# Patient Record
Sex: FEMALE | Race: WHITE | URBAN - METROPOLITAN AREA
[De-identification: names, ages, dates, MRNs, and addresses within clinical notes are randomized per-mention and may not be internally consistent; named-entity substitution may affect disease eponyms.]

---

## 2017-02-25 ENCOUNTER — EMERGENCY (EMERGENCY)
Facility: HOSPITAL | Age: 82
LOS: 0 days | Discharge: HOME | End: 2017-02-25
Attending: EMERGENCY MEDICINE | Admitting: EMERGENCY MEDICINE
Payer: MEDICARE

## 2017-02-25 VITALS
OXYGEN SATURATION: 100 % | DIASTOLIC BLOOD PRESSURE: 87 MMHG | RESPIRATION RATE: 16 BRPM | SYSTOLIC BLOOD PRESSURE: 128 MMHG

## 2017-02-25 VITALS — WEIGHT: 121.92 LBS | HEIGHT: 62 IN

## 2017-02-25 DIAGNOSIS — Z91.09 OTHER ALLERGY STATUS, OTHER THAN TO DRUGS AND BIOLOGICAL SUBSTANCES: ICD-10-CM

## 2017-02-25 DIAGNOSIS — Z88.8 ALLERGY STATUS TO OTHER DRUGS, MEDICAMENTS AND BIOLOGICAL SUBSTANCES STATUS: ICD-10-CM

## 2017-02-25 DIAGNOSIS — Z88.1 ALLERGY STATUS TO OTHER ANTIBIOTIC AGENTS STATUS: ICD-10-CM

## 2017-02-25 DIAGNOSIS — R53.1 WEAKNESS: ICD-10-CM

## 2017-02-25 DIAGNOSIS — R06.02 SHORTNESS OF BREATH: ICD-10-CM

## 2017-02-25 DIAGNOSIS — Z88.0 ALLERGY STATUS TO PENICILLIN: ICD-10-CM

## 2017-02-25 DIAGNOSIS — R19.7 DIARRHEA, UNSPECIFIED: ICD-10-CM

## 2017-02-25 DIAGNOSIS — I10 ESSENTIAL (PRIMARY) HYPERTENSION: ICD-10-CM

## 2017-02-25 DIAGNOSIS — N39.0 URINARY TRACT INFECTION, SITE NOT SPECIFIED: ICD-10-CM

## 2017-02-25 DIAGNOSIS — R53.83 OTHER FATIGUE: ICD-10-CM

## 2017-02-25 LAB
ALBUMIN SERPL ELPH-MCNC: 4 G/DL — SIGNIFICANT CHANGE UP (ref 3.3–5)
ALP SERPL-CCNC: 62 U/L — SIGNIFICANT CHANGE UP (ref 40–120)
ALT FLD-CCNC: 22 U/L — SIGNIFICANT CHANGE UP (ref 12–78)
ANION GAP SERPL CALC-SCNC: 9 MMOL/L — SIGNIFICANT CHANGE UP (ref 5–17)
APPEARANCE UR: CLEAR — SIGNIFICANT CHANGE UP
APTT BLD: 28.3 SEC — SIGNIFICANT CHANGE UP (ref 27.5–37.4)
AST SERPL-CCNC: 21 U/L — SIGNIFICANT CHANGE UP (ref 15–37)
BACTERIA # UR AUTO: (no result)
BASOPHILS # BLD AUTO: 0.1 K/UL — SIGNIFICANT CHANGE UP (ref 0–0.2)
BASOPHILS NFR BLD AUTO: 1.4 % — SIGNIFICANT CHANGE UP (ref 0–2)
BILIRUB SERPL-MCNC: 1.5 MG/DL — HIGH (ref 0.2–1.2)
BILIRUB UR-MCNC: NEGATIVE — SIGNIFICANT CHANGE UP
BUN SERPL-MCNC: 10 MG/DL — SIGNIFICANT CHANGE UP (ref 7–23)
CALCIUM SERPL-MCNC: 9.8 MG/DL — SIGNIFICANT CHANGE UP (ref 8.5–10.1)
CHLORIDE SERPL-SCNC: 102 MMOL/L — SIGNIFICANT CHANGE UP (ref 96–108)
CO2 SERPL-SCNC: 27 MMOL/L — SIGNIFICANT CHANGE UP (ref 22–31)
COLOR SPEC: YELLOW — SIGNIFICANT CHANGE UP
CREAT SERPL-MCNC: 0.66 MG/DL — SIGNIFICANT CHANGE UP (ref 0.5–1.3)
DIFF PNL FLD: (no result)
EOSINOPHIL # BLD AUTO: 0 K/UL — SIGNIFICANT CHANGE UP (ref 0–0.5)
EOSINOPHIL NFR BLD AUTO: 0.2 % — SIGNIFICANT CHANGE UP (ref 0–6)
EPI CELLS # UR: SIGNIFICANT CHANGE UP
GLUCOSE SERPL-MCNC: 83 MG/DL — SIGNIFICANT CHANGE UP (ref 70–99)
GLUCOSE UR QL: NEGATIVE MG/DL — SIGNIFICANT CHANGE UP
HCT VFR BLD CALC: 39.5 % — SIGNIFICANT CHANGE UP (ref 34.5–45)
HGB BLD-MCNC: 14.1 G/DL — SIGNIFICANT CHANGE UP (ref 11.5–15.5)
INR BLD: 1.06 RATIO — SIGNIFICANT CHANGE UP (ref 0.88–1.16)
KETONES UR-MCNC: NEGATIVE — SIGNIFICANT CHANGE UP
LEUKOCYTE ESTERASE UR-ACNC: (no result)
LIDOCAIN IGE QN: 267 U/L — SIGNIFICANT CHANGE UP (ref 73–393)
LYMPHOCYTES # BLD AUTO: 2.3 K/UL — SIGNIFICANT CHANGE UP (ref 1–3.3)
LYMPHOCYTES # BLD AUTO: 36.6 % — SIGNIFICANT CHANGE UP (ref 13–44)
MAGNESIUM SERPL-MCNC: 2.1 MG/DL — SIGNIFICANT CHANGE UP (ref 1.8–2.4)
MCHC RBC-ENTMCNC: 30.9 PG — SIGNIFICANT CHANGE UP (ref 27–34)
MCHC RBC-ENTMCNC: 35.8 GM/DL — SIGNIFICANT CHANGE UP (ref 32–36)
MCV RBC AUTO: 86.4 FL — SIGNIFICANT CHANGE UP (ref 80–100)
MONOCYTES # BLD AUTO: 0.5 K/UL — SIGNIFICANT CHANGE UP (ref 0–0.9)
MONOCYTES NFR BLD AUTO: 7.8 % — SIGNIFICANT CHANGE UP (ref 2–14)
NEUTROPHILS # BLD AUTO: 3.4 K/UL — SIGNIFICANT CHANGE UP (ref 1.8–7.4)
NEUTROPHILS NFR BLD AUTO: 54 % — SIGNIFICANT CHANGE UP (ref 43–77)
NITRITE UR-MCNC: NEGATIVE — SIGNIFICANT CHANGE UP
NT-PROBNP SERPL-SCNC: 189 PG/ML — SIGNIFICANT CHANGE UP (ref 0–450)
PH UR: 8 — SIGNIFICANT CHANGE UP (ref 4.8–8)
PHOSPHATE SERPL-MCNC: 3 MG/DL — SIGNIFICANT CHANGE UP (ref 2.5–4.5)
PLATELET # BLD AUTO: 248 K/UL — SIGNIFICANT CHANGE UP (ref 150–400)
POTASSIUM SERPL-MCNC: 4.2 MMOL/L — SIGNIFICANT CHANGE UP (ref 3.5–5.3)
POTASSIUM SERPL-SCNC: 4.2 MMOL/L — SIGNIFICANT CHANGE UP (ref 3.5–5.3)
PROT SERPL-MCNC: 7.2 GM/DL — SIGNIFICANT CHANGE UP (ref 6–8.3)
PROT UR-MCNC: NEGATIVE MG/DL — SIGNIFICANT CHANGE UP
PROTHROM AB SERPL-ACNC: 11.7 SEC — SIGNIFICANT CHANGE UP (ref 10–13.1)
RBC # BLD: 4.57 M/UL — SIGNIFICANT CHANGE UP (ref 3.8–5.2)
RBC # FLD: 11.2 % — SIGNIFICANT CHANGE UP (ref 10.3–14.5)
RBC CASTS # UR COMP ASSIST: NEGATIVE /HPF — SIGNIFICANT CHANGE UP (ref 0–4)
SODIUM SERPL-SCNC: 138 MMOL/L — SIGNIFICANT CHANGE UP (ref 135–145)
SP GR SPEC: 1.01 — SIGNIFICANT CHANGE UP (ref 1.01–1.02)
TROPONIN I SERPL-MCNC: <0.015 NG/ML — SIGNIFICANT CHANGE UP (ref 0.01–0.04)
UROBILINOGEN FLD QL: NEGATIVE MG/DL — SIGNIFICANT CHANGE UP
WBC # BLD: 6.2 K/UL — SIGNIFICANT CHANGE UP (ref 3.8–10.5)
WBC # FLD AUTO: 6.2 K/UL — SIGNIFICANT CHANGE UP (ref 3.8–10.5)
WBC UR QL: SIGNIFICANT CHANGE UP

## 2017-02-25 PROCEDURE — 99285 EMERGENCY DEPT VISIT HI MDM: CPT

## 2017-02-25 PROCEDURE — 70450 CT HEAD/BRAIN W/O DYE: CPT | Mod: 26

## 2017-02-25 PROCEDURE — 71010: CPT | Mod: 26

## 2017-02-25 PROCEDURE — 74177 CT ABD & PELVIS W/CONTRAST: CPT | Mod: 26

## 2017-02-25 RX ORDER — NITROFURANTOIN MACROCRYSTAL 50 MG
1 CAPSULE ORAL
Qty: 20 | Refills: 0
Start: 2017-02-25 | End: 2017-03-07

## 2017-02-25 RX ORDER — NITROFURANTOIN MACROCRYSTAL 50 MG
100 CAPSULE ORAL ONCE
Qty: 0 | Refills: 0 | Status: DISCONTINUED | OUTPATIENT
Start: 2017-02-25 | End: 2017-02-25

## 2017-02-25 NOTE — ED PROVIDER NOTE - ENMT, MLM
Airway patent, Nasal mucosa clear. DMM. Throat has no vesicles, no oropharyngeal exudates and uvula is midline.

## 2017-02-25 NOTE — ED PROVIDER NOTE - PROGRESS NOTE DETAILS
patient reevaluated; awake and alert; ambulatory in ED with steady gait  results discussed with patient and family  patient at baseline  will tx for UTI, and patient to follow up with pmd

## 2017-02-25 NOTE — ED PROVIDER NOTE - NS ED MD SCRIBE ATTENDING SCRIBE SECTIONS
PAST MEDICAL/SURGICAL/SOCIAL HISTORY/REVIEW OF SYSTEMS/DISPOSITION/HISTORY OF PRESENT ILLNESS/PROGRESS NOTE/RESULTS/PHYSICAL EXAM

## 2017-02-25 NOTE — ED PROVIDER NOTE - OBJECTIVE STATEMENT
82 y/o female with a h/o HTN, HLD, hiatal hernia, BIB daughter, son, and daughter-in-law, p/w SOB, fatigue, and increased confusion x2 days. Pt has had increasing confusion and diarrhea x2 months. No nausea or vomiting. Pt has been under a lot of stress because she has been taking care of her ill  at home. No sick contacts or recent travel. Non-smoker, non-drinker, no illicit drug use. On crestor, metoprolol, baby aspirin and xanax. Dr. Pelaez (PMD).

## 2017-02-26 LAB
CULTURE RESULTS: SIGNIFICANT CHANGE UP
SPECIMEN SOURCE: SIGNIFICANT CHANGE UP

## 2019-05-22 ENCOUNTER — EMERGENCY (EMERGENCY)
Facility: HOSPITAL | Age: 84
LOS: 0 days | Discharge: HOME | End: 2019-05-22
Attending: EMERGENCY MEDICINE | Admitting: EMERGENCY MEDICINE
Payer: MEDICARE

## 2019-05-22 VITALS
HEART RATE: 69 BPM | OXYGEN SATURATION: 99 % | HEIGHT: 61 IN | WEIGHT: 139.99 LBS | DIASTOLIC BLOOD PRESSURE: 78 MMHG | TEMPERATURE: 98 F | SYSTOLIC BLOOD PRESSURE: 149 MMHG | RESPIRATION RATE: 16 BRPM

## 2019-05-22 VITALS
TEMPERATURE: 98 F | OXYGEN SATURATION: 98 % | DIASTOLIC BLOOD PRESSURE: 76 MMHG | RESPIRATION RATE: 17 BRPM | HEART RATE: 67 BPM | SYSTOLIC BLOOD PRESSURE: 149 MMHG

## 2019-05-22 DIAGNOSIS — Z90.49 ACQUIRED ABSENCE OF OTHER SPECIFIED PARTS OF DIGESTIVE TRACT: ICD-10-CM

## 2019-05-22 DIAGNOSIS — R07.89 OTHER CHEST PAIN: ICD-10-CM

## 2019-05-22 DIAGNOSIS — Z88.1 ALLERGY STATUS TO OTHER ANTIBIOTIC AGENTS STATUS: ICD-10-CM

## 2019-05-22 DIAGNOSIS — I49.1 ATRIAL PREMATURE DEPOLARIZATION: ICD-10-CM

## 2019-05-22 DIAGNOSIS — E78.5 HYPERLIPIDEMIA, UNSPECIFIED: ICD-10-CM

## 2019-05-22 DIAGNOSIS — F41.9 ANXIETY DISORDER, UNSPECIFIED: ICD-10-CM

## 2019-05-22 DIAGNOSIS — Z88.8 ALLERGY STATUS TO OTHER DRUGS, MEDICAMENTS AND BIOLOGICAL SUBSTANCES: ICD-10-CM

## 2019-05-22 DIAGNOSIS — I10 ESSENTIAL (PRIMARY) HYPERTENSION: ICD-10-CM

## 2019-05-22 DIAGNOSIS — Z88.0 ALLERGY STATUS TO PENICILLIN: ICD-10-CM

## 2019-05-22 DIAGNOSIS — K44.9 DIAPHRAGMATIC HERNIA WITHOUT OBSTRUCTION OR GANGRENE: ICD-10-CM

## 2019-05-22 DIAGNOSIS — K21.9 GASTRO-ESOPHAGEAL REFLUX DISEASE WITHOUT ESOPHAGITIS: ICD-10-CM

## 2019-05-22 LAB
ALBUMIN SERPL ELPH-MCNC: 3.8 G/DL — SIGNIFICANT CHANGE UP (ref 3.3–5)
ALP SERPL-CCNC: 89 U/L — SIGNIFICANT CHANGE UP (ref 40–120)
ALT FLD-CCNC: 20 U/L — SIGNIFICANT CHANGE UP (ref 12–78)
ANION GAP SERPL CALC-SCNC: 4 MMOL/L — LOW (ref 5–17)
AST SERPL-CCNC: 24 U/L — SIGNIFICANT CHANGE UP (ref 15–37)
BILIRUB SERPL-MCNC: 0.8 MG/DL — SIGNIFICANT CHANGE UP (ref 0.2–1.2)
BUN SERPL-MCNC: 18 MG/DL — SIGNIFICANT CHANGE UP (ref 7–23)
CALCIUM SERPL-MCNC: 9.7 MG/DL — SIGNIFICANT CHANGE UP (ref 8.5–10.1)
CHLORIDE SERPL-SCNC: 101 MMOL/L — SIGNIFICANT CHANGE UP (ref 96–108)
CO2 SERPL-SCNC: 28 MMOL/L — SIGNIFICANT CHANGE UP (ref 22–31)
CREAT SERPL-MCNC: 0.75 MG/DL — SIGNIFICANT CHANGE UP (ref 0.5–1.3)
GLUCOSE SERPL-MCNC: 101 MG/DL — HIGH (ref 70–99)
HCT VFR BLD CALC: 37.2 % — SIGNIFICANT CHANGE UP (ref 34.5–45)
HGB BLD-MCNC: 12.4 G/DL — SIGNIFICANT CHANGE UP (ref 11.5–15.5)
LIDOCAIN IGE QN: 259 U/L — SIGNIFICANT CHANGE UP (ref 73–393)
MCHC RBC-ENTMCNC: 30 PG — SIGNIFICANT CHANGE UP (ref 27–34)
MCHC RBC-ENTMCNC: 33.3 GM/DL — SIGNIFICANT CHANGE UP (ref 32–36)
MCV RBC AUTO: 89.9 FL — SIGNIFICANT CHANGE UP (ref 80–100)
NT-PROBNP SERPL-SCNC: 119 PG/ML — SIGNIFICANT CHANGE UP (ref 0–450)
PLATELET # BLD AUTO: 209 K/UL — SIGNIFICANT CHANGE UP (ref 150–400)
POTASSIUM SERPL-MCNC: 4.1 MMOL/L — SIGNIFICANT CHANGE UP (ref 3.5–5.3)
POTASSIUM SERPL-SCNC: 4.1 MMOL/L — SIGNIFICANT CHANGE UP (ref 3.5–5.3)
PROT SERPL-MCNC: 7.4 GM/DL — SIGNIFICANT CHANGE UP (ref 6–8.3)
RBC # BLD: 4.14 M/UL — SIGNIFICANT CHANGE UP (ref 3.8–5.2)
RBC # FLD: 13 % — SIGNIFICANT CHANGE UP (ref 10.3–14.5)
SODIUM SERPL-SCNC: 133 MMOL/L — LOW (ref 135–145)
TROPONIN I SERPL-MCNC: <0.015 NG/ML — SIGNIFICANT CHANGE UP (ref 0.01–0.04)
TROPONIN I SERPL-MCNC: <0.015 NG/ML — SIGNIFICANT CHANGE UP (ref 0.01–0.04)
WBC # BLD: 7.02 K/UL — SIGNIFICANT CHANGE UP (ref 3.8–10.5)
WBC # FLD AUTO: 7.02 K/UL — SIGNIFICANT CHANGE UP (ref 3.8–10.5)

## 2019-05-22 PROCEDURE — 71045 X-RAY EXAM CHEST 1 VIEW: CPT | Mod: 26

## 2019-05-22 PROCEDURE — 99284 EMERGENCY DEPT VISIT MOD MDM: CPT

## 2019-05-22 PROCEDURE — 93010 ELECTROCARDIOGRAM REPORT: CPT

## 2019-05-22 NOTE — ED PROVIDER NOTE - CARE PLAN
Principal Discharge DX:	Atypical chest pain  Secondary Diagnosis:	Hiatal hernia with GERD  Secondary Diagnosis:	Anxiety

## 2019-05-22 NOTE — ED ADULT TRIAGE NOTE - CHIEF COMPLAINT QUOTE
pt was at home when she began feeling overwhelmed ( her  just returned home from the hospital) got nauseous and grabbed her chest

## 2019-05-22 NOTE — ED PROVIDER NOTE - NSFOLLOWUPINSTRUCTIONS_ED_ALL_ED_FT
Continue current medications as per routine.  Avoid spicy, fried, greasy foods.  Avoid eating 2 hours before bedtime.  Follow up Dr. Pelaez: call office tomorrow for appointment.  Follow up with own GI doctor next week.      ED evaluation and management discussed with the patient and family (if available) in detail.  Close PMD follow up encouraged.  Strict ED return instructions discussed in detail and patient given the opportunity to ask any questions about their discharge diagnosis and instructions. Patient verbalized understanding.        Chest Pain    Chest pain can be caused by many different conditions which may or may not be dangerous. Causes include heartburn, lung infections, heart attack, blood clot in lungs, skin infections, strain or damage to muscle, cartilage, or bones, etc. In addition to a history and physical examination, an electrocardiogram (ECG) or other lab tests may have been performed to determine the cause of your chest pain. Follow up with your primary care provider or with a cardiologist as instructed.     SEEK IMMEDIATE MEDICAL CARE IF YOU HAVE ANY OF THE FOLLOWING SYMPTOMS: worsening chest pain, coughing up blood, unexplained back/neck/jaw pain, severe abdominal pain, dizziness or lightheadedness, fainting, shortness of breath, sweaty or clammy skin, vomiting, or racing heart beat. These symptoms may represent a serious problem that is an emergency. Do not wait to see if the symptoms will go away. Get medical help right away. Call 911 and do not drive yourself to the hospital. Continue current medications as per routine.  Avoid spicy, fried, greasy foods.  Avoid eating 2 hours before bedtime.  Follow up Dr. Pelaez: call office tomorrow for appointment.  Follow up with own GI doctor next week.      ED evaluation and management discussed with the patient and family (if available) in detail.  Close PMD follow up encouraged.  Strict ED return instructions discussed in detail and patient given the opportunity to ask any questions about their discharge diagnosis and instructions. Patient verbalized understanding.        Chest Pain    Chest pain can be caused by many different conditions which may or may not be dangerous. Causes include heartburn, lung infections, heart attack, blood clot in lungs, skin infections, strain or damage to muscle, cartilage, or bones, etc. In addition to a history and physical examination, an electrocardiogram (ECG) or other lab tests may have been performed to determine the cause of your chest pain. Follow up with your primary care provider or with a cardiologist as instructed.     SEEK IMMEDIATE MEDICAL CARE IF YOU HAVE ANY OF THE FOLLOWING SYMPTOMS: worsening chest pain, coughing up blood, unexplained back/neck/jaw pain, severe abdominal pain, dizziness or lightheadedness, fainting, shortness of breath, sweaty or clammy skin, vomiting, or racing heart beat. These symptoms may represent a serious problem that is an emergency. Do not wait to see if the symptoms will go away. Get medical help right away. Call 911 and do not drive yourself to the hospital.        Hiatal Hernia  Image   A hiatal hernia occurs when part of the stomach slides above the muscle that separates the abdomen from the chest (diaphragm). A person can be born with a hiatal hernia (congenital), or it may develop over time. In almost all cases of hiatal hernia, only the top part of the stomach pushes through the diaphragm.    Many people have a hiatal hernia with no symptoms. The larger the hernia, the more likely it is that you will have symptoms. In some cases, a hiatal hernia allows stomach acid to flow back into the tube that carries food from your mouth to your stomach (esophagus). This may cause heartburn symptoms. Severe heartburn symptoms may mean that you have developed a condition called gastroesophageal reflux disease (GERD).    What are the causes?  This condition is caused by a weakness in the opening (hiatus) where the esophagus passes through the diaphragm to attach to the upper part of the stomach. A person may be born with a weakness in the hiatus, or a weakness can develop over time.    What increases the risk?  This condition is more likely to develop in:  Older people. Age is a major risk factor for a hiatal hernia, especially if you are over the age of 50.  Pregnant women.  People who are overweight.  People who have frequent constipation.  What are the signs or symptoms?  Symptoms of this condition usually develop in the form of GERD symptoms. Symptoms include:  Heartburn.  Belching.  Indigestion.  Trouble swallowing.  Coughing or wheezing.  Sore throat.  Hoarseness.  Chest pain.  Nausea and vomiting.  How is this diagnosed?  This condition may be diagnosed during testing for GERD. Tests that may be done include:  X-rays of your stomach or chest.  An upper gastrointestinal (GI) series. This is an X-ray exam of your GI tract that is taken after you swallow a chalky liquid that shows up clearly on the X-ray.  Endoscopy. This is a procedure to look into your stomach using a thin, flexible tube that has a tiny camera and light on the end of it.  How is this treated?  This condition may be treated by:  Dietary and lifestyle changes to help reduce GERD symptoms.  Medicines. These may include:  Over-the-counter antacids.  Medicines that make your stomach empty more quickly.  Medicines that block the production of stomach acid (H2 blockers).  Stronger medicines to reduce stomach acid (proton pump inhibitors).  Surgery to repair the hernia, if other treatments are not helping.  If you have no symptoms, you may not need treatment.    Follow these instructions at home:  Lifestyle and activity     Do not use any products that contain nicotine or tobacco, such as cigarettes and e-cigarettes. If you need help quitting, ask your health care provider.  Try to achieve and maintain a healthy body weight.  Avoid putting pressure on your abdomen. Anything that puts pressure on your abdomen increases the amount of acid that may be pushed up into your esophagus.  Avoid bending over, especially after eating.  Raise the head of your bed by putting blocks under the legs. This keeps your head and esophagus higher than your stomach.  Do not wear tight clothing around your chest or stomach.  Try not to strain when having a bowel movement, when urinating, or when lifting heavy objects.  Eating and drinking     Avoid foods that can worsen GERD symptoms. These may include:  Fatty foods, like fried foods.  Citrus fruits, like oranges or lemon.  Other foods and drinks that contain acid, like orange juice or tomatoes.  Spicy food.  Chocolate.  Eat frequent small meals instead of three large meals a day. This helps prevent your stomach from getting too full.  Eat slowly.  Do not lie down right after eating.  Do not eat 1–2 hours before bed.  Do not drink beverages with caffeine. These include cola, coffee, cocoa, and tea.  Do not drink alcohol.  General instructions     Take over-the-counter and prescription medicines only as told by your health care provider.  Keep all follow-up visits as told by your health care provider. This is important.  Contact a health care provider if:  Your symptoms are not controlled with medicines or lifestyle changes.  You are having trouble swallowing.  You have coughing or wheezing that will not go away.  Get help right away if:  Your pain is getting worse.  Your pain spreads to your arms, neck, jaw, teeth, or back.  You have shortness of breath.  You sweat for no reason.  You feel sick to your stomach (nauseous) or you vomit.  You vomit blood.  You have bright red blood in your stools.  You have black, tarry stools.  This information is not intended to replace advice given to you by your health care provider. Make sure you discuss any questions you have with your health care provider.        Anxiety    Generalized anxiety disorder (MARLEY) is a mental disorder. It is defined as anxiety that is not necessarily related to specific events or activities or is out of proportion to said events. Symptoms include restlessness, fatigue, difficulty concentrations, irritability and difficulty concentrating. It may interfere with life functions, including relationships, work, and school. If you were started on a medication, make sure to take exactly as prescribed and follow up with a psychiatrist.    SEEK IMMEDIATE MEDICAL CARE IF YOU HAVE ANY OF THE FOLLOWING SYMPTOMS: thoughts about hurting killing yourself, thoughts about hurting or killing somebody else, hallucinations, or worsening depression.

## 2019-05-22 NOTE — ED PROVIDER NOTE - OBJECTIVE STATEMENT
82 y/o F with PMHx of HTN, HLD, Hernia, PAC's, s/p cholecystectomy presenting to the ED c/o burning of chest/epigastric region that started 2.5 hours sudden onset. Patient said she made dinner, and all of a sudden she could not catch her breath and had burning in chest/epigastric area that lasted 5 minutes, without recurrence. After the burning stopped patient was limp +Nauseous, +unable to vomit, +diaphoretic.  Patient has recently been under a lot of stress due to her  being in and out of the hospital. Patient is currently asymptomatic. Denies lightheadedness, SOB, CP . Allergic to Biaxin, Demerol, Doxycycline, Indocin, Penicillins, Tetracyclines, Verapamil  GI Dr. Goldberg   PCD: Dr. Pelaez

## 2019-05-22 NOTE — ED PROVIDER NOTE - CLINICAL SUMMARY MEDICAL DECISION MAKING FREE TEXT BOX
82 y/o F with PMHx of HTN, HLD, Hernia, PAC's, s/p cholecystectomy presenting to the ED BIBA c/o burning of chest/epigastric region that started 2.5 hours sudden onset, that was incurred when eating dinner with mustard. Episode lasted 5-10 minutes non recurrent. Patient is asymptomatic in the ED, but she is slightly anxious.  Plan: EKG, CXR, labs including BNP troponin, monitor, observe reassess and repeat troponin after 2130. 84 y/o F with PMHx of HTN, HLD, Hernia, PAC's, s/p cholecystectomy presenting to the ED BIBA c/o burning of chest/epigastric region that started 2.5 hours sudden onset, that was incurred when eating dinner with mustard. Episode lasted 5-10 minutes non recurrent. Patient is asymptomatic in the ED, but she is slightly anxious. Plan: EKG, CXR, labs including BNP troponin, monitor, observe reassess and repeat troponin after 2130.

## 2019-05-22 NOTE — ED ADULT NURSE REASSESSMENT NOTE - NS ED NURSE REASSESS COMMENT FT1
change of shift report received from NEDRA Santamaria for continuation of care. pt is awake and alert. sitting upright in stretcher, awaiting evaluation by MD Mahoney. pt stating that she made dinner, "salmon and I put a little mustard on it, then I felt burning." as per family pt began to sweat at that time. pt has been placed on the cardiac monitor. pt states that her  has recently been discharged from this hospital and she feels overwhelmed caring for him. no changes in diet habits. vital signs as charted. will continue to monitor.

## 2019-05-22 NOTE — ED PROVIDER NOTE - NEUROLOGICAL, MLM
Cranial Nerves II-XII intact. Alert and oriented, no focal deficits, no motor or sensory deficits. Cranial Nerves II-XII intact.

## 2019-05-23 PROBLEM — I10 ESSENTIAL (PRIMARY) HYPERTENSION: Chronic | Status: ACTIVE | Noted: 2017-02-27

## 2020-01-07 ENCOUNTER — INPATIENT (INPATIENT)
Facility: HOSPITAL | Age: 85
LOS: 1 days | Discharge: HOME | DRG: 69 | End: 2020-01-09
Attending: INTERNAL MEDICINE | Admitting: INTERNAL MEDICINE
Payer: MEDICARE

## 2020-01-07 VITALS
TEMPERATURE: 98 F | RESPIRATION RATE: 18 BRPM | HEART RATE: 72 BPM | SYSTOLIC BLOOD PRESSURE: 150 MMHG | OXYGEN SATURATION: 100 % | DIASTOLIC BLOOD PRESSURE: 77 MMHG | HEIGHT: 61 IN | WEIGHT: 145.95 LBS

## 2020-01-07 DIAGNOSIS — M79.602 PAIN IN LEFT ARM: ICD-10-CM

## 2020-01-07 DIAGNOSIS — I10 ESSENTIAL (PRIMARY) HYPERTENSION: ICD-10-CM

## 2020-01-07 DIAGNOSIS — G45.9 TRANSIENT CEREBRAL ISCHEMIC ATTACK, UNSPECIFIED: ICD-10-CM

## 2020-01-07 DIAGNOSIS — F32.9 MAJOR DEPRESSIVE DISORDER, SINGLE EPISODE, UNSPECIFIED: ICD-10-CM

## 2020-01-07 LAB
ALBUMIN SERPL ELPH-MCNC: 3.5 G/DL — SIGNIFICANT CHANGE UP (ref 3.3–5)
ALP SERPL-CCNC: 100 U/L — SIGNIFICANT CHANGE UP (ref 40–120)
ALT FLD-CCNC: 25 U/L — SIGNIFICANT CHANGE UP (ref 12–78)
ANION GAP SERPL CALC-SCNC: 6 MMOL/L — SIGNIFICANT CHANGE UP (ref 5–17)
APPEARANCE UR: CLEAR — SIGNIFICANT CHANGE UP
APTT BLD: 28 SEC — SIGNIFICANT CHANGE UP (ref 27.5–36.3)
AST SERPL-CCNC: 24 U/L — SIGNIFICANT CHANGE UP (ref 15–37)
BASOPHILS # BLD AUTO: 0.04 K/UL — SIGNIFICANT CHANGE UP (ref 0–0.2)
BASOPHILS NFR BLD AUTO: 0.9 % — SIGNIFICANT CHANGE UP (ref 0–2)
BILIRUB SERPL-MCNC: 0.6 MG/DL — SIGNIFICANT CHANGE UP (ref 0.2–1.2)
BILIRUB UR-MCNC: NEGATIVE — SIGNIFICANT CHANGE UP
BUN SERPL-MCNC: 15 MG/DL — SIGNIFICANT CHANGE UP (ref 7–23)
CALCIUM SERPL-MCNC: 9 MG/DL — SIGNIFICANT CHANGE UP (ref 8.5–10.1)
CHLORIDE SERPL-SCNC: 106 MMOL/L — SIGNIFICANT CHANGE UP (ref 96–108)
CO2 SERPL-SCNC: 26 MMOL/L — SIGNIFICANT CHANGE UP (ref 22–31)
COLOR SPEC: YELLOW — SIGNIFICANT CHANGE UP
CREAT SERPL-MCNC: 0.84 MG/DL — SIGNIFICANT CHANGE UP (ref 0.5–1.3)
DIFF PNL FLD: ABNORMAL
EOSINOPHIL # BLD AUTO: 0.11 K/UL — SIGNIFICANT CHANGE UP (ref 0–0.5)
EOSINOPHIL NFR BLD AUTO: 2.4 % — SIGNIFICANT CHANGE UP (ref 0–6)
GLUCOSE SERPL-MCNC: 106 MG/DL — HIGH (ref 70–99)
GLUCOSE UR QL: NEGATIVE MG/DL — SIGNIFICANT CHANGE UP
HCT VFR BLD CALC: 37 % — SIGNIFICANT CHANGE UP (ref 34.5–45)
HGB BLD-MCNC: 12.5 G/DL — SIGNIFICANT CHANGE UP (ref 11.5–15.5)
IMM GRANULOCYTES NFR BLD AUTO: 0.2 % — SIGNIFICANT CHANGE UP (ref 0–1.5)
INR BLD: 0.96 RATIO — SIGNIFICANT CHANGE UP (ref 0.88–1.16)
KETONES UR-MCNC: ABNORMAL
LEUKOCYTE ESTERASE UR-ACNC: ABNORMAL
LYMPHOCYTES # BLD AUTO: 1.77 K/UL — SIGNIFICANT CHANGE UP (ref 1–3.3)
LYMPHOCYTES # BLD AUTO: 38.5 % — SIGNIFICANT CHANGE UP (ref 13–44)
MAGNESIUM SERPL-MCNC: 1.8 MG/DL — SIGNIFICANT CHANGE UP (ref 1.6–2.6)
MCHC RBC-ENTMCNC: 29.8 PG — SIGNIFICANT CHANGE UP (ref 27–34)
MCHC RBC-ENTMCNC: 33.8 GM/DL — SIGNIFICANT CHANGE UP (ref 32–36)
MCV RBC AUTO: 88.3 FL — SIGNIFICANT CHANGE UP (ref 80–100)
MONOCYTES # BLD AUTO: 0.43 K/UL — SIGNIFICANT CHANGE UP (ref 0–0.9)
MONOCYTES NFR BLD AUTO: 9.3 % — SIGNIFICANT CHANGE UP (ref 2–14)
NEUTROPHILS # BLD AUTO: 2.24 K/UL — SIGNIFICANT CHANGE UP (ref 1.8–7.4)
NEUTROPHILS NFR BLD AUTO: 48.7 % — SIGNIFICANT CHANGE UP (ref 43–77)
NITRITE UR-MCNC: NEGATIVE — SIGNIFICANT CHANGE UP
PH UR: 6 — SIGNIFICANT CHANGE UP (ref 5–8)
PLATELET # BLD AUTO: 206 K/UL — SIGNIFICANT CHANGE UP (ref 150–400)
POTASSIUM SERPL-MCNC: 3.6 MMOL/L — SIGNIFICANT CHANGE UP (ref 3.5–5.3)
POTASSIUM SERPL-SCNC: 3.6 MMOL/L — SIGNIFICANT CHANGE UP (ref 3.5–5.3)
PROT SERPL-MCNC: 7 GM/DL — SIGNIFICANT CHANGE UP (ref 6–8.3)
PROT UR-MCNC: 15 MG/DL
PROTHROM AB SERPL-ACNC: 10.6 SEC — SIGNIFICANT CHANGE UP (ref 10–12.9)
RBC # BLD: 4.19 M/UL — SIGNIFICANT CHANGE UP (ref 3.8–5.2)
RBC # FLD: 13.1 % — SIGNIFICANT CHANGE UP (ref 10.3–14.5)
SODIUM SERPL-SCNC: 138 MMOL/L — SIGNIFICANT CHANGE UP (ref 135–145)
SP GR SPEC: 1.02 — SIGNIFICANT CHANGE UP (ref 1.01–1.02)
TROPONIN I SERPL-MCNC: <0.015 NG/ML — SIGNIFICANT CHANGE UP (ref 0.01–0.04)
UROBILINOGEN FLD QL: 1 MG/DL
VIT B12 SERPL-MCNC: 752 PG/ML — SIGNIFICANT CHANGE UP (ref 232–1245)
WBC # BLD: 4.6 K/UL — SIGNIFICANT CHANGE UP (ref 3.8–10.5)
WBC # FLD AUTO: 4.6 K/UL — SIGNIFICANT CHANGE UP (ref 3.8–10.5)

## 2020-01-07 PROCEDURE — 80053 COMPREHEN METABOLIC PANEL: CPT

## 2020-01-07 PROCEDURE — 70551 MRI BRAIN STEM W/O DYE: CPT | Mod: 26

## 2020-01-07 PROCEDURE — 72198 MR ANGIO PELVIS W/O & W/DYE: CPT | Mod: 26

## 2020-01-07 PROCEDURE — 72141 MRI NECK SPINE W/O DYE: CPT | Mod: 26

## 2020-01-07 PROCEDURE — 70544 MR ANGIOGRAPHY HEAD W/O DYE: CPT | Mod: 26,59

## 2020-01-07 PROCEDURE — 71045 X-RAY EXAM CHEST 1 VIEW: CPT | Mod: 26

## 2020-01-07 PROCEDURE — 85025 COMPLETE CBC W/AUTO DIFF WBC: CPT

## 2020-01-07 PROCEDURE — 70450 CT HEAD/BRAIN W/O DYE: CPT | Mod: 26

## 2020-01-07 PROCEDURE — 70492 CT SFT TSUE NCK W/O & W/DYE: CPT

## 2020-01-07 PROCEDURE — 80061 LIPID PANEL: CPT

## 2020-01-07 PROCEDURE — 70547 MR ANGIOGRAPHY NECK W/O DYE: CPT | Mod: 26

## 2020-01-07 PROCEDURE — 36415 COLL VENOUS BLD VENIPUNCTURE: CPT

## 2020-01-07 PROCEDURE — 82607 VITAMIN B-12: CPT

## 2020-01-07 PROCEDURE — 84443 ASSAY THYROID STIM HORMONE: CPT

## 2020-01-07 RX ORDER — ACETAMINOPHEN 500 MG
650 TABLET ORAL EVERY 6 HOURS
Refills: 0 | Status: DISCONTINUED | OUTPATIENT
Start: 2020-01-07 | End: 2020-01-09

## 2020-01-07 RX ORDER — PSYLLIUM SEED (WITH DEXTROSE)
1 POWDER (GRAM) ORAL DAILY
Refills: 0 | Status: DISCONTINUED | OUTPATIENT
Start: 2020-01-07 | End: 2020-01-09

## 2020-01-07 RX ORDER — ASPIRIN/CALCIUM CARB/MAGNESIUM 324 MG
81 TABLET ORAL DAILY
Refills: 0 | Status: DISCONTINUED | OUTPATIENT
Start: 2020-01-07 | End: 2020-01-09

## 2020-01-07 RX ORDER — DEXTROAMPHETAMINE SACCHARATE, AMPHETAMINE ASPARTATE, DEXTROAMPHETAMINE SULFATE AND AMPHETAMINE SULFATE 1.875; 1.875; 1.875; 1.875 MG/1; MG/1; MG/1; MG/1
10 TABLET ORAL DAILY
Refills: 0 | Status: DISCONTINUED | OUTPATIENT
Start: 2020-01-07 | End: 2020-01-09

## 2020-01-07 RX ORDER — ASPIRIN/CALCIUM CARB/MAGNESIUM 324 MG
325 TABLET ORAL DAILY
Refills: 0 | Status: DISCONTINUED | OUTPATIENT
Start: 2020-01-07 | End: 2020-01-07

## 2020-01-07 RX ORDER — LANOLIN ALCOHOL/MO/W.PET/CERES
3 CREAM (GRAM) TOPICAL AT BEDTIME
Refills: 0 | Status: DISCONTINUED | OUTPATIENT
Start: 2020-01-07 | End: 2020-01-09

## 2020-01-07 RX ORDER — PANTOPRAZOLE SODIUM 20 MG/1
40 TABLET, DELAYED RELEASE ORAL
Refills: 0 | Status: DISCONTINUED | OUTPATIENT
Start: 2020-01-07 | End: 2020-01-09

## 2020-01-07 RX ORDER — METOPROLOL TARTRATE 50 MG
25 TABLET ORAL DAILY
Refills: 0 | Status: DISCONTINUED | OUTPATIENT
Start: 2020-01-07 | End: 2020-01-09

## 2020-01-07 RX ORDER — ATORVASTATIN CALCIUM 80 MG/1
40 TABLET, FILM COATED ORAL AT BEDTIME
Refills: 0 | Status: DISCONTINUED | OUTPATIENT
Start: 2020-01-07 | End: 2020-01-09

## 2020-01-07 RX ADMIN — Medication 3 MILLIGRAM(S): at 22:40

## 2020-01-07 RX ADMIN — ATORVASTATIN CALCIUM 40 MILLIGRAM(S): 80 TABLET, FILM COATED ORAL at 22:22

## 2020-01-07 RX ADMIN — Medication 325 MILLIGRAM(S): at 04:42

## 2020-01-07 RX ADMIN — Medication 20 MILLIGRAM(S): at 13:30

## 2020-01-07 RX ADMIN — Medication 25 MILLIGRAM(S): at 13:31

## 2020-01-07 RX ADMIN — DEXTROAMPHETAMINE SACCHARATE, AMPHETAMINE ASPARTATE, DEXTROAMPHETAMINE SULFATE AND AMPHETAMINE SULFATE 10 MILLIGRAM(S): 1.875; 1.875; 1.875; 1.875 TABLET ORAL at 13:32

## 2020-01-07 RX ADMIN — PANTOPRAZOLE SODIUM 40 MILLIGRAM(S): 20 TABLET, DELAYED RELEASE ORAL at 16:59

## 2020-01-07 RX ADMIN — Medication 1 TABLET(S): at 13:30

## 2020-01-07 NOTE — H&P ADULT - HISTORY OF PRESENT ILLNESS
Pt is a 85 y/o/f  presents to the ED with children.  Pt with episode of waking up screaming, not making sense and difficulty standing.  Pt is home wiht  and aide.  Son said he called and pt was not making sense when she was talking.  Pt states that her left arm was bother her (has had numbness left arm/hand for 1 month).  pt states she was awake and wanted to talk, but words not coming out.  EMS called and by the time they arrived, she was back to normal.  Pt went to sleep at 9pm and woke at 2am with symptoms. Initially patient was screaming. then when son was called patient was talking gibberish.      Care discussed with the son on the phone. Patient denies any HA, CP, SOB. Feels more forgetful than ussual. Labs and vitals reviewed.

## 2020-01-07 NOTE — H&P ADULT - NSHPLABSRESULTS_GEN_ALL_CORE
CBC Full  -  ( 2020 03:52 )  WBC Count : 4.60 K/uL  RBC Count : 4.19 M/uL  Hemoglobin : 12.5 g/dL  Hematocrit : 37.0 %  Platelet Count - Automated : 206 K/uL  Mean Cell Volume : 88.3 fl  Mean Cell Hemoglobin : 29.8 pg  Mean Cell Hemoglobin Concentration : 33.8 gm/dL  Auto Neutrophil # : 2.24 K/uL  Auto Lymphocyte # : 1.77 K/uL  Auto Monocyte # : 0.43 K/uL  Auto Eosinophil # : 0.11 K/uL  Auto Basophil # : 0.04 K/uL  Auto Neutrophil % : 48.7 %  Auto Lymphocyte % : 38.5 %  Auto Monocyte % : 9.3 %  Auto Eosinophil % : 2.4 %  Auto Basophil % : 0.9 %    PT/INR - ( 2020 03:52 )   PT: 10.6 sec;   INR: 0.96 ratio         PTT - ( 2020 03:52 )  PTT:28.0 sec  Urinalysis Basic - ( 2020 03:52 )    Color: Yellow / Appearance: Clear / S.020 / pH: x  Gluc: x / Ketone: Small  / Bili: Negative / Urobili: 1 mg/dL   Blood: x / Protein: 15 mg/dL / Nitrite: Negative   Leuk Esterase: Moderate / RBC: 6-10 /HPF / WBC 11-25   Sq Epi: x / Non Sq Epi: Few / Bacteria: Few          138  |  106  |  15  ----------------------------<  106<H>  3.6   |  26  |  0.84    Ca    9.0      2020 03:52  Mg     1.8         TPro  7.0  /  Alb  3.5  /  TBili  0.6  /  DBili  x   /  AST  24  /  ALT  25  /  AlkPhos  100

## 2020-01-07 NOTE — ED PROVIDER NOTE - PROGRESS NOTE DETAILS
Attending Natali, neurology paged Attending HOMA Hurst/ar Rodriguez and agrees with plan for TIA workup Attending HOMA Hurst/w Dr. Rodriguez and agrees with plan for TIA workup  family updated on results and plan Attending HOMA Hurst/w Dr. Kim for admission

## 2020-01-07 NOTE — H&P ADULT - NSHPREVIEWOFSYSTEMS_GEN_ALL_CORE
REVIEW OF SYSTEMS:  General: NAD, hemodynamically stable   HEENT:  Eyes:  No visual loss, blurred vision, double vision or yellow sclerae. Ears, Nose, Throat:  No hearing loss, sneezing, congestion, runny nose or sore throat.  SKIN:  No rash or itching.  CARDIOVASCULAR:  No chest pain, chest pressure or chest discomfort. No palpitations or edema.  RESPIRATORY:  No shortness of breath, cough or sputum.  GASTROINTESTINAL:  No anorexia, nausea, vomiting or diarrhea. No abdominal pain or blood.  NEUROLOGICAL:  no confusion, more forgetful than ussual  MUSCULOSKELETAL:  No muscle, back pain, joint pain or stiffness.  HEMATOLOGIC:  No anemia, bleeding or bruising.  LYMPHATICS:  No enlarged nodes. No history of splenectomy.  ENDOCRINOLOGIC:  No reports of sweating, cold or heat intolerance. No polyuria or polydipsia.  ALLERGIES:  No history of asthma, hives, eczema or rhinitis.

## 2020-01-07 NOTE — ED ADULT NURSE NOTE - OBJECTIVE STATEMENT
Pt on stretcher, alert and oriented x 3.  Patient's family stated that she woke up screaming and unable to stand, and not making sense while speaking.  Patient is currently at baseline, able to ambulate and speaking clearly

## 2020-01-07 NOTE — ED ADULT TRIAGE NOTE - CHIEF COMPLAINT QUOTE
As per EMS patient's family stated that she woke up screaming and unable to stand, patient is currently at baseline, able to ambulate as per family. Patient A&Ox4.

## 2020-01-07 NOTE — ED PROVIDER NOTE - OBJECTIVE STATEMENT
83 yo pt presents to the ED with children.  Pt with episode of waking up screaming, not making sense and difficulty standing.  Pt is home wiht  and aide.  Son said he called and pt was not making sense when she was talking.  Pt states that her left arm was bother her (has had numbness left arm/hand for 1 month).  pt states she was awake and wanted to talk, but words not coming out.  EMS called and by the time they arrived, she was back to normal.  Pt went to sleep at 9pm and woke at 2am with symptoms.  PMD = Dr. Rao 83 yo pt presents to the ED with children.  Pt with episode of waking up screaming, not making sense and difficulty standing.  Pt is home wiht  and aide.  Son said he called and pt was not making sense when she was talking.  Pt states that her left arm was bother her (has had numbness left arm/hand for 1 month).  pt states she was awake and wanted to talk, but words not coming out.  EMS called and by the time they arrived, she was back to normal.  Pt went to sleep at 9pm and woke at 2am with symptoms.  PMD = Dr. Pelaez

## 2020-01-07 NOTE — CONSULT NOTE ADULT - SUBJECTIVE AND OBJECTIVE BOX
Patient is a 84y old  Female who presents with a chief complaint of change in speech (07 Jan 2020 11:20)      HPI:  Pt is a 83 y/o/f  presents to the ED after a possible neurological event. She reports that she woke up screaming from a dream (she dreamt that she was trying to get help and noone would help her). Her 's aide called her son and he could not make sense of anything that she was saying. He believes that she was speaking gibberish. By the time he arrived he felt that her speech was slurred.   She has been having intermittent numbness/tingling in the left arm for about one month.     She reports feeling more tired recently. Family is not aware of snoring.  she has been more forgetful as well but denies getting lost while driving, etc.      PAST MEDICAL & SURGICAL HISTORY:  Hernia, hiatal  Hypertension  No significant past surgical history      FAMILY HISTORY:      Social Hx:  Nonsmoker, no drug or alcohol use    MEDICATIONS  (STANDING):  amphetamine/dextroamphetamine 10 milliGRAM(s) Oral daily  aspirin enteric coated 81 milliGRAM(s) Oral daily  atorvastatin 40 milliGRAM(s) Oral at bedtime  metoprolol succinate ER 25 milliGRAM(s) Oral daily  multivitamin 1 Tablet(s) Oral daily  pantoprazole    Tablet 40 milliGRAM(s) Oral before breakfast  PARoxetine 20 milliGRAM(s) Oral daily  psyllium Powder 1 Packet(s) Oral daily       Allergies    Biaxin (Other)  Demerol HCl (Other)  doxycycline (Other)  Indocin (Other)  Originally Entered as [Unknown] reaction to [sulfur] (Unknown)  penicillins (Other)  tetracyclines (Unknown)  verapamil (Other)    Intolerances        ROS: Pertinent positives in HPI, all other ROS were reviewed and are negative.      Vital Signs Last 24 Hrs  T(C): 36.8 (07 Jan 2020 07:18), Max: 36.8 (07 Jan 2020 07:18)  T(F): 98.2 (07 Jan 2020 07:18), Max: 98.2 (07 Jan 2020 07:18)  HR: 71 (07 Jan 2020 09:00) (61 - 72)  BP: 144/88 (07 Jan 2020 09:00) (137/73 - 173/91)  BP(mean): 108 (07 Jan 2020 07:18) (108 - 108)  RR: 15 (07 Jan 2020 09:00) (15 - 18)  SpO2: 100% (07 Jan 2020 09:00) (100% - 100%)        Constitutional: awake and alert.  HEENT: PERRLA, EOMI,   Neck: Supple.  Respiratory: Breath sounds are clear bilaterally  Cardiovascular: S1 and S2, regular / irregular rhythm  Gastrointestinal: soft, nontender  Extremities:  no edema  Vascular: Caritid Bruit - no  Musculoskeletal: no joint swelling/tenderness, no abnormal movements  Skin: No rashes    Neurological exam:  HF: A x O x 3. Appropriately interactive, normal affect. Speech fluent, No Aphasia or paraphasic errors. Naming /repetition intact   CN: ISABELLE, EOMI, VFF, facial sensation normal, no NLFD, tongue midline, Palate moves equally, SCM equal bilaterally  Motor: No pronator drift, Strength 5/5 in all 4 ext, normal bulk and tone, no tremor, rigidity or bradykinesia.    Sens: Intact to light touch / PP/ VS/ JS    Reflexes: Symmetric and normal . BJ 2+, BR 2+, KJ 2+, AJ 2+, downgoing toes b/l  Coord:  No FNFA, dysmetria, SHADY intact   Gait/Balance: Normal/Cannot test    NIHSS:          Labs:   01-07    138  |  106  |  15  ----------------------------<  106<H>  3.6   |  26  |  0.84    Ca    9.0      07 Jan 2020 03:52  Mg     1.8     01-07    TPro  7.0  /  Alb  3.5  /  TBili  0.6  /  DBili  x   /  AST  24  /  ALT  25  /  AlkPhos  100  01-07                              12.5   4.60  )-----------( 206      ( 07 Jan 2020 03:52 )             37.0       Radiology:  - CT Head:  - MRI brain  -MRA brain/Carotids  - EEG    A/P:    No IV tpa given because…    -ASA/PLAVIX  -Atorvastatin  -DVT prophylaxis  -Dysphagia screen  -Speech and swallow eval  -PT eval/ rehab eval    Mangement d/w Pt / family /     Total Critical Care Time spent: Patient is a 84y old  Female who presents with a chief complaint of change in speech (07 Jan 2020 11:20)      HPI:  Pt is a 83 y/o/f  presents to the ED after a possible neurological event. She reports that she woke up screaming from a dream (she dreamt that she was trying to get help and noone would help her). Her 's aide called her son and he could not make sense of anything that she was saying. He believes that she was speaking gibberish. By the time he arrived he felt that her speech was slurred.   She has been having intermittent numbness/tingling in the left arm for about one month.     She reports feeling more tired recently. Family is not aware of snoring.  she has been more forgetful as well but denies getting lost while driving, etc.      PAST MEDICAL & SURGICAL HISTORY:  Hernia, hiatal  Hypertension  High triglycerides  Hysterectomy, oophorectomy, cholecystectomy      FAMILY HISTORY:  Father - MI  Mother - CHf    Social Hx:  Nonsmoker, no drug or alcohol use. Lives with .    MEDICATIONS  (STANDING):  amphetamine/dextroamphetamine 10 milliGRAM(s) Oral daily  aspirin enteric coated 81 milliGRAM(s) Oral daily  atorvastatin 40 milliGRAM(s) Oral at bedtime  metoprolol succinate ER 25 milliGRAM(s) Oral daily  multivitamin 1 Tablet(s) Oral daily  pantoprazole    Tablet 40 milliGRAM(s) Oral before breakfast  PARoxetine 20 milliGRAM(s) Oral daily  psyllium Powder 1 Packet(s) Oral daily       Allergies    Biaxin (Other)  Demerol HCl (Other)  doxycycline (Other)  Indocin (Other)  Originally Entered as [Unknown] reaction to [sulfur] (Unknown)  penicillins (Other)  tetracyclines (Unknown)  verapamil (Other)    Intolerances        ROS: Pertinent positives in HPI, all other ROS were reviewed and are negative.      Vital Signs Last 24 Hrs  T(C): 36.8 (07 Jan 2020 07:18), Max: 36.8 (07 Jan 2020 07:18)  T(F): 98.2 (07 Jan 2020 07:18), Max: 98.2 (07 Jan 2020 07:18)  HR: 71 (07 Jan 2020 09:00) (61 - 72)  BP: 144/88 (07 Jan 2020 09:00) (137/73 - 173/91)  BP(mean): 108 (07 Jan 2020 07:18) (108 - 108)  RR: 15 (07 Jan 2020 09:00) (15 - 18)  SpO2: 100% (07 Jan 2020 09:00) (100% - 100%)        Constitutional: awake and alert.  HEENT: PERRLA, EOMI,   Neck: Supple.  Respiratory: Breath sounds are clear bilaterally  Cardiovascular: S1 and S2, regular / irregular rhythm  Gastrointestinal: soft, nontender  Extremities:  no edema  Vascular: Carotid Bruit - no  Musculoskeletal: no joint swelling/tenderness, no abnormal movements  Skin: No rashes    Neurological exam:  HF: A x O x 3. Appropriately interactive, normal affect. Speech fluent, No Aphasia or paraphasic errors.  CN: ISABELLE, EOMI, VFF, facial sensation normal, no NLFD, tongue midline, Palate moves equally, SCM equal bilaterally  Motor: No pronator drift, Strength 5/5 in all 4 ext, normal bulk and tone, no tremor, rigidity or bradykinesia.    Sens: Intact to light touch   Reflexes: Symmetric and normal . BJ 2, BR 2, KJ 1+,  downgoing toes b/l  Coord:  No FNFA, dysmetria, SHADY intact                 Labs:   01-07    138  |  106  |  15  ----------------------------<  106<H>  3.6   |  26  |  0.84    Ca    9.0      07 Jan 2020 03:52  Mg     1.8     01-07    TPro  7.0  /  Alb  3.5  /  TBili  0.6  /  DBili  x   /  AST  24  /  ALT  25  /  AlkPhos  100  01-07                              12.5   4.60  )-----------( 206      ( 07 Jan 2020 03:52 )             37.0       Radiology:  CT brain 1/7/20:  No acute intracranial abnormality.

## 2020-01-07 NOTE — H&P ADULT - NSHPPHYSICALEXAM_GEN_ALL_CORE
Physical Exam:   GENERAL APPEARANCE:  NAD, hemodynamically stable  T(C): 36.8 (01-07-20 @ 07:18), Max: 36.8 (01-07-20 @ 07:18)  HR: 71 (01-07-20 @ 09:00) (61 - 72)  BP: 144/88 (01-07-20 @ 09:00) (137/73 - 173/91)  RR: 15 (01-07-20 @ 09:00) (15 - 18)  SpO2: 100% (01-07-20 @ 09:00) (100% - 100%)  Wt(kg): --  HEENT:  Head is normocephalic    Skin:  Warm and dry without any rash   NECK:  Supple without lymphadenopathy.   HEART:  Regular rate and rhythm. normal S1 and S2, No M/R/G  LUNGS:  Good ins/exp effort, no W/R/R/C  ABDOMEN:  Soft, nontender, nondistended with good bowel sounds heard  EXTREMITIES:  Without cyanosis, clubbing or edema.   NEUROLOGICAL:  Gross nonfocal

## 2020-01-08 ENCOUNTER — TRANSCRIPTION ENCOUNTER (OUTPATIENT)
Age: 85
End: 2020-01-08

## 2020-01-08 DIAGNOSIS — G45.9 TRANSIENT CEREBRAL ISCHEMIC ATTACK, UNSPECIFIED: ICD-10-CM

## 2020-01-08 LAB
ALBUMIN SERPL ELPH-MCNC: 3.3 G/DL — SIGNIFICANT CHANGE UP (ref 3.3–5)
ALP SERPL-CCNC: 83 U/L — SIGNIFICANT CHANGE UP (ref 40–120)
ALT FLD-CCNC: 25 U/L — SIGNIFICANT CHANGE UP (ref 12–78)
ANION GAP SERPL CALC-SCNC: 5 MMOL/L — SIGNIFICANT CHANGE UP (ref 5–17)
AST SERPL-CCNC: 21 U/L — SIGNIFICANT CHANGE UP (ref 15–37)
BASOPHILS # BLD AUTO: 0.03 K/UL — SIGNIFICANT CHANGE UP (ref 0–0.2)
BASOPHILS NFR BLD AUTO: 0.6 % — SIGNIFICANT CHANGE UP (ref 0–2)
BILIRUB SERPL-MCNC: 1 MG/DL — SIGNIFICANT CHANGE UP (ref 0.2–1.2)
BUN SERPL-MCNC: 11 MG/DL — SIGNIFICANT CHANGE UP (ref 7–23)
CALCIUM SERPL-MCNC: 9.5 MG/DL — SIGNIFICANT CHANGE UP (ref 8.5–10.1)
CHLORIDE SERPL-SCNC: 106 MMOL/L — SIGNIFICANT CHANGE UP (ref 96–108)
CHOLEST SERPL-MCNC: 182 MG/DL — SIGNIFICANT CHANGE UP (ref 10–199)
CO2 SERPL-SCNC: 26 MMOL/L — SIGNIFICANT CHANGE UP (ref 22–31)
CREAT SERPL-MCNC: 0.78 MG/DL — SIGNIFICANT CHANGE UP (ref 0.5–1.3)
CULTURE RESULTS: SIGNIFICANT CHANGE UP
EOSINOPHIL # BLD AUTO: 0.12 K/UL — SIGNIFICANT CHANGE UP (ref 0–0.5)
EOSINOPHIL NFR BLD AUTO: 2.5 % — SIGNIFICANT CHANGE UP (ref 0–6)
GLUCOSE SERPL-MCNC: 105 MG/DL — HIGH (ref 70–99)
HCT VFR BLD CALC: 38.5 % — SIGNIFICANT CHANGE UP (ref 34.5–45)
HDLC SERPL-MCNC: 41 MG/DL — LOW
HGB BLD-MCNC: 12.7 G/DL — SIGNIFICANT CHANGE UP (ref 11.5–15.5)
IMM GRANULOCYTES NFR BLD AUTO: 0.2 % — SIGNIFICANT CHANGE UP (ref 0–1.5)
LIPID PNL WITH DIRECT LDL SERPL: 76 MG/DL — SIGNIFICANT CHANGE UP
LYMPHOCYTES # BLD AUTO: 1.48 K/UL — SIGNIFICANT CHANGE UP (ref 1–3.3)
LYMPHOCYTES # BLD AUTO: 31.2 % — SIGNIFICANT CHANGE UP (ref 13–44)
MCHC RBC-ENTMCNC: 29.1 PG — SIGNIFICANT CHANGE UP (ref 27–34)
MCHC RBC-ENTMCNC: 33 GM/DL — SIGNIFICANT CHANGE UP (ref 32–36)
MCV RBC AUTO: 88.1 FL — SIGNIFICANT CHANGE UP (ref 80–100)
MONOCYTES # BLD AUTO: 0.41 K/UL — SIGNIFICANT CHANGE UP (ref 0–0.9)
MONOCYTES NFR BLD AUTO: 8.6 % — SIGNIFICANT CHANGE UP (ref 2–14)
NEUTROPHILS # BLD AUTO: 2.69 K/UL — SIGNIFICANT CHANGE UP (ref 1.8–7.4)
NEUTROPHILS NFR BLD AUTO: 56.9 % — SIGNIFICANT CHANGE UP (ref 43–77)
PLATELET # BLD AUTO: 206 K/UL — SIGNIFICANT CHANGE UP (ref 150–400)
POTASSIUM SERPL-MCNC: 3.9 MMOL/L — SIGNIFICANT CHANGE UP (ref 3.5–5.3)
POTASSIUM SERPL-SCNC: 3.9 MMOL/L — SIGNIFICANT CHANGE UP (ref 3.5–5.3)
PROT SERPL-MCNC: 6.7 GM/DL — SIGNIFICANT CHANGE UP (ref 6–8.3)
RBC # BLD: 4.37 M/UL — SIGNIFICANT CHANGE UP (ref 3.8–5.2)
RBC # FLD: 13 % — SIGNIFICANT CHANGE UP (ref 10.3–14.5)
SODIUM SERPL-SCNC: 137 MMOL/L — SIGNIFICANT CHANGE UP (ref 135–145)
SPECIMEN SOURCE: SIGNIFICANT CHANGE UP
TOTAL CHOLESTEROL/HDL RATIO MEASUREMENT: 4.4 RATIO — SIGNIFICANT CHANGE UP (ref 3.3–7.1)
TRIGL SERPL-MCNC: 325 MG/DL — HIGH (ref 10–149)
TSH SERPL-MCNC: 1.06 UU/ML — SIGNIFICANT CHANGE UP (ref 0.34–4.82)
WBC # BLD: 4.74 K/UL — SIGNIFICANT CHANGE UP (ref 3.8–10.5)
WBC # FLD AUTO: 4.74 K/UL — SIGNIFICANT CHANGE UP (ref 3.8–10.5)

## 2020-01-08 PROCEDURE — 99232 SBSQ HOSP IP/OBS MODERATE 35: CPT

## 2020-01-08 PROCEDURE — 70492 CT SFT TSUE NCK W/O & W/DYE: CPT | Mod: 26

## 2020-01-08 RX ORDER — ASPIRIN/CALCIUM CARB/MAGNESIUM 324 MG
1 TABLET ORAL
Qty: 0 | Refills: 0 | DISCHARGE

## 2020-01-08 RX ADMIN — Medication 81 MILLIGRAM(S): at 11:31

## 2020-01-08 RX ADMIN — Medication 650 MILLIGRAM(S): at 22:44

## 2020-01-08 RX ADMIN — PANTOPRAZOLE SODIUM 40 MILLIGRAM(S): 20 TABLET, DELAYED RELEASE ORAL at 05:53

## 2020-01-08 RX ADMIN — Medication 25 MILLIGRAM(S): at 05:53

## 2020-01-08 RX ADMIN — ATORVASTATIN CALCIUM 40 MILLIGRAM(S): 80 TABLET, FILM COATED ORAL at 21:14

## 2020-01-08 RX ADMIN — DEXTROAMPHETAMINE SACCHARATE, AMPHETAMINE ASPARTATE, DEXTROAMPHETAMINE SULFATE AND AMPHETAMINE SULFATE 10 MILLIGRAM(S): 1.875; 1.875; 1.875; 1.875 TABLET ORAL at 11:31

## 2020-01-08 RX ADMIN — Medication 20 MILLIGRAM(S): at 13:59

## 2020-01-08 RX ADMIN — Medication 1 PACKET(S): at 13:59

## 2020-01-08 RX ADMIN — Medication 1 TABLET(S): at 11:31

## 2020-01-08 RX ADMIN — Medication 3 MILLIGRAM(S): at 21:14

## 2020-01-08 RX ADMIN — Medication 650 MILLIGRAM(S): at 21:14

## 2020-01-08 NOTE — DISCHARGE NOTE PROVIDER - HOSPITAL COURSE
Pt is a 83 y/o/f  presents to the ED with children.  Pt with episode of waking up screaming, not making sense and difficulty standing.  Pt is home wiht  and aide.  Son said he called and pt was not making sense when she was talking.  Pt states that her left arm was bother her (has had numbness left arm/hand for 1 month).  pt states she was awake and wanted to talk, but words not coming out.  EMS called and by the time they arrived, she was back to normal.  Pt went to sleep at 9pm and woke at 2am with symptoms. Initially patient was screaming. then when son was called patient was talking gibberish.          MRI of the head -  not suggestive of acute strokes.         Physical Exam:     GENERAL APPEARANCE:  NAD, hemodynamically stable    T(C): 36.6 (01-08-20 @ 11:06), Max: 37.1 (01-07-20 @ 14:43)    HR: 74 (01-08-20 @ 11:06) (65 - 77)    BP: 115/61 (01-08-20 @ 11:06) (115/61 - 172/91)    RR: 16 (01-08-20 @ 11:06) (15 - 16)    SpO2: 98% (01-08-20 @ 11:06) (95% - 100%)    Wt(kg): --    HEENT:  Head is normocephalic      Skin:  Warm and dry without any rash     NECK:  Supple without lymphadenopathy.     HEART:  Regular rate and rhythm. normal S1 and S2, No M/R/G    LUNGS:  Good ins/exp effort, no W/R/R/C    ABDOMEN:  Soft, nontender, nondistended with good bowel sounds heard    EXTREMITIES:  Without cyanosis, clubbing or edema.     NEUROLOGICAL:  Gross nonfocal

## 2020-01-08 NOTE — DISCHARGE NOTE PROVIDER - CARE PROVIDER_API CALL
Jayde Rodriguez (MD)  Clinical Neurophysiology; EEGEpilepsy; Neurology; Sleep Medicine  [address] [facility], [place] [address]  Phone: [phone]  Fax: [phone]  Follow Up Time:

## 2020-01-08 NOTE — PROGRESS NOTE ADULT - ASSESSMENT
Ms. López is an 84 year old woman who had an episode of waking from sleep startled (? nightmare) who then had an episode of possible aphasia.  She has also had recent numbness/tingling in the left upper extremity and has recent memory issues.    -No acute changes seen on MRI brain. No suggestion of stroke. Cannot rule out TIA.  -Can increase aspirin from 81 mg/day to 325 mg/day  -Continue statin  -MRI cervical spine shows some foraminal stenosis which may be contributing  to radiculopathy. Can get PT as outpatient.  -Continues to have fatigue - TSH and vitamin B12 levels normal. Can consider outpatient sleep study. ? need for echo.    Will follow intermittently as needed.

## 2020-01-08 NOTE — DISCHARGE NOTE PROVIDER - NSDCCPTREATMENT_GEN_ALL_CORE_FT
PRINCIPAL PROCEDURE  Procedure: MRI brain  Findings and Treatment: Attenuation/narrowing at the mid and distal bilateral posterior cerebral arteries.  Artifact versus narrowing at the distal A1 and proximal A2 segment of the left anterior cerebral artery. Artifact versus narrowing at the left MCA bifurcation.

## 2020-01-08 NOTE — DISCHARGE NOTE PROVIDER - NSDCMRMEDTOKEN_GEN_ALL_CORE_FT
aspirin 325 mg oral delayed release tablet: 1 tab(s) orally once a day  B Complex 100: 1 tab(s) orally once a day  Benicar HCT 40 mg-12.5 mg oral tablet: 1 tab(s) orally once a day (in the morning)  dextroamphetamine-amphetamine 10 mg oral tablet: 1 tab(s) orally once a day (in the morning)  Metamucil 400 mg oral capsule: 2 cap(s) orally once a day  metoprolol succinate 25 mg oral tablet, extended release: 1 tab(s) orally once a day  Multiple Vitamins oral tablet: 1 tab(s) orally once a day  pantoprazole 40 mg oral delayed release tablet: 1 tab(s) orally once a day  PARoxetine 20 mg oral tablet: 1 tab(s) orally once a day  rosuvastatin 5 mg oral tablet: 1 tab(s) orally once a day  Vitamin D3 5000 intl units (125 mcg) oral tablet: 1 tab(s) orally every other day

## 2020-01-08 NOTE — PROGRESS NOTE ADULT - SUBJECTIVE AND OBJECTIVE BOX
Interval History:  20: No new complaints this morning. Continues to complain of fatigue.    MEDICATIONS  (STANDING):  amphetamine/dextroamphetamine 10 milliGRAM(s) Oral daily  aspirin enteric coated 81 milliGRAM(s) Oral daily  atorvastatin 40 milliGRAM(s) Oral at bedtime  melatonin 3 milliGRAM(s) Oral at bedtime  metoprolol succinate ER 25 milliGRAM(s) Oral daily  multivitamin 1 Tablet(s) Oral daily  pantoprazole    Tablet 40 milliGRAM(s) Oral before breakfast  PARoxetine 20 milliGRAM(s) Oral daily  psyllium Powder 1 Packet(s) Oral daily    MEDICATIONS  (PRN):  acetaminophen   Tablet .. 650 milliGRAM(s) Oral every 6 hours PRN Mild Pain (1 - 3)      Allergies    Biaxin (Other)  Demerol HCl (Other)  doxycycline (Other)  Indocin (Other)  Originally Entered as [Unknown] reaction to [sulfur] (Unknown)  penicillins (Other)  tetracyclines (Unknown)  verapamil (Other)    Intolerances        PHYSICAL EXAM:  Vital Signs Last 24 Hrs  T(F): 97.9 (20 @ 05:30)  HR: 70 (20 @ 06:57)  BP: 149/84 (20 @ 06:57)  RR: 16 (20 @ 05:30)    GENERAL: NAD, well-groomed, well-developed  HEAD:  Atraumatic, Normocephalic  Neuro:  Awake, alert, no aphasia  CN: PERRL, EOMI, no nystagmus, no facial weakness, tongue protrudes in the midline  motor: normal tone, no pronator drift, full strength in all four extremities  sensory: intact to light touch  coordination: finger to nose intact bilaterally  DTRs: symmetric, plantar responses flexor bilaterally    LABS:                        12.7   4.74  )-----------( 206      ( 2020 06:58 )             38.5     -    137  |  106  |  11  ----------------------------<  105<H>  3.9   |  26  |  0.78    Ca    9.5      2020 06:58  Mg     1.8     -    TPro  6.7  /  Alb  3.3  /  TBili  1.0  /  DBili  x   /  AST  21  /  ALT  25  /  AlkPhos  83  01-08    PT/INR - ( 2020 03:52 )   PT: 10.6 sec;   INR: 0.96 ratio         PTT - ( 2020 03:52 )  PTT:28.0 sec  Urinalysis Basic - ( 2020 03:52 )    Color: Yellow / Appearance: Clear / S.020 / pH: x  Gluc: x / Ketone: Small  / Bili: Negative / Urobili: 1 mg/dL   Blood: x / Protein: 15 mg/dL / Nitrite: Negative   Leuk Esterase: Moderate / RBC: 6-10 /HPF / WBC 11-25   Sq Epi: x / Non Sq Epi: Few / Bacteria: Few        RADIOLOGY & ADDITIONAL STUDIES:  CT head no contrast 20:  No acute intracranial abnormality.    MRI brain 20:  There is mild diffuse parenchymal volume loss. There are T2 prolongation signal abnormalities in the periventricular subcortical white matter likely related to mild chronic microvascular ischemic changes.    Small gradient susceptibility effect left frontal region which may be related to chronic microhemorrhage    There is no acute parenchymal hemorrhage, parenchymal mass, mass effect or midline shift. There is no extra-axial fluid collection.  There is no hydrocephalus.  There is no acute infarct.    Retention cyst/polyp right maxillary sinus.    IMPRESSION:  No acute intracranial hemorrhage or acute infarct.      MRA brain20:  No acute abnormality    MRA neck 20:  No acute abnormality    MR cervical spine 20:  1. C4-C5 level, a disc osteophyte, moderate spinal canal narrowing, chronic   cord impingement, no definite abnormal cord signal, moderate to severe   foraminal narrowing.   2. C5-C6 level, disc osteophyte, left greater than right, mild-to-moderate   spinal canal narrowing, left greater than right chronic cord impingement,   moderate to severe foraminal narrowing.

## 2020-01-08 NOTE — DISCHARGE NOTE PROVIDER - NSDCCPCAREPLAN_GEN_ALL_CORE_FT
PRINCIPAL DISCHARGE DIAGNOSIS  Diagnosis: TIA (transient ischemic attack)  Assessment and Plan of Treatment: - Aspirin 325 mg po qdaily  - Lipitor 40 mg po qdaily  - good blood pressure control with current medications  - TSH and B12 normal  - Close follow up with Neurology for further cognitive evaluation      SECONDARY DISCHARGE DIAGNOSES  Diagnosis: Abnormal MRI  Assessment and Plan of Treatment: - C4-C5 level, a disc osteophyte, moderate spinal canal narrowing, chronic cord impingement, no definite abnormal cord signal, moderate to severe foraminal narrowing.   - C5-C6 level, disc osteophyte,, left greater than right, mild-to-moderate   spinal canal narrowing, left greater than right chronic cord impingement, moderate to severe foraminal narrowing.  - ortho spine evaluation

## 2020-01-09 ENCOUNTER — TRANSCRIPTION ENCOUNTER (OUTPATIENT)
Age: 85
End: 2020-01-09

## 2020-01-09 VITALS
DIASTOLIC BLOOD PRESSURE: 56 MMHG | TEMPERATURE: 98 F | OXYGEN SATURATION: 96 % | RESPIRATION RATE: 17 BRPM | HEART RATE: 62 BPM | SYSTOLIC BLOOD PRESSURE: 156 MMHG

## 2020-01-09 PROCEDURE — 99223 1ST HOSP IP/OBS HIGH 75: CPT

## 2020-01-09 RX ADMIN — Medication 1 TABLET(S): at 13:14

## 2020-01-09 RX ADMIN — DEXTROAMPHETAMINE SACCHARATE, AMPHETAMINE ASPARTATE, DEXTROAMPHETAMINE SULFATE AND AMPHETAMINE SULFATE 10 MILLIGRAM(S): 1.875; 1.875; 1.875; 1.875 TABLET ORAL at 13:14

## 2020-01-09 RX ADMIN — Medication 20 MILLIGRAM(S): at 13:14

## 2020-01-09 RX ADMIN — Medication 25 MILLIGRAM(S): at 06:22

## 2020-01-09 RX ADMIN — PANTOPRAZOLE SODIUM 40 MILLIGRAM(S): 20 TABLET, DELAYED RELEASE ORAL at 06:22

## 2020-01-09 RX ADMIN — Medication 81 MILLIGRAM(S): at 13:14

## 2020-01-09 NOTE — PROGRESS NOTE ADULT - SUBJECTIVE AND OBJECTIVE BOX
Pt is a 85 y/o/f  presents to the ED with children.  Pt with episode of waking up screaming, not making sense and difficulty standing.  Pt is home wiht  and aide.  Son said he called and pt was not making sense when she was talking.  Pt states that her left arm was bother her (has had numbness left arm/hand for 1 month).  pt states she was awake and wanted to talk, but words not coming out.  EMS called and by the time they arrived, she was back to normal.  Pt went to sleep at 9pm and woke at 2am with symptoms. Initially patient was screaming. then when son was called patient was talking gibberish.      MRI of the head -  not suggestive of acute strokes.     1/9 - feels well. case d/w son at bedside    Physical Exam:     Vital Signs Last 24 Hrs  T(C): 36.7 (09 Jan 2020 10:54), Max: 36.7 (09 Jan 2020 10:54)  T(F): 98 (09 Jan 2020 10:54), Max: 98 (09 Jan 2020 10:54)  HR: 62 (09 Jan 2020 10:54) (62 - 68)  BP: 156/56 (09 Jan 2020 10:54) (145/55 - 156/56)  BP(mean): --  RR: 17 (09 Jan 2020 10:54) (17 - 17)  SpO2: 96% (09 Jan 2020 10:54) (95% - 96%)    HEENT:  Head is normocephalic    Skin:  Warm and dry without any rash   NECK:  Supple without lymphadenopathy.   HEART:  Regular rate and rhythm. normal S1 and S2, No M/R/G  LUNGS:  Good ins/exp effort, no W/R/R/C  ABDOMEN:  Soft, nontender, nondistended with good bowel sounds heard  EXTREMITIES:  Without cyanosis, clubbing or edema.   NEUROLOGICAL:  Gross nonfocal                               12.7   4.74  )-----------( 206      ( 08 Jan 2020 06:58 )             38.5     01-08    137  |  106  |  11  ----------------------------<  105<H>  3.9   |  26  |  0.78    Ca    9.5      08 Jan 2020 06:58    TPro  6.7  /  Alb  3.3  /  TBili  1.0  /  DBili  x   /  AST  21  /  ALT  25  /  AlkPhos  83  01-08        LIVER FUNCTIONS - ( 08 Jan 2020 06:58 )  Alb: 3.3 g/dL / Pro: 6.7 gm/dL / ALK PHOS: 83 U/L / ALT: 25 U/L / AST: 21 U/L / GGT: x               - plan for dc home today. see dc summary for details  - ct neck noted. no mass  - ortho spine eval noted no intervention at this time. plans to f/u as outpt for PT  - plan for outpt EMG with neuro     total time spent 35 mins

## 2020-01-09 NOTE — PROGRESS NOTE ADULT - SUBJECTIVE AND OBJECTIVE BOX
Interval History:  1/9/20: No new complaints at this time.  Denies neck pain. Has intermittent paresthesias in his left arm.     MEDICATIONS  (STANDING):  amphetamine/dextroamphetamine 10 milliGRAM(s) Oral daily  aspirin enteric coated 81 milliGRAM(s) Oral daily  atorvastatin 40 milliGRAM(s) Oral at bedtime  melatonin 3 milliGRAM(s) Oral at bedtime  metoprolol succinate ER 25 milliGRAM(s) Oral daily  multivitamin 1 Tablet(s) Oral daily  pantoprazole    Tablet 40 milliGRAM(s) Oral before breakfast  PARoxetine 20 milliGRAM(s) Oral daily  psyllium Powder 1 Packet(s) Oral daily    MEDICATIONS  (PRN):  acetaminophen   Tablet .. 650 milliGRAM(s) Oral every 6 hours PRN Mild Pain (1 - 3)      Allergies    Biaxin (Other)  Demerol HCl (Other)  doxycycline (Other)  Indocin (Other)  Originally Entered as [Unknown] reaction to [sulfur] (Unknown)  penicillins (Other)  tetracyclines (Unknown)  verapamil (Other)    Intolerances        PHYSICAL EXAM:  Vital Signs Last 24 Hrs  T(F): 97.6 (01-09-20 @ 05:48)  HR: 68 (01-09-20 @ 05:48)  BP: 153/80 (01-09-20 @ 05:48)  RR: 17 (01-09-20 @ 05:48)    GENERAL: NAD, well-groomed, well-developed  HEAD:  Atraumatic, Normocephalic  Neuro:  Awake, alert, no aphasia  CN: PERRL, EOMI, no nystagmus, no facial weakness, tongue protrudes in the midline  motor: normal tone, no pronator drift, full strength in all four extremities  sensory: intact to light touch  coordination: finger to nose intact bilaterally  DTRs: symmetric, plantar responses flexor bilaterally  + tinel's on left. + phalen's on left    LABS:                        12.7   4.74  )-----------( 206      ( 08 Jan 2020 06:58 )             38.5     01-08    137  |  106  |  11  ----------------------------<  105<H>  3.9   |  26  |  0.78    Ca    9.5      08 Jan 2020 06:58    TPro  6.7  /  Alb  3.3  /  TBili  1.0  /  DBili  x   /  AST  21  /  ALT  25  /  AlkPhos  83  01-08          RADIOLOGY & ADDITIONAL STUDIES:  CT head no contrast 1/7/20:  No acute intracranial abnormality.    MRI brain 1/7/20:  There is mild diffuse parenchymal volume loss. There are T2 prolongation signal abnormalities in the periventricular subcortical white matter likely related to mild chronic microvascular ischemic changes.    Small gradient susceptibility effect left frontal region which may be related to chronic microhemorrhage    There is no acute parenchymal hemorrhage, parenchymal mass, mass effect or midline shift. There is no extra-axial fluid collection.  There is no hydrocephalus.  There is no acute infarct.    Retention cyst/polyp right maxillary sinus.    IMPRESSION:  No acute intracranial hemorrhage or acute infarct.      MRA brain1/7/20:  No acute abnormality    MRA neck 1/7/20:  No acute abnormality    MR cervical spine 1/7/20:  1. C4-C5 level, a disc osteophyte, moderate spinal canal narrowing, chronic   cord impingement, no definite abnormal cord signal, moderate to severe   foraminal narrowing.   2. C5-C6 level, disc osteophyte, left greater than right, mild-to-moderate   spinal canal narrowing, left greater than right chronic cord impingement,   moderate to severe foraminal narrowing.

## 2020-01-09 NOTE — CONSULT NOTE ADULT - SUBJECTIVE AND OBJECTIVE BOX
[facility]                                                           Orthopedic Spine Consultation      HPI: 84 year old female seen resting in bed. Pt consulted for LUE numbness. Pt had an episode of waking from sleep startled questionable nightmare with an episode of possible aphasia. Pt c/o LUE numbness and tingling to all digits of the left hand since 5 months. Symptoms are stable since. Pt denies cervical pain and LUE weakness. Pt denies changes in bowel and bladder dysfunction. There are no known alleviating or aggravating factors.        PAST MEDICAL & SURGICAL HISTORY:  Hernia, hiatal  Hypertension    SOCIAL HISTORY:    REVIEW OF SYSTEMS:    CONSTITUTIONAL: No fever, weight loss, or fatigue  NECK: See HPI  GASTROINTESTINAL: No abdominal or epigastric pain. No nausea, vomiting, or hematemesis; No diarrhea or constipation. No melena or hematochezia.  GENITOURINARY: No dysuria, frequency, hematuria, or incontinence  NEUROLOGICAL: See HPI  MUSCULOSKELETAL: See HPI    Vital Signs Last 24 Hrs  T(C): 36.7 (09 Jan 2020 10:54), Max: 36.7 (09 Jan 2020 10:54)  T(F): 98 (09 Jan 2020 10:54), Max: 98 (09 Jan 2020 10:54)  HR: 62 (09 Jan 2020 10:54) (62 - 74)  BP: 156/56 (09 Jan 2020 10:54) (115/61 - 156/56)  BP(mean): --  RR: 17 (09 Jan 2020 10:54) (16 - 17)  SpO2: 96% (09 Jan 2020 10:54) (95% - 98%)  I&O's Detail      LABS:                        12.7   4.74  )-----------( 206      ( 08 Jan 2020 06:58 )             38.5     01-08    137  |  106  |  11  ----------------------------<  105<H>  3.9   |  26  |  0.78    Ca    9.5      08 Jan 2020 06:58    TPro  6.7  /  Alb  3.3  /  TBili  1.0  /  DBili  x   /  AST  21  /  ALT  25  /  AlkPhos  83  01-08          RADIOLOGY & ADDITIONAL STUDIES:    PHYSICAL EXAM:  Constitutional: NAD, well-groomed, well-developed  Extremities:   Vascular: peripheral pulses intact  Cervical-non-tender to palpation  Neurological: A/O x  3             Sensation: [X] intact to light touch  [ ] decreased:          Motor exam: [X ]          [X] Upper extremity    Delt      Bicp       Tri      Wrist ext  Wrst Flex       Digit Ext Digit Flex                                     R         5/5        5/5        5/5       5/5            5/5            5/5       5/5          5/5                                     L          5/5        5/5        5/5       5/5            5/5            5/5       5/5          5/5       Positive tinel and phalen test of the LUE         MRI cervical---Multilevel degenerative changes worst at C4-5 and C5-C6 with impingement of ventral cord without cord edema as described above. Multilevel neural foraminal narrowing as described above    Partially visualized signal in the right supraclavicular region possibly representing a enlarged lymph node. CT neck recommended if clinically warranted    C4-C5 level, a disc osteophyte, moderate spinal canal narrowing, chronic   cord impingement, no definite abnormal cord signal, moderate to severe   foraminal narrowing.   2. C5-C6 level, disc osteophyte,, left greater than right, mild-to-moderate   spinal canal narrowing, left greater than right chronic cord impingement,   moderate to severe foraminal narrowing.    A/P :  - LUE numbness  - F/u as outpatient for EMG of the upper extremities to eval CTS VS radiculopathy  - CT scan and MRI reviewed with patient  - No surgical indications at this time for the cervical spine. Motor and neuro intact  - Partially visualized signal in the right supraclavicular region eval per MEDICINE  - Sign off from spine stand point   - Possible TIA/ Aphasia eval per Neuro.   - Discussed case with Dr. Hill.

## 2020-01-09 NOTE — PROGRESS NOTE ADULT - ASSESSMENT
Ms. López is an 84 year old woman who had an episode of waking from sleep startled (? nightmare) who then had an episode of possible aphasia.  She has also had recent numbness/tingling in the left upper extremity and has recent memory issues.    -No acute changes seen on MRI brain. No suggestion of stroke. Cannot rule out TIA although it is possible that patient was just confused/disoriented upon awakening from night mare.  -Can increase aspirin from 81 mg/day to 325 mg/day  -Continue statin  -MRI cervical spine shows some foraminal stenosis which may be contributing  to radiculopathy. There is also cord impingement which is chronic. She denies neck pain. It is unclear if symptoms in left arm are related to radiculopathy vs median neuropathy. Can have outpatient EMG for clarification. She does have + Tinel's and Phalen's. Recommend use of wrist splint at bedtime.  -Continues to have fatigue - TSH and vitamin B12 levels normal. Can consider outpatient sleep study. ? need for echo.    Will follow intermittently as needed.

## 2020-01-09 NOTE — CONSULT NOTE ADULT - ASSESSMENT
[facility]                                                           Orthopedic Spine Consultation      HPI: 84 year old female seen resting in bed. Pt c/o LUE numbness and tingling to all digits of the left hand since 5 months. Symptoms are stable since. Pt denies cervical pain and LUE weakness. Pt denies changes in bowel and bladder dysfunction. There are no known alleviating or aggravating factors.       PAST MEDICAL & SURGICAL HISTORY:  Hernia, hiatal  Hypertension    SOCIAL HISTORY:    REVIEW OF SYSTEMS:    CONSTITUTIONAL: No fever, weight loss, or fatigue  NECK: See HPI  GASTROINTESTINAL: No abdominal or epigastric pain. No nausea, vomiting, or hematemesis; No diarrhea or constipation. No melena or hematochezia.  GENITOURINARY: No dysuria, frequency, hematuria, or incontinence  NEUROLOGICAL: See HPI  MUSCULOSKELETAL: See HPI    Vital Signs Last 24 Hrs  T(C): 36.7 (09 Jan 2020 10:54), Max: 36.7 (09 Jan 2020 10:54)  T(F): 98 (09 Jan 2020 10:54), Max: 98 (09 Jan 2020 10:54)  HR: 62 (09 Jan 2020 10:54) (62 - 74)  BP: 156/56 (09 Jan 2020 10:54) (115/61 - 156/56)  BP(mean): --  RR: 17 (09 Jan 2020 10:54) (16 - 17)  SpO2: 96% (09 Jan 2020 10:54) (95% - 98%)  I&O's Detail      LABS:                        12.7   4.74  )-----------( 206      ( 08 Jan 2020 06:58 )             38.5     01-08    137  |  106  |  11  ----------------------------<  105<H>  3.9   |  26  |  0.78    Ca    9.5      08 Jan 2020 06:58    TPro  6.7  /  Alb  3.3  /  TBili  1.0  /  DBili  x   /  AST  21  /  ALT  25  /  AlkPhos  83  01-08          RADIOLOGY & ADDITIONAL STUDIES:    PHYSICAL EXAM:  Constitutional: NAD, well-groomed, well-developed  Extremities:   Vascular: peripheral pulses intact  Cervical-non-tender to palpation  Neurological: A/O x  3             Sensation: [X] intact to light touch  [ ] decreased:          Motor exam: [X ]          [X] Upper extremity    Delt      Bicp       Tri      Wrist ext  Wrst Flex       Digit Ext Digit Flex                                     R         5/5        5/5        5/5       5/5            5/5            5/5       5/5          5/5                                     L          5/5        5/5        5/5       5/5            5/5            5/5       5/5          5/5       Positive tinel and phalen test of the LUE         MRI cervical---Multilevel degenerative changes worst at C4-5 and C5-C6 with impingement of ventral cord without cord edema as described above. Multilevel neural foraminal narrowing as described above    Partially visualized signal in the right supraclavicular region possibly representing a enlarged lymph node. CT neck recommended if clinically warranted    C4-C5 level, a disc osteophyte, moderate spinal canal narrowing, chronic   cord impingement, no definite abnormal cord signal, moderate to severe   foraminal narrowing.   2. C5-C6 level, disc osteophyte,, left greater than right, mild-to-moderate   spinal canal narrowing, left greater than right chronic cord impingement,   moderate to severe foraminal narrowing.    A/P :  - LUE numbness  - F/u as outpatient for EMG of the upper extremities to eval CTS VS radiculopathy  - CT scan and MRI reviewed with patient  - No surgical indications at this time for the cervical spine. Motor and neuro intact  - Partially visualized signal in the right supraclavicular region eval per MEDICINE  - Sign off from spine stand point   - Discussed case with Dr. Hill.
Ms. López is an 84 year old woman who had an episode of waking from sleep startled (? nightmare) who then had an episode of possible aphasia.  She has also had recent numbness/tingling in the left upper extremity and has recent memory issues.    -Admit for monitoring on telemetry.  -Will get MRI brain, MRA brain, MRA neck  -Continue aspirin for now. If it becomes more clear that this was in fact a vascular event may change aspirin to Plavix.  -Continue statin and can increase dose as tolerated.  -Check TSH and vitamin B12 to look for reversible causes of memory problems.  -Outpatient EMG for workup of arm paresthesias - possible carpal tunnel syndrome.

## 2020-01-09 NOTE — DISCHARGE NOTE NURSING/CASE MANAGEMENT/SOCIAL WORK - NURSING SECTION COMPLETE
Patient/Caregiver provided printed discharge information.
Attending Attestation (For Attendings USE Only)...

## 2020-01-09 NOTE — DISCHARGE NOTE NURSING/CASE MANAGEMENT/SOCIAL WORK - PATIENT PORTAL LINK FT
You can access the FollowMyHealth Patient Portal offered by [facility] by registering at the following website: [email or web address]. By joining [employer]’s FollowMyHealth portal, you will also be able to view your health information using other applications (apps) compatible with our system.

## 2020-01-09 NOTE — DISCHARGE NOTE NURSING/CASE MANAGEMENT/SOCIAL WORK - NSDCPEPTSTRK_GEN_ALL_CORE
Call 911 for stroke/Stroke warning signs and symptoms/Signs and symptoms of stroke/Need for follow up after discharge/Prescribed medications/Risk factors for stroke/Stroke education booklet/Stroke support groups for patients, families, and friends

## 2020-01-11 PROBLEM — K44.9 DIAPHRAGMATIC HERNIA WITHOUT OBSTRUCTION OR GANGRENE: Chronic | Status: ACTIVE | Noted: 2020-01-07

## 2020-01-13 DIAGNOSIS — I10 ESSENTIAL (PRIMARY) HYPERTENSION: ICD-10-CM

## 2020-01-13 DIAGNOSIS — R47.01 APHASIA: ICD-10-CM

## 2020-01-13 DIAGNOSIS — R40.2412 GLASGOW COMA SCALE SCORE 13-15, AT ARRIVAL TO EMERGENCY DEPARTMENT: ICD-10-CM

## 2020-01-13 DIAGNOSIS — M79.602 PAIN IN LEFT ARM: ICD-10-CM

## 2020-01-13 DIAGNOSIS — F32.9 MAJOR DEPRESSIVE DISORDER, SINGLE EPISODE, UNSPECIFIED: ICD-10-CM

## 2020-01-13 DIAGNOSIS — G45.9 TRANSIENT CEREBRAL ISCHEMIC ATTACK, UNSPECIFIED: ICD-10-CM

## 2020-01-13 DIAGNOSIS — R20.2 PARESTHESIA OF SKIN: ICD-10-CM

## 2020-01-13 DIAGNOSIS — R20.0 ANESTHESIA OF SKIN: ICD-10-CM

## 2020-01-13 DIAGNOSIS — Z88.0 ALLERGY STATUS TO PENICILLIN: ICD-10-CM

## 2020-01-13 DIAGNOSIS — Z88.8 ALLERGY STATUS TO OTHER DRUGS, MEDICAMENTS AND BIOLOGICAL SUBSTANCES STATUS: ICD-10-CM

## 2020-02-13 ENCOUNTER — APPOINTMENT (OUTPATIENT)
Dept: NEUROLOGY | Facility: CLINIC | Age: 85
End: 2020-02-13

## 2020-05-26 ENCOUNTER — OUTPATIENT (OUTPATIENT)
Dept: OUTPATIENT SERVICES | Facility: HOSPITAL | Age: 85
LOS: 1 days | End: 2020-05-26
Payer: MEDICARE

## 2020-05-26 DIAGNOSIS — I25.10 ATHEROSCLEROTIC HEART DISEASE OF NATIVE CORONARY ARTERY WITHOUT ANGINA PECTORIS: ICD-10-CM

## 2020-05-26 DIAGNOSIS — Z01.818 ENCOUNTER FOR OTHER PREPROCEDURAL EXAMINATION: ICD-10-CM

## 2020-05-26 LAB
ANION GAP SERPL CALC-SCNC: 3 MMOL/L — LOW (ref 5–17)
BASOPHILS # BLD AUTO: 0.03 K/UL — SIGNIFICANT CHANGE UP (ref 0–0.2)
BASOPHILS NFR BLD AUTO: 0.6 % — SIGNIFICANT CHANGE UP (ref 0–2)
BUN SERPL-MCNC: 12 MG/DL — SIGNIFICANT CHANGE UP (ref 7–23)
CALCIUM SERPL-MCNC: 9.9 MG/DL — SIGNIFICANT CHANGE UP (ref 8.5–10.1)
CHLORIDE SERPL-SCNC: 110 MMOL/L — HIGH (ref 96–108)
CO2 SERPL-SCNC: 28 MMOL/L — SIGNIFICANT CHANGE UP (ref 22–31)
CREAT SERPL-MCNC: 0.76 MG/DL — SIGNIFICANT CHANGE UP (ref 0.5–1.3)
EOSINOPHIL # BLD AUTO: 0.09 K/UL — SIGNIFICANT CHANGE UP (ref 0–0.5)
EOSINOPHIL NFR BLD AUTO: 1.9 % — SIGNIFICANT CHANGE UP (ref 0–6)
GLUCOSE SERPL-MCNC: 93 MG/DL — SIGNIFICANT CHANGE UP (ref 70–99)
HCT VFR BLD CALC: 33.5 % — LOW (ref 34.5–45)
HGB BLD-MCNC: 11.1 G/DL — LOW (ref 11.5–15.5)
IMM GRANULOCYTES NFR BLD AUTO: 0.4 % — SIGNIFICANT CHANGE UP (ref 0–1.5)
LYMPHOCYTES # BLD AUTO: 1.92 K/UL — SIGNIFICANT CHANGE UP (ref 1–3.3)
LYMPHOCYTES # BLD AUTO: 40.8 % — SIGNIFICANT CHANGE UP (ref 13–44)
MCHC RBC-ENTMCNC: 29.5 PG — SIGNIFICANT CHANGE UP (ref 27–34)
MCHC RBC-ENTMCNC: 33.1 GM/DL — SIGNIFICANT CHANGE UP (ref 32–36)
MCV RBC AUTO: 89.1 FL — SIGNIFICANT CHANGE UP (ref 80–100)
MONOCYTES # BLD AUTO: 0.44 K/UL — SIGNIFICANT CHANGE UP (ref 0–0.9)
MONOCYTES NFR BLD AUTO: 9.3 % — SIGNIFICANT CHANGE UP (ref 2–14)
NEUTROPHILS # BLD AUTO: 2.21 K/UL — SIGNIFICANT CHANGE UP (ref 1.8–7.4)
NEUTROPHILS NFR BLD AUTO: 47 % — SIGNIFICANT CHANGE UP (ref 43–77)
PLATELET # BLD AUTO: 185 K/UL — SIGNIFICANT CHANGE UP (ref 150–400)
POTASSIUM SERPL-MCNC: 4.1 MMOL/L — SIGNIFICANT CHANGE UP (ref 3.5–5.3)
POTASSIUM SERPL-SCNC: 4.1 MMOL/L — SIGNIFICANT CHANGE UP (ref 3.5–5.3)
RBC # BLD: 3.76 M/UL — LOW (ref 3.8–5.2)
RBC # FLD: 13.2 % — SIGNIFICANT CHANGE UP (ref 10.3–14.5)
SODIUM SERPL-SCNC: 141 MMOL/L — SIGNIFICANT CHANGE UP (ref 135–145)
WBC # BLD: 4.71 K/UL — SIGNIFICANT CHANGE UP (ref 3.8–10.5)
WBC # FLD AUTO: 4.71 K/UL — SIGNIFICANT CHANGE UP (ref 3.8–10.5)

## 2020-05-26 PROCEDURE — 36415 COLL VENOUS BLD VENIPUNCTURE: CPT

## 2020-05-26 PROCEDURE — U0003: CPT

## 2020-05-26 PROCEDURE — 85025 COMPLETE CBC W/AUTO DIFF WBC: CPT

## 2020-05-26 PROCEDURE — 80048 BASIC METABOLIC PNL TOTAL CA: CPT

## 2020-05-26 RX ORDER — ROSUVASTATIN CALCIUM 5 MG/1
1 TABLET ORAL
Qty: 0 | Refills: 0 | DISCHARGE

## 2020-05-26 RX ORDER — CHOLECALCIFEROL (VITAMIN D3) 125 MCG
1 CAPSULE ORAL
Qty: 0 | Refills: 0 | DISCHARGE

## 2020-05-26 RX ORDER — PSYLLIUM SEED (WITH DEXTROSE)
2 POWDER (GRAM) ORAL
Qty: 0 | Refills: 0 | DISCHARGE

## 2020-05-26 RX ORDER — METOPROLOL TARTRATE 50 MG
1 TABLET ORAL
Qty: 0 | Refills: 0 | DISCHARGE

## 2020-05-27 DIAGNOSIS — Z01.818 ENCOUNTER FOR OTHER PREPROCEDURAL EXAMINATION: ICD-10-CM

## 2020-05-27 LAB — SARS-COV-2 RNA SPEC QL NAA+PROBE: SIGNIFICANT CHANGE UP

## 2020-05-27 NOTE — H&P ADULT - NSHPREVIEWOFSYSTEMS_GEN_ALL_CORE
General: Pt denies recent weight loss/fever/chills    Neurological: denies numbness or  sensation loss    Cardiovascular: denies chest pain/palpitations/leg edema    Respiratory and Thorax: denies cough/wheezing (+) SOB    Gastrointestinal: denies abdominal pain/diarrhea/constipation/bloody stool    Genitourinary: denies urinary frequency/urgency/ dysuria    Musculoskeletal: (+) chronic leg pain d/t arthritis    Hematologic: denies abnormal bleeding

## 2020-05-27 NOTE — H&P ADULT - NSICDXPASTSURGICALHX_GEN_ALL_CORE_FT
PAST SURGICAL HISTORY:  No significant past surgical history PAST SURGICAL HISTORY:  S/P appendectomy     S/P hysterectomy     S/P tonsillectomy

## 2020-05-27 NOTE — H&P ADULT - ASSESSMENT
84 y.o female with PMHx of HTN, hyperparathyroidism, arthritis, TIA, GERD with c/o SOB and increased fatigue referred for cardiac cath with possible PCI.     ASA class:  Cr:  GFR:  Bleeding risk: 84 y.o female with PMHx of HTN, hyperparathyroidism, arthritis, TIA, GERD with c/o SOB and increased fatigue referred for cardiac cath with possible PCI.     ASA class: II  Cr: 0.76  GFR:72  Bleeding risk: 3.7%

## 2020-05-27 NOTE — H&P ADULT - NSHPPHYSICALEXAM_GEN_ALL_CORE
Vital Signs : /70       HR  59     RR 16 O2 sat 97% in RA                Constitutional: well developed, well nourished, no deformities and no acute distress    Neurological: Alert & Oriented x 3, MESSER, no focal deficits    HEENT: NC/AT, PERRLA, EOMI,  Neck supple.    Respiratory: CTA B/L, No wheezing/crackles/rhonchi    Cardiovascular: (+) S1 & S2, RRR, No m/r/g    Gastrointestinal: soft, NT, nondistended, (+) BS    Genitourinary: non distended bladder, voiding freely    Extremities: No pedal edema, No clubbing, No cyanosis    Skin:  normal skin color and pigmentation, no skin lesions

## 2020-05-27 NOTE — H&P ADULT - HISTORY OF PRESENT ILLNESS
84 y.o female with PMHx of HTN, hyperparathyroidism, arthritis, TIA, GERD with c/o SOB and increased fatigue referred for cardiac cath with possible PCI. 84 y.o female with PMHx of HTN, hyperparathyroidism, arthritis, TIA, GERD with c/o SOB and increased fatigue referred for cardiac cath with possible PCI.   COVID-19 PCR: Ollie (26 May 2020 16:18)

## 2020-05-28 ENCOUNTER — OUTPATIENT (OUTPATIENT)
Dept: OUTPATIENT SERVICES | Facility: HOSPITAL | Age: 85
LOS: 1 days | Discharge: HOME | End: 2020-05-28
Payer: MEDICARE

## 2020-05-28 VITALS — WEIGHT: 145.06 LBS | HEIGHT: 61 IN

## 2020-05-28 DIAGNOSIS — R06.02 SHORTNESS OF BREATH: ICD-10-CM

## 2020-05-28 DIAGNOSIS — Z90.89 ACQUIRED ABSENCE OF OTHER ORGANS: Chronic | ICD-10-CM

## 2020-05-28 DIAGNOSIS — I25.10 ATHEROSCLEROTIC HEART DISEASE OF NATIVE CORONARY ARTERY WITHOUT ANGINA PECTORIS: ICD-10-CM

## 2020-05-28 DIAGNOSIS — Z90.710 ACQUIRED ABSENCE OF BOTH CERVIX AND UTERUS: Chronic | ICD-10-CM

## 2020-05-28 DIAGNOSIS — Z90.49 ACQUIRED ABSENCE OF OTHER SPECIFIED PARTS OF DIGESTIVE TRACT: Chronic | ICD-10-CM

## 2020-05-28 PROCEDURE — 86900 BLOOD TYPING SEROLOGIC ABO: CPT

## 2020-05-28 PROCEDURE — C1887: CPT

## 2020-05-28 PROCEDURE — C1769: CPT

## 2020-05-28 PROCEDURE — C1725: CPT

## 2020-05-28 PROCEDURE — 80048 BASIC METABOLIC PNL TOTAL CA: CPT

## 2020-05-28 PROCEDURE — C1894: CPT

## 2020-05-28 PROCEDURE — C1753: CPT

## 2020-05-28 PROCEDURE — C1874: CPT

## 2020-05-28 PROCEDURE — 86850 RBC ANTIBODY SCREEN: CPT

## 2020-05-28 PROCEDURE — C9600: CPT | Mod: LD

## 2020-05-28 PROCEDURE — 85027 COMPLETE CBC AUTOMATED: CPT

## 2020-05-28 PROCEDURE — 93010 ELECTROCARDIOGRAM REPORT: CPT

## 2020-05-28 PROCEDURE — 86901 BLOOD TYPING SEROLOGIC RH(D): CPT

## 2020-05-28 PROCEDURE — C1760: CPT

## 2020-05-28 PROCEDURE — 36415 COLL VENOUS BLD VENIPUNCTURE: CPT

## 2020-05-28 PROCEDURE — 93005 ELECTROCARDIOGRAM TRACING: CPT

## 2020-05-28 PROCEDURE — 93458 L HRT ARTERY/VENTRICLE ANGIO: CPT | Mod: XU

## 2020-05-28 RX ORDER — PANTOPRAZOLE SODIUM 20 MG/1
40 TABLET, DELAYED RELEASE ORAL DAILY
Refills: 0 | Status: DISCONTINUED | OUTPATIENT
Start: 2020-05-28 | End: 2020-05-29

## 2020-05-28 RX ORDER — CLOPIDOGREL BISULFATE 75 MG/1
75 TABLET, FILM COATED ORAL DAILY
Refills: 0 | Status: DISCONTINUED | OUTPATIENT
Start: 2020-05-29 | End: 2020-05-29

## 2020-05-28 RX ORDER — HYDRALAZINE HCL 50 MG
10 TABLET ORAL ONCE
Refills: 0 | Status: COMPLETED | OUTPATIENT
Start: 2020-05-28 | End: 2020-05-28

## 2020-05-28 RX ORDER — ASPIRIN/CALCIUM CARB/MAGNESIUM 324 MG
325 TABLET ORAL ONCE
Refills: 0 | Status: COMPLETED | OUTPATIENT
Start: 2020-05-28 | End: 2020-05-28

## 2020-05-28 RX ORDER — METOPROLOL TARTRATE 50 MG
50 TABLET ORAL DAILY
Refills: 0 | Status: DISCONTINUED | OUTPATIENT
Start: 2020-05-28 | End: 2020-05-29

## 2020-05-28 RX ORDER — CLOPIDOGREL BISULFATE 75 MG/1
600 TABLET, FILM COATED ORAL ONCE
Refills: 0 | Status: COMPLETED | OUTPATIENT
Start: 2020-05-28 | End: 2020-05-28

## 2020-05-28 RX ORDER — DEXTROAMPHETAMINE SACCHARATE, AMPHETAMINE ASPARTATE, DEXTROAMPHETAMINE SULFATE AND AMPHETAMINE SULFATE 1.875; 1.875; 1.875; 1.875 MG/1; MG/1; MG/1; MG/1
10 TABLET ORAL DAILY
Refills: 0 | Status: DISCONTINUED | OUTPATIENT
Start: 2020-05-28 | End: 2020-05-29

## 2020-05-28 RX ORDER — ISOSORBIDE MONONITRATE 60 MG/1
30 TABLET, EXTENDED RELEASE ORAL DAILY
Refills: 0 | Status: DISCONTINUED | OUTPATIENT
Start: 2020-05-28 | End: 2020-05-29

## 2020-05-28 RX ORDER — HYDROCHLOROTHIAZIDE 25 MG
12.5 TABLET ORAL DAILY
Refills: 0 | Status: DISCONTINUED | OUTPATIENT
Start: 2020-05-29 | End: 2020-05-29

## 2020-05-28 RX ORDER — LOSARTAN POTASSIUM 100 MG/1
50 TABLET, FILM COATED ORAL DAILY
Refills: 0 | Status: DISCONTINUED | OUTPATIENT
Start: 2020-05-28 | End: 2020-05-29

## 2020-05-28 RX ORDER — ATORVASTATIN CALCIUM 80 MG/1
80 TABLET, FILM COATED ORAL AT BEDTIME
Refills: 0 | Status: DISCONTINUED | OUTPATIENT
Start: 2020-05-28 | End: 2020-05-29

## 2020-05-28 RX ORDER — HYDROCHLOROTHIAZIDE 25 MG
12.5 TABLET ORAL ONCE
Refills: 0 | Status: DISCONTINUED | OUTPATIENT
Start: 2020-05-28 | End: 2020-05-28

## 2020-05-28 RX ORDER — SODIUM CHLORIDE 9 MG/ML
1000 INJECTION INTRAMUSCULAR; INTRAVENOUS; SUBCUTANEOUS
Refills: 0 | Status: DISCONTINUED | OUTPATIENT
Start: 2020-05-28 | End: 2020-05-29

## 2020-05-28 RX ORDER — ASPIRIN/CALCIUM CARB/MAGNESIUM 324 MG
325 TABLET ORAL DAILY
Refills: 0 | Status: DISCONTINUED | OUTPATIENT
Start: 2020-05-28 | End: 2020-05-29

## 2020-05-28 RX ORDER — LOSARTAN POTASSIUM 100 MG/1
50 TABLET, FILM COATED ORAL ONCE
Refills: 0 | Status: DISCONTINUED | OUTPATIENT
Start: 2020-05-28 | End: 2020-05-28

## 2020-05-28 RX ADMIN — ISOSORBIDE MONONITRATE 30 MILLIGRAM(S): 60 TABLET, EXTENDED RELEASE ORAL at 17:17

## 2020-05-28 RX ADMIN — Medication 10 MILLIGRAM(S): at 12:25

## 2020-05-28 RX ADMIN — Medication 1 TABLET(S): at 17:18

## 2020-05-28 RX ADMIN — CLOPIDOGREL BISULFATE 600 MILLIGRAM(S): 75 TABLET, FILM COATED ORAL at 11:45

## 2020-05-28 RX ADMIN — SODIUM CHLORIDE 75 MILLILITER(S): 9 INJECTION INTRAMUSCULAR; INTRAVENOUS; SUBCUTANEOUS at 12:00

## 2020-05-28 RX ADMIN — LOSARTAN POTASSIUM 50 MILLIGRAM(S): 100 TABLET, FILM COATED ORAL at 17:18

## 2020-05-28 RX ADMIN — Medication 50 MILLIGRAM(S): at 17:18

## 2020-05-28 RX ADMIN — Medication 325 MILLIGRAM(S): at 18:17

## 2020-05-28 RX ADMIN — Medication 325 MILLIGRAM(S): at 11:45

## 2020-05-28 RX ADMIN — ATORVASTATIN CALCIUM 80 MILLIGRAM(S): 80 TABLET, FILM COATED ORAL at 21:48

## 2020-05-28 RX ADMIN — Medication 20 MILLIGRAM(S): at 21:48

## 2020-05-28 RX ADMIN — PANTOPRAZOLE SODIUM 40 MILLIGRAM(S): 20 TABLET, DELAYED RELEASE ORAL at 17:18

## 2020-05-28 NOTE — PROGRESS NOTE ADULT - SUBJECTIVE AND OBJECTIVE BOX
Cath Lab Nurse Practitioner Note  HPI:  84 y.o female with PMHx of HTN, hyperparathyroidism, arthritis, TIA, GERD with c/o SOB and increased fatigue referred for cardiac cath with possible PCI.   COVID-19 PCR: NotDetec (26 May 2020 16:18)    s/p LHC : UZMA to LAD  Pt denies chest pain/SOB/palpitations post cath.    Physical exam:  Vital Signs Last 24 Hrs  T(C): 36.6 (28 May 2020 08:03), Max: 36.6 (28 May 2020 08:03)  T(F): 97.8 (28 May 2020 08:03), Max: 97.8 (28 May 2020 08:03)  HR: 56 (28 May 2020 11:45) (56 - 58)  BP: 173/82 (28 May 2020 11:45) (169/76 - 173/82)  BP(mean): 102 (28 May 2020 08:03) (102 - 102)  RR: 16 (28 May 2020 11:45) (16 - 18)  SpO2: 92% (28 May 2020 11:45) (92% - 96%)  Neuro: A&Ox3  Cardiac : (+)S1S2, RRR  Lungs: CTA B/L  Abd: soft, NT, (+)BS  Ext: Rt groin (+) perclose closure device , 2+ Rt DP    Labs:                        11.1   4.71  )-----------( 185      ( 26 May 2020 16:18 )             33.5     05-26    141  |  110<H>  |  12  ----------------------------<  93  4.1   |  28  |  0.76    Ca    9.9      26 May 2020 16:18        MEDICATIONS  (STANDING):  aspirin 325 milliGRAM(s) Oral Once  clopidogrel Tablet 600 milliGRAM(s) Oral Once  sodium chloride 0.9%. 1000 milliLiter(s) (75 mL/Hr) IV Continuous <Continuous>      A/P : CAD, s/p UZMA to LAD  -IV hydration  -continue ASA/plavix/b-blocker/statin  -Discussed therapeutic lifestyle changes to reduce risk factors such as following a cardiac diet, weight loss, maintaining a healthy weight, exercise, smoking cessation, medication compliance, and regular follow-up  with MD to know our numbers (BP, cholesterol, weight, and glucose)  -d/c planning later today if pt remains stable after OOB  -f/u with Dr. Yates in 1week  -Plan d/w pt/Dr. Stubbs. Cath Lab Nurse Practitioner Note  HPI:  84 y.o female with PMHx of HTN, hyperparathyroidism, arthritis, TIA, GERD with c/o SOB and increased fatigue referred for cardiac cath with possible PCI.   COVID-19 PCR: NotDetec (26 May 2020 16:18)    s/p LHC : UZMA to LAD  Pt denies chest pain/SOB/palpitations post cath.    Physical exam:  Vital Signs Last 24 Hrs  T(C): 36.6 (28 May 2020 08:03), Max: 36.6 (28 May 2020 08:03)  T(F): 97.8 (28 May 2020 08:03), Max: 97.8 (28 May 2020 08:03)  HR: 56 (28 May 2020 11:45) (56 - 58)  BP: 173/82 (28 May 2020 11:45) (169/76 - 173/82)  BP(mean): 102 (28 May 2020 08:03) (102 - 102)  RR: 16 (28 May 2020 11:45) (16 - 18)  SpO2: 92% (28 May 2020 11:45) (92% - 96%)  Neuro: A&Ox3  Cardiac : (+)S1S2, RRR  Lungs: CTA B/L  Abd: soft, NT, (+)BS  Ext: Rt groin (+) perclose closure device , 2+ Rt DP    Labs:                        11.1   4.71  )-----------( 185      ( 26 May 2020 16:18 )             33.5     05-26    141  |  110<H>  |  12  ----------------------------<  93  4.1   |  28  |  0.76    Ca    9.9      26 May 2020 16:18        MEDICATIONS  (STANDING):  aspirin 325 milliGRAM(s) Oral Once  clopidogrel Tablet 600 milliGRAM(s) Oral Once  sodium chloride 0.9%. 1000 milliLiter(s) (75 mL/Hr) IV Continuous <Continuous>      A/P : CAD, s/p UZMA to LAD  -IV hydration  -continue ASA/plavix/b-blocker/statin  -Discussed therapeutic lifestyle changes to reduce risk factors such as following a cardiac diet, weight loss, maintaining a healthy weight, exercise, smoking cessation, medication compliance, and regular follow-up  with MD to know our numbers (BP, cholesterol, weight, and glucose)  -check EKG/Labs/site in AM  -d/c home tomorrow if remains stable.  -f/u with Dr. Yates in 1week  -Plan d/w pt/Dr. Stubbs.

## 2020-05-29 ENCOUNTER — TRANSCRIPTION ENCOUNTER (OUTPATIENT)
Age: 85
End: 2020-05-29

## 2020-05-29 VITALS
SYSTOLIC BLOOD PRESSURE: 129 MMHG | OXYGEN SATURATION: 96 % | HEART RATE: 57 BPM | TEMPERATURE: 98 F | RESPIRATION RATE: 18 BRPM | DIASTOLIC BLOOD PRESSURE: 57 MMHG

## 2020-05-29 LAB
ANION GAP SERPL CALC-SCNC: 7 MMOL/L — SIGNIFICANT CHANGE UP (ref 5–17)
BUN SERPL-MCNC: 12 MG/DL — SIGNIFICANT CHANGE UP (ref 7–23)
CALCIUM SERPL-MCNC: 9.6 MG/DL — SIGNIFICANT CHANGE UP (ref 8.5–10.1)
CHLORIDE SERPL-SCNC: 107 MMOL/L — SIGNIFICANT CHANGE UP (ref 96–108)
CO2 SERPL-SCNC: 23 MMOL/L — SIGNIFICANT CHANGE UP (ref 22–31)
CREAT SERPL-MCNC: 0.77 MG/DL — SIGNIFICANT CHANGE UP (ref 0.5–1.3)
GLUCOSE SERPL-MCNC: 105 MG/DL — HIGH (ref 70–99)
HCT VFR BLD CALC: 35.8 % — SIGNIFICANT CHANGE UP (ref 34.5–45)
HGB BLD-MCNC: 12.1 G/DL — SIGNIFICANT CHANGE UP (ref 11.5–15.5)
MCHC RBC-ENTMCNC: 29.6 PG — SIGNIFICANT CHANGE UP (ref 27–34)
MCHC RBC-ENTMCNC: 33.8 GM/DL — SIGNIFICANT CHANGE UP (ref 32–36)
MCV RBC AUTO: 87.5 FL — SIGNIFICANT CHANGE UP (ref 80–100)
PLATELET # BLD AUTO: 181 K/UL — SIGNIFICANT CHANGE UP (ref 150–400)
POTASSIUM SERPL-MCNC: 3.9 MMOL/L — SIGNIFICANT CHANGE UP (ref 3.5–5.3)
POTASSIUM SERPL-SCNC: 3.9 MMOL/L — SIGNIFICANT CHANGE UP (ref 3.5–5.3)
RBC # BLD: 4.09 M/UL — SIGNIFICANT CHANGE UP (ref 3.8–5.2)
RBC # FLD: 13.5 % — SIGNIFICANT CHANGE UP (ref 10.3–14.5)
SODIUM SERPL-SCNC: 137 MMOL/L — SIGNIFICANT CHANGE UP (ref 135–145)
WBC # BLD: 6.96 K/UL — SIGNIFICANT CHANGE UP (ref 3.8–10.5)
WBC # FLD AUTO: 6.96 K/UL — SIGNIFICANT CHANGE UP (ref 3.8–10.5)

## 2020-05-29 PROCEDURE — 93010 ELECTROCARDIOGRAM REPORT: CPT

## 2020-05-29 RX ORDER — CLOPIDOGREL BISULFATE 75 MG/1
1 TABLET, FILM COATED ORAL
Qty: 30 | Refills: 7 | DISCHARGE
Start: 2020-05-29 | End: 2021-01-23

## 2020-05-29 RX ORDER — CLOPIDOGREL BISULFATE 75 MG/1
1 TABLET, FILM COATED ORAL
Qty: 30 | Refills: 7
Start: 2020-05-29 | End: 2021-01-23

## 2020-05-29 RX ORDER — LOSARTAN POTASSIUM 100 MG/1
1 TABLET, FILM COATED ORAL
Qty: 30 | Refills: 5
Start: 2020-05-29 | End: 2020-11-24

## 2020-05-29 RX ORDER — AMLODIPINE BESYLATE 2.5 MG/1
1 TABLET ORAL
Qty: 30 | Refills: 2
Start: 2020-05-29 | End: 2020-08-26

## 2020-05-29 RX ADMIN — LOSARTAN POTASSIUM 50 MILLIGRAM(S): 100 TABLET, FILM COATED ORAL at 05:51

## 2020-05-29 RX ADMIN — PANTOPRAZOLE SODIUM 40 MILLIGRAM(S): 20 TABLET, DELAYED RELEASE ORAL at 11:23

## 2020-05-29 RX ADMIN — DEXTROAMPHETAMINE SACCHARATE, AMPHETAMINE ASPARTATE, DEXTROAMPHETAMINE SULFATE AND AMPHETAMINE SULFATE 10 MILLIGRAM(S): 1.875; 1.875; 1.875; 1.875 TABLET ORAL at 11:22

## 2020-05-29 RX ADMIN — CLOPIDOGREL BISULFATE 75 MILLIGRAM(S): 75 TABLET, FILM COATED ORAL at 11:22

## 2020-05-29 RX ADMIN — Medication 1 TABLET(S): at 11:23

## 2020-05-29 RX ADMIN — Medication 12.5 MILLIGRAM(S): at 05:51

## 2020-05-29 RX ADMIN — Medication 50 MILLIGRAM(S): at 11:23

## 2020-05-29 RX ADMIN — Medication 325 MILLIGRAM(S): at 11:22

## 2020-05-29 RX ADMIN — ISOSORBIDE MONONITRATE 30 MILLIGRAM(S): 60 TABLET, EXTENDED RELEASE ORAL at 11:23

## 2020-05-29 NOTE — PROGRESS NOTE ADULT - ASSESSMENT
84 y.o female with PMHx of HTN, hyperparathyroidism, arthritis, TIA, GERD with c/o SOB and increased fatigue referred for cardiac cath with possible PCI.   COVID-19 PCR: NotDetec (26 May 2020 16:18) (27 May 2020     -Encourage PO fluids  -ASA 81mg   -Plavix 75mg  -Lipitor 80mg   -Toprol XL 50mg   -Lipitor 80mg   -HCTZ 12.5mg  -Imdur 30mg   -Losartan 50mg    -Pt. to be discharged home today  -Post cath discharge instructions reviewed with pt., pt. verbalizes understands instructions   -Cardiac rehab deferred at this time due to Covid-19 pandemic    -Plan of care D/W pt. and MD  -Discussed therapeutic lifestyle changes to reduce risk factors such as following a cardiac diet, weight loss, maintaining a healthy weight, exercise, smoking cessation, medication compliance, and regular follow-up  with MD to know our numbers (BP, cholesterol, weight, and glucose  -Follow-up with attending/cardiologist

## 2020-05-29 NOTE — DISCHARGE NOTE PROVIDER - HOSPITAL COURSE
84 y.o female with PMHx of HTN, hyperparathyroidism, arthritis, TIA, GERD with c/o SOB and increased fatigue referred for cardiac cath with possible PCI.     COVID-19 PCR: NotDetec (26 May 2020 16:18)    S/P [facility] S/P UZMA to LAD    < from: Cardiac Cath Lab - Adult (05.28.20 @ 10:49) >     Dominance: Right     Impression     Diagnostic Conclusions     One Vessel coronary artery disease (LAD) .     Normal LV systolic function. Estimated LV ejection fraction is 65 %.     No aortic valve stenosis.     Elevated left ventricular end-diastolic pressure.     Systemic hypertension.     Interventional Conclusions     Successful Coronary Intervention UZMA of proximal LAD.     Recommendations     Percutaneous coronary intervention of LAD today - symptomatic stenosis.     Aggressive medical management of coronary artery disease and its     underlying risk factors.     If renal function and electrolytes permit add diuretic for hypertension     and elevated LVEDP.

## 2020-05-29 NOTE — DISCHARGE NOTE PROVIDER - NSDCMRMEDTOKEN_GEN_ALL_CORE_FT
aspirin 325 mg oral delayed release tablet: 1 tab(s) orally once a day  B Complex 100: 1 tab(s) orally once a day  Benicar HCT 40 mg-12.5 mg oral tablet: 1 tab(s) orally once a day (in the morning)  calcium:   dextroamphetamine-amphetamine 10 mg oral tablet: 1 tab(s) orally once a day (in the morning)  isosorbide mononitrate 30 mg oral tablet, extended release: 1 tab(s) orally once a day (in the morning)  Metamucil:   metoprolol succinate 25 mg oral tablet, extended release: 2 tab(s) orally once a day  Multiple Vitamins oral tablet: 1 tab(s) orally once a day  Omega-3 oral capsule:   pantoprazole 40 mg oral delayed release tablet: 1 tab(s) orally once a day  PARoxetine 20 mg oral tablet: 1 tab(s) orally once a day (at bedtime)  rosuvastatin 40 mg oral tablet: 1 tab(s) orally once a day  Vitamin C 1000 mg oral tablet: 1 tab(s) orally once a day  Vitamin D2:

## 2020-05-29 NOTE — DISCHARGE NOTE NURSING/CASE MANAGEMENT/SOCIAL WORK - PATIENT PORTAL LINK FT
You can access the FollowMyHealth Patient Portal offered by [facility] by registering at the following website: [email or web address]. By joining [employer]’s FollowMyHealth portal, you will also be able to view your health information using other applications (apps) compatible with our system.

## 2020-05-29 NOTE — DISCHARGE NOTE PROVIDER - NSDCCPCAREPLAN_GEN_ALL_CORE_FT
PRINCIPAL DISCHARGE DIAGNOSIS  Diagnosis: CAD (coronary artery disease)  Assessment and Plan of Treatment: Pt. will remain free from chest pain

## 2020-05-29 NOTE — PROGRESS NOTE ADULT - SUBJECTIVE AND OBJECTIVE BOX
Follow Up: S/P C S/P UZMA     HPI:  84 y.o female with PMHx of HTN, hyperparathyroidism, arthritis, TIA, GERD with c/o SOB and increased fatigue referred for cardiac cath with possible PCI.   COVID-19 PCR: NotDetec (26 May 2020 16:18) (27 May 2020 18:40)        Subjective/Observations: Pt. seen and examined and evaluated. Pt. resting comfortably in bed in NAD, with no respiratory distress, no chest pain, dyspnea, palpitations, PND, or orthopnea.      REVIEW OF SYSTEMS: All other review of systems is negative unless indicated above    PAST MEDICAL & SURGICAL HISTORY:  Hernia, hiatal  Hypertension  S/P hysterectomy  S/P tonsillectomy  S/P appendectomy      MEDICATIONS  (STANDING):  amphetamine/dextroamphetamine 10 milliGRAM(s) Oral daily  aspirin enteric coated 325 milliGRAM(s) Oral daily  atorvastatin 80 milliGRAM(s) Oral at bedtime  clopidogrel Tablet 75 milliGRAM(s) Oral daily  hydrochlorothiazide 12.5 milliGRAM(s) Oral daily  isosorbide   mononitrate ER Tablet (IMDUR) 30 milliGRAM(s) Oral daily  losartan 50 milliGRAM(s) Oral daily  metoprolol succinate ER 50 milliGRAM(s) Oral daily  multivitamin 1 Tablet(s) Oral daily  pantoprazole    Tablet 40 milliGRAM(s) Oral daily  PARoxetine 20 milliGRAM(s) Oral at bedtime  sodium chloride 0.9%. 1000 milliLiter(s) (75 mL/Hr) IV Continuous <Continuous>    MEDICATIONS  (PRN):      Allergies    Biaxin (Other)  Demerol HCl (Other)  doxycycline (Other)  Indocin (Other)  Originally Entered as [Unknown] reaction to [sulfur] (Unknown)  penicillins (Other)  tetracyclines (Unknown)  verapamil (Other)    Intolerances          Vital Signs Last 24 Hrs  T(C): 36.9 (29 May 2020 05:47), Max: 36.9 (28 May 2020 21:44)  T(F): 98.4 (29 May 2020 05:47), Max: 98.4 (28 May 2020 21:44)  HR: 55 (29 May 2020 05:49) (52 - 67)  BP: 139/62 (29 May 2020 05:47) (108/54 - 207/89)  BP(mean): --  RR: 18 (29 May 2020 05:47) (16 - 18)  SpO2: 97% (29 May 2020 05:47) (92% - 99%)    I&O's Summary    28 May 2020 07:01  -  29 May 2020 07:00  --------------------------------------------------------  IN: 0 mL / OUT: 300 mL / NET: -300 mL              LABS: All Labs Reviewed:                        12.1   6.96  )-----------( 181      ( 29 May 2020 08:23 )             35.8                             Echo:    Stress Testing:     Cath:  < from: Cardiac Cath Lab - Adult (05.28.20 @ 10:49) >  [facility]  Ventriculography Findings:  Normal left ventricular systolic function.  Left ventricular cavity size is normal.     Coronary tree     Dominance: Right     Impression     Diagnostic Conclusions   One Vessel coronary artery disease (LAD) .   Normal LV systolic function. Estimated LV ejection fraction is 65 %.   No aortic valve stenosis.   Elevated left ventricular end-diastolic pressure.   Systemic hypertension.     Interventional Conclusions     Successful Coronary Intervention UZMA of proximal LAD.     Recommendations     Percutaneous coronary intervention of LAD today - symptomatic stenosis.     Aggressive medical management of coronary artery disease and its   underlying risk factors.     If renal function and electrolytes permit add diuretic for hypertension   and elevated LVEDP.     Aspirin 325 mg PO daily .   Add clopidogrel (Plavix) 75 mg PO daily.      EKG:    Events Overnight:       Physical Exam:  Appearance: [ ] Normal  [ ] abnormal [ ] NAD   Eyes: [ ] PERRL [ ] EOMI  HEENT: [ ] Normal [ ] Abnormal oral mucosa [ ]NC/AT  Cardiovascular: [ ] S1 [ ] S2 [ ] RRR [ ] m/r/g [ ]edema [ ] JVP  Procedural Access Site: [ ]  hematoma [ ] tender to palpation [ ] 2+ pulse [ ] bruit [ ] Ecchymosis  Respiratory: [ ] Clear to auscultation bilaterally  Gastrointestinal: [ ] Soft [ ] tenderness[ ] distension [ ] BS  Musculoskeletal: [ ] clubbing [ ] joint deformity   Neurologic: [ ] Non-focal  Lymphatic: [ ] lymphadenopathy  Psychiatry: [ ] AAOx3  [ ] confused [ ] disoriented [ ] Mood & affect appropriate  Skin: [ ]  rashes [ ] ecchymoses [ ] cyanosis Follow Up: S/P C S/P UZMA     HPI:  84 y.o female with PMHx of HTN, hyperparathyroidism, arthritis, TIA, GERD with c/o SOB and increased fatigue referred for cardiac cath with possible PCI.   COVID-19 PCR: NotDetec (26 May 2020 16:18) (27 May 2020 18:40)        Subjective/Observations: Pt. seen and examined and evaluated. Pt. resting comfortably in bed in NAD, with no respiratory distress, no chest pain, dyspnea, palpitations, PND, or orthopnea.      REVIEW OF SYSTEMS: All other review of systems is negative unless indicated above    PAST MEDICAL & SURGICAL HISTORY:  Hernia, hiatal  Hypertension  S/P hysterectomy  S/P tonsillectomy  S/P appendectomy      MEDICATIONS  (STANDING):  amphetamine/dextroamphetamine 10 milliGRAM(s) Oral daily  aspirin enteric coated 325 milliGRAM(s) Oral daily  atorvastatin 80 milliGRAM(s) Oral at bedtime  clopidogrel Tablet 75 milliGRAM(s) Oral daily  hydrochlorothiazide 12.5 milliGRAM(s) Oral daily  isosorbide   mononitrate ER Tablet (IMDUR) 30 milliGRAM(s) Oral daily  losartan 50 milliGRAM(s) Oral daily  metoprolol succinate ER 50 milliGRAM(s) Oral daily  multivitamin 1 Tablet(s) Oral daily  pantoprazole    Tablet 40 milliGRAM(s) Oral daily  PARoxetine 20 milliGRAM(s) Oral at bedtime  sodium chloride 0.9%. 1000 milliLiter(s) (75 mL/Hr) IV Continuous <Continuous>    MEDICATIONS  (PRN):      Allergies    Biaxin (Other)  Demerol HCl (Other)  doxycycline (Other)  Indocin (Other)  Originally Entered as [Unknown] reaction to [sulfur] (Unknown)  penicillins (Other)  tetracyclines (Unknown)  verapamil (Other)    Intolerances    Vital Signs Last 24 Hrs  T(C): 36.9 (29 May 2020 05:47), Max: 36.9 (28 May 2020 21:44)  T(F): 98.4 (29 May 2020 05:47), Max: 98.4 (28 May 2020 21:44)  HR: 55 (29 May 2020 05:49) (52 - 67)  BP: 139/62 (29 May 2020 05:47) (108/54 - 207/89)  BP(mean): --  RR: 18 (29 May 2020 05:47) (16 - 18)  SpO2: 97% (29 May 2020 05:47) (92% - 99%)    I&O's Summary    28 May 2020 07:01  -  29 May 2020 07:00  --------------------------------------------------------  IN: 0 mL / OUT: 300 mL / NET: -300 mL              LABS: All Labs Reviewed:                        12.1   6.96  )-----------( 181      ( 29 May 2020 08:23 )             35.8                   Cath:  < from: Cardiac Cath Lab - Adult (05.28.20 @ 10:49) >  [facility]  Ventriculography Findings:  Normal left ventricular systolic function.  Left ventricular cavity size is normal.     Coronary tree     Dominance: Right     Impression     Diagnostic Conclusions   One Vessel coronary artery disease (LAD) .   Normal LV systolic function. Estimated LV ejection fraction is 65 %.   No aortic valve stenosis.   Elevated left ventricular end-diastolic pressure.   Systemic hypertension.     Interventional Conclusions     Successful Coronary Intervention UZMA of proximal LAD.     Recommendations     Percutaneous coronary intervention of LAD today - symptomatic stenosis.     Aggressive medical management of coronary artery disease and its   underlying risk factors.     If renal function and electrolytes permit add diuretic for hypertension   and elevated LVEDP.     Aspirin 325 mg PO daily .   Add clopidogrel (Plavix) 75 mg PO daily.      EKG:     Telemetry SB-SR 50-60 SVT vs PAT     Physical Exam:  Appearance: [ ] Normal  [ ] abnormal [X ] NAD   Eyes: [ ] PERRL [ ] EOMI  HEENT: [ ] Normal [ ] Abnormal oral mucosa [ ]NC/AT  Cardiovascular: [X ] S1 [X ] S2 [ ] RRR [ ] m/r/g [ ]edema [ ] JVP  Procedural Access Site: [X] rt. groin benign benign soft no bleeding no hematoma    Respiratory: [X ] Clear to auscultation bilaterally  Gastrointestinal: [ ] Soft [ ] tenderness[ ] distension [ ] BS  Musculoskeletal: [ ] clubbing [ ] joint deformity   Neurologic: [ ] Non-focal  Lymphatic: [ ] lymphadenopathy  Psychiatry: [X] AAOx3  [ ] confused [ ] disoriented [ ] Mood & affect appropriate  Skin: [ ]  rashes [ ] ecchymoses [ ] cyanosis

## 2020-05-29 NOTE — DISCHARGE NOTE PROVIDER - CARE PROVIDER_API CALL
Annie Yates J  CARDIOVASCULAR DISEASE  [address] [place] [address]  Phone: [phone]  Fax: [phone]  Follow Up Time:

## 2020-05-31 DIAGNOSIS — I25.110 ATHEROSCLEROTIC HEART DISEASE OF NATIVE CORONARY ARTERY WITH UNSTABLE ANGINA PECTORIS: ICD-10-CM

## 2020-05-31 DIAGNOSIS — Z88.5 ALLERGY STATUS TO NARCOTIC AGENT: ICD-10-CM

## 2020-05-31 DIAGNOSIS — Z79.02 LONG TERM (CURRENT) USE OF ANTITHROMBOTICS/ANTIPLATELETS: ICD-10-CM

## 2020-05-31 DIAGNOSIS — R06.02 SHORTNESS OF BREATH: ICD-10-CM

## 2020-05-31 DIAGNOSIS — R53.83 OTHER FATIGUE: ICD-10-CM

## 2020-05-31 DIAGNOSIS — E21.3 HYPERPARATHYROIDISM, UNSPECIFIED: ICD-10-CM

## 2020-05-31 DIAGNOSIS — Z79.82 LONG TERM (CURRENT) USE OF ASPIRIN: ICD-10-CM

## 2020-05-31 DIAGNOSIS — I10 ESSENTIAL (PRIMARY) HYPERTENSION: ICD-10-CM

## 2020-05-31 DIAGNOSIS — Z88.0 ALLERGY STATUS TO PENICILLIN: ICD-10-CM

## 2020-05-31 DIAGNOSIS — I20.0 UNSTABLE ANGINA: ICD-10-CM

## 2020-05-31 DIAGNOSIS — I25.84 CORONARY ATHEROSCLEROSIS DUE TO CALCIFIED CORONARY LESION: ICD-10-CM

## 2020-09-01 ENCOUNTER — APPOINTMENT (OUTPATIENT)
Dept: DISASTER EMERGENCY | Facility: CLINIC | Age: 85
End: 2020-09-01

## 2020-09-01 DIAGNOSIS — Z01.818 ENCOUNTER FOR OTHER PREPROCEDURAL EXAMINATION: ICD-10-CM

## 2020-09-02 LAB — SARS-COV-2 N GENE NPH QL NAA+PROBE: NOT DETECTED

## 2020-09-02 NOTE — H&P ADULT - HISTORY OF PRESENT ILLNESS
84 y/o female with PMHx of HTN, TIA, CAD with LAD stent 5/2020 presented to cardiology for c/o increased fatigue. Sx of fatigue returned over the past few weeks. Patient was feeling better after LAD stent 5/28/2020. Referred for cardiac catheterization for further eval. COVID PST negative.

## 2020-09-02 NOTE — H&P ADULT - ASSESSMENT
84 y/o female with PMHx of HTN, TIA, CAD with LAD stent 5/2020 presented to cardiology for c/o increased fatigue. Sx of fatigue returned over the past few weeks. Patient was feeling better after LAD stent 5/28/2020. Referred for cardiac catheterization for further eval. COVID PST negative.     ASA class:  Creatinine:  GFR:  Bleeding  Risk score: 84 y/o female with PMHx of HTN, TIA, CAD with LAD stent 5/2020 presented to cardiology for c/o increased fatigue. Sx of fatigue returned over the past few weeks. Patient was feeling better after LAD stent 5/28/2020. Referred for cardiac catheterization for further eval. COVID PST negative.     ASA class:II  Creatinine: 0.8  GFR: 68.17  Bleeding  Risk score:2%

## 2020-09-02 NOTE — H&P ADULT - NSHPREVIEWOFSYSTEMS_GEN_ALL_CORE
REVIEW OF SYSTEMS:    CONSTITUTIONAL: No fever, weight loss, chills, shakes, or fatigue  EYES: No eye pain, visual disturbances, or discharge  ENMT:  No difficulty hearing, tinnitus, vertigo; No sinus or throat pain  NECK: No pain or stiffness  RESPIRATORY: No cough, wheezing, hemoptysis, or shortness of breath  CARDIOVASCULAR: No chest pain, dyspnea, palpitations, dizziness, syncope, paroxysmal nocturnal dyspnea, orthopnea, or arm or leg swelling  GASTROINTESTINAL: No abdominal  or epigastric pain, nausea, vomiting, hematemesis, diarrhea, constipation, melena or bright red blood.  GENITOURINARY: No dysuria, nocturia, hematuria, or urinary incontinence  NEUROLOGICAL: No headaches, memory loss, slurred speech, limb weakness, loss of strength, numbness, or tremors  SKIN: No itching, burning, rashes, or lesions   MUSCULOSKELETAL: No joint pain or swelling, muscle, back, or extremity pain  PSYCHIATRIC: No depression, anxiety, or difficulty sleeping REVIEW OF SYSTEMS:    CONSTITUTIONAL: No fever, weight loss, chills, shakes. + fatigue  EYES: No eye pain, visual disturbances, or discharge  ENMT:  No difficulty hearing, tinnitus, vertigo; No sinus or throat pain  NECK: No pain or stiffness  RESPIRATORY: No cough, wheezing, hemoptysis, or shortness of breath  CARDIOVASCULAR: No chest pain, dyspnea, palpitations, dizziness, syncope, paroxysmal nocturnal dyspnea, orthopnea, or arm or leg swelling  GASTROINTESTINAL: No abdominal  or epigastric pain, nausea, vomiting, hematemesis, diarrhea, constipation, melena or bright red blood.  GENITOURINARY: No dysuria, nocturia, hematuria, or urinary incontinence  NEUROLOGICAL: No headaches, memory loss, slurred speech, limb weakness, loss of strength, numbness, or tremors  SKIN: No itching, burning, rashes, or lesions   MUSCULOSKELETAL: No joint pain or swelling, muscle, back, or extremity pain  PSYCHIATRIC: No depression, anxiety, or difficulty sleeping

## 2020-09-02 NOTE — H&P ADULT - NSHPLABSRESULTS_GEN_ALL_CORE
EKG ----    < from: Cardiac Cath Lab - Adult (05.28.20 @ 10:49) >   Diagnostic Conclusions   One Vessel coronary artery disease (LAD) .   Normal LV systolic function. Estimated LV ejection fraction is 65 %.   No aortic valve stenosis.   Elevated left ventricular end-diastolic pressure.   Systemic hypertension.     Interventional Conclusions   Successful Coronary Intervention UZMA of proximal LAD.     Recommendations   Percutaneous coronary intervention of LAD today - symptomatic stenosis.  < end of copied text > 9/3/2020 EKG: NSR     < from: Cardiac Cath Lab - Adult (05.28.20 @ 10:49) >   Diagnostic Conclusions   One Vessel coronary artery disease (LAD) .   Normal LV systolic function. Estimated LV ejection fraction is 65 %.   No aortic valve stenosis.   Elevated left ventricular end-diastolic pressure.   Systemic hypertension.     Interventional Conclusions   Successful Coronary Intervention UZMA of proximal LAD.     Recommendations   Percutaneous coronary intervention of LAD today - symptomatic stenosis.  < end of copied text >

## 2020-09-02 NOTE — H&P ADULT - NSICDXPASTMEDICALHX_GEN_ALL_CORE_FT
PAST MEDICAL HISTORY:  CAD (coronary artery disease) LAD stent 5/2020    Hernia, hiatal     Hypertension

## 2020-09-02 NOTE — H&P ADULT - NSHPPHYSICALEXAM_GEN_ALL_CORE
PHYSICAL EXAM  GENERAL: NAD, AAOx3  HEAD:  Atraumatic, Normocephalic  EYES: EOMI, PERRLA, conjunctiva and sclera clear  NECK: Supple, No JVD, No LAD  CHEST/LUNG: Clear to auscultation bilaterally; No wheeze  HEART: s1 s2 Regular rate and rhythm; No murmurs, rubs, or gallops  ABDOMEN: Soft, Nontender, Nondistended; Bowel sounds present X 4 quadrants   EXTREMITIES:  2+ Peripheral Pulses, No clubbing, cyanosis, or edema  SKIN: No rashes or lesions,  b/l LE not red, cool to touch,  no open skin no drainage  NEURO: nonfocal CN/motor/sensory/reflexes  Psych: normal affect and behavior, calm and cooperative PHYSICAL EXAM    Vital Signs Last 24 Hrs  T(C): 36.7 (03 Sep 2020 10:12), Max: 36.7 (03 Sep 2020 10:12)  T(F): 98 (03 Sep 2020 10:12), Max: 98 (03 Sep 2020 10:12)  HR: 61 (03 Sep 2020 10:12) (61 - 61)  BP: 148/65 (03 Sep 2020 10:12) (148/65 - 148/65)  BP(mean): 87 (03 Sep 2020 10:12) (87 - 87)  RR: 18 (03 Sep 2020 10:12) (18 - 18)  SpO2: 100% (03 Sep 2020 10:12) (100% - 100%)    GENERAL: NAD, AAOx3  HEAD:  Atraumatic, Normocephalic  EYES: EOMI, PERRLA, conjunctiva and sclera clear  NECK: Supple, No JVD, No LAD  CHEST/LUNG: Clear to auscultation bilaterally; No wheeze  HEART: s1 s2 Regular rate and rhythm; No murmurs, rubs, or gallops  ABDOMEN: Soft, Nontender, Nondistended; Bowel sounds present X 4 quadrants   EXTREMITIES:  2+ Peripheral Pulses, No clubbing, cyanosis, or edema  SKIN: No rashes or lesions,  b/l LE not red, cool to touch,  no open skin no drainage  NEURO: nonfocal CN/motor/sensory/reflexes  Psych: normal affect and behavior, calm and cooperative

## 2020-09-03 ENCOUNTER — OUTPATIENT (OUTPATIENT)
Dept: OUTPATIENT SERVICES | Facility: HOSPITAL | Age: 85
LOS: 1 days | Discharge: HOME | End: 2020-09-03
Payer: MEDICARE

## 2020-09-03 VITALS
OXYGEN SATURATION: 100 % | SYSTOLIC BLOOD PRESSURE: 148 MMHG | DIASTOLIC BLOOD PRESSURE: 65 MMHG | RESPIRATION RATE: 18 BRPM | TEMPERATURE: 98 F | HEART RATE: 61 BPM

## 2020-09-03 VITALS
HEART RATE: 68 BPM | SYSTOLIC BLOOD PRESSURE: 156 MMHG | OXYGEN SATURATION: 97 % | DIASTOLIC BLOOD PRESSURE: 69 MMHG | RESPIRATION RATE: 16 BRPM

## 2020-09-03 DIAGNOSIS — Z90.89 ACQUIRED ABSENCE OF OTHER ORGANS: Chronic | ICD-10-CM

## 2020-09-03 DIAGNOSIS — I25.10 ATHEROSCLEROTIC HEART DISEASE OF NATIVE CORONARY ARTERY WITHOUT ANGINA PECTORIS: ICD-10-CM

## 2020-09-03 DIAGNOSIS — Z88.1 ALLERGY STATUS TO OTHER ANTIBIOTIC AGENTS STATUS: ICD-10-CM

## 2020-09-03 DIAGNOSIS — Z90.710 ACQUIRED ABSENCE OF BOTH CERVIX AND UTERUS: Chronic | ICD-10-CM

## 2020-09-03 DIAGNOSIS — Z90.49 ACQUIRED ABSENCE OF OTHER SPECIFIED PARTS OF DIGESTIVE TRACT: Chronic | ICD-10-CM

## 2020-09-03 PROCEDURE — C1760: CPT

## 2020-09-03 PROCEDURE — C1894: CPT

## 2020-09-03 PROCEDURE — C1769: CPT

## 2020-09-03 PROCEDURE — 93010 ELECTROCARDIOGRAM REPORT: CPT

## 2020-09-03 PROCEDURE — C1887: CPT

## 2020-09-03 PROCEDURE — 93005 ELECTROCARDIOGRAM TRACING: CPT | Mod: XU

## 2020-09-03 PROCEDURE — 93458 L HRT ARTERY/VENTRICLE ANGIO: CPT

## 2020-09-03 RX ORDER — ISOSORBIDE MONONITRATE 60 MG/1
1 TABLET, EXTENDED RELEASE ORAL
Qty: 0 | Refills: 0 | DISCHARGE

## 2020-09-03 NOTE — PACU DISCHARGE NOTE - COMMENTS
Patient discharge home as per orders. pt A/Ox4. Vital signs stable. PIVL removed. No evidence of infiltration noted at discharge. Patient skin intact, Patient with no complain of pain, SOB, or chest pain at discharge. pt ambulating around unit without c/o dizziness or lightheadedness. All discharge paperwork reviewed with pt. Pt to follow up as directed. Patient verbalized good understanding to all utilizing teach back and is without concerns at this time

## 2020-09-03 NOTE — ASU PREOP CHECKLIST - DNR CLARIFICATION FORM COMPLETED
Transfer    S- Transfer to [facility] from 636-2.    B- Pt arrived on station 66 9/13 around 2300. Pt troponin worsening from 3.5 to5.9 Heparin drip initiated and MD put orders in to transfer to [facility].    A- Brief systems assessment: PT A&Ox4, c/o chest tightness at beginning of shift. Pt got PRN albuterol nebulizer and chest tightness subsided to a tolerable level. SBA to wheel chair for transfer. No cardiac monitor needed for transfer.    R- Transfer to [facility] per physician orders. Continue to monitor pt and update physician as needed.      Code status: DNR / DNI  Skin: WDL- blanchable redness on coccyx  Fall Risk: Yes- Department fall risk interventions implemented.  Isolation: None  Patient belongings: only clothing- remains with patient  Medication drips upon transfer: none  Bedside Report Letter Given and explained to pt: Yes    
abdominal pain
n/a

## 2020-09-04 DIAGNOSIS — G45.8 OTHER TRANSIENT CEREBRAL ISCHEMIC ATTACKS AND RELATED SYNDROMES: ICD-10-CM

## 2020-09-04 DIAGNOSIS — I20.9 ANGINA PECTORIS, UNSPECIFIED: ICD-10-CM

## 2020-09-04 DIAGNOSIS — I25.119 ATHEROSCLEROTIC HEART DISEASE OF NATIVE CORONARY ARTERY WITH UNSPECIFIED ANGINA PECTORIS: ICD-10-CM

## 2020-09-04 DIAGNOSIS — Z95.5 PRESENCE OF CORONARY ANGIOPLASTY IMPLANT AND GRAFT: ICD-10-CM

## 2020-09-04 DIAGNOSIS — Z88.0 ALLERGY STATUS TO PENICILLIN: ICD-10-CM

## 2020-09-04 DIAGNOSIS — Z88.1 ALLERGY STATUS TO OTHER ANTIBIOTIC AGENTS STATUS: ICD-10-CM

## 2020-09-04 DIAGNOSIS — I10 ESSENTIAL (PRIMARY) HYPERTENSION: ICD-10-CM

## 2020-11-27 ENCOUNTER — EMERGENCY (EMERGENCY)
Facility: HOSPITAL | Age: 85
LOS: 0 days | Discharge: HOME | End: 2020-11-27
Attending: EMERGENCY MEDICINE
Payer: MEDICARE

## 2020-11-27 VITALS
HEART RATE: 63 BPM | RESPIRATION RATE: 17 BRPM | HEIGHT: 61 IN | OXYGEN SATURATION: 99 % | DIASTOLIC BLOOD PRESSURE: 66 MMHG | SYSTOLIC BLOOD PRESSURE: 137 MMHG | TEMPERATURE: 98 F | WEIGHT: 139.99 LBS

## 2020-11-27 VITALS
DIASTOLIC BLOOD PRESSURE: 64 MMHG | OXYGEN SATURATION: 96 % | TEMPERATURE: 98 F | HEART RATE: 64 BPM | RESPIRATION RATE: 18 BRPM | SYSTOLIC BLOOD PRESSURE: 152 MMHG

## 2020-11-27 DIAGNOSIS — S01.01XA LACERATION WITHOUT FOREIGN BODY OF SCALP, INITIAL ENCOUNTER: ICD-10-CM

## 2020-11-27 DIAGNOSIS — Z95.5 PRESENCE OF CORONARY ANGIOPLASTY IMPLANT AND GRAFT: ICD-10-CM
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
## 2020-11-27 DIAGNOSIS — S09.90XA UNSPECIFIED INJURY OF HEAD, INITIAL ENCOUNTER: ICD-10-CM

## 2020-11-27 DIAGNOSIS — Z88.0 ALLERGY STATUS TO PENICILLIN: ICD-10-CM

## 2020-11-27 DIAGNOSIS — Z90.710 ACQUIRED ABSENCE OF BOTH CERVIX AND UTERUS: Chronic | ICD-10-CM

## 2020-11-27 DIAGNOSIS — Z79.02 LONG TERM (CURRENT) USE OF ANTITHROMBOTICS/ANTIPLATELETS: ICD-10-CM
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
## 2020-11-27 DIAGNOSIS — I10 ESSENTIAL (PRIMARY) HYPERTENSION: ICD-10-CM

## 2020-11-27 DIAGNOSIS — Z88.1 ALLERGY STATUS TO OTHER ANTIBIOTIC AGENTS STATUS: ICD-10-CM

## 2020-11-27 DIAGNOSIS — Z90.49 ACQUIRED ABSENCE OF OTHER SPECIFIED PARTS OF DIGESTIVE TRACT: Chronic | ICD-10-CM

## 2020-11-27 DIAGNOSIS — Z79.82 LONG TERM (CURRENT) USE OF ASPIRIN: ICD-10-CM

## 2020-11-27 DIAGNOSIS — I25.10 ATHEROSCLEROTIC HEART DISEASE OF NATIVE CORONARY ARTERY WITHOUT ANGINA PECTORIS: ICD-10-CM

## 2020-11-27 DIAGNOSIS — K44.9 DIAPHRAGMATIC HERNIA WITHOUT OBSTRUCTION OR GANGRENE: ICD-10-CM

## 2020-11-27 DIAGNOSIS — Z90.89 ACQUIRED ABSENCE OF OTHER ORGANS: Chronic | ICD-10-CM

## 2020-11-27 PROCEDURE — 70450 CT HEAD/BRAIN W/O DYE: CPT | Mod: 26

## 2020-11-27 PROCEDURE — 99284 EMERGENCY DEPT VISIT MOD MDM: CPT | Mod: 25

## 2020-11-27 PROCEDURE — 12001 RPR S/N/AX/GEN/TRNK 2.5CM/<: CPT

## 2020-11-27 PROCEDURE — 93005 ELECTROCARDIOGRAM TRACING: CPT | Mod: XU

## 2020-11-27 PROCEDURE — 72125 CT NECK SPINE W/O DYE: CPT

## 2020-11-27 PROCEDURE — 99283 EMERGENCY DEPT VISIT LOW MDM: CPT

## 2020-11-27 PROCEDURE — 93010 ELECTROCARDIOGRAM REPORT: CPT

## 2020-11-27 PROCEDURE — 70450 CT HEAD/BRAIN W/O DYE: CPT

## 2020-11-27 PROCEDURE — 72125 CT NECK SPINE W/O DYE: CPT | Mod: 26

## 2020-11-27 NOTE — ED PROVIDER NOTE - PROGRESS NOTE DETAILS
84 yo female with a PMH of cad, on plavix and asa, htn, presents with head injury s/p mechanical fall. Pt was pushing the garbage down in the trash can and fell backwards and hit her head on the metal hose pond. Denies loc. Lac to the occipital scalp. CTs, lac repair, d/c home. pt feeling better, will dc home, lac repaired. 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

## 2020-11-27 NOTE — ED PROVIDER NOTE - MUSCULOSKELETAL, MLM
Spine appears normal, pelvic rack stable, range of motion is not limited, no muscle or joint tenderness

## 2020-11-27 NOTE — ED PROVIDER NOTE - NSFOLLOWUPINSTRUCTIONS_ED_ALL_ED_FT
Head Injury    WHAT YOU NEED TO KNOW:    A head injury is most often caused by a blow to the head. This may occur from a fall, bicycle injury, sports injury, being struck in the head, or a motor vehicle accident.     DISCHARGE INSTRUCTIONS:    Call 911 or have someone else call for any of the following:     You cannot be woken.      You have a seizure.      You stop responding to others or you faint.      You have blurry or double vision.      Your speech becomes slurred or confused.      You have arm or leg weakness, loss of feeling, or new problems with coordination.      Your pupils are larger than usual or one pupil is a different size than the other.       You have blood or clear fluid coming out of your ears or nose.    Return to the emergency department if:     You have repeated or forceful vomiting.      You feel confused.      Your headache gets worse or becomes severe.      You or someone caring for you notices that you are harder to wake than usual.    Contact your healthcare provider if:     Your symptoms last longer than 6 weeks after the injury.      You have questions or concerns about your condition or care.    Medicines:     Acetaminophen decreases pain. Acetaminophen is available without a doctor's order. Ask how much to take and how often to take it. Follow directions. Acetaminophen can cause liver damage if not taken correctly.      Take your medicine as directed. Contact your healthcare provider if you think your medicine is not helping or if you have side effects. Tell him or her if you are allergic to any medicine. Keep a list of the medicines, vitamins, and herbs you take. Include the amounts, and when and why you take them. Bring the list or the pill bottles to follow-up visits. Carry your medicine list with you in case of an emergency.    Self-care:     Rest or do quiet activities for 24 to 48 hours. Limit your time watching TV, using the computer, or doing tasks that require a lot of thinking. Slowly return to your normal activities as directed. Do not play sports or do activities that may cause you to get hit in the head. Ask your healthcare provider when you can return to sports.       Apply ice on your head for 15 to 20 minutes every hour or as directed. Use an ice pack, or put crushed ice in a plastic bag. Cover it with a towel before you apply it to your skin. Ice helps prevent tissue damage and decreases swelling and pain.       Have someone stay with you for 24 hours or as directed. This person can monitor you for complications and call 911. When you are awake the person should ask you a few questions to see if you are thinking clearly. An example would be to ask your name or your address.     Prevent another head injury:     Wear a helmet that fits properly. Do this when you play sports, or ride a bike, scooter, or skateboard. Helmets help decrease your risk of a serious head injury. Talk to your healthcare provider about other ways you can protect yourself if you play sports.      Wear your seat belt every time you are in a car. This helps to decrease your risk for a head injury if you are in a car accident.     Follow up with your healthcare provider as directed: Write down your questions so you remember to ask them during your visits.          Laceration    WHAT YOU NEED TO KNOW:    A laceration is an injury to the skin and the soft tissue underneath it. Lacerations happen when you are cut or hit by something. They can happen anywhere on the body.     DISCHARGE INSTRUCTIONS:    Return to the emergency department if:     You have heavy bleeding or bleeding that does not stop after 10 minutes of holding firm, direct pressure over the wound.       Your wound opens up.     Contact your healthcare provider if:     You have a fever or chills.       Your laceration is red, warm, or swollen.      You have red streaks on your skin coming from your wound.      You have white or yellow drainage from the wound that smells bad.      You have pain that gets worse, even after treatment.       You have questions or concerns about your condition or care.     Medicines:     Take your medicine as directed. Contact your healthcare provider if you think your medicine is not helping or if you have side effects. Tell him or her if you are allergic to any medicine. Keep a list of the medicines, vitamins, and herbs you take. Include the amounts, and when and why you take them. Bring the list or the pill bottles to follow-up visits. Carry your medicine list with you in case of an emergency.    Care for your wound as directed:     Do not get your wound wet until your healthcare provider says it is okay. Do not soak your wound in water. Do not go swimming until your healthcare provider says it is okay. Carefully wash the wound with soap and water. Gently pat the area dry or allow it to air dry.       Change your bandages when they get wet, dirty, or after washing. Apply new, clean bandages as directed. Do not apply elastic bandages or tape too tight. Do not put powders or lotions over your incision.       Apply antibiotic ointment as directed. Your healthcare provider may give you antibiotic ointment to put over your wound if you have stitches. If you have strips of tape over your incision, let them dry up and fall off on their own. If they do not fall off within 14 days, gently remove them. If you have glue over your wound, do not remove or pick at it. If your glue comes off, do not replace it with glue that you have at home.       Check your wound every day for signs of infection such as swelling, redness, or pus.     Self-care:     Apply ice on your wound for 15 to 20 minutes every hour or as directed. Use an ice pack, or put crushed ice in a plastic bag. Cover it with a towel. Ice helps prevent tissue damage and decreases swelling and pain.      Decrease scarring of your wound by applying ointments as directed. Do not apply ointments until your healthcare provider says it is okay. You may need to wait until your wound is healed. Ask which ointment to buy and how often to use it. After your wound is healed, use sunscreen over the area when you are out in the sun. You should do this for at least 6 months to 1 year after your injury.     Follow up with your healthcare provider as directed: You may need to follow up in 24 to 48 hours to have your wound checked for infection. You will need to return in 3 to 14 days if you have stitches or staples so they can be removed. Care for your wound as directed to prevent infection and help it heal. Write down your questions so you remember to ask them during your visits.

## 2020-11-27 NOTE — ED PROVIDER NOTE - ATTENDING CONTRIBUTION TO CARE
I, Deepthi Figueroa MD, performed the initial face to face bedside interview with this patient regarding history of present illness, review of symptoms and relevant past medical, social and family history.  I completed an independent physical examination.  I was the initial provider who evaluated this patient. I have signed out the follow up of any pending tests (i.e. labs, radiological studies) to the ACP.  I have communicated the patient’s plan of care and disposition with the ACP.  The history, relevant review of systems, past medical and surgical history, medical decision making, and physical examination was documented by the scribe in my presence and I attest to the accuracy of the documentation.

## 2020-11-27 NOTE — ED PROVIDER NOTE - PATIENT PORTAL LINK FT
You can access the FollowMyHealth Patient Portal offered by [facility] by registering at the following website: [email or web address]. By joining [email or web address]’s FollowMyHealth portal, you will also be able to view your health information using other applications (apps) compatible with our system.

## 2020-11-27 NOTE — ED PROVIDER NOTE - NSFOLLOWUPCLINICS_GEN_ALL_ED_FT
[facility]  Family Medicine  [address] [place] [address]  Phone: [phone]  Fax:   Follow Up Time:

## 2020-11-27 NOTE — ED PROVIDER NOTE - CLINICAL SUMMARY MEDICAL DECISION MAKING FREE TEXT BOX
85 year old female, says she was standing on box to crush it and fell backwards and hit her head. On Plavix and 81 mg ASA. Neuro alert called. Will obtain CT head and neck and reassess.

## 2020-11-27 NOTE — ED PROVIDER NOTE - OBJECTIVE STATEMENT
85 year old female with PMHx CAD s/p stent x1 on Plavix, 81 mg ASA presents to ED BIB EMS s/p mechanical fall with head strike PTA. Pt reports falling backward off a box while attempting to crush it with her feet. No LOC. +Struck back of head. Takes Plavix and 81 mg ASA. Presents with hematoma to back of scalp. No other acute complaints at this time.

## 2020-11-27 NOTE — ED PROVIDER NOTE - CARE PLAN
Principal Discharge DX:	Injury of head, initial encounter  Secondary Diagnosis:	Scalp laceration, initial encounter

## 2020-11-27 NOTE — ED ADULT NURSE NOTE - OBJECTIVE STATEMENT
pt to ed from home after mechanical fall pt fell sustaining laceration to head. Bleeding controlled. No loc. PT alert and oriented, [place]. Reports use of anticoagulants. Denies dizziness, palpitations and SOB. VSS. PT sent to CT scan from triage as a neuro alert. Will monitor and reassess.

## 2020-11-28 PROBLEM — I25.10 ATHEROSCLEROTIC HEART DISEASE OF NATIVE CORONARY ARTERY WITHOUT ANGINA PECTORIS: Chronic | Status: ACTIVE | Noted: 2020-09-02

## 2021-01-22 NOTE — ED ADULT NURSE NOTE - CHIEF COMPLAINT QUOTE
pt was at home when she began feeling overwhelmed ( her  just returned home from the hospital) got nauseous and grabbed her chest
none

## 2021-07-06 ENCOUNTER — INPATIENT (INPATIENT)
Facility: HOSPITAL | Age: 86
LOS: 1 days | Discharge: HOME HEALTH | DRG: 884 | End: 2021-07-08
Attending: INTERNAL MEDICINE | Admitting: HOSPITALIST
Payer: MEDICARE

## 2021-07-06 VITALS
WEIGHT: 145.06 LBS | HEIGHT: 61 IN | OXYGEN SATURATION: 96 % | SYSTOLIC BLOOD PRESSURE: 155 MMHG | HEART RATE: 69 BPM | DIASTOLIC BLOOD PRESSURE: 89 MMHG | RESPIRATION RATE: 20 BRPM

## 2021-07-06 DIAGNOSIS — Z90.710 ACQUIRED ABSENCE OF BOTH CERVIX AND UTERUS: Chronic | ICD-10-CM

## 2021-07-06 DIAGNOSIS — R41.0 DISORIENTATION, UNSPECIFIED: ICD-10-CM

## 2021-07-06 DIAGNOSIS — Z90.89 ACQUIRED ABSENCE OF OTHER ORGANS: Chronic | ICD-10-CM

## 2021-07-06 DIAGNOSIS — Z90.49 ACQUIRED ABSENCE OF OTHER SPECIFIED PARTS OF DIGESTIVE TRACT: Chronic | ICD-10-CM

## 2021-07-06 LAB
ALBUMIN SERPL ELPH-MCNC: 4.2 G/DL — SIGNIFICANT CHANGE UP (ref 3.3–5)
ALP SERPL-CCNC: 88 U/L — SIGNIFICANT CHANGE UP (ref 40–120)
ALT FLD-CCNC: 45 U/L — SIGNIFICANT CHANGE UP (ref 12–78)
ANION GAP SERPL CALC-SCNC: 9 MMOL/L — SIGNIFICANT CHANGE UP (ref 5–17)
APPEARANCE UR: ABNORMAL
AST SERPL-CCNC: 37 U/L — SIGNIFICANT CHANGE UP (ref 15–37)
BASOPHILS # BLD AUTO: 0.03 K/UL — SIGNIFICANT CHANGE UP (ref 0–0.2)
BASOPHILS NFR BLD AUTO: 0.4 % — SIGNIFICANT CHANGE UP (ref 0–2)
BILIRUB SERPL-MCNC: 1.3 MG/DL — HIGH (ref 0.2–1.2)
BILIRUB UR-MCNC: NEGATIVE — SIGNIFICANT CHANGE UP
BUN SERPL-MCNC: 14 MG/DL — SIGNIFICANT CHANGE UP (ref 7–23)
CALCIUM SERPL-MCNC: 10.5 MG/DL — HIGH (ref 8.5–10.1)
CHLORIDE SERPL-SCNC: 100 MMOL/L — SIGNIFICANT CHANGE UP (ref 96–108)
CO2 SERPL-SCNC: 26 MMOL/L — SIGNIFICANT CHANGE UP (ref 22–31)
COLOR SPEC: YELLOW — SIGNIFICANT CHANGE UP
CREAT SERPL-MCNC: 0.79 MG/DL — SIGNIFICANT CHANGE UP (ref 0.5–1.3)
DIFF PNL FLD: ABNORMAL
EOSINOPHIL # BLD AUTO: 0.05 K/UL — SIGNIFICANT CHANGE UP (ref 0–0.5)
EOSINOPHIL NFR BLD AUTO: 0.7 % — SIGNIFICANT CHANGE UP (ref 0–6)
GLUCOSE SERPL-MCNC: 88 MG/DL — SIGNIFICANT CHANGE UP (ref 70–99)
GLUCOSE UR QL: NEGATIVE MG/DL — SIGNIFICANT CHANGE UP
HCT VFR BLD CALC: 42.8 % — SIGNIFICANT CHANGE UP (ref 34.5–45)
HGB BLD-MCNC: 14 G/DL — SIGNIFICANT CHANGE UP (ref 11.5–15.5)
IMM GRANULOCYTES NFR BLD AUTO: 0.3 % — SIGNIFICANT CHANGE UP (ref 0–1.5)
KETONES UR-MCNC: NEGATIVE — SIGNIFICANT CHANGE UP
LEUKOCYTE ESTERASE UR-ACNC: NEGATIVE — SIGNIFICANT CHANGE UP
LYMPHOCYTES # BLD AUTO: 2.34 K/UL — SIGNIFICANT CHANGE UP (ref 1–3.3)
LYMPHOCYTES # BLD AUTO: 34.8 % — SIGNIFICANT CHANGE UP (ref 13–44)
MCHC RBC-ENTMCNC: 29.2 PG — SIGNIFICANT CHANGE UP (ref 27–34)
MCHC RBC-ENTMCNC: 32.7 GM/DL — SIGNIFICANT CHANGE UP (ref 32–36)
MCV RBC AUTO: 89.2 FL — SIGNIFICANT CHANGE UP (ref 80–100)
MONOCYTES # BLD AUTO: 0.65 K/UL — SIGNIFICANT CHANGE UP (ref 0–0.9)
MONOCYTES NFR BLD AUTO: 9.7 % — SIGNIFICANT CHANGE UP (ref 2–14)
NEUTROPHILS # BLD AUTO: 3.63 K/UL — SIGNIFICANT CHANGE UP (ref 1.8–7.4)
NEUTROPHILS NFR BLD AUTO: 54.1 % — SIGNIFICANT CHANGE UP (ref 43–77)
NITRITE UR-MCNC: NEGATIVE — SIGNIFICANT CHANGE UP
PH UR: 7 — SIGNIFICANT CHANGE UP (ref 5–8)
PLATELET # BLD AUTO: 217 K/UL — SIGNIFICANT CHANGE UP (ref 150–400)
POTASSIUM SERPL-MCNC: 3.7 MMOL/L — SIGNIFICANT CHANGE UP (ref 3.5–5.3)
POTASSIUM SERPL-SCNC: 3.7 MMOL/L — SIGNIFICANT CHANGE UP (ref 3.5–5.3)
PROT SERPL-MCNC: 8.2 GM/DL — SIGNIFICANT CHANGE UP (ref 6–8.3)
PROT UR-MCNC: 15 MG/DL
RBC # BLD: 4.8 M/UL — SIGNIFICANT CHANGE UP (ref 3.8–5.2)
RBC # FLD: 13.1 % — SIGNIFICANT CHANGE UP (ref 10.3–14.5)
SODIUM SERPL-SCNC: 135 MMOL/L — SIGNIFICANT CHANGE UP (ref 135–145)
SP GR SPEC: 1 — LOW (ref 1.01–1.02)
UROBILINOGEN FLD QL: NEGATIVE MG/DL — SIGNIFICANT CHANGE UP
WBC # BLD: 6.72 K/UL — SIGNIFICANT CHANGE UP (ref 3.8–10.5)
WBC # FLD AUTO: 6.72 K/UL — SIGNIFICANT CHANGE UP (ref 3.8–10.5)

## 2021-07-06 PROCEDURE — 97116 GAIT TRAINING THERAPY: CPT | Mod: GP

## 2021-07-06 PROCEDURE — 84100 ASSAY OF PHOSPHORUS: CPT

## 2021-07-06 PROCEDURE — 85610 PROTHROMBIN TIME: CPT

## 2021-07-06 PROCEDURE — 85025 COMPLETE CBC W/AUTO DIFF WBC: CPT

## 2021-07-06 PROCEDURE — 71045 X-RAY EXAM CHEST 1 VIEW: CPT | Mod: 26

## 2021-07-06 PROCEDURE — 82607 VITAMIN B-12: CPT

## 2021-07-06 PROCEDURE — 83735 ASSAY OF MAGNESIUM: CPT

## 2021-07-06 PROCEDURE — 70450 CT HEAD/BRAIN W/O DYE: CPT | Mod: 26,MA

## 2021-07-06 PROCEDURE — 70551 MRI BRAIN STEM W/O DYE: CPT

## 2021-07-06 PROCEDURE — 99222 1ST HOSP IP/OBS MODERATE 55: CPT

## 2021-07-06 PROCEDURE — 86769 SARS-COV-2 COVID-19 ANTIBODY: CPT

## 2021-07-06 PROCEDURE — 93010 ELECTROCARDIOGRAM REPORT: CPT

## 2021-07-06 PROCEDURE — 80053 COMPREHEN METABOLIC PANEL: CPT

## 2021-07-06 PROCEDURE — 82140 ASSAY OF AMMONIA: CPT

## 2021-07-06 PROCEDURE — 83970 ASSAY OF PARATHORMONE: CPT

## 2021-07-06 PROCEDURE — 97162 PT EVAL MOD COMPLEX 30 MIN: CPT | Mod: GP

## 2021-07-06 PROCEDURE — 36415 COLL VENOUS BLD VENIPUNCTURE: CPT

## 2021-07-06 PROCEDURE — 82310 ASSAY OF CALCIUM: CPT

## 2021-07-06 PROCEDURE — 84443 ASSAY THYROID STIM HORMONE: CPT

## 2021-07-06 PROCEDURE — 99285 EMERGENCY DEPT VISIT HI MDM: CPT

## 2021-07-06 RX ORDER — METOPROLOL TARTRATE 50 MG
50 TABLET ORAL DAILY
Refills: 0 | Status: DISCONTINUED | OUTPATIENT
Start: 2021-07-06 | End: 2021-07-08

## 2021-07-06 RX ORDER — ASCORBIC ACID 60 MG
1000 TABLET,CHEWABLE ORAL DAILY
Refills: 0 | Status: DISCONTINUED | OUTPATIENT
Start: 2021-07-06 | End: 2021-07-08

## 2021-07-06 RX ORDER — PANTOPRAZOLE SODIUM 20 MG/1
1 TABLET, DELAYED RELEASE ORAL
Qty: 0 | Refills: 0 | DISCHARGE

## 2021-07-06 RX ORDER — DONEPEZIL HYDROCHLORIDE 10 MG/1
5 TABLET, FILM COATED ORAL AT BEDTIME
Refills: 0 | Status: DISCONTINUED | OUTPATIENT
Start: 2021-07-06 | End: 2021-07-08

## 2021-07-06 RX ORDER — DEXTROAMPHETAMINE SACCHARATE, AMPHETAMINE ASPARTATE, DEXTROAMPHETAMINE SULFATE AND AMPHETAMINE SULFATE 1.875; 1.875; 1.875; 1.875 MG/1; MG/1; MG/1; MG/1
20 TABLET ORAL
Refills: 0 | Status: DISCONTINUED | OUTPATIENT
Start: 2021-07-06 | End: 2021-07-08

## 2021-07-06 RX ORDER — PSYLLIUM SEED (WITH DEXTROSE)
0 POWDER (GRAM) ORAL
Qty: 0 | Refills: 0 | DISCHARGE

## 2021-07-06 RX ORDER — ASPIRIN/CALCIUM CARB/MAGNESIUM 324 MG
325 TABLET ORAL DAILY
Refills: 0 | Status: DISCONTINUED | OUTPATIENT
Start: 2021-07-06 | End: 2021-07-08

## 2021-07-06 RX ORDER — CLOPIDOGREL BISULFATE 75 MG/1
75 TABLET, FILM COATED ORAL DAILY
Refills: 0 | Status: DISCONTINUED | OUTPATIENT
Start: 2021-07-06 | End: 2021-07-08

## 2021-07-06 RX ORDER — PSYLLIUM SEED (WITH DEXTROSE)
2 POWDER (GRAM) ORAL DAILY
Refills: 0 | Status: DISCONTINUED | OUTPATIENT
Start: 2021-07-06 | End: 2021-07-08

## 2021-07-06 RX ORDER — ATORVASTATIN CALCIUM 80 MG/1
80 TABLET, FILM COATED ORAL AT BEDTIME
Refills: 0 | Status: DISCONTINUED | OUTPATIENT
Start: 2021-07-06 | End: 2021-07-08

## 2021-07-06 RX ORDER — ACETAMINOPHEN 500 MG
650 TABLET ORAL EVERY 4 HOURS
Refills: 0 | Status: DISCONTINUED | OUTPATIENT
Start: 2021-07-06 | End: 2021-07-08

## 2021-07-06 RX ORDER — OMEGA-3 ACID ETHYL ESTERS 1 G
0 CAPSULE ORAL
Qty: 0 | Refills: 0 | DISCHARGE

## 2021-07-06 RX ORDER — ALPRAZOLAM 0.25 MG
0.25 TABLET ORAL
Refills: 0 | Status: DISCONTINUED | OUTPATIENT
Start: 2021-07-06 | End: 2021-07-08

## 2021-07-06 RX ORDER — LEVETIRACETAM 250 MG/1
500 TABLET, FILM COATED ORAL ONCE
Refills: 0 | Status: DISCONTINUED | OUTPATIENT
Start: 2021-07-06 | End: 2021-07-06

## 2021-07-06 RX ORDER — LOSARTAN POTASSIUM 100 MG/1
50 TABLET, FILM COATED ORAL DAILY
Refills: 0 | Status: DISCONTINUED | OUTPATIENT
Start: 2021-07-06 | End: 2021-07-08

## 2021-07-06 RX ORDER — ERGOCALCIFEROL 1.25 MG/1
0 CAPSULE ORAL
Qty: 0 | Refills: 0 | DISCHARGE

## 2021-07-06 RX ORDER — LACOSAMIDE 50 MG/1
100 TABLET ORAL
Refills: 0 | Status: DISCONTINUED | OUTPATIENT
Start: 2021-07-06 | End: 2021-07-06

## 2021-07-06 RX ORDER — DEXTROAMPHETAMINE SACCHARATE, AMPHETAMINE ASPARTATE, DEXTROAMPHETAMINE SULFATE AND AMPHETAMINE SULFATE 1.875; 1.875; 1.875; 1.875 MG/1; MG/1; MG/1; MG/1
1 TABLET ORAL
Qty: 0 | Refills: 0 | DISCHARGE

## 2021-07-06 RX ORDER — FAMOTIDINE 10 MG/ML
10 INJECTION INTRAVENOUS DAILY
Refills: 0 | Status: DISCONTINUED | OUTPATIENT
Start: 2021-07-06 | End: 2021-07-07

## 2021-07-06 RX ORDER — PANTOPRAZOLE SODIUM 20 MG/1
40 TABLET, DELAYED RELEASE ORAL ONCE
Refills: 0 | Status: DISCONTINUED | OUTPATIENT
Start: 2021-07-06 | End: 2021-07-06

## 2021-07-06 RX ORDER — METOPROLOL TARTRATE 50 MG
50 TABLET ORAL ONCE
Refills: 0 | Status: COMPLETED | OUTPATIENT
Start: 2021-07-06 | End: 2021-07-06

## 2021-07-06 RX ADMIN — Medication 50 MILLIGRAM(S): at 21:51

## 2021-07-06 NOTE — ED PROVIDER NOTE - CLINICAL SUMMARY MEDICAL DECISION MAKING FREE TEXT BOX
Presents with worsening confusion and auditory hallucinations x3-4 weeks. Taking Effexor and Paxil without relief. Will admit Presents with worsening confusion and auditory hallucinations x3-4 weeks. Taking Effexor and Paxil without relief. Will admit for further neuro & psych workup. pt w/o si/hi

## 2021-07-06 NOTE — H&P ADULT - HISTORY OF PRESENT ILLNESS
86 year old female patient with history noted for Tinnitus presented to the ED by her daughter who reports that patient has been increasingly confused for the past month. Of note, patient lost her  in December 2020 and has been depressed- she was subsequently started on Paxil and Effexor. Daughter noticed a drastic change since patient has been on  86 year old female patient with history noted for Tinnitus and mild dementia presented to the ED by her daughter who reports that patient has been increasingly confused for the past month. Of note, patient lost her  in December 2020 and has been depressed- she was subsequently started on Paxil and Effexor. Daughter noticed a drastic change since patient has been on Effexor- Both medications have since been discontinued by her Neurologist. Daughter states patient has been hearing voices, which have been instructive- patient unable to elaborate on exactly what the voices have been saying but states it has been her very uncomfortable and has been a source of anxiety, agitation and confusion. Patient lives alone. Denies any fever, chill, cough or urinary symptoms. Daughter concerned about sudden mood changes and confusion.

## 2021-07-06 NOTE — PATIENT PROFILE ADULT - NSPROGENPREVTRANSF_GEN_A_NUR
Please clarify procedure, patient states that she is not having an oophorectomy but it is listed in the procedure  no

## 2021-07-06 NOTE — ED ADULT NURSE NOTE - HIV OFFER
Anesthesia Evaluation     .             ROS/MED HX    ENT/Pulmonary:       Neurologic:       Cardiovascular:     (+) Dyslipidemia, -Peripheral Vascular Disease-- Carotid Stenosis, --. : . . . :. .       METS/Exercise Tolerance:     Hematologic:         Musculoskeletal:         GI/Hepatic:         Renal/Genitourinary:     (+) Other Renal/ Genitourinary, prostatism      Endo:         Psychiatric:         Infectious Disease:         Malignancy:         Other:                     Physical Exam      Airway   Mallampati: III  TM distance: >3 FB  Neck ROM: full    Dental     Cardiovascular   Rhythm and rate: regular and normal      Pulmonary    breath sounds clear to auscultation                    Anesthesia Plan      History & Physical Review  History and physical reviewed and following examination; no interval change.    ASA Status:  2 .    NPO Status:  > 8 hours    Plan for General and LMA with Intravenous and Propofol induction. Maintenance will be Balanced.    PONV prophylaxis:  Ondansetron (or other 5HT-3) and Dexamethasone or Solumedrol       Postoperative Care  Postoperative pain management:  IV analgesics, Oral pain medications and Multi-modal analgesia.      Consents  Anesthetic plan, risks, benefits and alternatives discussed with:  Patient..                          .   Previously Declined (within the last year)

## 2021-07-06 NOTE — H&P ADULT - NSHPPHYSICALEXAM_GEN_ALL_CORE
Vital Signs Last 24 Hrs  T(C): 36.4 (06 Jul 2021 21:30), Max: 36.6 (06 Jul 2021 18:12)  T(F): 97.5 (06 Jul 2021 21:30), Max: 97.9 (06 Jul 2021 18:12)  HR: 61 (06 Jul 2021 21:30) (61 - 69)  BP: 174/80 (06 Jul 2021 21:30) (155/89 - 174/80)  BP(mean): --  RR: 18 (06 Jul 2021 21:30) (18 - 20)  SpO2: 96% (06 Jul 2021 21:30) (96% - 96%)

## 2021-07-06 NOTE — PATIENT PROFILE ADULT - NSPRONUTRITIONRISK_GEN_A_NUR
CHIEF COMPLAINT:Right ureteral colic    HISTORY OF PRESENT ILLNESS:6mm  upper ureteral stone right side with hydro and pain    PAST MEDICAL & SURGICAL HISTORY:  Kidney stones  HTN (hypertension)  Diverticulitis      REVIEW OF SYSTEMS:    CONSTITUTIONAL: No weakness, fevers or chills  EYES/ENT: No visual changes;  No vertigo or throat pain   NECK: No pain or stiffness  RESPIRATORY: No cough, wheezing, hemoptysis; No shortness of breath  CARDIOVASCULAR: No chest pain or palpitations  GASTROINTESTINAL: No abdominal or epigastric pain. No nausea, vomiting, or hematemesis; No diarrhea or constipation. No melena or hematochezia.  GENITOURINARY: No dysuria, frequency or hematuria/flank pain  NEUROLOGICAL: No numbness or weakness  SKIN: No itching, burning, rashes, or lesions   All other review of systems is negative unless indicated above.    MEDICATIONS  (STANDING):  heparin  Injectable 5000 Unit(s) SubCutaneous every 8 hours  lidocaine   Patch 1 Patch Transdermal daily  nebivolol 5 milliGRAM(s) Oral daily  pantoprazole    Tablet 40 milliGRAM(s) Oral before breakfast  PARoxetine 30 milliGRAM(s) Oral daily  sodium chloride 0.9%. 1000 milliLiter(s) (75 mL/Hr) IV Continuous <Continuous>  tamsulosin 0.4 milliGRAM(s) Oral at bedtime    MEDICATIONS  (PRN):  acetaminophen   Tablet .. 650 milliGRAM(s) Oral every 6 hours PRN Mild Pain (1 - 3)  ALPRAZolam 0.5 milliGRAM(s) Oral daily PRN for anxiety  morphine  - Injectable 4 milliGRAM(s) IV Push every 4 hours PRN Severe Pain (7 - 10)  ondansetron Injectable 4 milliGRAM(s) IV Push every 6 hours PRN Nausea and/or Vomiting  ondansetron Injectable 4 milliGRAM(s) IV Push once PRN Nausea and/or Vomiting      Allergies    No Known Allergies    Intolerances        SOCIAL HISTORY:    FAMILY HISTORY:      Vital Signs Last 24 Hrs  T(C): 36.4 (2019 04:28), Max: 36.7 (2019 17:57)  T(F): 97.6 (2019 04:28), Max: 98.1 (2019 17:57)  HR: 50 (2019 09:03) (50 - 60)  BP: 111/75 (2019 09:03) (107/60 - 149/89)  BP(mean): --  RR: 16 (2019 04:28) (16 - 18)  SpO2: 97% (2019 04:28) (97% - 100%)    PHYSICAL EXAM:    Constitutional: NAD, well-developed  HEENT: EPHRAIM, EOMI, Normal Hearing, MMM  Neck: No LAD, No JVD  Back: Normal spine flexure, No CVA tenderness  Respiratory: CTAB   Cardiovascular: S1 and S2, RRR, no M/G/R  Abd: BS+, soft, NT/ND, No CVAT  : Normal phallus,open meatus,bilateral descended testes, no masses  DENISE: Normal prostate, no masses  Extremities: No peripheral edema  Vascular: 2+ peripheral pulses  Neurological: A/O x 3, no focal deficits  Psychiatric: Normal mood, normal affect  Musculoskeletal: 5/5 strength b/l upper and lower extremities  Skin: No rashes    LABS:                        14.2   7.97  )-----------( 173      ( 2019 18:39 )             42.2     02-    143  |  109<H>  |  23  ----------------------------<  93  4.2   |  28  |  1.25    Ca    8.3<L>      2019 07:03    TPro  8.2  /  Alb  4.2  /  TBili  0.5  /  DBili  x   /  AST  19  /  ALT  39  /  AlkPhos  56  02-22      Urinalysis Basic - ( 2019 18:39 )    Color: Yellow / Appearance: Clear / S.015 / pH: x  Gluc: x / Ketone: Negative  / Bili: Negative / Urobili: Negative mg/dL   Blood: x / Protein: Negative mg/dL / Nitrite: Negative   Leuk Esterase: Negative / RBC: 25-50 /HPF / WBC 0-2   Sq Epi: x / Non Sq Epi: Occasional / Bacteria: Occasional      Urine Culture:     RADIOLOGY & ADDITIONAL STUDIES:
No indicators present

## 2021-07-06 NOTE — ED PROVIDER NOTE - ATTENDING CONTRIBUTION TO CARE
Attending Attestation:  Onur Hicks MD.  I have personally performed a history and physical examination of the patient . I have discussed management with the ACP & the patient.  I reviewed the ACP's note and agree with the documented findings and plan of care.  I have authored and modified critical sections of the Provider Note, including but not limited to HPI, ROS, Physical Exam and MDM.

## 2021-07-06 NOTE — ED PROVIDER NOTE - PROGRESS NOTE DETAILS
85 y/o F presents with increased confusion. Per daughter in law pt lost her  in december and has gradually been decli Discussed with Dr. valadez who will admit pt. -Irineo Rose PA-C 85 y/o F presents with increased confusion. Per daughter in law pt lost her  in december and has gradually been declining. Pt was started on effexor 3 weeks ago and then had medication dose doubled, since then pt has been very confused, and hearing people talk to her. Effexor stopped 3 days ago w/o improvement of sx.  -Irineo Rose PA-C

## 2021-07-06 NOTE — ED PROVIDER NOTE - OBJECTIVE STATEMENT
Pertinent HPI/PMH/PSH/FHx/SHx and Review of Systems contained within  HPI:  87 y/o female presents to the ED c/o increased confusion x3 weeks. Pt has been very depressed since her   7 months ago. Was started on Effexor 4 weeks ago, had dose increased a week after that. Since that, she has been very confused, agitated, and hearing voices. Pt's family called neurologist, Effexor was stopped 3 days ago without improvement of symptoms. No SI/HI. Pt with h/o dementia, baseline A&Ox2. History obtained from family at bedside. History limited secondary to pt confusion.   PMH/PSH relevant for: dementia, CAD, HTN  ROS negative for: unable to obtain secondary to confusion Pertinent HPI/PMH/PSH/FHx/SHx and Review of Systems contained within  HPI:  87 y/o female presents to the ED c/o increased confusion x3 weeks. Pt has been very depressed since her   7 months ago. Was started on Effexor 4 weeks ago, had dose increased a week after that. Since that, she has been very confused, agitated, and hearing voices. Pt's family called neurologist, Effexor was stopped 3 days ago without improvement of symptoms. No SI/HI. Pt with h/o dementia, baseline A&Ox2. History obtained from family at bedside. History limited secondary to pt confusion.   PMH/PSH relevant for: dementia, CAD s/p stents in , TIA, HTN  ROS negative for: unable to obtain secondary to confusion Pertinent HPI/PMH/PSH/FHx/SHx and Review of Systems contained within  HPI:  87 y/o female presents to the ED c/o increased confusion x3 weeks. Pt has been very depressed since her   7 months ago. Was started on Effexor 4 weeks ago, had dose increased a week after that. Since that, she has been very confused, agitated, and hearing voices. Pt's family called neurologist, Effexor was stopped 3 days ago without improvement of symptoms. No SI/HI. Pt with h/o dementia, baseline A&Ox2. History obtained from family at bedside.   PMH/PSH relevant for: dementia, CAD s/p stents in , TIA, HTN  ROS negative for: unable to obtain secondary to confusion

## 2021-07-06 NOTE — ED ADULT NURSE NOTE - CHIEF COMPLAINT QUOTE
patient's daughter in law states patient has had increased confusion, hallucination and delusions, elevated blood pressure 195/112 at home and sweating episode in past week, patient started on effexor 2 weeks ago, having episodes of anxiety, BGM in triage 88

## 2021-07-06 NOTE — PATIENT PROFILE ADULT - ABILITY TO HEAR (WITH HEARING AID OR HEARING APPLIANCE IF NORMALLY USED):
Mildly to Moderately Impaired: difficulty hearing in some environments or speaker may need to increase volume or speak distinctly as per patient, B/L hearing aides send home with daughter prior to arrival to [facility]/Mildly to Moderately Impaired: difficulty hearing in some environments or speaker may need to increase volume or speak distinctly

## 2021-07-06 NOTE — ED ADULT NURSE NOTE - NSIMPLEMENTINTERV_GEN_ALL_ED
Implemented All Fall with Harm Risk Interventions:  [place] to call system. Call bell, personal items and telephone within reach. Instruct patient to call for assistance. Room bathroom lighting operational. Non-slip footwear when patient is off stretcher. Physically safe environment: no spills, clutter or unnecessary equipment. Stretcher in lowest position, wheels locked, appropriate side rails in place. Provide visual cue, wrist band, yellow gown, etc. Monitor gait and stability. Monitor for mental status changes and reorient to person, place, and time. Review medications for side effects contributing to fall risk. Reinforce activity limits and safety measures with patient and family. Provide visual clues: red socks.

## 2021-07-06 NOTE — PATIENT PROFILE ADULT - VISION (WITH CORRECTIVE LENSES IF THE PATIENT USUALLY WEARS THEM):
Normal vision: sees adequately in most situations; can see medication labels, newsprint glasses sent home with daughter, hospital pair provided/Partially impaired: cannot see medication labels or newsprint, but can see obstacles in path, and the surrounding layout; can count fingers at arm's length

## 2021-07-06 NOTE — ED ADULT NURSE REASSESSMENT NOTE - NS ED NURSE REASSESS COMMENT FT1
Assumed care of pt. Pt presents to the ED with confusion and hallucinations. At this time pt is A+Ox4 with family at bedside. Pt with stable vital signs and offers no current complaints at this time. All safety measures in place

## 2021-07-06 NOTE — ED ADULT NURSE REASSESSMENT NOTE - NS ED NURSE REASSESS COMMENT FT1
pt stable at this time with stable vitals. Pt denies any visual/auditory hallucinations. report given to 2SW

## 2021-07-06 NOTE — ED ADULT NURSE NOTE - OBJECTIVE STATEMENT
Pt brought in by daughter-in-law for increasing confusion, auditory hallucinations, and hypertension since change of Effexor dose on June 2. Pts  passed away in December and has had increased anxiety since. Daughter states symptoms started when the dose was changed. Pt lives alone. Currently AOX2, keeps thinking her  is at the bedside with her. Denies any pain, angina, sob, or urinary symptoms.

## 2021-07-06 NOTE — ED PROVIDER NOTE - NS ED ROS FT
Review of Systems:  	•	CONSTITUTIONAL: no fever  	•	SKIN: no rash  	•	RESPIRATORY: no shortness of breath  	•	CARDIAC: no chest pain  	•	GI:  no abd pain, no nausea, no vomiting, no diarrhea  	•	GENITO-URINARY:  no dysuria  	•	MUSCULOSKELETAL:  no back pain  	•	NEUROLOGIC: no weakness  	•	ALLERGY: no rhinorrhea  	•	PSYSCHIATRIC: auditory hallucinations

## 2021-07-06 NOTE — H&P ADULT - ASSESSMENT
86 year old female patient with mental status changes        -Admit to [facility]      #Encephalopathy   -etiology unclear  -UA not consistent with UTI  -CT head negative for acute pathology  -check tsh, B12 and ammonia levels  -will get MRI brain  -neuro checks per routine    # Auditory hallucinations  -hold anti-psychotics  -will get psych evaluation    #HTN  -give losartan    #Dementia  -on aricept  -supportive care    #Other  -will get social work eval: patient may benefit from HHA services and or possibly therapy    #Advanced Directives  -full code    # DVT ppx  -encourage ambulation

## 2021-07-06 NOTE — ED ADULT TRIAGE NOTE - CHIEF COMPLAINT QUOTE
patient's daughter in law states patient has had increased confusion, hallucination and delusions, elevated blood pressure 195/112 at home and sweating episode in past week, patient started on effexor 2 weeks ago, having episodes of anxiety patient's daughter in law states patient has had increased confusion, hallucination and delusions, elevated blood pressure 195/112 at home and sweating episode in past week, patient started on effexor 2 weeks ago, having episodes of anxiety, BGM in triage 88

## 2021-07-07 DIAGNOSIS — F43.20 ADJUSTMENT DISORDER, UNSPECIFIED: ICD-10-CM

## 2021-07-07 DIAGNOSIS — R41.89 OTHER SYMPTOMS AND SIGNS INVOLVING COGNITIVE FUNCTIONS AND AWARENESS: ICD-10-CM

## 2021-07-07 LAB
ALBUMIN SERPL ELPH-MCNC: 3.4 G/DL — SIGNIFICANT CHANGE UP (ref 3.3–5)
ALP SERPL-CCNC: 77 U/L — SIGNIFICANT CHANGE UP (ref 40–120)
ALT FLD-CCNC: 38 U/L — SIGNIFICANT CHANGE UP (ref 12–78)
AMMONIA BLD-MCNC: 30 UMOL/L — SIGNIFICANT CHANGE UP (ref 11–32)
ANION GAP SERPL CALC-SCNC: 5 MMOL/L — SIGNIFICANT CHANGE UP (ref 5–17)
AST SERPL-CCNC: 37 U/L — SIGNIFICANT CHANGE UP (ref 15–37)
BASOPHILS # BLD AUTO: 0.03 K/UL — SIGNIFICANT CHANGE UP (ref 0–0.2)
BASOPHILS NFR BLD AUTO: 0.6 % — SIGNIFICANT CHANGE UP (ref 0–2)
BILIRUB SERPL-MCNC: 1.1 MG/DL — SIGNIFICANT CHANGE UP (ref 0.2–1.2)
BUN SERPL-MCNC: 13 MG/DL — SIGNIFICANT CHANGE UP (ref 7–23)
CALCIUM SERPL-MCNC: 10.3 MG/DL — SIGNIFICANT CHANGE UP (ref 8.4–10.5)
CALCIUM SERPL-MCNC: 9.7 MG/DL — SIGNIFICANT CHANGE UP (ref 8.5–10.1)
CHLORIDE SERPL-SCNC: 106 MMOL/L — SIGNIFICANT CHANGE UP (ref 96–108)
CO2 SERPL-SCNC: 26 MMOL/L — SIGNIFICANT CHANGE UP (ref 22–31)
COVID-19 SPIKE DOMAIN AB INTERP: POSITIVE
COVID-19 SPIKE DOMAIN ANTIBODY RESULT: 107 U/ML — HIGH
CREAT SERPL-MCNC: 0.73 MG/DL — SIGNIFICANT CHANGE UP (ref 0.5–1.3)
CULTURE RESULTS: NO GROWTH — SIGNIFICANT CHANGE UP
EOSINOPHIL # BLD AUTO: 0.07 K/UL — SIGNIFICANT CHANGE UP (ref 0–0.5)
EOSINOPHIL NFR BLD AUTO: 1.5 % — SIGNIFICANT CHANGE UP (ref 0–6)
GLUCOSE SERPL-MCNC: 95 MG/DL — SIGNIFICANT CHANGE UP (ref 70–99)
HCT VFR BLD CALC: 41.3 % — SIGNIFICANT CHANGE UP (ref 34.5–45)
HGB BLD-MCNC: 13.3 G/DL — SIGNIFICANT CHANGE UP (ref 11.5–15.5)
IMM GRANULOCYTES NFR BLD AUTO: 0.4 % — SIGNIFICANT CHANGE UP (ref 0–1.5)
INR BLD: 1.07 RATIO — SIGNIFICANT CHANGE UP (ref 0.88–1.16)
LYMPHOCYTES # BLD AUTO: 1.53 K/UL — SIGNIFICANT CHANGE UP (ref 1–3.3)
LYMPHOCYTES # BLD AUTO: 32.8 % — SIGNIFICANT CHANGE UP (ref 13–44)
MAGNESIUM SERPL-MCNC: 2.3 MG/DL — SIGNIFICANT CHANGE UP (ref 1.6–2.6)
MCHC RBC-ENTMCNC: 29.2 PG — SIGNIFICANT CHANGE UP (ref 27–34)
MCHC RBC-ENTMCNC: 32.2 GM/DL — SIGNIFICANT CHANGE UP (ref 32–36)
MCV RBC AUTO: 90.8 FL — SIGNIFICANT CHANGE UP (ref 80–100)
MONOCYTES # BLD AUTO: 0.38 K/UL — SIGNIFICANT CHANGE UP (ref 0–0.9)
MONOCYTES NFR BLD AUTO: 8.2 % — SIGNIFICANT CHANGE UP (ref 2–14)
NEUTROPHILS # BLD AUTO: 2.63 K/UL — SIGNIFICANT CHANGE UP (ref 1.8–7.4)
NEUTROPHILS NFR BLD AUTO: 56.5 % — SIGNIFICANT CHANGE UP (ref 43–77)
PHOSPHATE SERPL-MCNC: 2.7 MG/DL — SIGNIFICANT CHANGE UP (ref 2.5–4.5)
PLATELET # BLD AUTO: 194 K/UL — SIGNIFICANT CHANGE UP (ref 150–400)
POTASSIUM SERPL-MCNC: 4 MMOL/L — SIGNIFICANT CHANGE UP (ref 3.5–5.3)
POTASSIUM SERPL-SCNC: 4 MMOL/L — SIGNIFICANT CHANGE UP (ref 3.5–5.3)
PROT SERPL-MCNC: 7.1 GM/DL — SIGNIFICANT CHANGE UP (ref 6–8.3)
PROTHROM AB SERPL-ACNC: 12.5 SEC — SIGNIFICANT CHANGE UP (ref 10.6–13.6)
PTH-INTACT FLD-MCNC: 54 PG/ML — SIGNIFICANT CHANGE UP (ref 15–65)
RBC # BLD: 4.55 M/UL — SIGNIFICANT CHANGE UP (ref 3.8–5.2)
RBC # FLD: 13.2 % — SIGNIFICANT CHANGE UP (ref 10.3–14.5)
SARS-COV-2 IGG+IGM SERPL QL IA: 107 U/ML — HIGH
SARS-COV-2 IGG+IGM SERPL QL IA: POSITIVE
SODIUM SERPL-SCNC: 137 MMOL/L — SIGNIFICANT CHANGE UP (ref 135–145)
SPECIMEN SOURCE: SIGNIFICANT CHANGE UP
VIT B12 SERPL-MCNC: 646 PG/ML — SIGNIFICANT CHANGE UP (ref 232–1245)
WBC # BLD: 4.66 K/UL — SIGNIFICANT CHANGE UP (ref 3.8–10.5)
WBC # FLD AUTO: 4.66 K/UL — SIGNIFICANT CHANGE UP (ref 3.8–10.5)

## 2021-07-07 PROCEDURE — 99233 SBSQ HOSP IP/OBS HIGH 50: CPT | Mod: GC

## 2021-07-07 PROCEDURE — 90792 PSYCH DIAG EVAL W/MED SRVCS: CPT

## 2021-07-07 PROCEDURE — 70551 MRI BRAIN STEM W/O DYE: CPT | Mod: 26

## 2021-07-07 RX ORDER — FAMOTIDINE 10 MG/ML
10 INJECTION INTRAVENOUS DAILY
Refills: 0 | Status: DISCONTINUED | OUTPATIENT
Start: 2021-07-07 | End: 2021-07-08

## 2021-07-07 RX ADMIN — DONEPEZIL HYDROCHLORIDE 5 MILLIGRAM(S): 10 TABLET, FILM COATED ORAL at 22:16

## 2021-07-07 RX ADMIN — Medication 50 MILLIGRAM(S): at 09:26

## 2021-07-07 RX ADMIN — DONEPEZIL HYDROCHLORIDE 5 MILLIGRAM(S): 10 TABLET, FILM COATED ORAL at 00:50

## 2021-07-07 RX ADMIN — FAMOTIDINE 10 MILLIGRAM(S): 10 INJECTION INTRAVENOUS at 09:26

## 2021-07-07 RX ADMIN — Medication 1000 MILLIGRAM(S): at 09:26

## 2021-07-07 RX ADMIN — ATORVASTATIN CALCIUM 80 MILLIGRAM(S): 80 TABLET, FILM COATED ORAL at 22:16

## 2021-07-07 RX ADMIN — DEXTROAMPHETAMINE SACCHARATE, AMPHETAMINE ASPARTATE, DEXTROAMPHETAMINE SULFATE AND AMPHETAMINE SULFATE 20 MILLIGRAM(S): 1.875; 1.875; 1.875; 1.875 TABLET ORAL at 10:10

## 2021-07-07 RX ADMIN — FAMOTIDINE 10 MILLIGRAM(S): 10 INJECTION INTRAVENOUS at 00:50

## 2021-07-07 RX ADMIN — LOSARTAN POTASSIUM 50 MILLIGRAM(S): 100 TABLET, FILM COATED ORAL at 09:26

## 2021-07-07 RX ADMIN — Medication 325 MILLIGRAM(S): at 09:26

## 2021-07-07 RX ADMIN — ATORVASTATIN CALCIUM 80 MILLIGRAM(S): 80 TABLET, FILM COATED ORAL at 00:50

## 2021-07-07 RX ADMIN — Medication 2 PACKET(S): at 09:08

## 2021-07-07 RX ADMIN — CLOPIDOGREL BISULFATE 75 MILLIGRAM(S): 75 TABLET, FILM COATED ORAL at 09:26

## 2021-07-07 NOTE — BEHAVIORAL HEALTH ASSESSMENT NOTE - HPI (INCLUDE ILLNESS QUALITY, SEVERITY, DURATION, TIMING, CONTEXT, MODIFYING FACTORS, ASSOCIATED SIGNS AND SYMPTOMS)
Pt is an 86 YOWWW, who lives alone after husbands passing 6 months ago, with difficulties adjusting on that major loss, was on Paxil and Effexor that was later d/ginny because of cognitive worsening,. followed by neurologist ROSA vitla, on Aricept 5mg,  Consult was called re: family concerns about change in MS. Pt states that after her  s passing she hears voices in her head and she said "I know it is in my head, there is nobody there". Denies VH, PI,. Denies ever having SI, HI. NO agitation. Denies depression and anxiety, denies sleep and appetite problems.   PT improved from grieving and adjustment to loss. Has mild memory problems, eg can not recall [particular things, forgets names.   Never saw psychiatrist. No legal hx, no trauma hx, and no substance abuse hxc.  PT is [place] but with a few repeating of words she registered 3/3, and recalled 3/3 in 1 min and in 5 min, Spelling is 2/5,  She is oriented in time, partially in place (could not remember county and alla it is [place]).  Pt has an aide, but she fired them due to high price of service.

## 2021-07-07 NOTE — PROGRESS NOTE ADULT - ATTENDING COMMENTS
86 year old female patient with mental status changes. Pt admitted due to:     #Acute metabolic encephalopathy   -Etiology unclear but could be secondary to depression or progression of dementia   -UA not consistent with UTI  -CT head negative for acute pathology  -MRI Brain wnl   -TSH and B12 wnl   -Neuro checks per routine    # Auditory hallucinations  -Psych consult appreciated; does not recommend psych inpatient admission or psychotropic drugs   -Could be secondary to complicated grief vs early dementia   - No psychotropic medications recommended   - F/U with neurology as outpatient

## 2021-07-07 NOTE — BEHAVIORAL HEALTH ASSESSMENT NOTE - NSBHCHARTREVIEWIMAGING_PSY_A_CORE FT
EXAM:  CT BRAIN                            PROCEDURE DATE:  07/06/2021          INTERPRETATION:  Noncontrast CT of the brain.    CLINICAL INDICATION: Confusion    TECHNIQUE : Axial CT scanning of the brain was obtained from the skull base to the vertex without the administration of intravenous contrast. Sagittal and coronal reformats were provided.    COMPARISON: CT brain 11/27/2020    FINDINGS:    No hydrocephalus, mass effect, midline shift, acute intracranial hemorrhage, or brain edema.    Right maxillary sinus mucosal thickening, otherwise the visualized paranasal sinuses and mastoid air cells are clear.    IMPRESSION:    No hydrocephalus, acute intracranial hemorrhage, mass effect, or brain edema.

## 2021-07-07 NOTE — BEHAVIORAL HEALTH ASSESSMENT NOTE - SUMMARY
Pt is an 86 YOWWW, who lives alone after husbands passing 6 months ago, with difficulties adjusting on that major loss, was on Paxil and Effexor that was later d/ginny because of cognitive worsening,. followed by neurologist [facility], on Aricept 5mg,  Consult was called re: family concerns about change in MS. Pt states that after her  s passing she hears voices in her head and she said "I know it is in my head, there is nobody there". Denies VH, PI,. Denies ever having SI, HI. NO agitation. Denies depression and anxiety, denies sleep and appetite problems.   PT improved from grieving and adjustment to loss. Has mild memory problems, eg can not recall [particular things, forgets names.   Never saw psychiatrist. No legal hx, no trauma hx, and no substance abuse hxc.  PT is [place] but with a few repeating of words she registered 3/3, and recalled 3/3 in 1 min and in 5 min, Spelling is 2/5,  She is oriented in time, partially in place (could not remember county and alla it is [place]).  Pt has an aide, but she fired them due to high price of service.    Pt does not need inpatient psychiatry  treatment.   No need for psychotropics.   f/u with neurologist in community.

## 2021-07-07 NOTE — PROGRESS NOTE ADULT - ASSESSMENT
86 year old female patient with mental status changes      #Encephalopathy   -etiology unclear  -UA not consistent with UTI  -CT head negative for acute pathology  -MRI Brain wnl   -TSH and B12 wnl   -neuro checks per routine    # Auditory hallucinations  -Psych consult appreciated; does not recommend psych inpatient admission or psychotropic drugs     #HTN  -Continue  losartan 50 mg Daily     #Dementia  -on aricept  -supportive care    #Advanced Directives  -full code    # DVT ppx  -encourage ambulation 86 year old female patient with mental status changes      #Encephalopathy   -etiology unclear  -UA not consistent with UTI  -CT head negative for acute pathology  -MRI Brain wnl   -TSH and B12 wnl   -neuro checks per routine    # Auditory hallucinations  -Psych consult appreciated; does not recommend psych inpatient admission or psychotropic drugs     #HTN  -Continue  losartan 50 mg Daily     #Dementia  -on aricept  -supportive care    #Advanced Directives  -full code  -attempted to contact family via phone but no response     # DVT ppx  -encourage ambulation 86 year old female patient with mental status changes      #Encephalopathy   -etiology unclear  -UA not consistent with UTI  -CT head negative for acute pathology  -MRI Brain wnl   -TSH and B12 wnl   -neuro checks per routine    # Auditory hallucinations  -Psych consult appreciated; does not recommend psych inpatient admission or psychotropic drugs     #HTN  -Continue  losartan 50 mg Daily     #Dementia  -on aricept  -supportive care    #Advanced Directives  -full code      # DVT ppx  -encourage ambulation 86 year old female patient with mental status changes. Pt admitted due to:     #Acute metabolic encephalopathy   -Etiology unclear but could be secondary to depression or progression of dementia   -UA not consistent with UTI  -CT head negative for acute pathology  -MRI Brain wnl   -TSH and B12 wnl   -Neuro checks per routine    # Auditory hallucinations  -Psych consult appreciated; does not recommend psych inpatient admission or psychotropic drugs   -Could be secondary to complicated grief vs early dementia   - No psychotropic medications recommended   - F/U with neurology as outpatient     #HTN  -Continue losartan 50 mg Daily     #Dementia  -on Aricept  -supportive care    #Advanced Directives  -full code    # DVT ppx  -encourage ambulation    #Dispo   - F/U SW for home assistance upon discharge. Discharge planning     Case discussed with Dr Do

## 2021-07-07 NOTE — PROGRESS NOTE ADULT - SUBJECTIVE AND OBJECTIVE BOX
86y YO Female With a PMH of  PAST MEDICAL & SURGICAL HISTORY:  Hypertension    Hernia, hiatal    CAD (coronary artery disease)  LAD stent 5/2020    Dementia    S/P appendectomy    S/P tonsillectomy    S/P hysterectomy    Patient is a 86y old  Female who presents with a chief complaint of Encephalopathy (06 Jul 2021 21:54)      Subjective: Patient was seen and examined by me this morning at bedside.     REVIEW OF SYSTEMS:  CONSTITUTIONAL: No weakness, fevers or chills  EYES/ENT: Hearing impairment.   No visual changes;  No vertigo or throat pain   NECK: No pain or stiffness  RESPIRATORY: No cough, wheezing, hemoptysis; No shortness of breath  CARDIOVASCULAR: No chest pain or palpitations  GASTROINTESTINAL: No abdominal or epigastric pain. No nausea, vomiting; No diarrhea or constipation.   GENITOURINARY: No dysuria, frequency or hematuria  NEUROLOGICAL: No numbness or weakness  SKIN: No itching, burning, rashes, or lesions     PHYSICAL EXAM:  Vital Signs Last 24 Hrs  T(C): 36.6 (07 Jul 2021 16:00), Max: 36.8 (06 Jul 2021 22:35)  T(F): 97.9 (07 Jul 2021 16:00), Max: 98.3 (06 Jul 2021 22:35)  HR: 65 (07 Jul 2021 16:00) (61 - 69)  BP: 134/63 (07 Jul 2021 16:00) (115/77 - 174/80)  BP(mean): 91 (06 Jul 2021 23:26) (91 - 91)  RR: 17 (07 Jul 2021 16:00) (16 - 20)  SpO2: 96% (07 Jul 2021 16:00) (94% - 98%)    Constitutional: Pt lying in bed, awake and alert, NAD  HEENT: EOMI, normal hearing, moist mucous membranes  Neck: Soft and supple, no JVD  Respiratory: CTABL, No wheezing, rales or rhonchi  Cardiovascular: S1S2+, RRR, no M/G/R  Gastrointestinal: BS+, soft, NT/ND, no guarding, no rebound  Extremities: No peripheral edema  Vascular: 2+ peripheral pulses  Neurological: AAOx3, no focal deficits  Musculoskeletal: 5/5 strength b/l upper and lower extremities  Skin: No rashes    MEDICATIONS:  MEDICATIONS  (STANDING):  amphetamine/dextroamphetamine XR 20 milliGRAM(s) Oral with breakfast  ascorbic acid  Oral Tab/Cap - Peds 1000 milliGRAM(s) Oral daily  aspirin enteric coated 325 milliGRAM(s) Oral daily  atorvastatin 80 milliGRAM(s) Oral at bedtime  clopidogrel Tablet 75 milliGRAM(s) Oral daily  donepezil 5 milliGRAM(s) Oral at bedtime  famotidine  Oral Tab/Cap - Peds 10 milliGRAM(s) Oral daily  losartan 50 milliGRAM(s) Oral daily  metoprolol succinate ER 50 milliGRAM(s) Oral daily  psyllium Powder 2 Packet(s) Oral daily    MEDICATIONS  (PRN):  acetaminophen   Tablet .. 650 milliGRAM(s) Oral every 4 hours PRN Mild Pain (1 - 3)  ALPRAZolam 0.25 milliGRAM(s) Oral two times a day PRN for anxiety      LABS: All Labs Reviewed:                        13.3   4.66  )-----------( 194      ( 07 Jul 2021 09:01 )             41.3     07-07    137  |  106  |  13  ----------------------------<  95  4.0   |  26  |  0.73    Ca    9.7      07 Jul 2021 09:01  Phos  2.7     07-07  Mg     2.3     07-07    TPro  7.1  /  Alb  3.4  /  TBili  1.1  /  DBili  x   /  AST  37  /  ALT  38  /  AlkPhos  77  07-07    PT/INR - ( 07 Jul 2021 09:01 )   PT: 12.5 sec;   INR: 1.07 ratio           I&O's Summary    06 Jul 2021 07:01  -  07 Jul 2021 07:00  --------------------------------------------------------  IN: 0 mL / OUT: 501 mL / NET: -501 mL          POCT Blood Glucose.: 88 mg/dL (06 Jul 2021 17:55)      RADIOLOGY/EKG:  EXAM:  MR BRAIN                            PROCEDURE DATE:  07/07/2021          INTERPRETATION:  Exam Date: 7/7/2021 3:39 PM    MR brain  without gadolinium    CLINICAL INFORMATION:  mental status changes/ auditory hallucinations    TECHNIQUE:   Sagittal and axial T1-weighted images, axial FLAIR images, gradient echo, axial  T2-weighted images and axial diffusion weighted images of the brain were obtained.    FINDINGS:    There is no evidence of acute infarct, hemorrhage, or mass lesion. There are scattered foci of FLAIR hyperintensity in the periventricular and subcortical white matter of the bilateral cerebri, compatible with mild chronic microvascular ischemic changes. There is no midline shift or herniation pattern. No mass effect is found in the brain.    The ventricles, sulci and basal cisterns are prominent, compatible with age related generalized cerebral volume loss.    The vertebral and internal carotid arteries demonstrate expected flow voids indicating their patency.    Small retention cyst versus polyp along the anterior right maxillary wall.

## 2021-07-07 NOTE — BEHAVIORAL HEALTH ASSESSMENT NOTE - NSBHCHARTREVIEWVS_PSY_A_CORE FT
Vital Signs Last 24 Hrs  T(C): 36.1 (07 Jul 2021 08:06), Max: 36.8 (06 Jul 2021 22:35)  T(F): 97 (07 Jul 2021 08:06), Max: 98.3 (06 Jul 2021 22:35)  HR: 61 (07 Jul 2021 08:06) (61 - 69)  BP: 156/73 (07 Jul 2021 08:06) (115/77 - 174/80)  BP(mean): 91 (06 Jul 2021 23:26) (91 - 91)  RR: 17 (07 Jul 2021 08:06) (16 - 20)  SpO2: 95% (07 Jul 2021 08:06) (94% - 98%)

## 2021-07-07 NOTE — BEHAVIORAL HEALTH ASSESSMENT NOTE - NSBHCHARTREVIEWLAB_PSY_A_CORE FT
13.3   4.66  )-----------( 194      ( 07 Jul 2021 09:01 )             41.3   07-07    137  |  106  |  13  ----------------------------<  95  4.0   |  26  |  0.73    Ca    9.7      07 Jul 2021 09:01  Phos  2.7     07-07  Mg     2.3     07-07    TPro  7.1  /  Alb  3.4  /  TBili  1.1  /  DBili  x   /  AST  37  /  ALT  38  /  AlkPhos  77  07-07

## 2021-07-07 NOTE — BEHAVIORAL HEALTH ASSESSMENT NOTE - RISK ASSESSMENT
Low Acute Suicide Risk Low acute risk: no SI, plan or intent, no depression and anxiety, no hx no pain, no insomnia   Low long term risk: no pshc hx. no hx of SA  Protective: plans for future and supportive family.

## 2021-07-08 ENCOUNTER — TRANSCRIPTION ENCOUNTER (OUTPATIENT)
Age: 86
End: 2021-07-08

## 2021-07-08 VITALS
TEMPERATURE: 97 F | HEART RATE: 62 BPM | SYSTOLIC BLOOD PRESSURE: 155 MMHG | OXYGEN SATURATION: 97 % | DIASTOLIC BLOOD PRESSURE: 69 MMHG | RESPIRATION RATE: 17 BRPM

## 2021-07-08 PROCEDURE — 99232 SBSQ HOSP IP/OBS MODERATE 35: CPT

## 2021-07-08 PROCEDURE — 99232 SBSQ HOSP IP/OBS MODERATE 35: CPT | Mod: GC

## 2021-07-08 RX ORDER — JNJ-78436735 50000000000 [PFU]/.5ML
0.5 SUSPENSION INTRAMUSCULAR
Qty: 0 | Refills: 0 | DISCHARGE

## 2021-07-08 RX ORDER — LUTEIN 20 MG
1 CAPSULE ORAL
Qty: 0 | Refills: 0 | DISCHARGE

## 2021-07-08 RX ADMIN — Medication 2 PACKET(S): at 09:37

## 2021-07-08 RX ADMIN — LOSARTAN POTASSIUM 50 MILLIGRAM(S): 100 TABLET, FILM COATED ORAL at 09:37

## 2021-07-08 RX ADMIN — Medication 50 MILLIGRAM(S): at 09:37

## 2021-07-08 RX ADMIN — Medication 1000 MILLIGRAM(S): at 09:37

## 2021-07-08 RX ADMIN — Medication 325 MILLIGRAM(S): at 09:37

## 2021-07-08 RX ADMIN — FAMOTIDINE 10 MILLIGRAM(S): 10 INJECTION INTRAVENOUS at 09:37

## 2021-07-08 RX ADMIN — DEXTROAMPHETAMINE SACCHARATE, AMPHETAMINE ASPARTATE, DEXTROAMPHETAMINE SULFATE AND AMPHETAMINE SULFATE 20 MILLIGRAM(S): 1.875; 1.875; 1.875; 1.875 TABLET ORAL at 11:08

## 2021-07-08 RX ADMIN — CLOPIDOGREL BISULFATE 75 MILLIGRAM(S): 75 TABLET, FILM COATED ORAL at 09:37

## 2021-07-08 NOTE — DISCHARGE NOTE PROVIDER - CARE PROVIDER_API CALL
Linda Ott)  Internal Medicine  [address] [place] [address]  Phone: [phone]  Fax: [phone]  Follow Up Time:

## 2021-07-08 NOTE — PHYSICAL THERAPY INITIAL EVALUATION ADULT - GENERAL OBSERVATIONS, REHAB EVAL
Pre and post session: supine in bed with bed alarm on. Call bell and phone in reach. No c/o pain. C/o confusion. Able to recall name,  and living situation. Forgetful with address and where she was presently. Tolerated well and is independent with mobility. Would recommend sup with activities on unit due to confusion and hallucinations. Nsg aware. No skilled PT needed at this time. ,

## 2021-07-08 NOTE — PROGRESS NOTE BEHAVIORAL HEALTH - SUMMARY
Pt is an 86 YOWWW, who lives alone after husbands passing 6 months ago, with difficulties adjusting on that major loss, was on Paxil and Effexor that was later d/ginny because of cognitive worsening,. followed by neurologist I [facility], on Aricept 5mg,  Consult was called re: family concerns about change in MS. Pt states that after her  s passing she heard voices, but t9oday she denies voices and states that she hears music.  Denies ever having SI, HI. No agitation.   Never saw psychiatrist. No legal hx, no trauma hx, and no substance abuse hxc.  [place]).   Pt does not need inpatient psychiatry  treatment.   No need for psychotropics.   f/u with neurologist in community.

## 2021-07-08 NOTE — DISCHARGE NOTE PROVIDER - HOSPITAL COURSE
86 year old female patient with history noted for Tinnitus and mild dementia presented to the ED by her daughter who reports that patient has been increasingly confused for the past month. Of note, patient lost her  in December 2020 and has been depressed- she was subsequently started on Paxil and Effexor. Daughter noticed a drastic change since patient has been on Effexor- Both medications have since been discontinued by her Neurologist. Patient reports has been hearing voices, described as musical and not giving particular instructions. Head Ct done at ED was wnl. BMP showed mild hypercalcemia and hyperbilirubinemia, which had decreased to normal levels on repeat labs. TSH, ammonia, and vitamin B12 levels were ordered as well to r/o any metabolic cause of dementia and came back negative. Psychiatry was consulted due to HPI, and did not recommended inpatient psychiatric admission or start of any psychotropic drugs. As of today, pt is stable to be discharged home with home care which was being arranged by . During the course of her stay, has had elevated BP readings, currently on antihypertensive regimen with Benicar, although changed to Losartan 50 mg qD on admission. Counseled to follow up with PCP. 86 year old female patient with history noted for Tinnitus and mild dementia presented to the ED 7/6/21 accompanied by her daughter who reports that patient has been increasingly confused for the past month. Of note, patient lost her  in December 2020 and has been depressed- she was subsequently started on Paxil and Effexor by Neurology. Daughter noticed a drastic change since patient has been on Effexor- Both medications have since been discontinued by her Neurologist. Patient reported has been hearing voices, described as musical and not giving particular instructions. At arrival to ED patient found  afebrile, BP stable. Head CT done at ED was wnl. MRI also performed and showed old microvascular changes. BMP showed mild hypercalcemia and hyperbilirubinemia, which had decreased to normal levels on repeat labs. TSH, ammonia, and vitamin B12 levels were ordered as well to r/o any metabolic cause of dementia and came back negative. Pt admitted due acute metabolic encephalopathy  with concerning of  auditory hallucinations. Psychiatry was consulted due to HPI, and did not recommended inpatient psychiatric admission or start of any psychotropic drugs. Per psychiatry current symptoms  can be secondary to complicated grief or mild dementia. Due auditory hallucination are no given instructions or making her disturb there is no concern of psychosis and cleared her to be discharge. As of today, pt is stable to be discharged home with home care which was being arranged by . During the course of her stay, has had elevated BP readings, currently on antihypertensive regimen with Benicar, although receiving Losartan 50 mg qD during hospitalization. Counseled to follow up with PCP.     SUBJECTIVE:   Pt is evaluated at bedside and found NAD and alert. She states continues hearing constant musical sounds. 86 year old female patient with history noted for Tinnitus and mild dementia presented to the ED 7/6/21 accompanied by her daughter who reports that patient has been increasingly confused for the past month. Of note, patient lost her  in December 2020 and has been depressed- she was subsequently started on Paxil and Effexor by Neurology. Daughter noticed a drastic change since patient has been on Effexor- Both medications have since been discontinued by her Neurologist. Patient reported has been hearing voices, described as musical and not giving particular instructions. At arrival to ED patient found  afebrile, BP stable. Head CT done at ED was wnl. MRI also performed and showed old microvascular changes. BMP showed mild hypercalcemia and hyperbilirubinemia, which had decreased to normal levels on repeat labs. TSH, ammonia, and vitamin B12 levels were ordered as well to r/o any metabolic cause of dementia and came back negative. Pt admitted due acute metabolic encephalopathy  with concerning of  auditory hallucinations. Psychiatry was consulted due to HPI, and did not recommended inpatient psychiatric admission or start of any psychotropic drugs. Per psychiatry current symptoms  can be secondary to complicated grief or mild dementia. Due auditory hallucination are no given instructions or making her disturb there is no concern of psychosis and cleared her to be discharge. As of today, pt is stable to be discharged home with home care which was being arranged by . During the course of her stay, has had elevated BP readings, currently on antihypertensive regimen with Benicar, although receiving Losartan 50 mg qD during hospitalization. Counseled to follow up with PCP.     SUBJECTIVE:   Pt is evaluated at bedside and found NAD and alert. She states continues hearing constant musical sounds. She denies suicidal thinking and plan. Pt denies fever, chills, nausea, vomits and any other symptoms.     Vital Signs Last 24 Hrs  T(C): 36.3 (08 Jul 2021 15:27), Max: 36.3 (08 Jul 2021 08:04)  T(F): 97.4 (08 Jul 2021 15:27), Max: 97.4 (08 Jul 2021 08:04)  HR: 62 (08 Jul 2021 15:27) (61 - 63)  BP: 155/69 (08 Jul 2021 15:27) (155/69 - 166/69)  BP(mean): --  RR: 17 (08 Jul 2021 15:27) (17 - 17)  SpO2: 97% (08 Jul 2021 15:27) (95% - 97%)    Constitutional: Pt lying in bed, awake and alert, NAD  HEENT: EOMI, normal hearing, moist mucous membranes  Neck: Soft and supple, no JVD  Respiratory: CTABL, No wheezing, rales or rhonchi  Cardiovascular: S1S2+, RRR, no M/G/R  Gastrointestinal: BS+, soft, NT/ND, no guarding, no rebound  Extremities: No peripheral edema  Vascular: 2+ peripheral pulses  Neurological: AAOx3, no focal deficits  Musculoskeletal: 5/5 strength b/l upper and lower extremities  Skin: No rashes    RADIOLOGY:     < from: CT Head No Cont (11.27.20 @ 16:43) >    IMPRESSION:    Head CT: Redemonstration volume loss, microvascular disease, no hemorrhage or midline shift. If symptoms persist consider follow-up head CT or MRI if no contraindications.    Cervical spine CT: Redemonstration multilevel degenerative changes most pronounced at C4-5 and C5-6, no acute fracture dislocation, MRI is more sensitive for disc or ligamentous injury may be obtained if persistentcervical spine tenderness as clinically warranted.    Redemonstration 1.2 x 7.3 x 1.2 cm, AP, soft tissue mass right transesophageal groove behind right posterior inferior thyroid with enhancing lesion and 1/8/2020 CT, series 2 image 148, separate from the thyroid gland parathyroid adenoma not excluded, correlate with calcium levels and ultrasound of neck as clinically warranted.      < from: MR Head No Cont (07.07.21 @ 15:39) >      FINDINGS:    There is no evidence of acute infarct, hemorrhage, or mass lesion. There are scattered foci of FLAIR hyperintensity in the periventricular and subcortical white matter of the bilateral cerebri, compatible with mild chronic microvascular ischemic changes. There is no midline shift or herniation pattern. No mass effect is found in the brain.    The ventricles, sulci and basal cisterns are prominent, compatible with age related generalized cerebral volume loss.    The vertebral and internal carotid arteries demonstrate expected flow voids indicating their patency.    Small retention cyst versus polyp along the anterior right maxillary wall.    .    IMPRESSION:   No acute infarct. Mild periventricular and subcortical white matter chronic microvascular ischemic changes.

## 2021-07-08 NOTE — PROGRESS NOTE BEHAVIORAL HEALTH - RISK ASSESSMENT
Low acute risk: no SI, plan or intent, no depression and anxiety, no hx no pain, no insomnia   Low long term risk: no pshc hx. no hx of SA  Protective: plans for future and supportive fami

## 2021-07-08 NOTE — PROGRESS NOTE BEHAVIORAL HEALTH - NSBHCHARTREVIEWVS_PSY_A_CORE FT
Vital Signs Last 24 Hrs  T(C): 36.3 (08 Jul 2021 08:04), Max: 36.6 (07 Jul 2021 16:00)  T(F): 97.4 (08 Jul 2021 08:04), Max: 97.9 (07 Jul 2021 16:00)  HR: 61 (08 Jul 2021 08:04) (61 - 65)  BP: 166/69 (08 Jul 2021 08:04) (134/63 - 166/69)  BP(mean): --  RR: 17 (08 Jul 2021 08:04) (17 - 17)  SpO2: 96% (08 Jul 2021 08:04) (95% - 96%)

## 2021-07-08 NOTE — DISCHARGE NOTE PROVIDER - NSDCCPCAREPLAN_GEN_ALL_CORE_FT
PRINCIPAL DISCHARGE DIAGNOSIS  Diagnosis: Confusion  Assessment and Plan of Treatment: You were brought to the ED because of confusion, episodes of anxiety, and hearing voices, and were hospitalized for further neurological workup. Blood work and urine  was normal. Head CT and brain MRI were within normal limits. The psychiatrist saw you and said you don't need medical treatment for your current symptoms, but should follow as an otpatient with a behavioral health professional.      SECONDARY DISCHARGE DIAGNOSES  Diagnosis: AMS (altered mental status)  Assessment and Plan of Treatment: You were brought to the ED becasue of confusion, altered mental status, episodes of anxiety, and hearing voices, and were hospitalized for further neurological workup. Blood work and urine was normal. Head CT and brain MRI were within normal. Tghe psychiatrist saw you and said you don't need medical treatment for your current symptoms or inpatient psychiatric admission, but should follow as an outpatient with a behavioral health specialist.     PRINCIPAL DISCHARGE DIAGNOSIS  Diagnosis: Confusion  Assessment and Plan of Treatment: You were brought to the ED because of confusion, episodes of anxiety, and hearing voices, and were hospitalized for further neurological workup. Blood work and urine  was normal. Head CT and brain MRI were within normal limits. The psychiatrist saw you and said you don't need medical treatment for your current symptoms, but should follow as an otpatient with a behavioral health professional.      SECONDARY DISCHARGE DIAGNOSES  Diagnosis: AMS (altered mental status)  Assessment and Plan of Treatment: You were brought to the ED because of confusion, altered mental status, episodes of anxiety, and hearing voices, and were hospitalized for further neurological workup. Blood work and urine was normal. Head CT and brain MRI were within normal. The psychiatrist saw you and said you don't need medical treatment for your current symptoms or inpatient psychiatric admission, but should follow as an outpatient with a behavioral health specialist and neurology     PRINCIPAL DISCHARGE DIAGNOSIS  Diagnosis: Confusion  Assessment and Plan of Treatment: You were brought to the ED because of confusion, episodes of anxiety, and hearing voices, and were hospitalized for further neurological workup. Blood work and urine  was normal. Head CT and brain MRI were within normal limits. The psychiatrist saw you and said you don't need medical treatment for your current symptoms, but should follow as an otpatient with a behavioral health professional.   - Discontinue Plaxil due to side effect profile   - Continue hold Efflexor      SECONDARY DISCHARGE DIAGNOSES  Diagnosis: AMS (altered mental status)  Assessment and Plan of Treatment: You were brought to the ED because of confusion, altered mental status, episodes of anxiety, and hearing voices, and were hospitalized for further neurological workup. Blood work and urine was normal. Head CT and brain MRI were within normal. The psychiatrist saw you and said you don't need medical treatment for your current symptoms or inpatient psychiatric admission, but should follow as an outpatient with a behavioral health specialist and neurology

## 2021-07-08 NOTE — PROGRESS NOTE BEHAVIORAL HEALTH - NSBHFUPINTERVALHXFT_PSY_A_CORE
Pt is [address] chair. Her grandson is present at the bedside. PT state that she enjoys grandsons presence. Mod is depressed "because I lost my ". Pt denies any SI, HI,   She denies heaving voices today and said "I hear music and I like music "  . NO delusions, no VH.   Pt is comfortable her grandson bright her hearing aide so compunction is improved.

## 2021-07-08 NOTE — DISCHARGE NOTE NURSING/CASE MANAGEMENT/SOCIAL WORK - PATIENT PORTAL LINK FT
You can access the FollowMyHealth Patient Portal offered by [facility] by registering at the following website: [email or web address]. By joining "[employer]’s FollowMyHealth portal, you will also be able to view your health information using other applications (apps) compatible with our system.

## 2021-07-08 NOTE — DISCHARGE NOTE PROVIDER - NSDCCAREPROVSEEN_GEN_ALL_CORE_FT
Isrrael, Jerod Smith, Mihir Villagomez, Jitendra Boo, Nata Batista, Jaime Alamo, Corky Schwartz, Bahman Serrato, Perez

## 2021-07-08 NOTE — DISCHARGE NOTE PROVIDER - NSDCMRMEDTOKEN_GEN_ALL_CORE_FT
Adderall XR 20 mg oral capsule, extended release: 1 cap(s) orally once a day (in the morning)  ALPRAZolam 0.25 mg oral tablet: 1 tab(s) orally 2 times a day, As Needed - for anxiety  aspirin 325 mg oral delayed release tablet: 1 tab(s) orally once a day  Benicar HCT 40 mg-12.5 mg oral tablet: 1 tab(s) orally once a day (in the morning)  clopidogrel 75 mg oral tablet: 1 tab(s) orally once a day  donepezil 5 mg oral tablet: 1 tab(s) orally once a day  famotidine 10 mg oral tablet: 1 tab(s) orally once a day  Tangela COVID-19 Vaccine PF intramuscular suspension: 0.5 milliliter(s) intramuscular once  ***May***  Metamucil 400 mg oral capsule: 2 cap(s) orally once a day  metoprolol succinate 25 mg oral tablet, extended release: 2 tab(s) orally once a day  PARoxetine 20 mg oral tablet: 2 tab(s) orally once a day (at bedtime)  rosuvastatin 40 mg oral tablet: 1 tab(s) orally once a day  Vitamin C 1000 mg oral tablet: 1 tab(s) orally once a day  Vitamin D3 5000 intl units (125 mcg) oral tablet: 1 tab(s) orally every other day   Adderall XR 20 mg oral capsule, extended release: 1 cap(s) orally once a day (in the morning)  ALPRAZolam 0.25 mg oral tablet: 1 tab(s) orally 2 times a day, As Needed - for anxiety  aspirin 325 mg oral delayed release tablet: 1 tab(s) orally once a day  Benicar HCT 40 mg-12.5 mg oral tablet: 1 tab(s) orally once a day (in the morning)  clopidogrel 75 mg oral tablet: 1 tab(s) orally once a day  donepezil 5 mg oral tablet: 1 tab(s) orally once a day  famotidine 10 mg oral tablet: 1 tab(s) orally once a day  Metamucil 400 mg oral capsule: 2 cap(s) orally once a day  metoprolol succinate 25 mg oral tablet, extended release: 2 tab(s) orally once a day  rosuvastatin 40 mg oral tablet: 1 tab(s) orally once a day  Vitamin C 1000 mg oral tablet: 1 tab(s) orally once a day  Vitamin D3 5000 intl units (125 mcg) oral tablet: 1 tab(s) orally every other day

## 2021-07-08 NOTE — PHYSICAL THERAPY INITIAL EVALUATION ADULT - PERTINENT HX OF CURRENT PROBLEM, REHAB EVAL
Daughter states patient has been hearing voices, which have been instructive- patient unable to elaborate on exactly what the voices have been saying but states it has been her very uncomfortable and has been a source of anxiety, agitation and confusion. Patient lives alone.

## 2021-07-08 NOTE — PHYSICAL THERAPY INITIAL EVALUATION ADULT - ADDITIONAL COMMENTS
Lives alone in house. 3-4 [address]IK[address] Bedroom on main level. Has chair lifts to upstairs and downstairs floors. No AD. Drives.

## 2021-07-08 NOTE — CHART NOTE - NSCHARTNOTEFT_GEN_A_CORE
Pt seen and examined with house staff.  Plan formulated and reviewed on rounds     Briefly,  85 y/o male recently loss  admitted with AMS--w/u for sepsis negative  She is awake and alert and Ox 3  No events overnight   She admits to still hearing people "singing in her head"   Denies SI/SA or HI    NAD  Stable vitals  No fevers    Grossly non focal     Labs reviewed    Will ask mental health to re eval today for continuos hearing of voices  Cont with current Rx  DVT prophy    Med surg

## 2021-07-18 DIAGNOSIS — F32.9 MAJOR DEPRESSIVE DISORDER, SINGLE EPISODE, UNSPECIFIED: ICD-10-CM

## 2021-07-18 DIAGNOSIS — Z79.02 LONG TERM (CURRENT) USE OF ANTITHROMBOTICS/ANTIPLATELETS: ICD-10-CM

## 2021-07-18 DIAGNOSIS — R44.0 AUDITORY HALLUCINATIONS: ICD-10-CM

## 2021-07-18 DIAGNOSIS — Z91.09 OTHER ALLERGY STATUS, OTHER THAN TO DRUGS AND BIOLOGICAL SUBSTANCES: ICD-10-CM

## 2021-07-18 DIAGNOSIS — G45.9 TRANSIENT CEREBRAL ISCHEMIC ATTACK, UNSPECIFIED: ICD-10-CM

## 2021-07-18 DIAGNOSIS — F03.90 UNSPECIFIED DEMENTIA, UNSPECIFIED SEVERITY, WITHOUT BEHAVIORAL DISTURBANCE, PSYCHOTIC DISTURBANCE, MOOD DISTURBANCE, AND ANXIETY: ICD-10-CM

## 2021-07-18 DIAGNOSIS — Z79.82 LONG TERM (CURRENT) USE OF ASPIRIN: ICD-10-CM

## 2021-07-18 DIAGNOSIS — F43.20 ADJUSTMENT DISORDER, UNSPECIFIED: ICD-10-CM

## 2021-07-18 DIAGNOSIS — E80.7 DISORDER OF BILIRUBIN METABOLISM, UNSPECIFIED: ICD-10-CM

## 2021-07-18 DIAGNOSIS — G93.41 METABOLIC ENCEPHALOPATHY: ICD-10-CM

## 2021-07-18 DIAGNOSIS — Z88.0 ALLERGY STATUS TO PENICILLIN: ICD-10-CM

## 2021-07-18 DIAGNOSIS — E83.52 HYPERCALCEMIA: ICD-10-CM

## 2021-07-18 DIAGNOSIS — I10 ESSENTIAL (PRIMARY) HYPERTENSION: ICD-10-CM

## 2021-07-18 DIAGNOSIS — I25.10 ATHEROSCLEROTIC HEART DISEASE OF NATIVE CORONARY ARTERY WITHOUT ANGINA PECTORIS: ICD-10-CM

## 2021-08-16 NOTE — ASU PREOP CHECKLIST - NOTHING BY MOUTH SINCE
In the next few weeks you may receive a [employer] survey regarding your most recent clinic visit with us.  Please take a few moments out of your day to accurately evaluate your visit.  We strive to provide you with the best medical care. Thank you for your time and we look forward to your next visit.     Results: If you recently had testing performed and your results are normal, we will notify you of your results in a letter. If we need to contact you regarding any abnormal results, we will make 2 attempts to reach you at the number you have listed during your office visit today. If we are unable to reach you, a letter with your results and any further instructions will be mailed to your home.    Refills: If you need a refill of your medication, please contact your pharmacy FIRST. You may have refills on file with them; if not, they will contact our office to refill the prescription on your behalf.     [facility] Hours:  Monday - Thursday: 7:00 am - 6:00 pm  Friday: 7:00 am - 5:00 pm  Saturday: 7:00 am - noon    Please do not hesitate to call our office with any questions or concerns AT [phone].       28-May-2020 00:00

## 2022-06-28 NOTE — PATIENT PROFILE ADULT - DO YOU FEEL UNSAFE AT HOME, WORK, OR SCHOOL?
[General Appearance - Well Developed] : well developed [General Appearance - Well Nourished] : well nourished [Normal Appearance] : normal appearance [Well Groomed] : well groomed [General Appearance - In No Acute Distress] : no acute distress [Edema] : no peripheral edema [Respiration, Rhythm And Depth] : normal respiratory rhythm and effort [Exaggerated Use Of Accessory Muscles For Inspiration] : no accessory muscle use [Abdomen Soft] : soft [Abdomen Tenderness] : non-tender [] : no hepato-splenomegaly [Costovertebral Angle Tenderness] : no ~M costovertebral angle tenderness [Urethral Meatus] : normal urethra [Urinary Bladder Findings] : the bladder was normal on palpation [External Female Genitalia] : normal external genitalia [Vagina] : normal vaginal exam [Normal Station and Gait] : the gait and station were normal for the patient's age [Oriented To Time, Place, And Person] : oriented to person, place, and time [FreeTextEntry1] : - [facility], - CST, no prolapse noted.  no

## 2022-10-19 NOTE — ED PROVIDER NOTE - CROS ED NEURO ALL NEG
Received call from patients sister Luis Daniel White stating that patient is currently still experiencing an extraordinary amount of pain on current pain medication  She would like to discuss other pain medication options  - - -

## 2023-05-06 NOTE — BEHAVIORAL HEALTH ASSESSMENT NOTE - AFFECT RANGE
Follow-up with PCP in 1 week  Follow-up with orthopedics in 10 to 14 days as outpatient  Continue to wear knee immobilizer while in bed to prevent hyperflexion and risk of recurrent dislocation
Full

## 2023-11-05 ENCOUNTER — EMERGENCY (EMERGENCY)
Facility: HOSPITAL | Age: 88
LOS: 0 days | Discharge: HOME | End: 2023-11-06
Attending: STUDENT IN AN ORGANIZED HEALTH CARE EDUCATION/TRAINING PROGRAM
Payer: MEDICARE

## 2023-11-05 VITALS — HEIGHT: 64 IN | WEIGHT: 179.9 LBS

## 2023-11-05 DIAGNOSIS — I10 ESSENTIAL (PRIMARY) HYPERTENSION: ICD-10-CM

## 2023-11-05 DIAGNOSIS — Z79.02 LONG TERM (CURRENT) USE OF ANTITHROMBOTICS/ANTIPLATELETS: ICD-10-CM

## 2023-11-05 DIAGNOSIS — Z86.73 PERSONAL HISTORY OF TRANSIENT ISCHEMIC ATTACK (TIA), AND CEREBRAL INFARCTION WITHOUT RESIDUAL DEFICITS: ICD-10-CM

## 2023-11-05 DIAGNOSIS — Z88.8 ALLERGY STATUS TO OTHER DRUGS, MEDICAMENTS AND BIOLOGICAL SUBSTANCES: ICD-10-CM

## 2023-11-05 DIAGNOSIS — R53.83 OTHER FATIGUE: ICD-10-CM

## 2023-11-05 DIAGNOSIS — K44.9 DIAPHRAGMATIC HERNIA WITHOUT OBSTRUCTION OR GANGRENE: ICD-10-CM

## 2023-11-05 DIAGNOSIS — Z90.710 ACQUIRED ABSENCE OF BOTH CERVIX AND UTERUS: ICD-10-CM

## 2023-11-05 DIAGNOSIS — Z88.0 ALLERGY STATUS TO PENICILLIN: ICD-10-CM

## 2023-11-05 DIAGNOSIS — R07.9 CHEST PAIN, UNSPECIFIED: ICD-10-CM

## 2023-11-05 DIAGNOSIS — Z90.710 ACQUIRED ABSENCE OF BOTH CERVIX AND UTERUS: Chronic | ICD-10-CM

## 2023-11-05 DIAGNOSIS — N39.0 URINARY TRACT INFECTION, SITE NOT SPECIFIED: ICD-10-CM

## 2023-11-05 DIAGNOSIS — Z79.82 LONG TERM (CURRENT) USE OF ASPIRIN: ICD-10-CM

## 2023-11-05 DIAGNOSIS — Z90.89 ACQUIRED ABSENCE OF OTHER ORGANS: Chronic | ICD-10-CM

## 2023-11-05 DIAGNOSIS — F02.80 DEMENTIA IN OTHER DISEASES CLASSIFIED ELSEWHERE, UNSPECIFIED SEVERITY, WITHOUT BEHAVIORAL DISTURBANCE, PSYCHOTIC DISTURBANCE, MOOD DISTURBANCE, AND ANXIETY: ICD-10-CM

## 2023-11-05 DIAGNOSIS — Z88.6 ALLERGY STATUS TO ANALGESIC AGENT: ICD-10-CM

## 2023-11-05 DIAGNOSIS — Z90.49 ACQUIRED ABSENCE OF OTHER SPECIFIED PARTS OF DIGESTIVE TRACT: ICD-10-CM

## 2023-11-05 DIAGNOSIS — Z88.1 ALLERGY STATUS TO OTHER ANTIBIOTIC AGENTS STATUS: ICD-10-CM

## 2023-11-05 DIAGNOSIS — I25.10 ATHEROSCLEROTIC HEART DISEASE OF NATIVE CORONARY ARTERY WITHOUT ANGINA PECTORIS: ICD-10-CM

## 2023-11-05 DIAGNOSIS — Z88.5 ALLERGY STATUS TO NARCOTIC AGENT: ICD-10-CM

## 2023-11-05 DIAGNOSIS — Z90.49 ACQUIRED ABSENCE OF OTHER SPECIFIED PARTS OF DIGESTIVE TRACT: Chronic | ICD-10-CM

## 2023-11-05 DIAGNOSIS — G20.A1 PARKINSON'S DISEASE WITHOUT DYSKINESIA, WITHOUT MENTION OF FLUCTUATIONS: ICD-10-CM

## 2023-11-05 DIAGNOSIS — Z95.5 PRESENCE OF CORONARY ANGIOPLASTY IMPLANT AND GRAFT: ICD-10-CM

## 2023-11-05 LAB
ALBUMIN SERPL ELPH-MCNC: 3.9 G/DL — SIGNIFICANT CHANGE UP (ref 3.3–5)
ALBUMIN SERPL ELPH-MCNC: 3.9 G/DL — SIGNIFICANT CHANGE UP (ref 3.3–5)
ALP SERPL-CCNC: 164 U/L — HIGH (ref 40–120)
ALP SERPL-CCNC: 164 U/L — HIGH (ref 40–120)
ALT FLD-CCNC: 12 U/L — SIGNIFICANT CHANGE UP (ref 12–78)
ALT FLD-CCNC: 12 U/L — SIGNIFICANT CHANGE UP (ref 12–78)
ANION GAP SERPL CALC-SCNC: 4 MMOL/L — LOW (ref 5–17)
ANION GAP SERPL CALC-SCNC: 4 MMOL/L — LOW (ref 5–17)
APPEARANCE UR: CLEAR — SIGNIFICANT CHANGE UP
APPEARANCE UR: CLEAR — SIGNIFICANT CHANGE UP
APTT BLD: 29 SEC — SIGNIFICANT CHANGE UP (ref 24.5–35.6)
APTT BLD: 29 SEC — SIGNIFICANT CHANGE UP (ref 24.5–35.6)
AST SERPL-CCNC: 31 U/L — SIGNIFICANT CHANGE UP (ref 15–37)
AST SERPL-CCNC: 31 U/L — SIGNIFICANT CHANGE UP (ref 15–37)
BASOPHILS # BLD AUTO: 0.01 K/UL — SIGNIFICANT CHANGE UP (ref 0–0.2)
BASOPHILS # BLD AUTO: 0.01 K/UL — SIGNIFICANT CHANGE UP (ref 0–0.2)
BASOPHILS NFR BLD AUTO: 0.1 % — SIGNIFICANT CHANGE UP (ref 0–2)
BASOPHILS NFR BLD AUTO: 0.1 % — SIGNIFICANT CHANGE UP (ref 0–2)
BILIRUB SERPL-MCNC: 1.9 MG/DL — HIGH (ref 0.2–1.2)
BILIRUB SERPL-MCNC: 1.9 MG/DL — HIGH (ref 0.2–1.2)
BILIRUB UR-MCNC: NEGATIVE — SIGNIFICANT CHANGE UP
BILIRUB UR-MCNC: NEGATIVE — SIGNIFICANT CHANGE UP
BLD GP AB SCN SERPL QL: SIGNIFICANT CHANGE UP
BLD GP AB SCN SERPL QL: SIGNIFICANT CHANGE UP
BUN SERPL-MCNC: 17 MG/DL — SIGNIFICANT CHANGE UP (ref 7–23)
BUN SERPL-MCNC: 17 MG/DL — SIGNIFICANT CHANGE UP (ref 7–23)
CALCIUM SERPL-MCNC: 11.1 MG/DL — HIGH (ref 8.5–10.1)
CALCIUM SERPL-MCNC: 11.1 MG/DL — HIGH (ref 8.5–10.1)
CHLORIDE SERPL-SCNC: 103 MMOL/L — SIGNIFICANT CHANGE UP (ref 96–108)
CHLORIDE SERPL-SCNC: 103 MMOL/L — SIGNIFICANT CHANGE UP (ref 96–108)
CO2 SERPL-SCNC: 32 MMOL/L — HIGH (ref 22–31)
CO2 SERPL-SCNC: 32 MMOL/L — HIGH (ref 22–31)
COLOR SPEC: SIGNIFICANT CHANGE UP
COLOR SPEC: SIGNIFICANT CHANGE UP
CREAT SERPL-MCNC: 1.35 MG/DL — HIGH (ref 0.5–1.3)
CREAT SERPL-MCNC: 1.35 MG/DL — HIGH (ref 0.5–1.3)
DIFF PNL FLD: ABNORMAL
DIFF PNL FLD: ABNORMAL
EGFR: 38 ML/MIN/1.73M2 — LOW
EGFR: 38 ML/MIN/1.73M2 — LOW
EOSINOPHIL # BLD AUTO: 0.29 K/UL — SIGNIFICANT CHANGE UP (ref 0–0.5)
EOSINOPHIL # BLD AUTO: 0.29 K/UL — SIGNIFICANT CHANGE UP (ref 0–0.5)
EOSINOPHIL NFR BLD AUTO: 3 % — SIGNIFICANT CHANGE UP (ref 0–6)
EOSINOPHIL NFR BLD AUTO: 3 % — SIGNIFICANT CHANGE UP (ref 0–6)
GLUCOSE SERPL-MCNC: 116 MG/DL — HIGH (ref 70–99)
GLUCOSE SERPL-MCNC: 116 MG/DL — HIGH (ref 70–99)
GLUCOSE UR QL: NEGATIVE MG/DL — SIGNIFICANT CHANGE UP
GLUCOSE UR QL: NEGATIVE MG/DL — SIGNIFICANT CHANGE UP
HCT VFR BLD CALC: 44.1 % — SIGNIFICANT CHANGE UP (ref 34.5–45)
HCT VFR BLD CALC: 44.1 % — SIGNIFICANT CHANGE UP (ref 34.5–45)
HGB BLD-MCNC: 14.4 G/DL — SIGNIFICANT CHANGE UP (ref 11.5–15.5)
HGB BLD-MCNC: 14.4 G/DL — SIGNIFICANT CHANGE UP (ref 11.5–15.5)
IMM GRANULOCYTES NFR BLD AUTO: 0.4 % — SIGNIFICANT CHANGE UP (ref 0–0.9)
IMM GRANULOCYTES NFR BLD AUTO: 0.4 % — SIGNIFICANT CHANGE UP (ref 0–0.9)
INR BLD: 1.05 RATIO — SIGNIFICANT CHANGE UP (ref 0.85–1.18)
INR BLD: 1.05 RATIO — SIGNIFICANT CHANGE UP (ref 0.85–1.18)
KETONES UR-MCNC: NEGATIVE MG/DL — SIGNIFICANT CHANGE UP
KETONES UR-MCNC: NEGATIVE MG/DL — SIGNIFICANT CHANGE UP
LEUKOCYTE ESTERASE UR-ACNC: ABNORMAL
LEUKOCYTE ESTERASE UR-ACNC: ABNORMAL
LYMPHOCYTES # BLD AUTO: 2.47 K/UL — SIGNIFICANT CHANGE UP (ref 1–3.3)
LYMPHOCYTES # BLD AUTO: 2.47 K/UL — SIGNIFICANT CHANGE UP (ref 1–3.3)
LYMPHOCYTES # BLD AUTO: 25.8 % — SIGNIFICANT CHANGE UP (ref 13–44)
LYMPHOCYTES # BLD AUTO: 25.8 % — SIGNIFICANT CHANGE UP (ref 13–44)
MCHC RBC-ENTMCNC: 28.9 PG — SIGNIFICANT CHANGE UP (ref 27–34)
MCHC RBC-ENTMCNC: 28.9 PG — SIGNIFICANT CHANGE UP (ref 27–34)
MCHC RBC-ENTMCNC: 32.7 GM/DL — SIGNIFICANT CHANGE UP (ref 32–36)
MCHC RBC-ENTMCNC: 32.7 GM/DL — SIGNIFICANT CHANGE UP (ref 32–36)
MCV RBC AUTO: 88.6 FL — SIGNIFICANT CHANGE UP (ref 80–100)
MCV RBC AUTO: 88.6 FL — SIGNIFICANT CHANGE UP (ref 80–100)
MONOCYTES # BLD AUTO: 0.77 K/UL — SIGNIFICANT CHANGE UP (ref 0–0.9)
MONOCYTES # BLD AUTO: 0.77 K/UL — SIGNIFICANT CHANGE UP (ref 0–0.9)
MONOCYTES NFR BLD AUTO: 8 % — SIGNIFICANT CHANGE UP (ref 2–14)
MONOCYTES NFR BLD AUTO: 8 % — SIGNIFICANT CHANGE UP (ref 2–14)
NEUTROPHILS # BLD AUTO: 6.01 K/UL — SIGNIFICANT CHANGE UP (ref 1.8–7.4)
NEUTROPHILS # BLD AUTO: 6.01 K/UL — SIGNIFICANT CHANGE UP (ref 1.8–7.4)
NEUTROPHILS NFR BLD AUTO: 62.7 % — SIGNIFICANT CHANGE UP (ref 43–77)
NEUTROPHILS NFR BLD AUTO: 62.7 % — SIGNIFICANT CHANGE UP (ref 43–77)
NITRITE UR-MCNC: NEGATIVE — SIGNIFICANT CHANGE UP
NITRITE UR-MCNC: NEGATIVE — SIGNIFICANT CHANGE UP
PH UR: 6 — SIGNIFICANT CHANGE UP (ref 5–8)
PH UR: 6 — SIGNIFICANT CHANGE UP (ref 5–8)
PLATELET # BLD AUTO: 272 K/UL — SIGNIFICANT CHANGE UP (ref 150–400)
PLATELET # BLD AUTO: 272 K/UL — SIGNIFICANT CHANGE UP (ref 150–400)
POTASSIUM SERPL-MCNC: 3.7 MMOL/L — SIGNIFICANT CHANGE UP (ref 3.5–5.3)
POTASSIUM SERPL-MCNC: 3.7 MMOL/L — SIGNIFICANT CHANGE UP (ref 3.5–5.3)
POTASSIUM SERPL-SCNC: 3.7 MMOL/L — SIGNIFICANT CHANGE UP (ref 3.5–5.3)
POTASSIUM SERPL-SCNC: 3.7 MMOL/L — SIGNIFICANT CHANGE UP (ref 3.5–5.3)
PROT SERPL-MCNC: 9.1 GM/DL — HIGH (ref 6–8.3)
PROT SERPL-MCNC: 9.1 GM/DL — HIGH (ref 6–8.3)
PROT UR-MCNC: 100 MG/DL
PROT UR-MCNC: 100 MG/DL
PROTHROM AB SERPL-ACNC: 11.9 SEC — SIGNIFICANT CHANGE UP (ref 9.5–13)
PROTHROM AB SERPL-ACNC: 11.9 SEC — SIGNIFICANT CHANGE UP (ref 9.5–13)
RBC # BLD: 4.98 M/UL — SIGNIFICANT CHANGE UP (ref 3.8–5.2)
RBC # BLD: 4.98 M/UL — SIGNIFICANT CHANGE UP (ref 3.8–5.2)
RBC # FLD: 14.3 % — SIGNIFICANT CHANGE UP (ref 10.3–14.5)
RBC # FLD: 14.3 % — SIGNIFICANT CHANGE UP (ref 10.3–14.5)
SODIUM SERPL-SCNC: 139 MMOL/L — SIGNIFICANT CHANGE UP (ref 135–145)
SODIUM SERPL-SCNC: 139 MMOL/L — SIGNIFICANT CHANGE UP (ref 135–145)
SP GR SPEC: 1.01 — SIGNIFICANT CHANGE UP (ref 1–1.03)
SP GR SPEC: 1.01 — SIGNIFICANT CHANGE UP (ref 1–1.03)
TROPONIN I, HIGH SENSITIVITY RESULT: 7.21 NG/L — SIGNIFICANT CHANGE UP
TROPONIN I, HIGH SENSITIVITY RESULT: 7.21 NG/L — SIGNIFICANT CHANGE UP
UROBILINOGEN FLD QL: 1 MG/DL — SIGNIFICANT CHANGE UP (ref 0.2–1)
UROBILINOGEN FLD QL: 1 MG/DL — SIGNIFICANT CHANGE UP (ref 0.2–1)
WBC # BLD: 9.59 K/UL — SIGNIFICANT CHANGE UP (ref 3.8–10.5)
WBC # BLD: 9.59 K/UL — SIGNIFICANT CHANGE UP (ref 3.8–10.5)
WBC # FLD AUTO: 9.59 K/UL — SIGNIFICANT CHANGE UP (ref 3.8–10.5)
WBC # FLD AUTO: 9.59 K/UL — SIGNIFICANT CHANGE UP (ref 3.8–10.5)

## 2023-11-05 PROCEDURE — 85025 COMPLETE CBC W/AUTO DIFF WBC: CPT

## 2023-11-05 PROCEDURE — 86900 BLOOD TYPING SEROLOGIC ABO: CPT

## 2023-11-05 PROCEDURE — 84484 ASSAY OF TROPONIN QUANT: CPT | Mod: 59

## 2023-11-05 PROCEDURE — 85730 THROMBOPLASTIN TIME PARTIAL: CPT

## 2023-11-05 PROCEDURE — 80053 COMPREHEN METABOLIC PANEL: CPT

## 2023-11-05 PROCEDURE — 99285 EMERGENCY DEPT VISIT HI MDM: CPT

## 2023-11-05 PROCEDURE — 85610 PROTHROMBIN TIME: CPT

## 2023-11-05 PROCEDURE — 71045 X-RAY EXAM CHEST 1 VIEW: CPT | Mod: 26

## 2023-11-05 PROCEDURE — 86901 BLOOD TYPING SEROLOGIC RH(D): CPT

## 2023-11-05 PROCEDURE — 93005 ELECTROCARDIOGRAM TRACING: CPT

## 2023-11-05 PROCEDURE — 71045 X-RAY EXAM CHEST 1 VIEW: CPT

## 2023-11-05 PROCEDURE — 81001 URINALYSIS AUTO W/SCOPE: CPT

## 2023-11-05 PROCEDURE — 36415 COLL VENOUS BLD VENIPUNCTURE: CPT

## 2023-11-05 PROCEDURE — 86850 RBC ANTIBODY SCREEN: CPT

## 2023-11-05 PROCEDURE — 99285 EMERGENCY DEPT VISIT HI MDM: CPT | Mod: 25

## 2023-11-05 PROCEDURE — 93010 ELECTROCARDIOGRAM REPORT: CPT

## 2023-11-05 PROCEDURE — 87086 URINE CULTURE/COLONY COUNT: CPT

## 2023-11-05 NOTE — ED PROVIDER NOTE - PATIENT PORTAL LINK FT
You can access the FollowMyHealth Patient Portal offered by [facility] by registering at the following website: [email or web address]. By joining [employer]’s FollowMyHealth portal, you will also be able to view your health information using other applications (apps) compatible with our system.

## 2023-11-05 NOTE — ED PROVIDER NOTE - NS ED ROS FT
General: No fever, no nausea, no vomiting  HEENT: No loc, no ha, no dizziness  Respiratory: no trouble breathing  Cardiovascular: no chest pain  GI: No abdominal pain, no diarrhea  : No urinary complaints  Endocrine: no generalized weakness  Neurological: No weakness, numbness  MSK: no recent trauma

## 2023-11-05 NOTE — ED PROVIDER NOTE - NS_ ATTENDINGSCRIBEDETAILS _ED_A_ED_FT
I, Ruby Walden DO,  performed the initial face to face bedside interview with this patient regarding history of present illness, review of symptoms and relevant past medical, social and family history.  I completed an independent physical examination.  I was the initial provider who evaluated this patient.   I personally saw the patient and performed a substantive portion of the visit including all aspects of the medical decision making.  The history, relevant review of systems, past medical and surgical history, medical decision making, and physical examination was documented by the scribe in my presence and I attest to the accuracy of the documentation.

## 2023-11-05 NOTE — ED ADULT NURSE NOTE - NSFALLRISKINTERV_ED_ALL_ED

## 2023-11-05 NOTE — ED PROVIDER NOTE - OBJECTIVE STATEMENT
89 y/o female with PMHx of CVA, Parkinson's disease, dementia, CAD s/p stent x2, hiatal hernia, HTN, s/p hysterectomy; presents to the ED c/o chest pain onset 18:30 but has resolved currently. Daughter at bedside reports patient looked pale tonight and nauseous yesterday, she also notes patient seems fatigued and has been sleeping in more than usual x1 week. Takes Plavix. Denies nausea, vomiting, dizziness, abdominal pain, fever, recent falls. Cardio [place].

## 2023-11-05 NOTE — ED PROVIDER NOTE - CARE PROVIDER_API CALL
Annie Yates  Cardiovascular Disease  [address] [place] [id number]  Phone: [phone]  Fax: [phone]  Follow Up Time:

## 2023-11-05 NOTE — ED ADULT NURSE NOTE - CAS EDP DISCH TYPE
Home

## 2023-11-05 NOTE — ED STATDOCS - NSICDXPASTMEDICALHX_GEN_ALL_CORE_FT
PAST MEDICAL HISTORY:  CAD (coronary artery disease) LAD stent 5/2020    Dementia     Hernia, hiatal     Hypertension

## 2023-11-05 NOTE — ED ADULT TRIAGE NOTE - CHIEF COMPLAINT QUOTE
pt BIB family to the ED with c/o chest pain starting tonight. Pt has hx of CVA, parkinson's, Dementia and had a stent placed. Pt's daughter states "she said she was nauseous yesterday and also looked pale tonight". Family also endorses she's been sleeping longer in the morning. STAT EKG in triage.

## 2023-11-05 NOTE — ED PROVIDER NOTE - CLINICAL SUMMARY MEDICAL DECISION MAKING FREE TEXT BOX
87 y/o female with PMHx of CVA, Parkinson's disease, dementia, CAD s/p stent x2, hiatal hernia, HTN, s/p hysterectomy; presents to the ED c/o chest pain onset 18:30, but has resolved at present. Rule out ACS vs arrhythmia. Plan labs including troponin, EKG, chest x-ray and reassess. 89 y/o female with PMHx of CVA, Parkinson's disease, dementia, CAD s/p stent x2, hiatal hernia, HTN, s/p hysterectomy; presents to the ED c/o chest pain onset 18:30, but has resolved at present. Rule out ACS vs arrhythmia. vs UTI. Plan labs including troponin, EKG, UA, chest x-ray and reassess.     1st trop X negative, EKG NSR, non specific t wave abnormalities. pending 2nd trop, UA.

## 2023-11-05 NOTE — ED PROVIDER NOTE - NSFOLLOWUPINSTRUCTIONS_ED_ALL_ED_FT
Return to the ER if symptoms return or worsen.  See your doctor within 1-2 days.    Chest Pain    WHAT YOU NEED TO KNOW:    Chest pain can be caused by a range of conditions, from not serious to life-threatening. Chest pain can be a symptom of a digestive problem, such as acid reflux or a stomach ulcer. An anxiety attack or a strong emotion, such as anger, can also cause chest pain. Infection, inflammation, or a fracture in the bones or cartilage in your chest can cause pain or discomfort. Sometimes chest pain or pressure is caused by poor blood flow to your heart (angina). Chest pain may also be caused by life-threatening conditions such as a heart attack or blood clot in your lungs.     DISCHARGE INSTRUCTIONS:    Call 911 if:     You have any of the following signs of a heart attack:   Squeezing, pressure, or pain in your chest       and any of the following:   Discomfort or pain in your back, neck, jaw, stomach, or arm       Shortness of breath      Nausea or vomiting      Lightheadedness or a sudden cold sweat        Return to the emergency department if:     You have chest discomfort that gets worse, even with medicine.      You cough or vomit blood.       Your bowel movements are black or bloody.       You cannot stop vomiting, or it hurts to swallow.     Contact your healthcare provider if:     You have questions or concerns about your condition or care.        Medicines:     Medicines may be given to treat the cause of your chest pain. Examples include pain medicine, anxiety medicine, or medicines to increase blood flow to your heart.       Do not take certain medicines without asking your healthcare provider first. These include NSAIDs, herbal or vitamin supplements, or hormones (estrogen or progestin).       Take your medicine as directed. Contact your healthcare provider if you think your medicine is not helping or if you have side effects. Tell him or her if you are allergic to any medicine. Keep a list of the medicines, vitamins, and herbs you take. Include the amounts, and when and why you take them. Bring the list or the pill bottles to follow-up visits. Carry your medicine list with you in case of an emergency.    Follow up with your healthcare provider within 72 hours, or as directed: You may need to return for more tests to find the cause of your chest pain. You may be referred to a specialist, such as a cardiologist or gastroenterologist. Write down your questions so you remember to ask them during your visits.     Healthy living tips: The following are general healthy guidelines. If your chest pain is caused by a heart problem, your healthcare provider will give you specific guidelines to follow.    Do not smoke. Nicotine and other chemicals in cigarettes and cigars can cause lung and heart damage. Ask your healthcare provider for information if you currently smoke and need help to quit. E-cigarettes or smokeless tobacco still contain nicotine. Talk to your healthcare provider before you use these products.       Eat a variety of healthy, low-fat, low-salt foods. Healthy foods include fruits, vegetables, whole-grain breads, low-fat dairy products, beans, lean meats, and fish. Ask for more information about a heart healthy diet.      Drink plenty of water every day. Your body is made of mostly water. Water helps your body to control your temperature and blood pressure. Ask your healthcare provider how much water you should drink every day.      Ask about activity. Your healthcare provider will tell you which activities to limit or avoid. Ask when you can drive, return to work, and have sex. Ask about the best exercise plan for you.      Maintain a healthy weight. Ask your healthcare provider how much you should weigh. Ask him or her to help you create a weight loss plan if you are overweight.       Get the flu and pneumonia vaccines. All adults should get the influenza (flu) vaccine. Get it every year as soon as it becomes available. The pneumococcal vaccine is given to adults aged 65 years or older. The vaccine is given every 5 years to prevent pneumococcal disease, such as pneumonia.    If you have a stent:     Carry your stent card with you at all times.       Let all healthcare providers know that you have a stent.    Urinary Tract Infection, Adult  ImageA urinary tract infection (UTI) is an infection of any part of the urinary tract, which includes the kidneys, ureters, bladder, and urethra. These organs make, store, and get rid of urine in the body. UTI can be a bladder infection (cystitis) or kidney infection (pyelonephritis).    What are the causes?  This infection may be caused by fungi, viruses, or bacteria. Bacteria are the most common cause of UTIs. This condition can also be caused by repeated incomplete emptying of the bladder during urination.    What increases the risk?  This condition is more likely to develop if:    You ignore your need to urinate or hold urine for long periods of time.  You do not empty your bladder completely during urination.  You wipe back to front after urinating or having a bowel movement, if you are female.  You are uncircumcised, if you are male.  You are constipated.  You have a urinary catheter that stays in place (indwelling).  You have a weak defense (immune) system.  You have a medical condition that affects your bowels, kidneys, or bladder.  You have diabetes.  You take antibiotic medicines frequently or for long periods of time, and the antibiotics no longer work well against certain types of infections (antibiotic resistance).  You take medicines that irritate your urinary tract.  You are exposed to chemicals that irritate your urinary tract.  You are female.    What are the signs or symptoms?  Symptoms of this condition include:    Fever.  Frequent urination or passing small amounts of urine frequently.  Needing to urinate urgently.  Pain or burning with urination.  Urine that smells bad or unusual.  Cloudy urine.  Pain in the lower abdomen or back.  Trouble urinating.  Blood in the urine.  Vomiting or being less hungry than normal.  Diarrhea or abdominal pain.  Vaginal discharge, if you are female.    How is this diagnosed?  This condition is diagnosed with a medical history and physical exam. You will also need to provide a urine sample to test your urine. Other tests may be done, including:    Blood tests.  Sexually transmitted disease (STD) testing.    If you have had more than one UTI, a cystoscopy or imaging studies may be done to determine the cause of the infections.    How is this treated?  Treatment for this condition often includes a combination of two or more of the following:    Antibiotic medicine.  Other medicines to treat less common causes of UTI.  Over-the-counter medicines to treat pain.  Drinking enough water to stay hydrated.    Follow these instructions at home:  Take over-the-counter and prescription medicines only as told by your health care provider.  If you were prescribed an antibiotic, take it as told by your health care provider. Do not stop taking the antibiotic even if you start to feel better.  Avoid alcohol, caffeine, tea, and carbonated beverages. They can irritate your bladder.  Drink enough fluid to keep your urine clear or pale yellow.  Keep all follow-up visits as told by your health care provider. This is important.  ImageMake sure to:    Empty your bladder often and completely. Do not hold urine for long periods of time.  Empty your bladder before and after sex.  Wipe from front to back after a bowel movement if you are female. Use each tissue one time when you wipe.    Contact a health care provider if:  You have back pain.  You have a fever.  You feel nauseous or vomit.  Your symptoms do not get better after 3 days.  Your symptoms go away and then return.  Get help right away if:  You have severe back pain or lower abdominal pain.  You are vomiting and cannot keep down any medicines or water.  This information is not intended to replace advice given to you by your health care provider. Make sure you discuss any questions you have with your health care provider.

## 2023-11-05 NOTE — ED ADULT NURSE NOTE - OBJECTIVE STATEMENT
88y female presented to the ED with complaints of chest pain. Pt has a history of dementia. Pt stated she had an episode of chest pain and took Pepcid with out relief. Upon arrival Pt states the chest pain has resolved. As per family she has been having increasing dificulty ambulating and increased weakness over the last 4 days.

## 2023-11-05 NOTE — ED PROVIDER NOTE - PROGRESS NOTE DETAILS
JG: Received signout from Dr. Walden to follow up 2nd troponin and discharge if negative.  2nd troponin negative.  UA trace positive.  Will treat with macrobid, but patient with dementia, poor historian unable to repor dysuria.  Macrobid dose given in ED and sent to pharmacy until urine culture results.  Will have patient follow up with PMD and Cardiologist.

## 2023-11-05 NOTE — ED PROVIDER NOTE - PHYSICAL EXAMINATION
"Subjective   History of Present Illness  Pt presents to the  with report of R ankle pain - states he fell and twisted ankle when someone tripped him.  Pt denies any other injuries.  No numb/tingling.  No hip/knee pain. Denies head injury.         Review of Systems   Respiratory:  Negative for shortness of breath.    Cardiovascular:  Negative for chest pain.   Musculoskeletal:         + R ankle pain     Neurological:  Negative for numbness and headaches.   Psychiatric/Behavioral:  Negative for confusion.    All other systems reviewed and are negative.      Past Medical History:   Diagnosis Date    Abdominal pain     Blindness     Chest pressure     Diabetes mellitus     Fatigue     Hypertension     Pancreatitis     Seizures        Allergies   Allergen Reactions    Keppra [Levetiracetam] Rash     Patient reports rash with keppra. \"episodes of altered mental status at this time.)       Past Surgical History:   Procedure Laterality Date    EYE SURGERY         Family History   Problem Relation Age of Onset    Diabetes Mother        Social History     Socioeconomic History    Marital status: Single   Tobacco Use    Smoking status: Every Day     Packs/day: 1     Types: Cigarettes    Smokeless tobacco: Never   Vaping Use    Vaping Use: Never used   Substance and Sexual Activity    Alcohol use: Yes     Comment: Occasionally    Drug use: Yes     Types: Marijuana, Methamphetamines    Sexual activity: Defer           Objective   Physical Exam  Vitals and nursing note reviewed.   Constitutional:       General: He is not in acute distress.     Appearance: He is well-developed.   HENT:      Head: Normocephalic and atraumatic.   Eyes:      Extraocular Movements: Extraocular movements intact.      Pupils: Pupils are equal, round, and reactive to light.   Cardiovascular:      Rate and Rhythm: Normal rate and regular rhythm.   Pulmonary:      Effort: Pulmonary effort is normal.      Breath sounds: Normal breath sounds.   Abdominal: "      General: Bowel sounds are normal.      Palpations: Abdomen is soft.   Musculoskeletal:      Comments: + TTP R ankle diffusely.  No swelling/erythema/warmth. + abrasion to anteromedial aspect.  NVT intact.    Skin:     General: Skin is warm and dry.      Capillary Refill: Capillary refill takes less than 2 seconds.   Neurological:      Mental Status: He is alert and oriented to person, place, and time.      Sensory: No sensory deficit.      Motor: No weakness.         Procedures           ED Course      CT Head Without Contrast   Final Result   No acute intracranial abnormality.       This report was signed and finalized on 11/4/2023 10:17 PM CDT by Dr. Amy Stephen MD.          XR Ankle 3+ View Right   Final Result   1. No convincing acute osseous injury identified. Soft tissue swelling.   Old healed right posterior tibial fracture with additional chronic   changes described above.           This report was signed and finalized on 11/4/2023 10:20 PM CDT by Dr. Amy Stephen MD.                                                   Medical Decision Making  Pt stable in EC - NAD att.  No neuro deficits.  Pt answers me and speaks appropriately with me. Given report of AMS - CT head was obtained as he has elevated BP and report of fall.  This was unremarkable.  No evid of ankle fx.  Given splint.  Recommend RICE/apap/nsaids.  Will d/c to home att and recommend outpt f/u    Amount and/or Complexity of Data Reviewed  Radiology: ordered and independent interpretation performed.    Risk  Prescription drug management.        Final diagnoses:   Sprain of right ankle, unspecified ligament, initial encounter   Fall, initial encounter       ED Disposition  ED Disposition       ED Disposition   Discharge    Condition   Stable    Comment   --               Provider, No Known  [facility]  [facility] [address]  [phone]               Medication List      No changes were made to your prescriptions during this  visit.            Facundo Tamez, DO  11/04/23 2146       Facundo Tamez, DO  11/04/23 2148       Facundo Tamez, DO  11/04/23 2234       Facundo Tamez, DO  11/04/23 7756     *GEN: No acute distress, well appearing   *HEAD: Normocephalic, Atraumatic  *EYES/NOSE: b/l Pupils symmetric & Reactive to light, EOMI b/l  *THROAT: airway patent, moist mucous membranes  *NECK: Neck supple  *PULMONARY: No Respiratory distress, symmetric b/l chest rise  *CARDIAC: s1s2, regular rhythm   *ABDOMEN:  Non Tender, Non Distended, soft, no guarding, no rebound, no masses   *BACK: no CVA tenderness, No midline vertebral tenderness to palpation   *EXTREMITIES: symmetric pulses, 2+ DP & radial pulses, no cyanosis, no edema   *SKIN: no rash, no bruising   *NEUROLOGIC: alert,  full active & passive ROM in all 4 extremities,   *PSYCH: appropriate concern about symptoms, pleasant *GEN: No acute distress, well appearing   *HEAD: Normocephalic, Atraumatic  *EYES/NOSE: b/l Pupils symmetric & Reactive to light, EOMI b/l  *THROAT: airway patent, moist mucous membranes  *NECK: Neck supple  *PULMONARY: No Respiratory distress, symmetric b/l chest rise  *CARDIAC: s1s2, regular rhythm   *ABDOMEN:  Non Tender, Non Distended, soft, no guarding, no rebound, no masses   *BACK: no CVA tenderness, No midline vertebral tenderness to palpation   *EXTREMITIES: symmetric pulses, 2+ DP & radial pulses, no cyanosis, no edema   *SKIN: no rash, no bruising   *NEUROLOGIC: alert,  full active & passive ROM in all 4 extremities, Cranial nerves II-XII intact   *PSYCH: appropriate concern about symptoms, pleasant A&Ox3

## 2023-11-05 NOTE — ED STATDOCS - PROGRESS NOTE DETAILS
Nay العراقي   89 yo female wPMHx of CVA, Parkinson's, dementia, stent placed on 2012 presents to the ED c/o chest pain starting tonight. Pt's daughter states she was nauseous yesterday and looked pale tonight. Family endorses she's been sleeping longer in the morning. Family states that pt has been more fatigued. Pt is on Plavix. Pt used to be on ASA but not anymore. Denies fevers, SOB. Will send to main for further evaluation.

## 2023-11-06 VITALS
HEART RATE: 79 BPM | SYSTOLIC BLOOD PRESSURE: 121 MMHG | DIASTOLIC BLOOD PRESSURE: 79 MMHG | TEMPERATURE: 98 F | RESPIRATION RATE: 20 BRPM | OXYGEN SATURATION: 99 %

## 2023-11-06 PROBLEM — F03.90 UNSPECIFIED DEMENTIA WITHOUT BEHAVIORAL DISTURBANCE: Chronic | Status: ACTIVE | Noted: 2021-07-07

## 2023-11-06 PROBLEM — F03.90 UNSPECIFIED DEMENTIA, UNSPECIFIED SEVERITY, WITHOUT BEHAVIORAL DISTURBANCE, PSYCHOTIC DISTURBANCE, MOOD DISTURBANCE, AND ANXIETY: Chronic | Status: ACTIVE | Noted: 2021-07-07

## 2023-11-06 LAB
BACTERIA # UR AUTO: ABNORMAL /HPF
BACTERIA # UR AUTO: ABNORMAL /HPF
CAST: 9 /LPF — HIGH (ref 0–4)
CAST: 9 /LPF — HIGH (ref 0–4)
GRAN CASTS # UR COMP ASSIST: PRESENT
GRAN CASTS # UR COMP ASSIST: PRESENT
RBC CASTS # UR COMP ASSIST: 1 /HPF — SIGNIFICANT CHANGE UP (ref 0–4)
RBC CASTS # UR COMP ASSIST: 1 /HPF — SIGNIFICANT CHANGE UP (ref 0–4)
SQUAMOUS # UR AUTO: 8 /HPF — HIGH (ref 0–5)
SQUAMOUS # UR AUTO: 8 /HPF — HIGH (ref 0–5)
TROPONIN I, HIGH SENSITIVITY RESULT: 8.26 NG/L — SIGNIFICANT CHANGE UP
TROPONIN I, HIGH SENSITIVITY RESULT: 8.26 NG/L — SIGNIFICANT CHANGE UP
WBC UR QL: 10 /HPF — HIGH (ref 0–5)
WBC UR QL: 10 /HPF — HIGH (ref 0–5)

## 2023-11-06 RX ORDER — NITROFURANTOIN MACROCRYSTAL 50 MG
1 CAPSULE ORAL
Qty: 14 | Refills: 0
Start: 2023-11-06 | End: 2023-11-12

## 2023-11-06 RX ORDER — NITROFURANTOIN MACROCRYSTAL 50 MG
100 CAPSULE ORAL ONCE
Refills: 0 | Status: COMPLETED | OUTPATIENT
Start: 2023-11-06 | End: 2023-11-06

## 2023-11-06 RX ADMIN — Medication 100 MILLIGRAM(S): at 01:52

## 2023-11-07 LAB
CULTURE RESULTS: SIGNIFICANT CHANGE UP
CULTURE RESULTS: SIGNIFICANT CHANGE UP
SPECIMEN SOURCE: SIGNIFICANT CHANGE UP
SPECIMEN SOURCE: SIGNIFICANT CHANGE UP

## 2024-02-06 ENCOUNTER — INPATIENT (INPATIENT)
Facility: HOSPITAL | Age: 89
LOS: 2 days | Discharge: HOME HEALTH | DRG: 193 | End: 2024-02-09
Attending: INTERNAL MEDICINE | Admitting: INTERNAL MEDICINE
Payer: MEDICARE

## 2024-02-06 VITALS — WEIGHT: 139.99 LBS

## 2024-02-06 DIAGNOSIS — Z90.710 ACQUIRED ABSENCE OF BOTH CERVIX AND UTERUS: Chronic | ICD-10-CM

## 2024-02-06 DIAGNOSIS — Z90.89 ACQUIRED ABSENCE OF OTHER ORGANS: Chronic | ICD-10-CM

## 2024-02-06 DIAGNOSIS — Z90.49 ACQUIRED ABSENCE OF OTHER SPECIFIED PARTS OF DIGESTIVE TRACT: Chronic | ICD-10-CM

## 2024-02-06 LAB
ALBUMIN SERPL ELPH-MCNC: 3.5 G/DL — SIGNIFICANT CHANGE UP (ref 3.3–5)
ALP SERPL-CCNC: 131 U/L — HIGH (ref 40–120)
ALT FLD-CCNC: 16 U/L — SIGNIFICANT CHANGE UP (ref 12–78)
ANION GAP SERPL CALC-SCNC: 6 MMOL/L — SIGNIFICANT CHANGE UP (ref 5–17)
APTT BLD: 29.7 SEC — SIGNIFICANT CHANGE UP (ref 24.5–35.6)
AST SERPL-CCNC: 54 U/L — HIGH (ref 15–37)
BASOPHILS # BLD AUTO: 0.02 K/UL — SIGNIFICANT CHANGE UP (ref 0–0.2)
BASOPHILS NFR BLD AUTO: 0.3 % — SIGNIFICANT CHANGE UP (ref 0–2)
BILIRUB SERPL-MCNC: 1.5 MG/DL — HIGH (ref 0.2–1.2)
BUN SERPL-MCNC: 21 MG/DL — SIGNIFICANT CHANGE UP (ref 7–23)
CALCIUM SERPL-MCNC: 10.4 MG/DL — HIGH (ref 8.5–10.1)
CHLORIDE SERPL-SCNC: 104 MMOL/L — SIGNIFICANT CHANGE UP (ref 96–108)
CO2 SERPL-SCNC: 26 MMOL/L — SIGNIFICANT CHANGE UP (ref 22–31)
CREAT SERPL-MCNC: 1.4 MG/DL — HIGH (ref 0.5–1.3)
EGFR: 36 ML/MIN/1.73M2 — LOW
EOSINOPHIL # BLD AUTO: 0.01 K/UL — SIGNIFICANT CHANGE UP (ref 0–0.5)
EOSINOPHIL NFR BLD AUTO: 0.1 % — SIGNIFICANT CHANGE UP (ref 0–6)
FLUAV AG NPH QL: DETECTED
FLUBV AG NPH QL: SIGNIFICANT CHANGE UP
GLUCOSE SERPL-MCNC: 101 MG/DL — HIGH (ref 70–99)
HCT VFR BLD CALC: 37.4 % — SIGNIFICANT CHANGE UP (ref 34.5–45)
HGB BLD-MCNC: 12.4 G/DL — SIGNIFICANT CHANGE UP (ref 11.5–15.5)
IMM GRANULOCYTES NFR BLD AUTO: 0.4 % — SIGNIFICANT CHANGE UP (ref 0–0.9)
INR BLD: 1.06 RATIO — SIGNIFICANT CHANGE UP (ref 0.85–1.18)
LACTATE SERPL-SCNC: 1.3 MMOL/L — SIGNIFICANT CHANGE UP (ref 0.7–2)
LYMPHOCYTES # BLD AUTO: 2.37 K/UL — SIGNIFICANT CHANGE UP (ref 1–3.3)
LYMPHOCYTES # BLD AUTO: 34.4 % — SIGNIFICANT CHANGE UP (ref 13–44)
MAGNESIUM SERPL-MCNC: 2.6 MG/DL — SIGNIFICANT CHANGE UP (ref 1.6–2.6)
MCHC RBC-ENTMCNC: 29.2 PG — SIGNIFICANT CHANGE UP (ref 27–34)
MCHC RBC-ENTMCNC: 33.2 GM/DL — SIGNIFICANT CHANGE UP (ref 32–36)
MCV RBC AUTO: 88.2 FL — SIGNIFICANT CHANGE UP (ref 80–100)
MONOCYTES # BLD AUTO: 0.77 K/UL — SIGNIFICANT CHANGE UP (ref 0–0.9)
MONOCYTES NFR BLD AUTO: 11.2 % — SIGNIFICANT CHANGE UP (ref 2–14)
NEUTROPHILS # BLD AUTO: 3.69 K/UL — SIGNIFICANT CHANGE UP (ref 1.8–7.4)
NEUTROPHILS NFR BLD AUTO: 53.6 % — SIGNIFICANT CHANGE UP (ref 43–77)
NT-PROBNP SERPL-SCNC: 1862 PG/ML — HIGH (ref 0–450)
PLATELET # BLD AUTO: 174 K/UL — SIGNIFICANT CHANGE UP (ref 150–400)
POTASSIUM SERPL-MCNC: 4 MMOL/L — SIGNIFICANT CHANGE UP (ref 3.5–5.3)
POTASSIUM SERPL-SCNC: 4 MMOL/L — SIGNIFICANT CHANGE UP (ref 3.5–5.3)
PROT SERPL-MCNC: 8.4 GM/DL — HIGH (ref 6–8.3)
PROTHROM AB SERPL-ACNC: 12 SEC — SIGNIFICANT CHANGE UP (ref 9.5–13)
RBC # BLD: 4.24 M/UL — SIGNIFICANT CHANGE UP (ref 3.8–5.2)
RBC # FLD: 14.1 % — SIGNIFICANT CHANGE UP (ref 10.3–14.5)
RSV RNA NPH QL NAA+NON-PROBE: SIGNIFICANT CHANGE UP
SARS-COV-2 RNA SPEC QL NAA+PROBE: SIGNIFICANT CHANGE UP
SODIUM SERPL-SCNC: 136 MMOL/L — SIGNIFICANT CHANGE UP (ref 135–145)
TROPONIN I, HIGH SENSITIVITY RESULT: 14.03 NG/L — SIGNIFICANT CHANGE UP
WBC # BLD: 6.89 K/UL — SIGNIFICANT CHANGE UP (ref 3.8–10.5)
WBC # FLD AUTO: 6.89 K/UL — SIGNIFICANT CHANGE UP (ref 3.8–10.5)

## 2024-02-06 PROCEDURE — 71045 X-RAY EXAM CHEST 1 VIEW: CPT | Mod: 26

## 2024-02-06 PROCEDURE — 99285 EMERGENCY DEPT VISIT HI MDM: CPT | Mod: FS

## 2024-02-06 PROCEDURE — 70450 CT HEAD/BRAIN W/O DYE: CPT | Mod: 26,MA

## 2024-02-06 PROCEDURE — 93010 ELECTROCARDIOGRAM REPORT: CPT

## 2024-02-06 RX ORDER — PSYLLIUM SEED (WITH DEXTROSE)
2 POWDER (GRAM) ORAL
Qty: 0 | Refills: 0 | DISCHARGE

## 2024-02-06 RX ORDER — DEXTROAMPHETAMINE SACCHARATE, AMPHETAMINE ASPARTATE, DEXTROAMPHETAMINE SULFATE AND AMPHETAMINE SULFATE 1.875; 1.875; 1.875; 1.875 MG/1; MG/1; MG/1; MG/1
1 TABLET ORAL
Qty: 0 | Refills: 0 | DISCHARGE

## 2024-02-06 RX ORDER — CHOLECALCIFEROL (VITAMIN D3) 125 MCG
1 CAPSULE ORAL
Qty: 0 | Refills: 0 | DISCHARGE

## 2024-02-06 RX ORDER — OLMESARTAN MEDOXOMIL-HYDROCHLOROTHIAZIDE 25; 40 MG/1; MG/1
1 TABLET, FILM COATED ORAL
Qty: 0 | Refills: 0 | DISCHARGE

## 2024-02-06 RX ORDER — ASCORBIC ACID 60 MG
1 TABLET,CHEWABLE ORAL
Qty: 0 | Refills: 0 | DISCHARGE

## 2024-02-06 RX ORDER — ASPIRIN/CALCIUM CARB/MAGNESIUM 324 MG
1 TABLET ORAL
Qty: 0 | Refills: 0 | DISCHARGE

## 2024-02-06 RX ORDER — ALPRAZOLAM 0.25 MG
1 TABLET ORAL
Qty: 0 | Refills: 0 | DISCHARGE

## 2024-02-06 RX ORDER — FAMOTIDINE 10 MG/ML
1 INJECTION INTRAVENOUS
Qty: 0 | Refills: 0 | DISCHARGE

## 2024-02-06 RX ADMIN — Medication 30 MILLIGRAM(S): at 23:59

## 2024-02-06 NOTE — ED PROVIDER NOTE - IV ALTEPLASE EXCL ABS HIDDEN
[facility] CHILDRENS [facility]  [facility] at [facility]  [address], 3rd [address]  [facility] [address]  Dept Phone: [phone]    Developmental and Behavioral Pediatric Initial Evaluation    Name:  Ivan Mccauley     YOB: 2019     Date of Service:  11/28/2023       Clinician: Antonieta Disla MD   Referring Physician: Farheen Harmon MD      Ivan and his family were seen for a Developmental and Behavioral Pediatric physician initial evaluation.        The following individuals were present during the session: Mother.       Family Concerns:  Ivan is a 4 year old boy whose family is concerned about his speech and communication. Mother first became concerned about his development at the age 2 years.       Developmental and Behavioral History  Family does not report a history of regression of skills or seizures.    Motor: Family does not have concerns in this area.     Adaptive: Ivan was fully toilet trained at 3 years of age. He can feed himself using utensils.     Communication: He has a history of a speech delay. He had approximately 10-20 meaningful words at the age of 2 years. He currently communicates his needs using 3-4 word phrases. Mother reports some echolalia. He struggles to maintain a conversation and answering open ended questions. His eye contact is described as appropriate. He points to request and direct attention. He uses descriptive gestures. He can follow 2 step commands consistently .       Play: Ivan likes playing with [employer], trucks, cars, blocks, legos, balls and other construction toys. He plays with a little kitchen. He occasionally aligns cars.     Interaction: His mother states that Ivan usually approaches and interacts with other children. Mother states that he often does not maintain the interaction due to his communication difficulties. Teachers have mentioned that he has friends at school.      Behavior: Parents report some tantrums triggered by taking a preferred object away from him.  He now does well with transitions, but this has now improved. Tantrums last less than 1 minute. He is able to calm himself down with a hug.  He used to be hyperactive but this has improved. Mother states that he has had eloping behaviors in the past. There are no longer eloping concerns.      Sensory Sensitivities/Interest: none reported.     Restricted Interests and Repetitive Behaviors: Mother only reports echolalia. He is very attached to a Ed[employer]ard Mouse stuff animal.     Sleep: Ivan goes to bed at 8:30pm and wakes up at 6:30am  He does not snore and sleeps through the night.  Ivan  takes naps every day     Diet: Ivan is described as a good eater. He eats a variety of foods.      Oral Motor: Ivan does not choke or gag when eating liquids or solids.    Past Medical History    Birth History:   Ivan was born at 37 weeks gestation via Vaginal Delivery.   Birth Weight: 6 pounds, 5 ounces.   No prenatal, , or  complications noted.    Medical History:   No significant past medical history, surgical history, injuries, or chronic illnesses reported.  No history of congenital heart disease, cardiac arrhythmia, or syncope.    Educational and Therapeutic History:   Ivan participated in Early Intervention. He was evaluated by [facility] and they recommended ST however mother chose to participate in private therapies.    Ivan has an Individualized Education Plan. He is a in a general education classroom. He attends a half-day pre-K program . He receives ST at school (120 min/month)    Ivan participated in private therapies. He received ST at [facility] for 12 sessions.     Hearing: Passed audiology evaluation at 2.5 years of age.      Vision: No concerns about vision.    Medications:  No outpatient medications have been marked as taking for the 23 encounter (Appointment) with Antonieta Torrez MD.        Allergies:  ALLERGIES:  No Known Allergies    Family History: Ivan is related to individuals with distant family members with ASD. Family history is otherwise negative for developmental delays,, learning problems, ADHD, seizures/neurologic conditions, known genetic syndromes, or mental illness.      Social History: Ivan resides with parents and two siblings  ( 2 and 8 years age) .     Review of Systems   Constitutional: Negative for activity change and appetite change.   HENT: Negative for congestion.    Respiratory: Negative for cough.    Cardiovascular: Negative.  Negative for palpitations.   Gastrointestinal: Negative for abdominal distention and abdominal pain.   Skin: Negative for rash.   Neurological: Positive for speech difficulty. Negative for seizures and headaches.   Psychiatric/Behavioral: Negative for behavioral problems, self-injury and sleep disturbance. The patient is not hyperactive.         Physical Exam:  Visit Vitals  Ht 3' 5.73\" (1.06 m)   Wt 17.8 kg (39 lb 3.9 oz)   BMI 15.84 kg/m²     General: Well-appearing child in no apparent distress.  Face:    symmetrical  Mouth:  symmetrical  Neck:   supple  Chest:  symmetrical  Cardiovascular: no murmurs auscultated  Abdomen:  soft, non-tender, no masses, no organomegaly  Extremities:  good muscle mass, normal range of motion  Skin:  One hypopigmented macule in upper abdomen and three hyperpigmented macules in left hand, left leg and lower back areas.  Neurological Examination:  Strength:          normal               Tone:               normal  Motor:           normal gait, good hand movements  Reflexes:          DTRs   2+ and equal  Behavioral Observations: Ivan often directed his mother's attention, however he demonstrated slightly decreased social reciprocity with the evaluator. He communicated using short phrases but he did not respond to open ended questions. He requested by putting the bubbles in the evaluator's hand with a  vocalization but without a coordinated eye gaze.    Developmental and Behavioral Data:     The Developmental Profile 4 assesses the overall development and functioning of individuals from birth to 21 years, 11 months. For this parent/caregiver questionnaire, the following areas are assessed through parent/caregiver report: Physical, Adaptive Behavior, Social-Emotional, Cognitive, and Communication. It is used in conjunction with developmental and behavioral history, medical examination, and overall developmental assessment and observation.    Score Summary:       Interpretation:   Physical Scale  The Physical Scale includes items measuring gross- and fine-motor skills, coordination, strength, stamina, and flexibility. Based on the information provided, the standard score on this scale fell into the Average range. A score in this range suggests physical skills similar to those of same-age peers. There may be some strengths and weaknesses within the physical area, but overall development is typical.    Adaptive Behavior Scale  The Adaptive Behavior Scale measures age-appropriate independent functioning, which includes the ability to use current technology. On this scale, the standard score that was obtained is considered to be in the Well Above Average range. This score range indicates very successful self-care behaviors, with little to no struggles related to age-appropriate independent functioning.    Social-Emotional Scale  The Social-Emotional Scale measures skills related to interpersonal behaviors and the demonstration of social and emotional competence. The standard score that was obtained on this scale is in the Above Average range and signifies good social-emotional skills, with little difficulty expressing needs, interacting with others, and adhering to societal norms.    Cognitive Scale  The Cognitive Scale measures perception, concept development, number relations, reasoning, memory, classification, time  concepts, and related mental acuity tasks. The standard score that was obtained on this scale is in the Below Average range, which implies cognitive skills are below the expected level for this age.     Communication Scale  The Communication Scale score reflects the ability to understand spoken and written language as well as to use both verbal and nonverbal skills to communicate. On the Communication Scale, the obtained standard score is considered to be in the Average range, compared to peers of a similar age. This score range on this scale denotes typical communication skills, with some relative strengths and weaknesses in this area.        Impression: Ivan is a 4 year old boy with a history of  expressive and receptive language delays, echolalia and decreased social interactions. At this time, Sonyas development and behavior meet criteria for the following diagnoses:  1. Language impairment      Ivan benefits from the following home, community, and school-based interventions. Further evaluation is recommended as described below.    Recommendations:    School: Continue with current school and IEP services.    Referral: Recommend a referral to combo DBP/ psych clinic.    Therapies: Recommend supplementing with outpatient ST     Family Support: Discussed the book SOS:  Help for Parents by Maryse Faustin, PhD for help with behavioral modification strategies with Ivan including the appropriate use of time outs.    Follow up with Dr. Askew during combo clinic evaluation or sooner if needed.    If you have questions, please call [phone].    Sincerely,        Antonieta Askew MD  Developmental and Behavioral Pediatrician  [facility]    Cc: family, primary care physician    Total time spent on patient care, including face-to-face and non face-to-face time on date of encounter:  65 minutes.    No additional time was spent related to developmental testing.                      show

## 2024-02-06 NOTE — ED PROVIDER NOTE - CARE PLAN
1 Principal Discharge DX:	Influenza A  Secondary Diagnosis:	Acute respiratory failure with hypoxia

## 2024-02-06 NOTE — ED ADULT NURSE NOTE - OBJECTIVE STATEMENT
Pt presents with c/o SOB and non-baseline AMS. The pt son stated she developed a cough 2 days ago which worsened yesterday. He also reported the pt has also been hearing hallucinations in the morning and afternoon, as well as being lethargic past 12:00 which is not normal for her.  The son states her SPO2 at home was 89% on RA, and was brought to ED for evaluation.

## 2024-02-06 NOTE — ED ADULT NURSE NOTE - NSFALLHARMRISKINTERV_ED_ALL_ED
Assistance OOB with selected safe patient handling equipment if applicable/Assistance with ambulation/Communicate risk of Fall with Harm to all staff, patient, and family/Monitor gait and stability/Monitor for mental status changes and reorient to person, place, and time, as needed/Move patient closer to nursing station/within visual sight of ED staff/Provide patient with walking aids/Provide visual cue: red socks, yellow wristband, yellow gown, etc/Reinforce activity limits and safety measures with patient and family/Toileting schedule using arm’s reach rule for commode and bathroom/Use of alarms - bed, stretcher, chair and/or video monitoring/Bed in lowest position, wheels locked, appropriate side rails in place/Call bell, personal items and telephone in reach/Instruct patient to call for assistance before getting out of bed/chair/stretcher/Non-slip footwear applied when patient is off stretcher/[place] to call system/Physically safe environment - no spills, clutter or unnecessary equipment/Purposeful Proactive Rounding/Room/bathroom lighting operational, light cord in reach

## 2024-02-06 NOTE — PHARMACOTHERAPY INTERVENTION NOTE - COMMENTS
Medication reconciliation completed.  Patient's family at bedside provided list of current medication; confirmed with Dr. First [facility].

## 2024-02-06 NOTE — ED PROVIDER NOTE - CLINICAL SUMMARY MEDICAL DECISION MAKING FREE TEXT BOX
Elderly female with hypoxia and AMS, likely with infectious process. Patient hypoxic 89% on room air, in ED sat O2 up to 96% on 3L O2. Labs, urine, CXR, swab, likely admission. Elderly female with hypoxia and AMS, likely with infectious process. Patient hypoxic 89% on room air, in ED sat O2 up to 96% on 3L O2. Labs, urine, CXR, swab, likely admission.    23;00, C MD Maru:  CXR wet read personally by me: VANE.  Labs notable for Influenza A +, normal WBC/lactate, elev. BNP 1800.  Pt requires admission for suppl. O2.  I believe pt's hypoxia is from Flu A & not CHF.  Tamiflu ordered.  Case d/w admit hospitalist Dr. Brewer for Med floor admit. Elderly female with hypoxia and AMS, likely with infectious process. Patient hypoxic 89% on room air, in ED sat O2 up to 96% on 3L O2.   Plan:  Labs, urine, CXR, swab, nasal O2 suppl., likely admission.    16:47, Nay Mendez for attending Dr. Mahoney: Patient bradycardic to 55, son notes that for years she has intermittent bradycardia, saw Dr. Lagunas for bradycardia, since this is transient did not need to be treated at this time.    16:51, signed Eugenie Moser PA-C   88F with dementia and PArkinsons here with her son for cough x 2 days and hypoxia at home 87-89% when checked by the home aide. As per son who sees her 3-4x a week she is not herself today and seems very 'lethargic'.    23:00, NALLELY Mahoney MD:  CXR wet read personally by me: VANE.  Labs notable for Influenza A +, normal WBC/lactate, elev. BNP 1800.  Pt requires admission for suppl. O2.  I believe pt's hypoxia is from Flu A & not CHF.  Tamiflu ordered.  Case d/w admit hospitalist Dr. Brewer for Med floor admit.

## 2024-02-06 NOTE — ED PROVIDER NOTE - CONSTITUTIONAL, MLM
elderly female sitting quietly in stretcher, sedate appearing but does respond to voice and follows commands normal...

## 2024-02-06 NOTE — ED ADULT TRIAGE NOTE - CHIEF COMPLAINT QUOTE
Pt presents to ED due to low O2 Sat O2 Sat 89% pt has been feeling SOB x 2 days lives at home with daughter who recently tested FLU + pt now with a cough

## 2024-02-06 NOTE — ED PROVIDER NOTE - MUSCULOSKELETAL, MLM
Spine appears normal, range of motion is not limited, no muscle or joint tenderness, moves all extremities equally Spine appears normal, range of motion is not limited, no muscle or joint tenderness, moves all extremities equally, no focal tender/swelling.

## 2024-02-06 NOTE — ED PROVIDER NOTE - OBJECTIVE STATEMENT
89 y/o female with PMHx of dementia, on plavix for TIA presents to the ED c/o low O2 sat at home. Son states that family members were recently ill, patient developed cough 2 days ago, worsened yesterday. Today patient wasn't coughing at all, seemed to be low energy, not acting herself. Home aide checked her vitals, O2 sat 89%, son brought patient in for evaluation. Patient was incontinent of urine this morning, usually states when she has to go to the bathroom. Denies N/V/D, fever, known falls. PCP Dr. Ott. Neurologist Dr. Laureano. Cardiologist Dr. Lagunas. 89 y/o female with PMHx of dementia, on plavix for TIA, BIB pvt car via son to the ED c/o low O2 sat at home. Son reports + contact w/ family members who were recently ill. Patient developed cough 2 days ago, worsened yesterday. Today patient wasn't coughing at all, but seemed to be low energy, not acting like herself. Home aide checked her vitals, O2 sat 89%, son brought patient in for evaluation. Patient was incontinent of urine this morning, usually states when she has to go to the bathroom. Denies N/V/D, fever, known falls.   PCP Dr. Ott.    Neurologist Dr. Laureano.    Cardiologist Dr. Yates.

## 2024-02-06 NOTE — ED PROVIDER NOTE - PROGRESS NOTE DETAILS
Nay Mendez for attending Dr. Mahoney: Patient bradycardic to 55, son notes that for years she has intermittent bradycardia, saw Dr. Lagunas for bradycardia, since this is transient did not need to be treated at this time. signed Eugenie Moser PA-C   88F with dementia and PArkinsons here with her son for cough x 2 days and hypoxia at home 87-89% when checked by the home aide. As per son who sees her 3-4x a week she is not herself today and seems very 'lethargic'.

## 2024-02-06 NOTE — ED PROVIDER NOTE - ATTENDING APP SHARED VISIT CONTRIBUTION OF CARE
LO Mahoney MD, ED Attending physician:  This was a shared visit with WE[facility].  I reviewed and verified the documentation and independently performed the documented history/exam/mdm.

## 2024-02-07 ENCOUNTER — TRANSCRIPTION ENCOUNTER (OUTPATIENT)
Age: 89
End: 2024-02-07

## 2024-02-07 DIAGNOSIS — J10.1 INFLUENZA DUE TO OTHER IDENTIFIED INFLUENZA VIRUS WITH OTHER RESPIRATORY MANIFESTATIONS: ICD-10-CM

## 2024-02-07 LAB
ANION GAP SERPL CALC-SCNC: 6 MMOL/L — SIGNIFICANT CHANGE UP (ref 5–17)
APPEARANCE UR: CLEAR — SIGNIFICANT CHANGE UP
BACTERIA # UR AUTO: NEGATIVE /HPF — SIGNIFICANT CHANGE UP
BASOPHILS # BLD AUTO: 0.03 K/UL — SIGNIFICANT CHANGE UP (ref 0–0.2)
BASOPHILS NFR BLD AUTO: 0.4 % — SIGNIFICANT CHANGE UP (ref 0–2)
BILIRUB UR-MCNC: NEGATIVE — SIGNIFICANT CHANGE UP
BUN SERPL-MCNC: 22 MG/DL — SIGNIFICANT CHANGE UP (ref 7–23)
CALCIUM SERPL-MCNC: 10.1 MG/DL — SIGNIFICANT CHANGE UP (ref 8.5–10.1)
CAST: 3 /LPF — SIGNIFICANT CHANGE UP (ref 0–4)
CHLORIDE SERPL-SCNC: 107 MMOL/L — SIGNIFICANT CHANGE UP (ref 96–108)
CO2 SERPL-SCNC: 25 MMOL/L — SIGNIFICANT CHANGE UP (ref 22–31)
COLOR SPEC: YELLOW — SIGNIFICANT CHANGE UP
CREAT SERPL-MCNC: 1.38 MG/DL — HIGH (ref 0.5–1.3)
DIFF PNL FLD: ABNORMAL
EGFR: 37 ML/MIN/1.73M2 — LOW
EOSINOPHIL # BLD AUTO: 0.08 K/UL — SIGNIFICANT CHANGE UP (ref 0–0.5)
EOSINOPHIL NFR BLD AUTO: 1.2 % — SIGNIFICANT CHANGE UP (ref 0–6)
GLUCOSE SERPL-MCNC: 104 MG/DL — HIGH (ref 70–99)
GLUCOSE UR QL: NEGATIVE MG/DL — SIGNIFICANT CHANGE UP
HCT VFR BLD CALC: 35.2 % — SIGNIFICANT CHANGE UP (ref 34.5–45)
HGB BLD-MCNC: 11.4 G/DL — LOW (ref 11.5–15.5)
IMM GRANULOCYTES NFR BLD AUTO: 0.3 % — SIGNIFICANT CHANGE UP (ref 0–0.9)
KETONES UR-MCNC: NEGATIVE MG/DL — SIGNIFICANT CHANGE UP
LEUKOCYTE ESTERASE UR-ACNC: NEGATIVE — SIGNIFICANT CHANGE UP
LYMPHOCYTES # BLD AUTO: 2.49 K/UL — SIGNIFICANT CHANGE UP (ref 1–3.3)
LYMPHOCYTES # BLD AUTO: 36.5 % — SIGNIFICANT CHANGE UP (ref 13–44)
MCHC RBC-ENTMCNC: 29.1 PG — SIGNIFICANT CHANGE UP (ref 27–34)
MCHC RBC-ENTMCNC: 32.4 GM/DL — SIGNIFICANT CHANGE UP (ref 32–36)
MCV RBC AUTO: 89.8 FL — SIGNIFICANT CHANGE UP (ref 80–100)
MONOCYTES # BLD AUTO: 0.82 K/UL — SIGNIFICANT CHANGE UP (ref 0–0.9)
MONOCYTES NFR BLD AUTO: 12 % — SIGNIFICANT CHANGE UP (ref 2–14)
NEUTROPHILS # BLD AUTO: 3.39 K/UL — SIGNIFICANT CHANGE UP (ref 1.8–7.4)
NEUTROPHILS NFR BLD AUTO: 49.6 % — SIGNIFICANT CHANGE UP (ref 43–77)
NITRITE UR-MCNC: NEGATIVE — SIGNIFICANT CHANGE UP
PH UR: 7 — SIGNIFICANT CHANGE UP (ref 5–8)
PLATELET # BLD AUTO: 189 K/UL — SIGNIFICANT CHANGE UP (ref 150–400)
POTASSIUM SERPL-MCNC: 3.3 MMOL/L — LOW (ref 3.5–5.3)
POTASSIUM SERPL-SCNC: 3.3 MMOL/L — LOW (ref 3.5–5.3)
PROT UR-MCNC: 100 MG/DL
RBC # BLD: 3.92 M/UL — SIGNIFICANT CHANGE UP (ref 3.8–5.2)
RBC # FLD: 14.1 % — SIGNIFICANT CHANGE UP (ref 10.3–14.5)
RBC CASTS # UR COMP ASSIST: 11 /HPF — HIGH (ref 0–4)
SODIUM SERPL-SCNC: 138 MMOL/L — SIGNIFICANT CHANGE UP (ref 135–145)
SP GR SPEC: 1.01 — SIGNIFICANT CHANGE UP (ref 1–1.03)
SQUAMOUS # UR AUTO: 0 /HPF — SIGNIFICANT CHANGE UP (ref 0–5)
UROBILINOGEN FLD QL: 1 MG/DL — SIGNIFICANT CHANGE UP (ref 0.2–1)
WBC # BLD: 6.83 K/UL — SIGNIFICANT CHANGE UP (ref 3.8–10.5)
WBC # FLD AUTO: 6.83 K/UL — SIGNIFICANT CHANGE UP (ref 3.8–10.5)
WBC UR QL: 1 /HPF — SIGNIFICANT CHANGE UP (ref 0–5)

## 2024-02-07 PROCEDURE — 87086 URINE CULTURE/COLONY COUNT: CPT

## 2024-02-07 PROCEDURE — 81001 URINALYSIS AUTO W/SCOPE: CPT

## 2024-02-07 PROCEDURE — 99223 1ST HOSP IP/OBS HIGH 75: CPT

## 2024-02-07 PROCEDURE — 85027 COMPLETE CBC AUTOMATED: CPT

## 2024-02-07 PROCEDURE — 80048 BASIC METABOLIC PNL TOTAL CA: CPT

## 2024-02-07 PROCEDURE — 85025 COMPLETE CBC W/AUTO DIFF WBC: CPT

## 2024-02-07 PROCEDURE — 36415 COLL VENOUS BLD VENIPUNCTURE: CPT

## 2024-02-07 RX ORDER — CARBIDOPA AND LEVODOPA 25; 100 MG/1; MG/1
1 TABLET ORAL THREE TIMES A DAY
Refills: 0 | Status: DISCONTINUED | OUTPATIENT
Start: 2024-02-07 | End: 2024-02-09

## 2024-02-07 RX ORDER — CARBIDOPA AND LEVODOPA 25; 100 MG/1; MG/1
1 TABLET ORAL AT BEDTIME
Refills: 0 | Status: DISCONTINUED | OUTPATIENT
Start: 2024-02-07 | End: 2024-02-09

## 2024-02-07 RX ORDER — CLOPIDOGREL BISULFATE 75 MG/1
75 TABLET, FILM COATED ORAL DAILY
Refills: 0 | Status: DISCONTINUED | OUTPATIENT
Start: 2024-02-07 | End: 2024-02-09

## 2024-02-07 RX ORDER — ALPRAZOLAM 0.25 MG
0.25 TABLET ORAL AT BEDTIME
Refills: 0 | Status: DISCONTINUED | OUTPATIENT
Start: 2024-02-07 | End: 2024-02-09

## 2024-02-07 RX ORDER — ONDANSETRON 8 MG/1
4 TABLET, FILM COATED ORAL EVERY 8 HOURS
Refills: 0 | Status: DISCONTINUED | OUTPATIENT
Start: 2024-02-07 | End: 2024-02-09

## 2024-02-07 RX ORDER — DONEPEZIL HYDROCHLORIDE 10 MG/1
5 TABLET, FILM COATED ORAL AT BEDTIME
Refills: 0 | Status: DISCONTINUED | OUTPATIENT
Start: 2024-02-07 | End: 2024-02-09

## 2024-02-07 RX ORDER — LOSARTAN POTASSIUM 100 MG/1
50 TABLET, FILM COATED ORAL DAILY
Refills: 0 | Status: DISCONTINUED | OUTPATIENT
Start: 2024-02-07 | End: 2024-02-09

## 2024-02-07 RX ORDER — CARBIDOPA AND LEVODOPA 25; 100 MG/1; MG/1
1 TABLET ORAL ONCE
Refills: 0 | Status: DISCONTINUED | OUTPATIENT
Start: 2024-02-07 | End: 2024-02-07

## 2024-02-07 RX ORDER — CARBIDOPA AND LEVODOPA 25; 100 MG/1; MG/1
1 TABLET ORAL ONCE
Refills: 0 | Status: COMPLETED | OUTPATIENT
Start: 2024-02-07 | End: 2024-02-07

## 2024-02-07 RX ORDER — ALBUTEROL 90 UG/1
2 AEROSOL, METERED ORAL EVERY 6 HOURS
Refills: 0 | Status: DISCONTINUED | OUTPATIENT
Start: 2024-02-07 | End: 2024-02-09

## 2024-02-07 RX ORDER — ATORVASTATIN CALCIUM 80 MG/1
80 TABLET, FILM COATED ORAL AT BEDTIME
Refills: 0 | Status: DISCONTINUED | OUTPATIENT
Start: 2024-02-07 | End: 2024-02-09

## 2024-02-07 RX ORDER — METOPROLOL TARTRATE 50 MG
50 TABLET ORAL AT BEDTIME
Refills: 0 | Status: DISCONTINUED | OUTPATIENT
Start: 2024-02-07 | End: 2024-02-09

## 2024-02-07 RX ORDER — OLANZAPINE 15 MG/1
2.5 TABLET, FILM COATED ORAL
Refills: 0 | Status: DISCONTINUED | OUTPATIENT
Start: 2024-02-07 | End: 2024-02-09

## 2024-02-07 RX ORDER — LANOLIN ALCOHOL/MO/W.PET/CERES
3 CREAM (GRAM) TOPICAL AT BEDTIME
Refills: 0 | Status: DISCONTINUED | OUTPATIENT
Start: 2024-02-07 | End: 2024-02-09

## 2024-02-07 RX ORDER — ACETAMINOPHEN 500 MG
650 TABLET ORAL EVERY 6 HOURS
Refills: 0 | Status: DISCONTINUED | OUTPATIENT
Start: 2024-02-07 | End: 2024-02-09

## 2024-02-07 RX ORDER — POTASSIUM CHLORIDE 20 MEQ
40 PACKET (EA) ORAL EVERY 4 HOURS
Refills: 0 | Status: COMPLETED | OUTPATIENT
Start: 2024-02-07 | End: 2024-02-07

## 2024-02-07 RX ADMIN — CARBIDOPA AND LEVODOPA 1 TABLET(S): 25; 100 TABLET ORAL at 14:49

## 2024-02-07 RX ADMIN — Medication 0.25 MILLIGRAM(S): at 04:25

## 2024-02-07 RX ADMIN — Medication 40 MILLIEQUIVALENT(S): at 10:17

## 2024-02-07 RX ADMIN — OLANZAPINE 2.5 MILLIGRAM(S): 15 TABLET, FILM COATED ORAL at 11:34

## 2024-02-07 RX ADMIN — Medication 1 TABLET(S): at 10:17

## 2024-02-07 RX ADMIN — DONEPEZIL HYDROCHLORIDE 5 MILLIGRAM(S): 10 TABLET, FILM COATED ORAL at 02:11

## 2024-02-07 RX ADMIN — Medication 40 MILLIGRAM(S): at 02:11

## 2024-02-07 RX ADMIN — Medication 0.25 MILLIGRAM(S): at 21:59

## 2024-02-07 RX ADMIN — CLOPIDOGREL BISULFATE 75 MILLIGRAM(S): 75 TABLET, FILM COATED ORAL at 10:17

## 2024-02-07 RX ADMIN — CARBIDOPA AND LEVODOPA 1 TABLET(S): 25; 100 TABLET ORAL at 10:17

## 2024-02-07 RX ADMIN — DONEPEZIL HYDROCHLORIDE 5 MILLIGRAM(S): 10 TABLET, FILM COATED ORAL at 21:40

## 2024-02-07 RX ADMIN — CARBIDOPA AND LEVODOPA 1 TABLET(S): 25; 100 TABLET ORAL at 02:12

## 2024-02-07 RX ADMIN — Medication 40 MILLIEQUIVALENT(S): at 14:49

## 2024-02-07 RX ADMIN — OLANZAPINE 2.5 MILLIGRAM(S): 15 TABLET, FILM COATED ORAL at 02:12

## 2024-02-07 RX ADMIN — Medication 30 MILLIGRAM(S): at 10:18

## 2024-02-07 RX ADMIN — CARBIDOPA AND LEVODOPA 1 TABLET(S): 25; 100 TABLET ORAL at 21:42

## 2024-02-07 RX ADMIN — ATORVASTATIN CALCIUM 80 MILLIGRAM(S): 80 TABLET, FILM COATED ORAL at 21:40

## 2024-02-07 RX ADMIN — OLANZAPINE 2.5 MILLIGRAM(S): 15 TABLET, FILM COATED ORAL at 21:40

## 2024-02-07 NOTE — H&P ADULT - ASSESSMENT
88 year old female w CAD, dementia, HTN, PD, TIA on plavix,  presents to  ED c/o low O2 sat at home        #Acute hypoxic resp failure  #Flu A+  had sick contact  1. admit to med  2. supplemental O2  3. tamiflu 30 mg qd given creatinine clearance < 30  4. supplemental O2  5. albuterol        #CAD  #TIA  1. plavix  2. statin      #Dementia  1. donepezil  2. alprazolam  3. paroxetine      #PD  1. carbidopa-levadopa TID as IR  2.     "              "           q HS as ER      #HTN  1. BB  2. olmesartan interchange        #VTE  low risk      #GOC  no advanced directives w pt      #80 minutes   88 year old female w CAD, dementia, HTN, PD, TIA on plavix,  presents to  ED c/o low O2 sat at home        #Acute hypoxic resp failure  #Flu A+  #Acute metabolic encephalopathy POA  lethargic and less interactive today  had sick contact  head CT no acute changes  1. admit to med  2. supplemental O2  3. tamiflu 30 mg qd given creatinine clearance < 30  4. supplemental O2  5. albuterol        #CAD  #TIA  1. plavix  2. statin      #Dementia  1. donepezil  2. alprazolam  3. paroxetine      #PD  1. carbidopa-levadopa TID as IR  2.     "              "           q HS as ER      #HTN  1. BB  2. olmesartan interchange        #VTE  low risk      #GOC  no advanced directives w pt      #80 minutes
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
## 2024-02-07 NOTE — DIETITIAN INITIAL EVALUATION ADULT - PERTINENT LABORATORY DATA
02-07    138  |  107  |  22  ----------------------------<  104<H>  3.3<L>   |  25  |  1.38<H>    Ca    10.1      07 Feb 2024 07:32  Mg     2.6     02-06    TPro  8.4<H>  /  Alb  3.5  /  TBili  1.5<H>  /  DBili  x   /  AST  54<H>  /  ALT  16  /  AlkPhos  131<H>  02-06

## 2024-02-07 NOTE — DISCHARGE NOTE NURSING/CASE MANAGEMENT/SOCIAL WORK - PATIENT PORTAL LINK FT
You can access the FollowMyHealth Patient Portal offered by [facility] by registering at the following website: [email or web address]. By joining [facility]’s FollowMyHealth portal, you will also be able to view your health information using other applications (apps) compatible with our system.

## 2024-02-07 NOTE — DIETITIAN INITIAL EVALUATION ADULT - OTHER INFO
88 year old female w CAD w stent, dementia, HTN, PD, TIA on plavix presents to  ED c/o low O2 sat at home. Son states that family members were recently ill; daughter tested flu+, worsening cough, low energy, and not acting herself.   Admit for acute hypoxic resp failure, flu A+, acute metabolic encephalopathy POA    Diet/wt hx obtained from bed-side aide; pt w/ dementia/AMS. Unable to confirm UBW at this time, but believes wt has remained stable x 1 year. Bed scale wt of  151# taken by RD on 2/7/24. Appears appropriate weight for height; however, NFPE reveals mild-mod muscle/ fat wasting. Liberalize diet to regular to maximize caloric and nutrient intake. Add ensure + HP shake BID (350kcal, 20g protein) to optimize nutrition. See below for other recommendations.

## 2024-02-07 NOTE — DISCHARGE NOTE NURSING/CASE MANAGEMENT/SOCIAL WORK - NSDCPEFALRISK_GEN_ALL_CORE
For information on Fall & Injury Prevention, visit: [email or web address] OR  [email or web address] OR  https://www.cdc.gov/steadi/patient.html

## 2024-02-07 NOTE — DIETITIAN INITIAL EVALUATION ADULT - FACTORS AFF FOOD INTAKE
2/2 lethargy/fatigue/cough as per EMR/change in mental status/difficulty with food procurement/preparation/other (specify)

## 2024-02-07 NOTE — PATIENT PROFILE ADULT - FUNCTIONAL ASSESSMENT - BASIC MOBILITY 6.
4-calculated by average/Not able to assess (calculate score using [facility] averaging method)

## 2024-02-07 NOTE — PATIENT PROFILE ADULT - FALL HARM RISK - HARM RISK INTERVENTIONS
Assistance with ambulation/Assistance OOB with selected safe patient handling equipment/Communicate Risk of Fall with Harm to all staff/Discuss with provider need for PT consult/Monitor for mental status changes/Monitor gait and stability/Provide patient with walking aids - walker, cane, crutches/Reinforce activity limits and safety measures with patient and family/Reorient to person, place and time as needed/Tailored Fall Risk Interventions/Toileting schedule using arm’s reach rule for commode and bathroom/Use of alarms - bed, chair and/or voice tab/Visual Cue: Yellow wristband and red socks/Bed in lowest position, wheels locked, appropriate side rails in place/Call bell, personal items and telephone in reach/Instruct patient to call for assistance before getting out of bed or chair/Non-slip footwear when patient is out of bed/[place] to call system/Physically safe environment - no spills, clutter or unnecessary equipment/Purposeful Proactive Rounding/Room/bathroom lighting operational, light cord in reach

## 2024-02-07 NOTE — H&P ADULT - NSHPPHYSICALEXAM_GEN_ALL_CORE
Vital Signs Last 24 Hrs  T(C): 37.1 (06 Feb 2024 22:00), Max: 37.1 (06 Feb 2024 22:00)  T(F): 98.8 (06 Feb 2024 22:00), Max: 98.8 (06 Feb 2024 22:00)  HR: 94 (06 Feb 2024 22:00) (60 - 94)  BP: 119/75 (06 Feb 2024 22:00) (119/62 - 119/75)  BP(mean): 81 (06 Feb 2024 15:53) (81 - 81)  RR: 26 (06 Feb 2024 22:00) (19 - 26)  SpO2: 95% (06 Feb 2024 22:00) (94% - 95%)    Parameters below as of 06 Feb 2024 22:00  Patient On (Oxygen Delivery Method): nasal cannula  O2 Flow (L/min): 3

## 2024-02-07 NOTE — ED ADULT NURSE REASSESSMENT NOTE - NS ED NURSE REASSESS COMMENT FT1
Pt admitted and awaiting bed on unit. Pt resting comfortably in stretcher in room, with home health aide at beside. POC discussed with pt and aide. No acute distress noted at this time. Comfort and safety measures maintained.

## 2024-02-07 NOTE — DIETITIAN INITIAL EVALUATION ADULT - ORAL INTAKE PTA/DIET HISTORY
Obtained all information from aide at bed-side; pt w/ dementia/AMS. Lives at home w/ daughter (that lives up-stairs); full-time aide normally cook/grocery shop for household. Aide endorses pt w/ good appetite, but is "picky," and consumes 3 meals/day. Normally fair PO intake 50-75% of ENN

## 2024-02-07 NOTE — DIETITIAN INITIAL EVALUATION ADULT - ADD RECOMMEND
1) Liberalize diet to regular to maximize caloric and nutrient intake.  2) Add ensure + HP shake BID (350kcal, 20g protein)  3) Monitor bowel movements, if no BM for >3 days, consider implementing bowel regimen.  4) Encourage protein-rich foods, maximize food preferences  5) MVI w/ minerals daily to ensure 100% RDA met  6) Consider adding thiamine 100 mg daily 2/2 poor PO intake/ malnutrition  7) Monitor lytes/ min and replete prn.  8) Please provide supervision / Meal encouragement; tray set-up to optimize nutrition   9) Confirm goals of care regarding nutrition support. Nutrition support is not recommended due to overall declining medical status which evidenced based studies indicate EN is not effective in prolonging survival and improving quality of life. It can also increase risk of aspiration pneumonia as well as other related issues (infection, GI complications, and worsening/ non-healing PI's). However, will provide nutrition/ hydration within GOC.  RD will continue to monitor PO intake, labs, hydration, and wt prn.

## 2024-02-07 NOTE — DIETITIAN INITIAL EVALUATION ADULT - PERTINENT MEDS FT
MEDICATIONS  (STANDING):  atorvastatin 80 milliGRAM(s) Oral at bedtime  carbidopa/levodopa  25/100 1 Tablet(s) Oral three times a day  carbidopa/levodopa CR 25/100 1 Tablet(s) Oral at bedtime  clopidogrel Tablet 75 milliGRAM(s) Oral daily  donepezil 5 milliGRAM(s) Oral at bedtime  losartan 50 milliGRAM(s) Oral daily  metoprolol succinate ER 50 milliGRAM(s) Oral at bedtime  multivitamin 1 Tablet(s) Oral daily  OLANZapine 2.5 milliGRAM(s) Oral two times a day  oseltamivir 30 milliGRAM(s) Oral daily  PARoxetine 40 milliGRAM(s) Oral at bedtime  potassium chloride    Tablet ER 40 milliEquivalent(s) Oral every 4 hours    MEDICATIONS  (PRN):  acetaminophen     Tablet .. 650 milliGRAM(s) Oral every 6 hours PRN Temp greater or equal to 38C (100.4F), Mild Pain (1 - 3)  albuterol    90 MICROgram(s) HFA Inhaler 2 Puff(s) Inhalation every 6 hours PRN Shortness of Breath and/or Wheezing  ALPRAZolam 0.25 milliGRAM(s) Oral at bedtime PRN sleep  aluminum hydroxide/magnesium hydroxide/simethicone Suspension 30 milliLiter(s) Oral every 4 hours PRN Dyspepsia  melatonin 3 milliGRAM(s) Oral at bedtime PRN Insomnia  ondansetron Injectable 4 milliGRAM(s) IV Push every 8 hours PRN Nausea and/or Vomiting

## 2024-02-07 NOTE — H&P ADULT - HISTORY OF PRESENT ILLNESS
88 year old female w dementia, TIA on plavix presents to  ED c/o low O2 sat at home.       Son states that family members were recently ill; daughter tested flu+  + cough 2 days ago, worsened yesterday  +low energy, not acting herself. Home aide checked her vitals, O2 sat 89%, son brought patient in for evaluation.   patient was incontinent of urine this morning, usually states when she has to go to the bathroom.   Denies N/V/D, fever, known falls.       In ED /62   HR 60   RR 19   T 97.6   94%    PAST MEDICAL HX  CAD (coronary artery disease) LAD stent 5/2020  Dementia   Hernia, hiatal   HTN hypertension.     PAST SURGICAL HX  S/P appendectomy   S/P hysterectomy   S/P tonsillectomy.     FAMILY HX  FHx: heart disease      SOCIAL HX 88 year old female w dementia, TIA on plavix presents to  ED c/o low O2 sat at home.       Son states that family members were recently ill; daughter tested flu+  + cough 2 days ago, worsened yesterday  +low energy, not acting herself. Home aide checked her vitals, O2 sat 89%, son brought patient in for evaluation.   patient was incontinent of urine this morning, usually states when she has to go to the bathroom.   Denies N/V/D, fever, known falls.       AT time of my exam, aide was at bedside.  Pt could not contribute to hx    In ED /62   HR 60   RR 19   T 97.6   94%  flu A+  tamifu 30 mg      PAST MEDICAL HX  CAD (coronary artery disease) LAD stent 5/2020  Dementia   Hernia, hiatal   HTN hypertension.     PAST SURGICAL HX  S/P appendectomy   S/P hysterectomy   S/P tonsillectomy.     FAMILY HX  FHx: heart disease      SOCIAL HX  lives at home w aide   88 year old female w CAD w stent, dementia, HTN, PD, TIA on plavix presents to  ED c/o low O2 sat at home.       Son states that family members were recently ill; daughter tested flu+  + cough 2 days ago, worsened yesterday  +low energy, not acting herself. Home aide checked her vitals, O2 sat 89%, son brought patient in for evaluation.   patient was incontinent of urine this morning, usually states when she has to go to the bathroom.   Denies N/V/D, fever, known falls.       AT time of my exam, aide was at bedside.  Pt could not contribute to hx    In ED /62   HR 60   RR 19   T 97.6   94%  flu A+  tamifu 30 mg      PAST MEDICAL HX  CAD (coronary artery disease) LAD stent 5/2020  Dementia   Hernia, hiatal   HTN hypertension.   PD Parkinson disease  TIA    PAST SURGICAL HX  S/P appendectomy   S/P hysterectomy   S/P tonsillectomy.     FAMILY HX  FHx: heart disease      SOCIAL HX  lives at home w aide   88 year old female w CAD w stent, dementia, HTN, PD, TIA on plavix presents to  ED c/o low O2 sat at home.       Son states that family members were recently ill; daughter tested flu+  + cough 2 days ago, worsened yesterday  +low energy, not acting herself. Home aide checked her vitals, O2 sat 89%, son brought patient in for evaluation.   patient was incontinent of urine this morning, usually states when she has to go to the bathroom.   Denies N/V/D, fever, known falls.       AT time of my exam, aide was at bedside.  Pt could not contribute to hx    In ED /62   HR 60   RR 19   T 97.6   94%  flu A+  CXR no infiltrate  tamifu 30 mg      PAST MEDICAL HX  CAD (coronary artery disease) LAD stent 5/2020  Dementia   Hernia, hiatal   HTN hypertension.   PD Parkinson disease  TIA    PAST SURGICAL HX  S/P appendectomy   S/P hysterectomy   S/P tonsillectomy.     FAMILY HX  FHx: heart disease      SOCIAL HX  lives at home w aide   88 year old female w CAD w stent, dementia, HTN, PD, TIA on plavix presents to  ED c/o low O2 sat at home.       Son states that family members were recently ill; daughter tested flu+  + cough 2 days ago, worsened yesterday  +low energy, not acting herself. Home aide checked her vitals, O2 sat 89%, son brought patient in for evaluation.   patient was incontinent of urine this morning, usually states when she has to go to the bathroom.   Denies N/V/D, fever, known falls.       AT time of my exam, aide was at bedside.  Pt could not contribute to hx    In ED /62   HR 60   RR 19   T 97.6   94%  flu A+  head CT no acute changes  CXR no infiltrate  tamifu 30 mg      PAST MEDICAL HX  CAD (coronary artery disease) LAD stent 5/2020  Dementia   Hernia, hiatal   HTN hypertension.   PD Parkinson disease  TIA    PAST SURGICAL HX  S/P appendectomy   S/P hysterectomy   S/P tonsillectomy.     FAMILY HX  FHx: heart disease      SOCIAL HX  lives at home w aide

## 2024-02-08 LAB
ANION GAP SERPL CALC-SCNC: 5 MMOL/L — SIGNIFICANT CHANGE UP (ref 5–17)
BUN SERPL-MCNC: 26 MG/DL — HIGH (ref 7–23)
CALCIUM SERPL-MCNC: 10.3 MG/DL — HIGH (ref 8.5–10.1)
CHLORIDE SERPL-SCNC: 109 MMOL/L — HIGH (ref 96–108)
CO2 SERPL-SCNC: 21 MMOL/L — LOW (ref 22–31)
CREAT SERPL-MCNC: 1.62 MG/DL — HIGH (ref 0.5–1.3)
CULTURE RESULTS: NO GROWTH — SIGNIFICANT CHANGE UP
EGFR: 30 ML/MIN/1.73M2 — LOW
GLUCOSE SERPL-MCNC: 130 MG/DL — HIGH (ref 70–99)
HCT VFR BLD CALC: 40.8 % — SIGNIFICANT CHANGE UP (ref 34.5–45)
HGB BLD-MCNC: 12.9 G/DL — SIGNIFICANT CHANGE UP (ref 11.5–15.5)
MCHC RBC-ENTMCNC: 28.8 PG — SIGNIFICANT CHANGE UP (ref 27–34)
MCHC RBC-ENTMCNC: 31.6 GM/DL — LOW (ref 32–36)
MCV RBC AUTO: 91.1 FL — SIGNIFICANT CHANGE UP (ref 80–100)
PLATELET # BLD AUTO: 235 K/UL — SIGNIFICANT CHANGE UP (ref 150–400)
POTASSIUM SERPL-MCNC: 4 MMOL/L — SIGNIFICANT CHANGE UP (ref 3.5–5.3)
POTASSIUM SERPL-SCNC: 4 MMOL/L — SIGNIFICANT CHANGE UP (ref 3.5–5.3)
RBC # BLD: 4.48 M/UL — SIGNIFICANT CHANGE UP (ref 3.8–5.2)
RBC # FLD: 14.5 % — SIGNIFICANT CHANGE UP (ref 10.3–14.5)
SODIUM SERPL-SCNC: 135 MMOL/L — SIGNIFICANT CHANGE UP (ref 135–145)
SPECIMEN SOURCE: SIGNIFICANT CHANGE UP
WBC # BLD: 7.53 K/UL — SIGNIFICANT CHANGE UP (ref 3.8–10.5)
WBC # FLD AUTO: 7.53 K/UL — SIGNIFICANT CHANGE UP (ref 3.8–10.5)

## 2024-02-08 PROCEDURE — 99233 SBSQ HOSP IP/OBS HIGH 50: CPT

## 2024-02-08 RX ADMIN — DONEPEZIL HYDROCHLORIDE 5 MILLIGRAM(S): 10 TABLET, FILM COATED ORAL at 22:03

## 2024-02-08 RX ADMIN — CARBIDOPA AND LEVODOPA 1 TABLET(S): 25; 100 TABLET ORAL at 08:54

## 2024-02-08 RX ADMIN — CARBIDOPA AND LEVODOPA 1 TABLET(S): 25; 100 TABLET ORAL at 22:03

## 2024-02-08 RX ADMIN — Medication 40 MILLIGRAM(S): at 01:12

## 2024-02-08 RX ADMIN — LOSARTAN POTASSIUM 50 MILLIGRAM(S): 100 TABLET, FILM COATED ORAL at 09:25

## 2024-02-08 RX ADMIN — Medication 0.25 MILLIGRAM(S): at 22:06

## 2024-02-08 RX ADMIN — CARBIDOPA AND LEVODOPA 1 TABLET(S): 25; 100 TABLET ORAL at 15:49

## 2024-02-08 RX ADMIN — CARBIDOPA AND LEVODOPA 1 TABLET(S): 25; 100 TABLET ORAL at 12:29

## 2024-02-08 RX ADMIN — CLOPIDOGREL BISULFATE 75 MILLIGRAM(S): 75 TABLET, FILM COATED ORAL at 09:25

## 2024-02-08 RX ADMIN — Medication 30 MILLIGRAM(S): at 09:29

## 2024-02-08 RX ADMIN — Medication 40 MILLIGRAM(S): at 22:04

## 2024-02-08 RX ADMIN — OLANZAPINE 2.5 MILLIGRAM(S): 15 TABLET, FILM COATED ORAL at 09:25

## 2024-02-08 RX ADMIN — Medication 1 TABLET(S): at 09:25

## 2024-02-08 RX ADMIN — OLANZAPINE 2.5 MILLIGRAM(S): 15 TABLET, FILM COATED ORAL at 22:03

## 2024-02-08 RX ADMIN — ATORVASTATIN CALCIUM 80 MILLIGRAM(S): 80 TABLET, FILM COATED ORAL at 22:03

## 2024-02-08 NOTE — CONSULT NOTE ADULT - ASSESSMENT
88y old Female with PMH CAD, dementia, hiatal hernia, HTN, parkinsons, who presents with low O2 sat at home  1. flu A: continue tamiflu  -supplemental o2 as needed  -continue albuterol PRN

## 2024-02-08 NOTE — CONSULT NOTE ADULT - SUBJECTIVE AND OBJECTIVE BOX
HPI: DENA HENRY is a 88y old Female with PMH CAD, dementia, hiatal hernia, HTN, parkinsons, who presents with low O2 sat at home.  Son states that family members were recently ill and his daughter tested positive for flu  She had a cough 2 days prior to admission that worsened. she was found to have a pulse ox of 89%  she was found to be positive for flu and started on tamiflu. she has less of cough now according aide    Past Medical History:   Hypertension    Hernia, hiatal    CAD (coronary artery disease)    Dementia      Past Surgical History:   No significant past surgical history    S/P appendectomy    S/P tonsillectomy    S/P hysterectomy      Family History:    FHx: heart disease       Social History: lives at home    Review of Systems:  All 10 systems reviewed in detailed and found to be negative with the exception of what has already been described above    Allergies:  Demerol HCl (Other)  Indocin (Other)  tetracyclines (Unknown)  doxycycline (Other)  penicillins (Other)  verapamil (Other)  Biaxin (Other)    Meds  MEDICATIONS  (STANDING):  atorvastatin 80 milliGRAM(s) Oral at bedtime  carbidopa/levodopa  25/100 1 Tablet(s) Oral three times a day  carbidopa/levodopa CR 25/100 1 Tablet(s) Oral at bedtime  clopidogrel Tablet 75 milliGRAM(s) Oral daily  donepezil 5 milliGRAM(s) Oral at bedtime  losartan 50 milliGRAM(s) Oral daily  metoprolol succinate ER 50 milliGRAM(s) Oral at bedtime  multivitamin 1 Tablet(s) Oral daily  OLANZapine 2.5 milliGRAM(s) Oral two times a day  oseltamivir 30 milliGRAM(s) Oral daily  PARoxetine 40 milliGRAM(s) Oral at bedtime    MEDICATIONS  (PRN):  acetaminophen     Tablet .. 650 milliGRAM(s) Oral every 6 hours PRN Temp greater or equal to 38C (100.4F), Mild Pain (1 - 3)  albuterol    90 MICROgram(s) HFA Inhaler 2 Puff(s) Inhalation every 6 hours PRN Shortness of Breath and/or Wheezing  ALPRAZolam 0.25 milliGRAM(s) Oral at bedtime PRN sleep  aluminum hydroxide/magnesium hydroxide/simethicone Suspension 30 milliLiter(s) Oral every 4 hours PRN Dyspepsia  melatonin 3 milliGRAM(s) Oral at bedtime PRN Insomnia  ondansetron Injectable 4 milliGRAM(s) IV Push every 8 hours PRN Nausea and/or Vomiting    Physical Exam  T(C): 36.8 (02-08-24 @ 17:06), Max: 37.1 (02-07-24 @ 22:00)  HR: 89 (02-08-24 @ 17:06) (78 - 108)  BP: 109/54 (02-08-24 @ 17:06) (90/59 - 109/54)  RR: 18 (02-08-24 @ 17:06) (18 - 18)  SpO2: 94% (02-08-24 @ 17:06) (94% - 97%)  Gen: Alert, flat affect  HEENT: Anicteric sclera, moist mucous membranes  Cardio: Regular rhythm and rate, normal S1S2, no murmurs  Resp: Clear to auscultation bilaterally, no wheezing or rhonchi  GI: Nontender, nondistended, normoactive bowel sounds  Ext: No cyanosis, clubbing or edema  Neuro: Nonfocal    Labs:                        12.9   7.53  )-----------( 235      ( 08 Feb 2024 09:09 )             40.8     02-08    135  |  109<H>  |  26<H>  ----------------------------<  130<H>  4.0   |  21<L>  |  1.62<H>    Ca    10.3<H>      08 Feb 2024 09:09        Urinalysis Basic - ( 08 Feb 2024 09:09 )    Color: x / Appearance: x / SG: x / pH: x  Gluc: 130 mg/dL / Ketone: x  / Bili: x / Urobili: x   Blood: x / Protein: x / Nitrite: x   Leuk Esterase: x / RBC: x / WBC x   Sq Epi: x / Non Sq Epi: x / Bacteria: x    rvp positive for flu     < from: Xray Chest 1 View AP/PA. (02.06.24 @ 17:40) >  PROCEDURE DATE:  02/06/2024          INTERPRETATION:  INDICATION: Hypoxia and cough    Portable chest 5:25 PM    COMPARISON: 11/5/2023    Overlying EKG leads and wires.    FINDINGS:  Heart/Vascular: The heart size, mediastinum, hilum and aorta are within   normal limits for projection.  Pulmonary: Midline trachea. There is no focal infiltrate, congestion or   effusion.  Bones: There is no fracture.  Lines and catheter: None    Impression:    No acute pulmonary disease.    < from: CT Head No Cont (02.06.24 @ 18:50) >  PROCEDURE DATE:  02/06/2024          INTERPRETATION:  .    CLINICAL INFORMATION: Altered mental status. Hypoxia. On anticoagulation.    TECHNIQUE: Multiple axial CT images of the headwere obtained without   contrast. Sagittal and coronal reconstructed images were acquired from   the source data.    COMPARISON: Prior brain MRI study dated 7/7/2021. Prior CT study of the   head from 7/6/2021.    FINDINGS: There is no acute intracranial hemorrhage, mass effect, shift   of the midline structures, herniation, extra-axial fluid collection, or   hydrocephalus.    There is diffuse cerebral volume loss with prominence of the sulci,   fissures, and cisternal spaces which is normal for the patient's age.   There is minimal deep and periventricular white matter hypoattenuation   statistically compatible with microvascular changes given calcific   atherosclerotic disease of the intracranial arteries.    Scattered mucosal thickening aswell as aerated secretions are seen   throughout the paranasal sinuses. The bilateral tympanomastoid cavities   are clear. The calvarium is intact. The imaged orbits are unremarkable.    IMPRESSION: No acute intracranial hemorrhage, mass effect, or shift of   the midline structures.    < end of copied text >

## 2024-02-09 ENCOUNTER — TRANSCRIPTION ENCOUNTER (OUTPATIENT)
Age: 89
End: 2024-02-09

## 2024-02-09 VITALS
HEART RATE: 95 BPM | RESPIRATION RATE: 18 BRPM | OXYGEN SATURATION: 94 % | TEMPERATURE: 98 F | DIASTOLIC BLOOD PRESSURE: 53 MMHG | SYSTOLIC BLOOD PRESSURE: 119 MMHG

## 2024-02-09 PROCEDURE — 99239 HOSP IP/OBS DSCHRG MGMT >30: CPT

## 2024-02-09 RX ADMIN — Medication 1 TABLET(S): at 10:52

## 2024-02-09 RX ADMIN — CARBIDOPA AND LEVODOPA 1 TABLET(S): 25; 100 TABLET ORAL at 10:06

## 2024-02-09 RX ADMIN — CLOPIDOGREL BISULFATE 75 MILLIGRAM(S): 75 TABLET, FILM COATED ORAL at 10:52

## 2024-02-09 RX ADMIN — CARBIDOPA AND LEVODOPA 1 TABLET(S): 25; 100 TABLET ORAL at 13:21

## 2024-02-09 RX ADMIN — OLANZAPINE 2.5 MILLIGRAM(S): 15 TABLET, FILM COATED ORAL at 10:53

## 2024-02-09 RX ADMIN — LOSARTAN POTASSIUM 50 MILLIGRAM(S): 100 TABLET, FILM COATED ORAL at 10:52

## 2024-02-09 RX ADMIN — Medication 30 MILLIGRAM(S): at 10:53

## 2024-02-09 NOTE — DISCHARGE NOTE PROVIDER - NPI NUMBER (FOR [employer] USE ONLY) :
I did not personally evaluate this patient but I was asked to review the EKG. EKG  The Ekg interpreted by myself in the emergency department in the absence of a cardiologist.  normal sinus rhythm with a rate of 76  Axis is   Normal  QTc is  within an acceptable range  Intervals and Durations are unremarkable. No specific ST-T wave changes appreciated. t wave inversion lead 3  No evidence of acute ischemia.    No prior ekg available for comparison     Felicity Simmons MD  04/05/22 1300 [[phone]]

## 2024-02-09 NOTE — PROGRESS NOTE ADULT - SUBJECTIVE AND OBJECTIVE BOX
88 year old female w CAD w stent, dementia, HTN, PD, TIA on plavix presents to  ED c/o low O2 sat at home.   Son states that family members were recently ill; daughter tested flu+  + cough 2 days ago, worsened yesterday  +low energy, not acting herself. Home aide checked her vitals, O2 sat 89%, son brought patient in for evaluation.   patient was incontinent of urine this morning, usually states when she has to go to the bathroom.   Denies N/V/D, fever, known falls.      02/08/24: Patient seen and examined. Sitting in chair, hard of hearing. Private aide at bed side.       Vital Signs Last 24 Hrs  T(C): 36.7 (08 Feb 2024 08:05), Max: 37.1 (07 Feb 2024 22:00)  T(F): 98.1 (08 Feb 2024 08:05), Max: 98.8 (07 Feb 2024 22:00)  HR: 78 (08 Feb 2024 08:05) (70 - 108)  BP: 97/62 (08 Feb 2024 08:05) (90/59 - 105/70)  BP(mean): --  RR: 18 (08 Feb 2024 08:05) (17 - 18)  SpO2: 96% (08 Feb 2024 08:05) (96% - 97%)    Parameters below as of 08 Feb 2024 08:05  Patient On (Oxygen Delivery Method): nasal cannula      Physical Exam:       PHYSICAL EXAM:   · CONSTITUTIONAL: elderly female sitting quietly in stretcher, sedate appearing but does respond to voice and follows commands  · ENMT: MMM  · EYES: PERRLA EOMI  · CARDIAC: normal heart sounds, bradycardic, no edema  · RESPIRATORY: scattered expiratory rhonchi  · GASTROINTESTINAL: Abdomen soft, non-tender  · MUSCULOSKELETAL: Spine appears normal, range of motion is not limited, no muscle or joint tenderness, moves all extremities equally  · NEUROLOGICAL: patient alert to person  · SKIN: Skin normal color for race, warm, dry and intact. No evidence of rash.                            12.9   7.53  )-----------( 235      ( 08 Feb 2024 09:09 )             40.8     08 Feb 2024 09:09    135    |  109    |  26     ----------------------------<  130    4.0     |  21     |  1.62     Ca    10.3       08 Feb 2024 09:09  Mg     2.6       06 Feb 2024 16:42    TPro  8.4    /  Alb  3.5    /  TBili  1.5    /  DBili  x      /  AST  54     /  ALT  16     /  AlkPhos  131    06 Feb 2024 16:42    LIVER FUNCTIONS - ( 06 Feb 2024 16:42 )  Alb: 3.5 g/dL / Pro: 8.4 gm/dL / ALK PHOS: 131 U/L / ALT: 16 U/L / AST: 54 U/L / GGT: x           PT/INR - ( 06 Feb 2024 16:42 )   PT: 12.0 sec;   INR: 1.06 ratio         PTT - ( 06 Feb 2024 16:42 )  PTT:29.7 sec  CAPILLARY BLOOD GLUCOSE            Urinalysis Basic - ( 08 Feb 2024 09:09 )    Color: x / Appearance: x / SG: x / pH: x  Gluc: 130 mg/dL / Ketone: x  / Bili: x / Urobili: x   Blood: x / Protein: x / Nitrite: x   Leuk Esterase: x / RBC: x / WBC x   Sq Epi: x / Non Sq Epi: x / Bacteria: x        MEDICATIONS  (STANDING):  atorvastatin 80 milliGRAM(s) Oral at bedtime  carbidopa/levodopa  25/100 1 Tablet(s) Oral three times a day  carbidopa/levodopa CR 25/100 1 Tablet(s) Oral at bedtime  clopidogrel Tablet 75 milliGRAM(s) Oral daily  donepezil 5 milliGRAM(s) Oral at bedtime  losartan 50 milliGRAM(s) Oral daily  metoprolol succinate ER 50 milliGRAM(s) Oral at bedtime  multivitamin 1 Tablet(s) Oral daily  OLANZapine 2.5 milliGRAM(s) Oral two times a day  oseltamivir 30 milliGRAM(s) Oral daily  PARoxetine 40 milliGRAM(s) Oral at bedtime    MEDICATIONS  (PRN):  acetaminophen     Tablet .. 650 milliGRAM(s) Oral every 6 hours PRN Temp greater or equal to 38C (100.4F), Mild Pain (1 - 3)  albuterol    90 MICROgram(s) HFA Inhaler 2 Puff(s) Inhalation every 6 hours PRN Shortness of Breath and/or Wheezing  ALPRAZolam 0.25 milliGRAM(s) Oral at bedtime PRN sleep  aluminum hydroxide/magnesium hydroxide/simethicone Suspension 30 milliLiter(s) Oral every 4 hours PRN Dyspepsia  melatonin 3 milliGRAM(s) Oral at bedtime PRN Insomnia  ondansetron Injectable 4 milliGRAM(s) IV Push every 8 hours PRN Nausea and/or Vomiting      Assessment:    88 year old female w CAD, dementia, HTN, PD, TIA on plavix,  presents to  ED c/o low O2 sat at home    #Acute hypoxic resp failure  #Flu A+  #Acute metabolic encephalopathy POA-resolved  had sick contact  head CT no acute changes   supplemental O2. Titrate    tamiflu   albuterol        #CAD  #TIA  1. plavix  2. statin    #Dementia  1. donepezil  2. alprazolam  3. paroxetine      #PD  1. carbidopa-levadopa TID as IR  2.     "              "           q HS as ER      #HTN  1. BB  2. olmesartan interchange        #VTE  low risk  
Subjective:  ambulating well, with pulse ox 94%    Review of Systems:  All 10 systems reviewed in detailed and found to be negative with the exception of what has already been described above    Allergies:  Demerol HCl (Other)  Indocin (Other)  tetracyclines (Unknown)  doxycycline (Other)  penicillins (Other)  verapamil (Other)  Biaxin (Other)    Meds  MEDICATIONS  (STANDING):  atorvastatin 80 milliGRAM(s) Oral at bedtime  carbidopa/levodopa  25/100 1 Tablet(s) Oral three times a day  carbidopa/levodopa CR 25/100 1 Tablet(s) Oral at bedtime  clopidogrel Tablet 75 milliGRAM(s) Oral daily  donepezil 5 milliGRAM(s) Oral at bedtime  losartan 50 milliGRAM(s) Oral daily  metoprolol succinate ER 50 milliGRAM(s) Oral at bedtime  multivitamin 1 Tablet(s) Oral daily  OLANZapine 2.5 milliGRAM(s) Oral two times a day  oseltamivir 30 milliGRAM(s) Oral daily  PARoxetine 40 milliGRAM(s) Oral at bedtime    MEDICATIONS  (PRN):  acetaminophen     Tablet .. 650 milliGRAM(s) Oral every 6 hours PRN Temp greater or equal to 38C (100.4F), Mild Pain (1 - 3)  albuterol    90 MICROgram(s) HFA Inhaler 2 Puff(s) Inhalation every 6 hours PRN Shortness of Breath and/or Wheezing  ALPRAZolam 0.25 milliGRAM(s) Oral at bedtime PRN sleep  aluminum hydroxide/magnesium hydroxide/simethicone Suspension 30 milliLiter(s) Oral every 4 hours PRN Dyspepsia  melatonin 3 milliGRAM(s) Oral at bedtime PRN Insomnia  ondansetron Injectable 4 milliGRAM(s) IV Push every 8 hours PRN Nausea and/or Vomiting    Physical Exam  T(C): 36.8 (02-09-24 @ 08:11), Max: 36.8 (02-08-24 @ 17:06)  HR: 95 (02-09-24 @ 08:11) (89 - 95)  BP: 119/53 (02-09-24 @ 08:11) (109/54 - 119/53)  RR: 18 (02-09-24 @ 08:11) (18 - 18)  SpO2: 94% (02-09-24 @ 08:11) (94% - 94%)  Gen: Alert, flat affect  HEENT: Anicteric sclera, moist mucous membranes  Cardio: Regular rhythm and rate, normal S1S2, no murmurs  Resp: Clear to auscultation bilaterally, no wheezing or rhonchi  GI: Nontender, nondistended, normoactive bowel sounds  Ext: No cyanosis, clubbing or edema  Neuro: Nonfocal    Labs:                        12.9   7.53  )-----------( 235      ( 08 Feb 2024 09:09 )             40.8     02-08    135  |  109<H>  |  26<H>  ----------------------------<  130<H>  4.0   |  21<L>  |  1.62<H>    Ca    10.3<H>      08 Feb 2024 09:09        Urinalysis Basic - ( 08 Feb 2024 09:09 )    Color: x / Appearance: x / SG: x / pH: x  Gluc: 130 mg/dL / Ketone: x  / Bili: x / Urobili: x   Blood: x / Protein: x / Nitrite: x   Leuk Esterase: x / RBC: x / WBC x   Sq Epi: x / Non Sq Epi: x / Bacteria: x    rvp positive for flu     < from: Xray Chest 1 View AP/PA. (02.06.24 @ 17:40) >  PROCEDURE DATE:  02/06/2024          INTERPRETATION:  INDICATION: Hypoxia and cough    Portable chest 5:25 PM    COMPARISON: 11/5/2023    Overlying EKG leads and wires.    FINDINGS:  Heart/Vascular: The heart size, mediastinum, hilum and aorta are within   normal limits for projection.  Pulmonary: Midline trachea. There is no focal infiltrate, congestion or   effusion.  Bones: There is no fracture.  Lines and catheter: None    Impression:    No acute pulmonary disease.    < from: CT Head No Cont (02.06.24 @ 18:50) >  PROCEDURE DATE:  02/06/2024          INTERPRETATION:  .    CLINICAL INFORMATION: Altered mental status. Hypoxia. On anticoagulation.    TECHNIQUE: Multiple axial CT images of the headwere obtained without   contrast. Sagittal and coronal reconstructed images were acquired from   the source data.    COMPARISON: Prior brain MRI study dated 7/7/2021. Prior CT study of the   head from 7/6/2021.    FINDINGS: There is no acute intracranial hemorrhage, mass effect, shift   of the midline structures, herniation, extra-axial fluid collection, or   hydrocephalus.    There is diffuse cerebral volume loss with prominence of the sulci,   fissures, and cisternal spaces which is normal for the patient's age.   There is minimal deep and periventricular white matter hypoattenuation   statistically compatible with microvascular changes given calcific   atherosclerotic disease of the intracranial arteries.    Scattered mucosal thickening aswell as aerated secretions are seen   throughout the paranasal sinuses. The bilateral tympanomastoid cavities   are clear. The calvarium is intact. The imaged orbits are unremarkable.    IMPRESSION: No acute intracranial hemorrhage, mass effect, or shift of   the midline structures.

## 2024-02-09 NOTE — DISCHARGE NOTE PROVIDER - CARE PROVIDER_API CALL
Linda Ott  Internal Medicine  [address] [place] [id number]  Phone: [phone]  Fax: [phone]  Follow Up Time:

## 2024-02-09 NOTE — PROGRESS NOTE ADULT - ASSESSMENT
88y old Female with PMH CAD, dementia, hiatal hernia, HTN, parkinsons, who presents with low O2 sat at home  1. flu A: continue tamiflu  -supplemental o2 as needed  -continue albuterol PRN   88y old Female with PMH CAD, dementia, hiatal hernia, HTN, parkinsons, who presents with low O2 sat at home  1. flu A: continue tamiflu  -did not qualify for supplemental o2 on ambulation  -continue albuterol PRN      f/u with me in 1-2 weeks

## 2024-02-09 NOTE — DISCHARGE NOTE PROVIDER - HOSPITAL COURSE
88 year old female w CAD w stent, dementia, HTN, PD, TIA on plavix presents to  ED c/o low O2 sat at home.   Son states that family members were recently ill; daughter tested flu+  + cough 2 days ago, worsened yesterday  +low energy, not acting herself. Home aide checked her vitals, O2 sat 89%, son brought patient in for evaluation.   patient was incontinent of urine this morning, usually states when she has to go to the bathroom.   Denies N/V/D, fever, known falls.  She was admitted to medical floor. She was started on tamiflu, O2 support. She was continued with her   outpatient medications. Her mentation back to baseline. Her o2 sats improving. Today her O2 saturation on RA on ambulation  94%. No more O2 requirement. Discussed with son Bill regarding d/c plan.       PHYSICAL EXAM:   · CONSTITUTIONAL: elderly female sitting quietly in stretcher, sedate appearing but does respond to voice and follows commands  · ENMT: MMM  · EYES: PERRLA EOMI  · CARDIAC: normal heart sounds, bradycardic, no edema  · RESPIRATORY: scattered expiratory rhonchi  · GASTROINTESTINAL: Abdomen soft, non-tender  · MUSCULOSKELETAL: Spine appears normal, range of motion is not limited, no muscle or joint tenderness, moves all extremities equally  · NEUROLOGICAL: patient alert to person  · SKIN: Skin normal color for race, warm, dry and intact. No evidence of rash.      Assessment:    88 year old female w CAD, dementia, HTN, PD, TIA on plavix,  presents to  ED c/o low O2 sat at home    #Acute hypoxic resp failure-resolved  #Flu A+  #Acute metabolic encephalopathy POA-resolved  had sick contact  head CT no acute changes  O2 sats on RA on ambulation 94%   Continue tamiflu for 2 more days  albuterol    #CAD  #TIA  1. plavix  2. statin    #Dementia  1. donepezil  2. alprazolam  3. paroxetine    #PD  1. carbidopa-levadopa     #HTN  1. BB  2. olmesartan     # Moderate protein calorie malnutrition  Regular diet  Ensure high protein     Home today   spent more than 30 min to prepare the discharge

## 2024-02-09 NOTE — DISCHARGE NOTE PROVIDER - NSDCMRMEDTOKEN_GEN_ALL_CORE_FT
albuterol 2.5 mg/3 mL (0.083%) inhalation solution: 3 milliliter(s) by nebulizer 4 times a day as needed for  shortness of breath and/or wheezing  ALPRAZolam 0.25 mg oral tablet: 3 tab(s) orally once a day (at bedtime) as needed for sleep  carbidopa-levodopa 25 mg-100 mg oral tablet: 1 tab(s) orally 3 times a day  carbidopa-levodopa 25 mg-100 mg oral tablet, extended release: 1 tab(s) orally once a day (at bedtime)  clopidogrel 75 mg oral tablet: 1 tab(s) orally once a day (at bedtime)  donepezil 5 mg oral tablet: 1 tab(s) orally once a day  metoprolol succinate 25 mg oral tablet, extended release: 2 tab(s) orally once a day (at bedtime)  Multiple Vitamins oral tablet: 1 tab(s) orally once a day  OLANZapine 2.5 mg oral tablet: 0.5 tab(s) orally 2 times a day  olmesartan 20 mg oral tablet: 1 tab(s) orally once a day  oseltamivir 30 mg oral capsule: 1 cap(s) orally once a day  PARoxetine 40 mg oral tablet: 1 tab(s) orally once a day (at bedtime)  rosuvastatin 40 mg oral tablet: 1 tab(s) orally once a day

## 2024-02-15 DIAGNOSIS — Z90.710 ACQUIRED ABSENCE OF BOTH CERVIX AND UTERUS: ICD-10-CM

## 2024-02-15 DIAGNOSIS — Z86.73 PERSONAL HISTORY OF TRANSIENT ISCHEMIC ATTACK (TIA), AND CEREBRAL INFARCTION WITHOUT RESIDUAL DEFICITS: ICD-10-CM

## 2024-02-15 DIAGNOSIS — Z88.0 ALLERGY STATUS TO PENICILLIN: ICD-10-CM

## 2024-02-15 DIAGNOSIS — G20.A1 PARKINSON'S DISEASE WITHOUT DYSKINESIA, WITHOUT MENTION OF FLUCTUATIONS: ICD-10-CM

## 2024-02-15 DIAGNOSIS — G93.41 METABOLIC ENCEPHALOPATHY: ICD-10-CM

## 2024-02-15 DIAGNOSIS — I25.10 ATHEROSCLEROTIC HEART DISEASE OF NATIVE CORONARY ARTERY WITHOUT ANGINA PECTORIS: ICD-10-CM

## 2024-02-15 DIAGNOSIS — J10.1 INFLUENZA DUE TO OTHER IDENTIFIED INFLUENZA VIRUS WITH OTHER RESPIRATORY MANIFESTATIONS: ICD-10-CM

## 2024-02-15 DIAGNOSIS — I10 ESSENTIAL (PRIMARY) HYPERTENSION: ICD-10-CM

## 2024-02-15 DIAGNOSIS — Z88.8 ALLERGY STATUS TO OTHER DRUGS, MEDICAMENTS AND BIOLOGICAL SUBSTANCES: ICD-10-CM

## 2024-02-15 DIAGNOSIS — Z79.82 LONG TERM (CURRENT) USE OF ASPIRIN: ICD-10-CM

## 2024-02-15 DIAGNOSIS — Z79.02 LONG TERM (CURRENT) USE OF ANTITHROMBOTICS/ANTIPLATELETS: ICD-10-CM

## 2024-02-15 DIAGNOSIS — Z98.890 OTHER SPECIFIED POSTPROCEDURAL STATES: ICD-10-CM

## 2024-02-15 DIAGNOSIS — F02.80 DEMENTIA IN OTHER DISEASES CLASSIFIED ELSEWHERE, UNSPECIFIED SEVERITY, WITHOUT BEHAVIORAL DISTURBANCE, PSYCHOTIC DISTURBANCE, MOOD DISTURBANCE, AND ANXIETY: ICD-10-CM

## 2024-02-15 DIAGNOSIS — Z95.5 PRESENCE OF CORONARY ANGIOPLASTY IMPLANT AND GRAFT: ICD-10-CM

## 2024-02-15 DIAGNOSIS — J96.01 ACUTE RESPIRATORY FAILURE WITH HYPOXIA: ICD-10-CM

## 2024-02-15 DIAGNOSIS — E44.0 MODERATE PROTEIN-CALORIE MALNUTRITION: ICD-10-CM

## 2024-02-15 DIAGNOSIS — Z88.1 ALLERGY STATUS TO OTHER ANTIBIOTIC AGENTS STATUS: ICD-10-CM

## 2024-04-10 NOTE — BEHAVIORAL HEALTH ASSESSMENT NOTE - ORIENTED TO PLACE
04/10/24- Called patient and was able to make a virtual visit for next week to discuss medication questions.   No

## 2024-05-29 ENCOUNTER — INPATIENT (INPATIENT)
Facility: HOSPITAL | Age: 89
LOS: 5 days | Discharge: HOME | DRG: 179 | End: 2024-06-04
Attending: INTERNAL MEDICINE | Admitting: HOSPITALIST
Payer: MEDICARE

## 2024-05-29 VITALS — HEART RATE: 89 BPM | HEIGHT: 64 IN | OXYGEN SATURATION: 91 % | RESPIRATION RATE: 18 BRPM | WEIGHT: 160.06 LBS

## 2024-05-29 DIAGNOSIS — Z90.710 ACQUIRED ABSENCE OF BOTH CERVIX AND UTERUS: Chronic | ICD-10-CM

## 2024-05-29 DIAGNOSIS — Z90.89 ACQUIRED ABSENCE OF OTHER ORGANS: Chronic | ICD-10-CM

## 2024-05-29 DIAGNOSIS — U07.1 COVID-19: ICD-10-CM

## 2024-05-29 DIAGNOSIS — Z90.49 ACQUIRED ABSENCE OF OTHER SPECIFIED PARTS OF DIGESTIVE TRACT: Chronic | ICD-10-CM

## 2024-05-29 LAB
ADD ON TEST-SPECIMEN IN LAB: SIGNIFICANT CHANGE UP
ALBUMIN SERPL ELPH-MCNC: 3.4 G/DL — SIGNIFICANT CHANGE UP (ref 3.3–5)
ALP SERPL-CCNC: 159 U/L — HIGH (ref 40–120)
ALT FLD-CCNC: 9 U/L — LOW (ref 12–78)
ANION GAP SERPL CALC-SCNC: 7 MMOL/L — SIGNIFICANT CHANGE UP (ref 5–17)
APPEARANCE UR: CLEAR — SIGNIFICANT CHANGE UP
APTT BLD: 30.7 SEC — SIGNIFICANT CHANGE UP (ref 24.5–35.6)
AST SERPL-CCNC: 28 U/L — SIGNIFICANT CHANGE UP (ref 15–37)
BACTERIA # UR AUTO: NEGATIVE /HPF — SIGNIFICANT CHANGE UP
BASOPHILS # BLD AUTO: 0.02 K/UL — SIGNIFICANT CHANGE UP (ref 0–0.2)
BASOPHILS NFR BLD AUTO: 0.3 % — SIGNIFICANT CHANGE UP (ref 0–2)
BILIRUB SERPL-MCNC: 1.5 MG/DL — HIGH (ref 0.2–1.2)
BILIRUB UR-MCNC: NEGATIVE — SIGNIFICANT CHANGE UP
BUN SERPL-MCNC: 21 MG/DL — SIGNIFICANT CHANGE UP (ref 7–23)
CALCIUM SERPL-MCNC: 10.3 MG/DL — HIGH (ref 8.5–10.1)
CAST: 0 /LPF — SIGNIFICANT CHANGE UP (ref 0–4)
CHLORIDE SERPL-SCNC: 105 MMOL/L — SIGNIFICANT CHANGE UP (ref 96–108)
CO2 SERPL-SCNC: 23 MMOL/L — SIGNIFICANT CHANGE UP (ref 22–31)
COLOR SPEC: YELLOW — SIGNIFICANT CHANGE UP
CREAT SERPL-MCNC: 1.36 MG/DL — HIGH (ref 0.5–1.3)
D DIMER BLD IA.RAPID-MCNC: 249 NG/ML DDU — HIGH
DIFF PNL FLD: ABNORMAL
EGFR: 37 ML/MIN/1.73M2 — LOW
EOSINOPHIL # BLD AUTO: 0.04 K/UL — SIGNIFICANT CHANGE UP (ref 0–0.5)
EOSINOPHIL NFR BLD AUTO: 0.7 % — SIGNIFICANT CHANGE UP (ref 0–6)
FLUAV AG NPH QL: SIGNIFICANT CHANGE UP
FLUBV AG NPH QL: SIGNIFICANT CHANGE UP
GLUCOSE SERPL-MCNC: 105 MG/DL — HIGH (ref 70–99)
GLUCOSE UR QL: NEGATIVE MG/DL — SIGNIFICANT CHANGE UP
HCT VFR BLD CALC: 36.9 % — SIGNIFICANT CHANGE UP (ref 34.5–45)
HGB BLD-MCNC: 11.9 G/DL — SIGNIFICANT CHANGE UP (ref 11.5–15.5)
IMM GRANULOCYTES NFR BLD AUTO: 0.5 % — SIGNIFICANT CHANGE UP (ref 0–0.9)
INR BLD: 1.05 RATIO — SIGNIFICANT CHANGE UP (ref 0.85–1.18)
KETONES UR-MCNC: NEGATIVE MG/DL — SIGNIFICANT CHANGE UP
LACTATE SERPL-SCNC: 0.8 MMOL/L — SIGNIFICANT CHANGE UP (ref 0.7–2)
LEUKOCYTE ESTERASE UR-ACNC: ABNORMAL
LYMPHOCYTES # BLD AUTO: 0.99 K/UL — LOW (ref 1–3.3)
LYMPHOCYTES # BLD AUTO: 16.2 % — SIGNIFICANT CHANGE UP (ref 13–44)
MANUAL SMEAR VERIFICATION: SIGNIFICANT CHANGE UP
MCHC RBC-ENTMCNC: 28.4 PG — SIGNIFICANT CHANGE UP (ref 27–34)
MCHC RBC-ENTMCNC: 32.2 GM/DL — SIGNIFICANT CHANGE UP (ref 32–36)
MCV RBC AUTO: 88.1 FL — SIGNIFICANT CHANGE UP (ref 80–100)
MONOCYTES # BLD AUTO: 0.55 K/UL — SIGNIFICANT CHANGE UP (ref 0–0.9)
MONOCYTES NFR BLD AUTO: 9 % — SIGNIFICANT CHANGE UP (ref 2–14)
NEUTROPHILS # BLD AUTO: 4.49 K/UL — SIGNIFICANT CHANGE UP (ref 1.8–7.4)
NEUTROPHILS NFR BLD AUTO: 73.3 % — SIGNIFICANT CHANGE UP (ref 43–77)
NITRITE UR-MCNC: NEGATIVE — SIGNIFICANT CHANGE UP
PH UR: 6.5 — SIGNIFICANT CHANGE UP (ref 5–8)
PLAT MORPH BLD: NORMAL — SIGNIFICANT CHANGE UP
PLATELET # BLD AUTO: 184 K/UL — SIGNIFICANT CHANGE UP (ref 150–400)
PLATELET COUNT - ESTIMATE: NORMAL — SIGNIFICANT CHANGE UP
POTASSIUM SERPL-MCNC: 3.5 MMOL/L — SIGNIFICANT CHANGE UP (ref 3.5–5.3)
POTASSIUM SERPL-SCNC: 3.5 MMOL/L — SIGNIFICANT CHANGE UP (ref 3.5–5.3)
PROT SERPL-MCNC: 8.3 GM/DL — SIGNIFICANT CHANGE UP (ref 6–8.3)
PROT UR-MCNC: 100 MG/DL
PROTHROM AB SERPL-ACNC: 11.9 SEC — SIGNIFICANT CHANGE UP (ref 9.5–13)
RBC # BLD: 4.19 M/UL — SIGNIFICANT CHANGE UP (ref 3.8–5.2)
RBC # FLD: 13.6 % — SIGNIFICANT CHANGE UP (ref 10.3–14.5)
RBC BLD AUTO: NORMAL — SIGNIFICANT CHANGE UP
RBC CASTS # UR COMP ASSIST: 62 /HPF — HIGH (ref 0–4)
RSV RNA NPH QL NAA+NON-PROBE: SIGNIFICANT CHANGE UP
SARS-COV-2 RNA SPEC QL NAA+PROBE: DETECTED
SODIUM SERPL-SCNC: 135 MMOL/L — SIGNIFICANT CHANGE UP (ref 135–145)
SP GR SPEC: 1.01 — SIGNIFICANT CHANGE UP (ref 1–1.03)
SQUAMOUS # UR AUTO: 2 /HPF — SIGNIFICANT CHANGE UP (ref 0–5)
UROBILINOGEN FLD QL: 1 MG/DL — SIGNIFICANT CHANGE UP (ref 0.2–1)
WBC # BLD: 6.12 K/UL — SIGNIFICANT CHANGE UP (ref 3.8–10.5)
WBC # FLD AUTO: 6.12 K/UL — SIGNIFICANT CHANGE UP (ref 3.8–10.5)
WBC UR QL: 4 /HPF — SIGNIFICANT CHANGE UP (ref 0–5)

## 2024-05-29 PROCEDURE — 97116 GAIT TRAINING THERAPY: CPT | Mod: GP

## 2024-05-29 PROCEDURE — 84100 ASSAY OF PHOSPHORUS: CPT

## 2024-05-29 PROCEDURE — 93010 ELECTROCARDIOGRAM REPORT: CPT

## 2024-05-29 PROCEDURE — 82728 ASSAY OF FERRITIN: CPT

## 2024-05-29 PROCEDURE — 99285 EMERGENCY DEPT VISIT HI MDM: CPT | Mod: FS

## 2024-05-29 PROCEDURE — 94640 AIRWAY INHALATION TREATMENT: CPT

## 2024-05-29 PROCEDURE — 84145 PROCALCITONIN (PCT): CPT

## 2024-05-29 PROCEDURE — 85730 THROMBOPLASTIN TIME PARTIAL: CPT

## 2024-05-29 PROCEDURE — 85379 FIBRIN DEGRADATION QUANT: CPT

## 2024-05-29 PROCEDURE — 83036 HEMOGLOBIN GLYCOSYLATED A1C: CPT

## 2024-05-29 PROCEDURE — 85610 PROTHROMBIN TIME: CPT

## 2024-05-29 PROCEDURE — 85025 COMPLETE CBC W/AUTO DIFF WBC: CPT

## 2024-05-29 PROCEDURE — 82306 VITAMIN D 25 HYDROXY: CPT

## 2024-05-29 PROCEDURE — 86140 C-REACTIVE PROTEIN: CPT

## 2024-05-29 PROCEDURE — 85027 COMPLETE CBC AUTOMATED: CPT

## 2024-05-29 PROCEDURE — 99223 1ST HOSP IP/OBS HIGH 75: CPT

## 2024-05-29 PROCEDURE — 80053 COMPREHEN METABOLIC PANEL: CPT

## 2024-05-29 PROCEDURE — 71045 X-RAY EXAM CHEST 1 VIEW: CPT | Mod: 26

## 2024-05-29 PROCEDURE — 36415 COLL VENOUS BLD VENIPUNCTURE: CPT

## 2024-05-29 PROCEDURE — 82803 BLOOD GASES ANY COMBINATION: CPT

## 2024-05-29 PROCEDURE — 93970 EXTREMITY STUDY: CPT

## 2024-05-29 PROCEDURE — 97163 PT EVAL HIGH COMPLEX 45 MIN: CPT | Mod: GP

## 2024-05-29 PROCEDURE — 99497 ADVNCD CARE PLAN 30 MIN: CPT | Mod: 25

## 2024-05-29 PROCEDURE — 83735 ASSAY OF MAGNESIUM: CPT

## 2024-05-29 RX ORDER — CARBIDOPA AND LEVODOPA 25; 100 MG/1; MG/1
1 TABLET ORAL AT BEDTIME
Refills: 0 | Status: DISCONTINUED | OUTPATIENT
Start: 2024-05-29 | End: 2024-06-04

## 2024-05-29 RX ORDER — CARBIDOPA AND LEVODOPA 25; 100 MG/1; MG/1
1 TABLET ORAL
Refills: 0 | Status: DISCONTINUED | OUTPATIENT
Start: 2024-05-29 | End: 2024-06-04

## 2024-05-29 RX ORDER — METOPROLOL TARTRATE 50 MG
25 TABLET ORAL DAILY
Refills: 0 | Status: DISCONTINUED | OUTPATIENT
Start: 2024-05-29 | End: 2024-06-04

## 2024-05-29 RX ORDER — DEXAMETHASONE 0.5 MG/5ML
6 ELIXIR ORAL DAILY
Refills: 0 | Status: DISCONTINUED | OUTPATIENT
Start: 2024-05-30 | End: 2024-06-04

## 2024-05-29 RX ORDER — CARBIDOPA AND LEVODOPA 25; 100 MG/1; MG/1
1 TABLET ORAL ONCE
Refills: 0 | Status: COMPLETED | OUTPATIENT
Start: 2024-05-29 | End: 2024-05-29

## 2024-05-29 RX ORDER — ACETAMINOPHEN 500 MG
650 TABLET ORAL EVERY 4 HOURS
Refills: 0 | Status: DISCONTINUED | OUTPATIENT
Start: 2024-05-29 | End: 2024-06-04

## 2024-05-29 RX ORDER — ALPRAZOLAM 0.25 MG
0.12 TABLET ORAL AT BEDTIME
Refills: 0 | Status: DISCONTINUED | OUTPATIENT
Start: 2024-05-29 | End: 2024-06-04

## 2024-05-29 RX ORDER — GUAIFENESIN/DEXTROMETHORPHAN 600MG-30MG
5 TABLET, EXTENDED RELEASE 12 HR ORAL EVERY 4 HOURS
Refills: 0 | Status: DISCONTINUED | OUTPATIENT
Start: 2024-05-29 | End: 2024-06-04

## 2024-05-29 RX ORDER — LOSARTAN POTASSIUM 100 MG/1
50 TABLET, FILM COATED ORAL DAILY
Refills: 0 | Status: DISCONTINUED | OUTPATIENT
Start: 2024-05-29 | End: 2024-06-04

## 2024-05-29 RX ORDER — DONEPEZIL HYDROCHLORIDE 10 MG/1
5 TABLET, FILM COATED ORAL DAILY
Refills: 0 | Status: DISCONTINUED | OUTPATIENT
Start: 2024-05-29 | End: 2024-06-04

## 2024-05-29 RX ORDER — ONDANSETRON 8 MG/1
4 TABLET, FILM COATED ORAL EVERY 6 HOURS
Refills: 0 | Status: DISCONTINUED | OUTPATIENT
Start: 2024-05-29 | End: 2024-06-04

## 2024-05-29 RX ORDER — HEPARIN SODIUM 5000 [USP'U]/ML
5000 INJECTION INTRAVENOUS; SUBCUTANEOUS EVERY 8 HOURS
Refills: 0 | Status: DISCONTINUED | OUTPATIENT
Start: 2024-05-29 | End: 2024-06-04

## 2024-05-29 RX ORDER — ROSUVASTATIN CALCIUM 5 MG/1
40 TABLET ORAL AT BEDTIME
Refills: 0 | Status: DISCONTINUED | OUTPATIENT
Start: 2024-05-29 | End: 2024-06-04

## 2024-05-29 RX ORDER — ERGOCALCIFEROL 1.25 MG/1
1 CAPSULE ORAL
Refills: 0 | DISCHARGE

## 2024-05-29 RX ORDER — ALBUTEROL 90 UG/1
2.5 AEROSOL, METERED ORAL
Refills: 0 | Status: DISCONTINUED | OUTPATIENT
Start: 2024-05-29 | End: 2024-06-04

## 2024-05-29 RX ORDER — SODIUM CHLORIDE 9 MG/ML
1000 INJECTION INTRAMUSCULAR; INTRAVENOUS; SUBCUTANEOUS ONCE
Refills: 0 | Status: COMPLETED | OUTPATIENT
Start: 2024-05-29 | End: 2024-05-29

## 2024-05-29 RX ORDER — OLANZAPINE 15 MG/1
2.5 TABLET, FILM COATED ORAL AT BEDTIME
Refills: 0 | Status: DISCONTINUED | OUTPATIENT
Start: 2024-05-29 | End: 2024-06-04

## 2024-05-29 RX ORDER — IBUPROFEN 200 MG
200 TABLET ORAL EVERY 8 HOURS
Refills: 0 | Status: DISCONTINUED | OUTPATIENT
Start: 2024-05-29 | End: 2024-06-04

## 2024-05-29 RX ORDER — DEXAMETHASONE 0.5 MG/5ML
6 ELIXIR ORAL ONCE
Refills: 0 | Status: COMPLETED | OUTPATIENT
Start: 2024-05-29 | End: 2024-05-29

## 2024-05-29 RX ORDER — ALPRAZOLAM 0.25 MG
0.25 TABLET ORAL AT BEDTIME
Refills: 0 | Status: DISCONTINUED | OUTPATIENT
Start: 2024-05-29 | End: 2024-06-04

## 2024-05-29 RX ORDER — CLOPIDOGREL BISULFATE 75 MG/1
75 TABLET, FILM COATED ORAL DAILY
Refills: 0 | Status: DISCONTINUED | OUTPATIENT
Start: 2024-05-30 | End: 2024-06-04

## 2024-05-29 RX ORDER — DEXAMETHASONE 0.5 MG/5ML
6 ELIXIR ORAL ONCE
Refills: 0 | Status: DISCONTINUED | OUTPATIENT
Start: 2024-05-29 | End: 2024-05-29

## 2024-05-29 RX ADMIN — Medication 200 MILLIGRAM(S): at 22:12

## 2024-05-29 RX ADMIN — Medication 40 MILLIGRAM(S): at 22:13

## 2024-05-29 RX ADMIN — HEPARIN SODIUM 5000 UNIT(S): 5000 INJECTION INTRAVENOUS; SUBCUTANEOUS at 22:15

## 2024-05-29 RX ADMIN — SODIUM CHLORIDE 1000 MILLILITER(S): 9 INJECTION INTRAMUSCULAR; INTRAVENOUS; SUBCUTANEOUS at 14:40

## 2024-05-29 RX ADMIN — Medication 6 MILLIGRAM(S): at 18:35

## 2024-05-29 RX ADMIN — CARBIDOPA AND LEVODOPA 1 TABLET(S): 25; 100 TABLET ORAL at 15:50

## 2024-05-29 RX ADMIN — Medication 650 MILLIGRAM(S): at 22:14

## 2024-05-29 RX ADMIN — Medication 0.25 MILLIGRAM(S): at 22:10

## 2024-05-29 RX ADMIN — ROSUVASTATIN CALCIUM 40 MILLIGRAM(S): 5 TABLET ORAL at 22:13

## 2024-05-29 RX ADMIN — OLANZAPINE 2.5 MILLIGRAM(S): 15 TABLET, FILM COATED ORAL at 22:14

## 2024-05-29 RX ADMIN — DONEPEZIL HYDROCHLORIDE 5 MILLIGRAM(S): 10 TABLET, FILM COATED ORAL at 22:13

## 2024-05-29 RX ADMIN — Medication 25 MILLIGRAM(S): at 22:10

## 2024-05-29 RX ADMIN — LOSARTAN POTASSIUM 50 MILLIGRAM(S): 100 TABLET, FILM COATED ORAL at 22:12

## 2024-05-29 RX ADMIN — CARBIDOPA AND LEVODOPA 1 TABLET(S): 25; 100 TABLET ORAL at 22:11

## 2024-05-29 NOTE — H&P ADULT - TIME BILLING
A minimum of 75 min was spent completing this admission not including time spent discussing advanced care planning or discussing goals of care with a minimum of 36 min spent face to face with patient while in ED unit on admission, counseling patient on current acute on chronic conditions and discussing plan and coordination of care including diagnostic imaging such as results of x rays, diagnostic laboratory tests which were ordered and reasoning behind each test, and also predicted possible discharge planning discussed ie home with home oxygen.

## 2024-05-29 NOTE — ED PROVIDER NOTE - OBJECTIVE STATEMENT
88 yr old female with hx of Dementia, HTN, HLD, CAD presents with aid and son at bedside due to fatigue over the last 5 days, worsening today. +  subjective fever, cough, sore throat, and episode of hypoxia at home of 90 %. episode of diarrhea x1 today. No abd pain noted. No vomiting or diarrhea.

## 2024-05-29 NOTE — ED PROVIDER NOTE - PROGRESS NOTE DETAILS
Nay Flores for attending Dr. Mahoney   Case discussed with ed pancreatitis pt seen and evauated in ED. 87 yo wf multiple pmhx including Alzheimer's dementia, parkinson's on sinemet, HTN, HLD, CAD s/p 1 coronary stent, TIA, BIB private car via son c/o 5 days generalized weakness fatigue, failure to thrive, low grade fever, dry cough, decreased po intake, home o2 pulse ox sat 88-92% on RA, today too weak to ambulate without assistance and +diarrhea. No chest pain, pt denies SOB, no LOC. MARIA ELENA Montero: labs and imaging reviewed. + covid noted. attempted ambulation trial and pt 02 dropped to 88. Pt to be admitted at this time. son agrees with plan for admission. Pt accepted by hospitalist and will follow up d- dimer. Nay Flores for attending Dr. Mahoney   Case discussed with ED PA, pt seen and evaluated in ED. 89 yo WF, multiple pmhx including Alzheimer's dementia, Parkinson's dz. on Sinemet, HTN, HLD, CAD s/p 1 coronary stent, TIA, BIB private car via son c/o 5 days generalized weakness fatigue, failure to thrive, low grade fever, dry cough, decreased po intake, home o2 pulse ox sat 88-92% on R/A, today too weak to ambulate without assistance and +diarrhea. No chest pain, pt denies SOB, no LOC.

## 2024-05-29 NOTE — ED PROVIDER NOTE - CLINICAL SUMMARY MEDICAL DECISION MAKING FREE TEXT BOX
87 yo wf multiple pmhx including Alzheimer's dementia, parkinson's on sinemet, HTN, HLD, CAD s/p 1 coronary stent, TIA, BIB private car via son c/o 5 days generalized weakness fatigue, failure to thrive, low grade fever, dry cough, decreased po intake, home o2 pulse ox sat 88-92% on RA, today too weak to ambulate without assistance and +diarrhea. Pt not hypoxic in ED, mildly ill appearing. Plan rectal temp, ekg, cxr , labs including pan culture, lactate, flu covid swab, coags, cautious IVF, monitor, observe and reassess 87 yo WF multiple pmhx including Alzheimer's dementia, Parkinson's on sinemet, HTN, HLD, CAD s/p 1 coronary stent, TIA, BIB private car via son c/o 5 days generalized weakness fatigue, failure to thrive, low grade fever, dry cough, decreased po intake, home o2 pulse ox sat 88-92% on RA, today too weak to ambulate without assistance and +diarrhea. Pt not hypoxic in ED, mildly ill appearing.   Plan: rectal temp, ekg, cxr , labs including pan culture, lactate, flu covid swab, coags, cautious IVF, monitor, observe and reassess    16:48, MARIA ELENA Montero: labs and imaging reviewed. + covid noted. attempted ambulation trial and pt 02 dropped to 88. Pt to be admitted at this time. son agrees with plan for admission. Pt accepted by hospitalist and will follow up d- dimer.

## 2024-05-29 NOTE — ED ADULT NURSE NOTE - NSFALLHARMRISKINTERV_ED_ALL_ED

## 2024-05-29 NOTE — ED PROVIDER NOTE - PHYSICAL EXAMINATION
Gen'l: Elderly F adult mildly ill-appearing though non-toxic, no respiratory discomfort, no sentence shortening  Head: NC/AT  Eyes: PERRRL, EOMI  ENT: O/P clear, mm mildly dry  CV: milldy tachycardic, regular rhtythm, normal radial pulse  Lungs: CTA, normal respirations  GI: soft, NT, BS+  : Deferred, no flank nor CVAT  Neck: NT, supple w/o pain, no stiffness nor meningismus  MSK: MESSER x 4, no focal extremity swelling nor tenderness, B/L SLR 35 degrees w/o pain, normal motor  Skin: no tactile warmth, no rash  Neuro: Alert, CN 2 -12 intact, normal speech, no focal motor/sensory deficits NALLELY Mahoney MD:   Gen'l: Elderly F adult mildly ill-appearing though non-toxic, no respiratory discomfort, no sentence shortening  Head: NC/AT  Eyes: PERRRL, EOMI  ENT: O/P clear, mm mildly dry  CV: mildly tachycardic, regular rhythm, normal radial pulse  Lungs: CTA, normal respirations  GI: soft, NT, BS+  : Deferred, no flank nor CVAT  Neck: NT, supple w/o pain, no stiffness nor meningismus  MSK: MESSER x 4, no focal extremity swelling nor tenderness, B/L SLR 35 degrees w/o pain, normal motor  Skin: no tactile warmth, no rash  Neuro: Alert, CN 2 -12 intact, normal speech, no focal motor/sensory deficits

## 2024-05-29 NOTE — ED ADULT NURSE NOTE - OBJECTIVE STATEMENT
Pt presents to the ED from home with son with c/o fever, cough, weakness, sore throat, hypoxia (92-94%). Son states symptoms started about 2 days ago but pt has been feeling tired since Friday. Pt has hx dementia & parkinsons. Pt walks with walker at baseline, A&O 2-3.

## 2024-05-29 NOTE — ED ADULT TRIAGE NOTE - CHIEF COMPLAINT QUOTE
pt presents to Ed with complaints of subjective fever, cough, weakness, hypoxia, sore throat, and fatigue. per son, symptoms started yesterday. pt reports pt started to become more lethargic last Friday. steady decline since onset.

## 2024-05-29 NOTE — PHARMACOTHERAPY INTERVENTION NOTE - COMMENTS
Medication reconciliation completed.  Patient was unable to provide medication information, spoke to son Fredis at bedside and they provided current medication list; confirmed with Dr. First Ngo.  Bill confirms that pt witnesses auditory hallucinations, pt receives 1/2 tab Xanax 0.25mg when she does and repeat every 15-20 minutes as needed, then 1/2 - 1 whole tab at night.

## 2024-05-29 NOTE — H&P ADULT - HISTORY OF PRESENT ILLNESS
Patient is an 88-year-old female with past medical history of CAD status post PCI, dementia, hypertension, hard of hearing, PAD, TIA, recent discharge due to influenza A on February 9, who presents to ED secondary to increasing generalized malaise and fatigue over the past 5 days with subjective fevers at home sore throat, coughing, and 1 episode of watery stool.  Patient's son at bedside to provide history as patient with dementia.  Patient arrived to ED with pulse ox in 80s which resolved after 2 L nasal cannula in place.  Patient found to be COVID-positive and admitted to medicine.    Of note son is healthcare proxy and is declining remdesivir be started and accepts risk of worsening of condition including death, worsening symptoms, more severe symptoms.  Son states understanding that patient will be admitted for steroid therapy supplemental oxygen and plan for DC home when appropriate.  Son states that patient has a history of sundowning, hearing voices, and is high fall risk.

## 2024-05-29 NOTE — ED ADULT NURSE REASSESSMENT NOTE - NS ED NURSE REASSESS COMMENT FT1
Dinner ordered for pt. Pt changed and repositioned. No acute distress noted. Pt admitted and awaiting bed on unit. Comfort and safety measures maintained
Assumed care from Yasemin SNEED. pt awake and talkative, speaking in full and complete sentences without difficulty.

## 2024-05-29 NOTE — H&P ADULT - ASSESSMENT
Patient is an 88-year-old female with recent influenza who presents to ED due to generalized malaise associated with cough, subjective fever, hypoxia admitted due to:    #Acute hypoxic respiratory failure in setting of COVID  Admit to medicine with continuous pulse ox  Continue with supplemental oxygen with plan to wean  Continue with dexamethasone  D-dimer within normal limits when adjusted for age  Turn and reposition every 2 hours  Out of bed and ambulate with assistance  Isolation precautions  Aspiration precautions  Fall precautions  Bed alarm placed as patient high risk for falls  Incentive spirometry  Healthcare proxy declining initiation of remdesivir  Robitussin as needed  Nebulizers as needed–patient uses this twice a day at home  Heparin subcu for DVT prophylaxis    #CKD  Renally dose meds  Avoid nephrotoxic medications  Baseline creatinine approximately 1.4, today 1.36–at baseline    #CAD status post PCI  c/w plavix  c/w statin  c/w olmesartan  c/w metoprolol    #Dementia  bed alarm  fall and aspiration precautions  turn and position q 2  c/w carbidopa/levidopa  c/w xanax standing and prn  c/w paroxetine

## 2024-05-29 NOTE — H&P ADULT - NSICDXPASTMEDICALHX_GEN_ALL_CORE_FT
PAST MEDICAL HISTORY:  CAD (coronary artery disease) LAD stent 5/2020    Dementia     Hernia, hiatal     Hypertension      Benzoyl Peroxide Pregnancy And Lactation Text: This medication is Pregnancy Category C. It is unknown if benzoyl peroxide is excreted in breast milk.

## 2024-05-29 NOTE — H&P ADULT - MUSCULOSKELETAL
ROM intact/no calf tenderness/strength 5/5 bilateral upper extremities/strength 5/5 bilateral lower extremities/no chest wall tenderness

## 2024-05-30 LAB
ALBUMIN SERPL ELPH-MCNC: 3 G/DL — LOW (ref 3.3–5)
ALP SERPL-CCNC: 144 U/L — HIGH (ref 40–120)
ALT FLD-CCNC: 12 U/L — SIGNIFICANT CHANGE UP (ref 12–78)
ANION GAP SERPL CALC-SCNC: 5 MMOL/L — SIGNIFICANT CHANGE UP (ref 5–17)
APTT BLD: 30.9 SEC — SIGNIFICANT CHANGE UP (ref 24.5–35.6)
AST SERPL-CCNC: 26 U/L — SIGNIFICANT CHANGE UP (ref 15–37)
BASOPHILS # BLD AUTO: 0.01 K/UL — SIGNIFICANT CHANGE UP (ref 0–0.2)
BASOPHILS NFR BLD AUTO: 0.2 % — SIGNIFICANT CHANGE UP (ref 0–2)
BILIRUB SERPL-MCNC: 1 MG/DL — SIGNIFICANT CHANGE UP (ref 0.2–1.2)
BUN SERPL-MCNC: 21 MG/DL — SIGNIFICANT CHANGE UP (ref 7–23)
CALCIUM SERPL-MCNC: 9.9 MG/DL — SIGNIFICANT CHANGE UP (ref 8.5–10.1)
CHLORIDE SERPL-SCNC: 111 MMOL/L — HIGH (ref 96–108)
CO2 SERPL-SCNC: 21 MMOL/L — LOW (ref 22–31)
CREAT SERPL-MCNC: 1.17 MG/DL — SIGNIFICANT CHANGE UP (ref 0.5–1.3)
EGFR: 45 ML/MIN/1.73M2 — LOW
EOSINOPHIL # BLD AUTO: 0 K/UL — SIGNIFICANT CHANGE UP (ref 0–0.5)
EOSINOPHIL NFR BLD AUTO: 0 % — SIGNIFICANT CHANGE UP (ref 0–6)
GLUCOSE SERPL-MCNC: 149 MG/DL — HIGH (ref 70–99)
HCT VFR BLD CALC: 34.4 % — LOW (ref 34.5–45)
HGB BLD-MCNC: 11.1 G/DL — LOW (ref 11.5–15.5)
IMM GRANULOCYTES NFR BLD AUTO: 0.7 % — SIGNIFICANT CHANGE UP (ref 0–0.9)
INR BLD: 1.01 RATIO — SIGNIFICANT CHANGE UP (ref 0.85–1.18)
LYMPHOCYTES # BLD AUTO: 1.04 K/UL — SIGNIFICANT CHANGE UP (ref 1–3.3)
LYMPHOCYTES # BLD AUTO: 22.9 % — SIGNIFICANT CHANGE UP (ref 13–44)
MCHC RBC-ENTMCNC: 28.5 PG — SIGNIFICANT CHANGE UP (ref 27–34)
MCHC RBC-ENTMCNC: 32.3 GM/DL — SIGNIFICANT CHANGE UP (ref 32–36)
MCV RBC AUTO: 88.4 FL — SIGNIFICANT CHANGE UP (ref 80–100)
MONOCYTES # BLD AUTO: 0.21 K/UL — SIGNIFICANT CHANGE UP (ref 0–0.9)
MONOCYTES NFR BLD AUTO: 4.6 % — SIGNIFICANT CHANGE UP (ref 2–14)
NEUTROPHILS # BLD AUTO: 3.25 K/UL — SIGNIFICANT CHANGE UP (ref 1.8–7.4)
NEUTROPHILS NFR BLD AUTO: 71.6 % — SIGNIFICANT CHANGE UP (ref 43–77)
PLATELET # BLD AUTO: 179 K/UL — SIGNIFICANT CHANGE UP (ref 150–400)
POTASSIUM SERPL-MCNC: 3.9 MMOL/L — SIGNIFICANT CHANGE UP (ref 3.5–5.3)
POTASSIUM SERPL-SCNC: 3.9 MMOL/L — SIGNIFICANT CHANGE UP (ref 3.5–5.3)
PROT SERPL-MCNC: 7.4 GM/DL — SIGNIFICANT CHANGE UP (ref 6–8.3)
PROTHROM AB SERPL-ACNC: 11.4 SEC — SIGNIFICANT CHANGE UP (ref 9.5–13)
RBC # BLD: 3.89 M/UL — SIGNIFICANT CHANGE UP (ref 3.8–5.2)
RBC # FLD: 13.6 % — SIGNIFICANT CHANGE UP (ref 10.3–14.5)
SODIUM SERPL-SCNC: 137 MMOL/L — SIGNIFICANT CHANGE UP (ref 135–145)
WBC # BLD: 4.54 K/UL — SIGNIFICANT CHANGE UP (ref 3.8–10.5)
WBC # FLD AUTO: 4.54 K/UL — SIGNIFICANT CHANGE UP (ref 3.8–10.5)

## 2024-05-30 PROCEDURE — 99232 SBSQ HOSP IP/OBS MODERATE 35: CPT

## 2024-05-30 RX ORDER — CHLORHEXIDINE GLUCONATE 213 G/1000ML
1 SOLUTION TOPICAL
Refills: 0 | Status: DISCONTINUED | OUTPATIENT
Start: 2024-05-30 | End: 2024-06-04

## 2024-05-30 RX ORDER — LANOLIN ALCOHOL/MO/W.PET/CERES
3 CREAM (GRAM) TOPICAL ONCE
Refills: 0 | Status: COMPLETED | OUTPATIENT
Start: 2024-05-30 | End: 2024-05-30

## 2024-05-30 RX ADMIN — Medication 40 MILLIGRAM(S): at 11:13

## 2024-05-30 RX ADMIN — CARBIDOPA AND LEVODOPA 1 TABLET(S): 25; 100 TABLET ORAL at 20:28

## 2024-05-30 RX ADMIN — CLOPIDOGREL BISULFATE 75 MILLIGRAM(S): 75 TABLET, FILM COATED ORAL at 10:23

## 2024-05-30 RX ADMIN — Medication 3 MILLIGRAM(S): at 20:27

## 2024-05-30 RX ADMIN — HEPARIN SODIUM 5000 UNIT(S): 5000 INJECTION INTRAVENOUS; SUBCUTANEOUS at 20:29

## 2024-05-30 RX ADMIN — DONEPEZIL HYDROCHLORIDE 5 MILLIGRAM(S): 10 TABLET, FILM COATED ORAL at 10:19

## 2024-05-30 RX ADMIN — ALBUTEROL 2.5 MILLIGRAM(S): 90 AEROSOL, METERED ORAL at 09:02

## 2024-05-30 RX ADMIN — LOSARTAN POTASSIUM 50 MILLIGRAM(S): 100 TABLET, FILM COATED ORAL at 10:20

## 2024-05-30 RX ADMIN — HEPARIN SODIUM 5000 UNIT(S): 5000 INJECTION INTRAVENOUS; SUBCUTANEOUS at 06:16

## 2024-05-30 RX ADMIN — CARBIDOPA AND LEVODOPA 1 TABLET(S): 25; 100 TABLET ORAL at 12:30

## 2024-05-30 RX ADMIN — Medication 0.25 MILLIGRAM(S): at 20:28

## 2024-05-30 RX ADMIN — CARBIDOPA AND LEVODOPA 1 TABLET(S): 25; 100 TABLET ORAL at 06:16

## 2024-05-30 RX ADMIN — HEPARIN SODIUM 5000 UNIT(S): 5000 INJECTION INTRAVENOUS; SUBCUTANEOUS at 13:54

## 2024-05-30 RX ADMIN — CARBIDOPA AND LEVODOPA 1 TABLET(S): 25; 100 TABLET ORAL at 17:58

## 2024-05-30 RX ADMIN — OLANZAPINE 2.5 MILLIGRAM(S): 15 TABLET, FILM COATED ORAL at 20:27

## 2024-05-30 RX ADMIN — Medication 25 MILLIGRAM(S): at 10:20

## 2024-05-30 RX ADMIN — ALBUTEROL 2.5 MILLIGRAM(S): 90 AEROSOL, METERED ORAL at 19:49

## 2024-05-30 RX ADMIN — Medication 6 MILLIGRAM(S): at 10:27

## 2024-05-30 RX ADMIN — ROSUVASTATIN CALCIUM 40 MILLIGRAM(S): 5 TABLET ORAL at 20:27

## 2024-05-30 NOTE — DIETITIAN INITIAL EVALUATION ADULT - ADD RECOMMEND
1) Liberalize diet to regular to maximize caloric and nutrient intake.  2) Add ensure + HP shake BID (350kcal, 20g protein)  3) Monitor bowel movements, if no BM for >3 days, consider implementing bowel regimen.  4) Encourage protein-rich foods, maximize food preferences  5) MVI w/ minerals daily to ensure 100% RDA met  6) Consider adding thiamine 100 mg daily 2/2 poor PO intake/ malnutrition  7) Monitor lytes/ min and replete prn.  8) Confirm goals of care regarding nutrition support  RD will continue to monitor PO intake, labs, hydration, and wt prn.

## 2024-05-30 NOTE — DIETITIAN NUTRITION RISK NOTIFICATION - PHYSICAL ASSESSMENT CLAVICLES
Spoke with daughter about her mother's pacemaker battery reaching SONU for battery to be changed. Patient has not seen Dr Valenzuela in over a year due to family not getting her out since covid pandemic. Will make an appt for patient to be seen before scheduling a generator change. Will call daughter back with date and time. Daughter verbalized understanding.  
mild

## 2024-05-30 NOTE — DIETITIAN INITIAL EVALUATION ADULT - PERTINENT LABORATORY DATA
05-30    137  |  111<H>  |  21  ----------------------------<  149<H>  3.9   |  21<L>  |  1.17    Ca    9.9      30 May 2024 07:45    TPro  7.4  /  Alb  3.0<L>  /  TBili  1.0  /  DBili  x   /  AST  26  /  ALT  12  /  AlkPhos  144<H>  05-30

## 2024-05-30 NOTE — DIETITIAN INITIAL EVALUATION ADULT - ORAL INTAKE PTA/DIET HISTORY
Unable to obtain meaningful information 2/2 dementia/AMS. As per previous RD note on 2/7/24: "Obtained all information from aide at bed-side; pt w/ dementia/AMS. Lives at home w/ daughter (that lives up-stairs); full-time aide normally cook/grocery shop for household. Aide endorses pt w/ good appetite, but is "picky," and consumes 3 meals/day. Normally fair PO intake 50-75% of ENN."

## 2024-05-30 NOTE — PATIENT PROFILE ADULT - FUNCTIONAL ASSESSMENT - DAILY ACTIVITY SCORE.
Patient:  Zhou Martin   :   1957    PROCEDURAL SUMMARY  ~Consent:   Obtained written and verbal consent      Risks/benefits explained in detail  ~Procedure:    Left Heart Catheterization  ~Medications:    Procedural sedation with moderate conscious sedation  ~Complications:   None  ~Blood Loss:    <20cc  ~Specimens:    None obtained  ~Pre-sedation re-evaluation: Performed immediately prior to procedure. ~Performing Physician:           Mikal Luong MD  ~Assistants:     None    INDICATIONS:  Pre-Procedure Diagnosis:  New Onset Angina <= 2 months and Worsening Angina    Post-Procedure Diagnosis: Mild to moderate nonobstructive CAD, normal LV systolic function    PROCEDURES PERFORMED:   Left heart catheterization, coronary angiography, left ventriculography and FFR. MEDICATION AND PROCEDURAL RECONCILIATION:  An independent trained observer pushed medications at my direction. We monitored the patient's level of consciousness and vital signs/physiologic status throughout the procedure duration (see start and stop times below). Sedation: 2.5 mg Versed, 125 mcg Fentanyl  Sedation start: 1403  Sedation stop: 1456  Contrast: 58 cc of Isovue   Flouro Time: 6.8 minutes of fluoroscopy    PROCEDURE DETAILS/TECHNIQUE:  Local anesthetic was given and access was obtained in the right Common femoral artery using a micropuncture technique and a 4 Fr  Terumo Sheath was placed without difficulty. Catheters were advanced over a 0.35 wire under fluoroscopic guidance  Left coronary angiography was done using a 4 Fr Lisa L 4.0 diagnostic catheter. Right coronary angiography was done using a 4 Fr THE [facility] diagnostic catheter. Left ventriculography was done using a 4 Fr pigtail catheter. FFR assessment, of the LAD and circumflex, was performed with a 6 [place] JL 4 guiding catheter and a [employer] FloWire. At the end of the procedure a Angioseal device was used for hemostasis.      ANGIOGRAM/CORONARY ARTERIOGRAM:     Left main artery: 30% distal.  Left anterior descending artery: 30% mid followed by a widely patent mid stent.  D1: 30% ostial, widely patent proximal and mid stent  Left circumflex artery: Widely patent mid and distal stents.  OM1: Normal  Right coronary artery: Widely patent proximal and mid stents, 40% distal.  RPDA: Normal      LEFT VENTRICULOGRAM:  Left ventricular angiogram was done in the 30° FLORIAN projection and revealed  LVEF-60%  LVEDP-11 mmHg  Aortic pressure-128/80 mmHg. There was no gradient between the left ventricle and aorta      INTERVENTION:  None       IMPRESSION/SUMMARY  -Coronary Angiography w/ mild to moderate nonobstructive CAD and widely patent stents  -LVG with LVEF of 60% and no regional wall motion abnormalities  -No cardiac etiology to explain continued chest discomfort      RECOMMENDATION:  -Aggressive medical treatment and risk factor modification  -CTPA.  If CTPA is negative would look for noncardiac cause of chest pain; suggest GI consultation for possible EGD.    Daniel Giraldo MD   Interventional Cardiology  12/19/2023 5:35 PM    22

## 2024-05-30 NOTE — DIETITIAN INITIAL EVALUATION ADULT - OTHER INFO
88-year-old female with past medical history of CAD status post PCI, dementia, hypertension, hard of hearing, PAD, TIA, recent discharge due to influenza A on February 9, who presents to ED secondary to increasing generalized malaise and fatigue over the past 5 days with subjective fevers at home sore throat, coughing, and 1 episode of watery stool.  Patient's son at bedside to provide history as patient with dementia.  Patient arrived to ED with pulse ox in 80s which resolved after 2 L nasal cannula in place.  Patient found to be COVID-positive and admitted to medicine.  Admit for acute hypoxic respiratory failure in setting of COVID+    Known to nutr services and dx'd w/ mal on 2/7/24; still meets criteria. Unable to obtain diet/wt hx 2/2 dementia/AMS. Bed scale wt of 147# taken by RD on 5/20/24. Wt hx as per EMR: 151# (bed scale wt taken by RD on 2/7/24). Unintentional wt loss of 4# / 2.6% wt loss x 3 mo ; not clinically significant. Appears appropriate wt for height; however, NFPE reveals mild-mod muscle/ fat wasting. Liberalize diet to regular to maximize caloric and nutrient intake. Add ensure + HP shake BID (350kcal, 20g protein). See below for other recommendations.

## 2024-05-30 NOTE — DIETITIAN INITIAL EVALUATION ADULT - ETIOLOGY
r/t inability to meet increased needs 2/2 acute hypoxic respiratory failure in setting of COVID+ on dementia/advanced age

## 2024-05-30 NOTE — DIETITIAN INITIAL EVALUATION ADULT - PERTINENT MEDS FT
MEDICATIONS  (STANDING):  albuterol    0.083% 2.5 milliGRAM(s) Nebulizer two times a day  ALPRAZolam 0.25 milliGRAM(s) Oral at bedtime  carbidopa/levodopa  25/100 1 Tablet(s) Oral <User Schedule>  carbidopa/levodopa CR 25/100 1 Tablet(s) Oral at bedtime  chlorhexidine 4% Liquid 1 Application(s) Topical <User Schedule>  clopidogrel Tablet 75 milliGRAM(s) Oral daily  dexAMETHasone  Injectable 6 milliGRAM(s) IV Push daily  donepezil 5 milliGRAM(s) Oral daily  heparin   Injectable 5000 Unit(s) SubCutaneous every 8 hours  losartan 50 milliGRAM(s) Oral daily  metoprolol succinate ER 25 milliGRAM(s) Oral daily  OLANZapine 2.5 milliGRAM(s) Oral at bedtime  PARoxetine 40 milliGRAM(s) Oral daily  rosuvastatin 40 milliGRAM(s) Oral at bedtime    MEDICATIONS  (PRN):  acetaminophen     Tablet .. 650 milliGRAM(s) Oral every 4 hours PRN Temp greater or equal to 38C (100.4F), Mild Pain (1 - 3), Moderate Pain (4 - 6)  ALPRAZolam 0.125 milliGRAM(s) Oral at bedtime PRN auditory hallucination  guaifenesin/dextromethorphan Oral Liquid 5 milliLiter(s) Oral every 4 hours PRN Cough  ibuprofen  Tablet. 200 milliGRAM(s) Oral every 8 hours PRN Mild Pain (1 - 3), Moderate Pain (4 - 6)  ondansetron Injectable 4 milliGRAM(s) IV Push every 6 hours PRN Nausea and/or Vomiting

## 2024-05-30 NOTE — PATIENT PROFILE ADULT - FALL HARM RISK - HARM RISK INTERVENTIONS
Assistance with ambulation/Assistance OOB with selected safe patient handling equipment/Communicate Risk of Fall with Harm to all staff/Discuss with provider need for PT consult/Monitor for mental status changes/Monitor gait and stability/Move patient closer to nurses' station/Provide patient with walking aids - walker, cane, crutches/Reinforce activity limits and safety measures with patient and family/Reorient to person, place and time as needed/Tailored Fall Risk Interventions/Toileting schedule using arm’s reach rule for commode and bathroom/Use of alarms - bed, chair and/or voice tab/Visual Cue: Yellow wristband and red socks/Bed in lowest position, wheels locked, appropriate side rails in place/Call bell, personal items and telephone in reach/Instruct patient to call for assistance before getting out of bed or chair/Non-slip footwear when patient is out of bed/Bard to call system/Physically safe environment - no spills, clutter or unnecessary equipment/Purposeful Proactive Rounding/Room/bathroom lighting operational, light cord in reach

## 2024-05-30 NOTE — PROGRESS NOTE ADULT - SUBJECTIVE AND OBJECTIVE BOX
; no complaints      PHYSICAL EXAM:    Daily     Daily Weight in k.1 (30 May 2024 02:25)    Vital Signs Last 24 Hrs  T(C): 36.8 (30 May 2024 08:08), Max: 37.4 (29 May 2024 22:31)  T(F): 98.2 (30 May 2024 08:08), Max: 99.4 (29 May 2024 22:31)  HR: 74 (30 May 2024 09:07) (67 - 93)  BP: 121/85 (30 May 2024 08:08) (112/72 - 144/74)  BP(mean): 97 (30 May 2024 08:08) (95 - 97)  RR: 18 (30 May 2024 08:08) (18 - 20)  SpO2: 97% (30 May 2024 08:08) (94% - 97%)    Constitutional: Weak and ill appearing  HEENT: Atraumatic, MARILYN,   Respiratory: Breath Sounds normal, no rhonchi/wheeze  Cardiovascular: N S1S2;   Gastrointestinal: Abdomen soft, non tender, Bowel Sounds present  Extremities: No edema, peripheral pulses present  Neurological: AAO x 2, no gross focal motor deficits  Skin: Non cellulitic, no rash, ulcers  Lymph Nodes: No lymphadenopathy noted  Back: No CVA tenderness   Musculoskeletal: non tender  Breasts: Deferred  Genitourinary: deferred  Rectal: Deferred    All Labs/EKG/Radiology/Meds reviewed by me                          11.1   4.54  )-----------( 179      ( 30 May 2024 07:45 )             34.4       CBC Full  -  ( 30 May 2024 07:45 )  WBC Count : 4.54 K/uL  RBC Count : 3.89 M/uL  Hemoglobin : 11.1 g/dL  Hematocrit : 34.4 %  Platelet Count - Automated : 179 K/uL  Mean Cell Volume : 88.4 fl  Mean Cell Hemoglobin : 28.5 pg  Mean Cell Hemoglobin Concentration : 32.3 gm/dL  Auto Neutrophil # : 3.25 K/uL  Auto Lymphocyte # : 1.04 K/uL  Auto Monocyte # : 0.21 K/uL  Auto Eosinophil # : 0.00 K/uL  Auto Basophil # : 0.01 K/uL  Auto Neutrophil % : 71.6 %  Auto Lymphocyte % : 22.9 %  Auto Monocyte % : 4.6 %  Auto Eosinophil % : 0.0 %  Auto Basophil % : 0.2 %          137  |  111<H>  |  21  ----------------------------<  149<H>  3.9   |  21<L>  |  1.17    Ca    9.9      30 May 2024 07:45    TPro  7.4  /  Alb  3.0<L>  /  TBili  1.0  /  DBili  x   /  AST  26  /  ALT  12  /  AlkPhos  144<H>        LIVER FUNCTIONS - ( 30 May 2024 07:45 )  Alb: 3.0 g/dL / Pro: 7.4 gm/dL / ALK PHOS: 144 U/L / ALT: 12 U/L / AST: 26 U/L / GGT: x             PT/INR - ( 30 May 2024 07:45 )   PT: 11.4 sec;   INR: 1.01 ratio         PTT - ( 30 May 2024 07:45 )  PTT:30.9 sec          Urinalysis Basic - ( 30 May 2024 07:45 )    Color: x / Appearance: x / SG: x / pH: x  Gluc: 149 mg/dL / Ketone: x  / Bili: x / Urobili: x   Blood: x / Protein: x / Nitrite: x   Leuk Esterase: x / RBC: x / WBC x   Sq Epi: x / Non Sq Epi: x / Bacteria: x            MEDICATIONS  (STANDING):  albuterol    0.083% 2.5 milliGRAM(s) Nebulizer two times a day  ALPRAZolam 0.25 milliGRAM(s) Oral at bedtime  carbidopa/levodopa  25/100 1 Tablet(s) Oral <User Schedule>  carbidopa/levodopa CR 25/100 1 Tablet(s) Oral at bedtime  chlorhexidine 4% Liquid 1 Application(s) Topical <User Schedule>  clopidogrel Tablet 75 milliGRAM(s) Oral daily  dexAMETHasone  Injectable 6 milliGRAM(s) IV Push daily  donepezil 5 milliGRAM(s) Oral daily  heparin   Injectable 5000 Unit(s) SubCutaneous every 8 hours  losartan 50 milliGRAM(s) Oral daily  metoprolol succinate ER 25 milliGRAM(s) Oral daily  OLANZapine 2.5 milliGRAM(s) Oral at bedtime  PARoxetine 40 milliGRAM(s) Oral daily  rosuvastatin 40 milliGRAM(s) Oral at bedtime    MEDICATIONS  (PRN):  acetaminophen     Tablet .. 650 milliGRAM(s) Oral every 4 hours PRN Temp greater or equal to 38C (100.4F), Mild Pain (1 - 3), Moderate Pain (4 - 6)  ALPRAZolam 0.125 milliGRAM(s) Oral at bedtime PRN auditory hallucination  guaifenesin/dextromethorphan Oral Liquid 5 milliLiter(s) Oral every 4 hours PRN Cough  ibuprofen  Tablet. 200 milliGRAM(s) Oral every 8 hours PRN Mild Pain (1 - 3), Moderate Pain (4 - 6)  ondansetron Injectable 4 milliGRAM(s) IV Push every 6 hours PRN Nausea and/or Vomiting

## 2024-05-30 NOTE — PATIENT PROFILE ADULT - FUNCTIONAL ASSESSMENT - BASIC MOBILITY 6.
4-calculated by average/Not able to assess (calculate score using [facility] averaging method)

## 2024-05-30 NOTE — PROGRESS NOTE ADULT - ASSESSMENT
Patient is an 88-year-old female with recent influenza who presents to ED due to generalized malaise associated with cough, subjective fever, hypoxia admitted due to:    #Acute hypoxic respiratory failure in setting of COVID  Admit to medicine with continuous pulse ox  Continue with supplemental oxygen with plan to wean  Continue with dexamethasone  D-dimer within normal limits when adjusted for age  Turn and reposition every 2 hours  Out of bed and ambulate with assistance  Isolation precautions  Aspiration precautions  Fall precautions  Bed alarm placed as patient high risk for falls  Incentive spirometry  Healthcare proxy declining initiation of remdesivir  Robitussin as needed  Nebulizers as needed–patient uses this twice a day at home  Pulmo consult      #CKD  Renally dose meds  Avoid nephrotoxic medications  Baseline creatinine approximately 1.4, today 1.36–at baseline    #CAD status post PCI  c/w plavix  c/w statin  c/w olmesartan  c/w metoprolol    #Dementia  bed alarm  fall and aspiration precautions  turn and position q 2  c/w carbidopa/levidopa  c/w xanax standing and prn  c/w paroxetine      Heparin subcu for DVT prophylaxis

## 2024-05-30 NOTE — DIETITIAN NUTRITION RISK NOTIFICATION - TREATMENT: THE FOLLOWING DIET HAS BEEN RECOMMENDED
Diet, Regular:   DASH/TLC {Sodium & Cholesterol Restricted} (DASH) (05-29-24 @ 21:08) [Active]

## 2024-05-31 PROCEDURE — 99232 SBSQ HOSP IP/OBS MODERATE 35: CPT

## 2024-05-31 RX ADMIN — DONEPEZIL HYDROCHLORIDE 5 MILLIGRAM(S): 10 TABLET, FILM COATED ORAL at 09:55

## 2024-05-31 RX ADMIN — CARBIDOPA AND LEVODOPA 1 TABLET(S): 25; 100 TABLET ORAL at 06:24

## 2024-05-31 RX ADMIN — Medication 6 MILLIGRAM(S): at 10:03

## 2024-05-31 RX ADMIN — OLANZAPINE 2.5 MILLIGRAM(S): 15 TABLET, FILM COATED ORAL at 21:38

## 2024-05-31 RX ADMIN — ALBUTEROL 2.5 MILLIGRAM(S): 90 AEROSOL, METERED ORAL at 08:59

## 2024-05-31 RX ADMIN — HEPARIN SODIUM 5000 UNIT(S): 5000 INJECTION INTRAVENOUS; SUBCUTANEOUS at 06:25

## 2024-05-31 RX ADMIN — Medication 25 MILLIGRAM(S): at 09:53

## 2024-05-31 RX ADMIN — CHLORHEXIDINE GLUCONATE 1 APPLICATION(S): 213 SOLUTION TOPICAL at 06:16

## 2024-05-31 RX ADMIN — LOSARTAN POTASSIUM 50 MILLIGRAM(S): 100 TABLET, FILM COATED ORAL at 09:54

## 2024-05-31 RX ADMIN — CARBIDOPA AND LEVODOPA 1 TABLET(S): 25; 100 TABLET ORAL at 21:37

## 2024-05-31 RX ADMIN — Medication 40 MILLIGRAM(S): at 09:55

## 2024-05-31 RX ADMIN — Medication 0.25 MILLIGRAM(S): at 21:37

## 2024-05-31 RX ADMIN — CARBIDOPA AND LEVODOPA 1 TABLET(S): 25; 100 TABLET ORAL at 12:10

## 2024-05-31 RX ADMIN — ROSUVASTATIN CALCIUM 40 MILLIGRAM(S): 5 TABLET ORAL at 21:38

## 2024-05-31 RX ADMIN — ALBUTEROL 2.5 MILLIGRAM(S): 90 AEROSOL, METERED ORAL at 21:09

## 2024-05-31 RX ADMIN — CARBIDOPA AND LEVODOPA 1 TABLET(S): 25; 100 TABLET ORAL at 17:53

## 2024-05-31 RX ADMIN — HEPARIN SODIUM 5000 UNIT(S): 5000 INJECTION INTRAVENOUS; SUBCUTANEOUS at 13:55

## 2024-05-31 RX ADMIN — HEPARIN SODIUM 5000 UNIT(S): 5000 INJECTION INTRAVENOUS; SUBCUTANEOUS at 21:39

## 2024-05-31 RX ADMIN — CLOPIDOGREL BISULFATE 75 MILLIGRAM(S): 75 TABLET, FILM COATED ORAL at 09:54

## 2024-05-31 NOTE — CONSULT NOTE ADULT - SUBJECTIVE AND OBJECTIVE BOX
HPI: DENA HENRY is a 89y old Female with PMH Hypertension    Hernia, hiatal    CAD (coronary artery disease)    Dementia     who presented on  with chief complaint.  Pulmonary consulted for question of     Past Medical History:   Hypertension    Hernia, hiatal    CAD (coronary artery disease)    Dementia      Past Surgical History:   No significant past surgical history    S/P appendectomy    S/P tonsillectomy    S/P hysterectomy      Family History:    FHx: heart disease       Social History:     Review of Systems:  All 10 systems reviewed in detailed and found to be negative with the exception of what has already been described above    Allergies:  doxycycline (Other)  Biaxin (Other)  penicillins (Other)  verapamil (Other)  tetracyclines (Unknown)  Indocin (Other)  Demerol HCl (Other)    Meds  MEDICATIONS  (STANDING):  albuterol    0.083% 2.5 milliGRAM(s) Nebulizer two times a day  ALPRAZolam 0.25 milliGRAM(s) Oral at bedtime  carbidopa/levodopa  25/100 1 Tablet(s) Oral <User Schedule>  carbidopa/levodopa CR 25/100 1 Tablet(s) Oral at bedtime  chlorhexidine 4% Liquid 1 Application(s) Topical <User Schedule>  clopidogrel Tablet 75 milliGRAM(s) Oral daily  dexAMETHasone  Injectable 6 milliGRAM(s) IV Push daily  donepezil 5 milliGRAM(s) Oral daily  heparin   Injectable 5000 Unit(s) SubCutaneous every 8 hours  losartan 50 milliGRAM(s) Oral daily  metoprolol succinate ER 25 milliGRAM(s) Oral daily  OLANZapine 2.5 milliGRAM(s) Oral at bedtime  PARoxetine 40 milliGRAM(s) Oral daily  rosuvastatin 40 milliGRAM(s) Oral at bedtime    MEDICATIONS  (PRN):  acetaminophen     Tablet .. 650 milliGRAM(s) Oral every 4 hours PRN Temp greater or equal to 38C (100.4F), Mild Pain (1 - 3), Moderate Pain (4 - 6)  ALPRAZolam 0.125 milliGRAM(s) Oral at bedtime PRN auditory hallucination  guaifenesin/dextromethorphan Oral Liquid 5 milliLiter(s) Oral every 4 hours PRN Cough  ibuprofen  Tablet. 200 milliGRAM(s) Oral every 8 hours PRN Mild Pain (1 - 3), Moderate Pain (4 - 6)  ondansetron Injectable 4 milliGRAM(s) IV Push every 6 hours PRN Nausea and/or Vomiting    Physical Exam  T(C): 36.5 (05-31-24 @ 15:42), Max: 36.5 (05-31-24 @ 15:42)  HR: 59 (05-31-24 @ 15:42) (56 - 109)  BP: 111/56 (05-31-24 @ 15:42) (111/56 - 141/68)  RR: 18 (05-31-24 @ 15:42) (17 - 18)  SpO2: 95% (05-31-24 @ 15:42) (95% - 96%)  Gen: Alert, oriented, no distress  HEENT: Anicteric sclera, moist mucous membranes, no JVD, no lymphadenopathy, good dentition  Cardio: Regular rhythm and rate, normal S1S2, no murmurs  Resp: Clear to auscultation bilaterally, no wheezing or rhonchi  GI: Nontender, nondistended, normoactive bowel sounds  Ext: No cyanosis, clubbing or edema  Neuro: Nonfocal    Labs:                        11.1   4.54  )-----------( 179      ( 30 May 2024 07:45 )             34.4     05-30    137  |  111<H>  |  21  ----------------------------<  149<H>  3.9   |  21<L>  |  1.17    Ca    9.9      30 May 2024 07:45    TPro  7.4  /  Alb  3.0<L>  /  TBili  1.0  /  DBili  x   /  AST  26  /  ALT  12  /  AlkPhos  144<H>  05-30    PT/INR - ( 30 May 2024 07:45 )   PT: 11.4 sec;   INR: 1.01 ratio         PTT - ( 30 May 2024 07:45 )  PTT:30.9 sec  Urinalysis Basic - ( 30 May 2024 07:45 )    Color: x / Appearance: x / SG: x / pH: x  Gluc: 149 mg/dL / Ketone: x  / Bili: x / Urobili: x   Blood: x / Protein: x / Nitrite: x   Leuk Esterase: x / RBC: x / WBC x   Sq Epi: x / Non Sq Epi: x / Bacteria: x    < from: Xray Chest 1 View-PORTABLE IMMEDIATE (05.29.24 @ 14:11) >  PROCEDURE DATE:  05/29/2024          INTERPRETATION:  An AP chest radiograph was performed for sepsis.    Comparison is made to 2/6/2024.    There is a shallow inspiration with low lung volumes and the patient is   rotated to the left. There is no definite evidence of infiltrate on   either side. There is suggestion of very mild subsegmental atelectasis in   the peripheral left lower lobe. There is no pneumothorax. No pleural   fluid is seen. There is no hilar or mediastinal widening. The cardiac   silhouette is likely magnified by technique, and while noting left   coronary artery stents, there is no pulmonary edema. There are   degenerative changes of thethoracic spine.    IMPRESSION:  1. Suspected mild subsegmental atelectasis in the left lower lobe, with   no evidence of infiltrate.  2. Left coronary artery stent and atherosclerosis, without CHF.    < end of copied text >   HPI: DENA HENRY is a 89y old Female with PMH CAD status post PCI, dementia, hypertension, hard of hearing, PAD, TIA, recent discharge due to influenza A on February 9, who presents to ED secondary to increasing generalized malaise and fatigue over the past 5 days with subjective fevers at home sore throat, coughing, and 1 episode of watery stool.  Patient's son at bedside to provide history as patient with dementia.  Patient arrived to ED with pulse ox in 80s which resolved after 2 L nasal cannula in place.  Patient found to be COVID-positive and admitted to medicine.    Of note son is healthcare proxy and is declining remdesivir be started and accepts risk of worsening of condition including death, worsening symptoms, more severe symptoms.  Son states understanding that patient will be admitted for steroid therapy supplemental oxygen and plan for DC home when appropriate.      When seen this afternoon patient was in no distress but was asking for her  who passed away a few years ago    Past Medical History:   Hypertension    Hernia, hiatal    CAD (coronary artery disease)    Dementia      Past Surgical History:   No significant past surgical history    S/P appendectomy    S/P tonsillectomy    S/P hysterectomy      Family History:    FHx: heart disease       Social History:     Review of Systems:  All 10 systems reviewed in detailed and found to be negative with the exception of what has already been described above    Allergies:  doxycycline (Other)  Biaxin (Other)  penicillins (Other)  verapamil (Other)  tetracyclines (Unknown)  Indocin (Other)  Demerol HCl (Other)    Meds  MEDICATIONS  (STANDING):  albuterol    0.083% 2.5 milliGRAM(s) Nebulizer two times a day  ALPRAZolam 0.25 milliGRAM(s) Oral at bedtime  carbidopa/levodopa  25/100 1 Tablet(s) Oral <User Schedule>  carbidopa/levodopa CR 25/100 1 Tablet(s) Oral at bedtime  chlorhexidine 4% Liquid 1 Application(s) Topical <User Schedule>  clopidogrel Tablet 75 milliGRAM(s) Oral daily  dexAMETHasone  Injectable 6 milliGRAM(s) IV Push daily  donepezil 5 milliGRAM(s) Oral daily  heparin   Injectable 5000 Unit(s) SubCutaneous every 8 hours  losartan 50 milliGRAM(s) Oral daily  metoprolol succinate ER 25 milliGRAM(s) Oral daily  OLANZapine 2.5 milliGRAM(s) Oral at bedtime  PARoxetine 40 milliGRAM(s) Oral daily  rosuvastatin 40 milliGRAM(s) Oral at bedtime    MEDICATIONS  (PRN):  acetaminophen     Tablet .. 650 milliGRAM(s) Oral every 4 hours PRN Temp greater or equal to 38C (100.4F), Mild Pain (1 - 3), Moderate Pain (4 - 6)  ALPRAZolam 0.125 milliGRAM(s) Oral at bedtime PRN auditory hallucination  guaifenesin/dextromethorphan Oral Liquid 5 milliLiter(s) Oral every 4 hours PRN Cough  ibuprofen  Tablet. 200 milliGRAM(s) Oral every 8 hours PRN Mild Pain (1 - 3), Moderate Pain (4 - 6)  ondansetron Injectable 4 milliGRAM(s) IV Push every 6 hours PRN Nausea and/or Vomiting    Physical Exam  T(C): 36.5 (05-31-24 @ 15:42), Max: 36.5 (05-31-24 @ 15:42)  HR: 59 (05-31-24 @ 15:42) (56 - 109)  BP: 111/56 (05-31-24 @ 15:42) (111/56 - 141/68)  RR: 18 (05-31-24 @ 15:42) (17 - 18)  SpO2: 95% (05-31-24 @ 15:42) (95% - 96%)  Gen: Alert, forgetful no distress  HEENT: Anicteric sclera, moist mucous membranes  Cardio: Regular rhythm and rate, normal S1S2, no murmurs  Resp: Clear to auscultation bilaterally, no wheezing or rhonchi  GI: Nontender, nondistended, normoactive bowel sounds  Ext: No cyanosis, clubbing or edema  Neuro: Nonfocal    Labs:                        11.1   4.54  )-----------( 179      ( 30 May 2024 07:45 )             34.4     05-30    137  |  111<H>  |  21  ----------------------------<  149<H>  3.9   |  21<L>  |  1.17    Ca    9.9      30 May 2024 07:45    TPro  7.4  /  Alb  3.0<L>  /  TBili  1.0  /  DBili  x   /  AST  26  /  ALT  12  /  AlkPhos  144<H>  05-30    PT/INR - ( 30 May 2024 07:45 )   PT: 11.4 sec;   INR: 1.01 ratio         PTT - ( 30 May 2024 07:45 )  PTT:30.9 sec  Urinalysis Basic - ( 30 May 2024 07:45 )    Color: x / Appearance: x / SG: x / pH: x  Gluc: 149 mg/dL / Ketone: x  / Bili: x / Urobili: x   Blood: x / Protein: x / Nitrite: x   Leuk Esterase: x / RBC: x / WBC x   Sq Epi: x / Non Sq Epi: x / Bacteria: x    < from: Xray Chest 1 View-PORTABLE IMMEDIATE (05.29.24 @ 14:11) >  PROCEDURE DATE:  05/29/2024          INTERPRETATION:  An AP chest radiograph was performed for sepsis.    Comparison is made to 2/6/2024.    There is a shallow inspiration with low lung volumes and the patient is   rotated to the left. There is no definite evidence of infiltrate on   either side. There is suggestion of very mild subsegmental atelectasis in   the peripheral left lower lobe. There is no pneumothorax. No pleural   fluid is seen. There is no hilar or mediastinal widening. The cardiac   silhouette is likely magnified by technique, and while noting left   coronary artery stents, there is no pulmonary edema. There are   degenerative changes of thethoracic spine.    IMPRESSION:  1. Suspected mild subsegmental atelectasis in the left lower lobe, with   no evidence of infiltrate.  2. Left coronary artery stent and atherosclerosis, without CHF.    < end of copied text >

## 2024-05-31 NOTE — PROGRESS NOTE ADULT - ASSESSMENT
Patient is an 88-year-old female with recent influenza who presents to ED due to generalized malaise associated with cough, subjective fever, hypoxia admitted due to:    #Acute hypoxic respiratory failure in setting of COVID  Admit to medicine with continuous pulse ox  Continue with supplemental oxygen with plan to wean  Continue with dexamethasone  D-dimer within normal limits when adjusted for age  Turn and reposition every 2 hours  Out of bed and ambulate with assistance  Isolation precautions  Aspiration precautions  Fall precautions  Bed alarm placed as patient high risk for falls  Incentive spirometry  Healthcare proxy declining initiation of remdesivir  Robitussin as needed  Nebulizers as needed–patient uses this twice a day at home  Pulmo consult      #CKD  Renally dose meds  Avoid nephrotoxic medications  Baseline creatinine approximately 1.4, today 1.36–at baseline    #CAD status post PCI  c/w plavix  c/w statin  c/w olmesartan  c/w metoprolol    #Dementia  bed alarm  fall and aspiration precautions  turn and position q 2  c/w carbidopa/levidopa  c/w xanax standing and prn  c/w paroxetine      Heparin subcu for DVT prophylaxis    DISPO: cont O2, iv steroids  d/c home once better

## 2024-05-31 NOTE — PROGRESS NOTE ADULT - SUBJECTIVE AND OBJECTIVE BOX
; no complaints    : no new complaints    PHYSICAL EXAM:    Daily     Daily Weight in k.1 (30 May 2024 02:25)    Vital Signs Last 24 Hrs  T(C): 36.3 (31 May 2024 08:38), Max: 36.3 (30 May 2024 23:37)  T(F): 97.3 (31 May 2024 08:38), Max: 97.4 (30 May 2024 23:37)  HR: 76 (31 May 2024 09:03) (56 - 109)  BP: 141/68 (31 May 2024 08:38) (118/61 - 141/68)  BP(mean): 87 (31 May 2024 08:38) (87 - 87)  RR: 18 (31 May 2024 08:38) (17 - 18)  SpO2: 95% (31 May 2024 08:38) (95% - 96%)    Parameters below as of 31 May 2024 08:38  Patient On (Oxygen Delivery Method): nasal cannula  O2 Flow (L/min): 3      Constitutional: Weak and ill appearing  HEENT: Atraumatic, MARILYN,   Respiratory: Breath Sounds normal, no rhonchi/wheeze  Cardiovascular: N S1S2;   Gastrointestinal: Abdomen soft, non tender, Bowel Sounds present  Extremities: No edema, peripheral pulses present  Neurological: AAO x 2, no gross focal motor deficits  Skin: Non cellulitic, no rash, ulcers  Lymph Nodes: No lymphadenopathy noted  Back: No CVA tenderness   Musculoskeletal: non tender  Breasts: Deferred  Genitourinary: deferred  Rectal: Deferred    All Labs/EKG/Radiology/Meds reviewed by me                          11.1   4.54  )-----------( 179      ( 30 May 2024 07:45 )             34.4       CBC Full  -  ( 30 May 2024 07:45 )  WBC Count : 4.54 K/uL  RBC Count : 3.89 M/uL  Hemoglobin : 11.1 g/dL  Hematocrit : 34.4 %  Platelet Count - Automated : 179 K/uL  Mean Cell Volume : 88.4 fl  Mean Cell Hemoglobin : 28.5 pg  Mean Cell Hemoglobin Concentration : 32.3 gm/dL  Auto Neutrophil # : 3.25 K/uL  Auto Lymphocyte # : 1.04 K/uL  Auto Monocyte # : 0.21 K/uL  Auto Eosinophil # : 0.00 K/uL  Auto Basophil # : 0.01 K/uL  Auto Neutrophil % : 71.6 %  Auto Lymphocyte % : 22.9 %  Auto Monocyte % : 4.6 %  Auto Eosinophil % : 0.0 %  Auto Basophil % : 0.2 %          137  |  111<H>  |  21  ----------------------------<  149<H>  3.9   |  21<L>  |  1.17    Ca    9.9      30 May 2024 07:45    TPro  7.4  /  Alb  3.0<L>  /  TBili  1.0  /  DBili  x   /  AST  26  /  ALT  12  /  AlkPhos  144<H>        LIVER FUNCTIONS - ( 30 May 2024 07:45 )  Alb: 3.0 g/dL / Pro: 7.4 gm/dL / ALK PHOS: 144 U/L / ALT: 12 U/L / AST: 26 U/L / GGT: x             PT/INR - ( 30 May 2024 07:45 )   PT: 11.4 sec;   INR: 1.01 ratio         PTT - ( 30 May 2024 07:45 )  PTT:30.9 sec          Urinalysis Basic - ( 30 May 2024 07:45 )    Color: x / Appearance: x / SG: x / pH: x  Gluc: 149 mg/dL / Ketone: x  / Bili: x / Urobili: x   Blood: x / Protein: x / Nitrite: x   Leuk Esterase: x / RBC: x / WBC x   Sq Epi: x / Non Sq Epi: x / Bacteria: x            MEDICATIONS  (STANDING):  albuterol    0.083% 2.5 milliGRAM(s) Nebulizer two times a day  ALPRAZolam 0.25 milliGRAM(s) Oral at bedtime  carbidopa/levodopa  25/100 1 Tablet(s) Oral <User Schedule>  carbidopa/levodopa CR 25/100 1 Tablet(s) Oral at bedtime  chlorhexidine 4% Liquid 1 Application(s) Topical <User Schedule>  clopidogrel Tablet 75 milliGRAM(s) Oral daily  dexAMETHasone  Injectable 6 milliGRAM(s) IV Push daily  donepezil 5 milliGRAM(s) Oral daily  heparin   Injectable 5000 Unit(s) SubCutaneous every 8 hours  losartan 50 milliGRAM(s) Oral daily  metoprolol succinate ER 25 milliGRAM(s) Oral daily  OLANZapine 2.5 milliGRAM(s) Oral at bedtime  PARoxetine 40 milliGRAM(s) Oral daily  rosuvastatin 40 milliGRAM(s) Oral at bedtime    MEDICATIONS  (PRN):  acetaminophen     Tablet .. 650 milliGRAM(s) Oral every 4 hours PRN Temp greater or equal to 38C (100.4F), Mild Pain (1 - 3), Moderate Pain (4 - 6)  ALPRAZolam 0.125 milliGRAM(s) Oral at bedtime PRN auditory hallucination  guaifenesin/dextromethorphan Oral Liquid 5 milliLiter(s) Oral every 4 hours PRN Cough  ibuprofen  Tablet. 200 milliGRAM(s) Oral every 8 hours PRN Mild Pain (1 - 3), Moderate Pain (4 - 6)  ondansetron Injectable 4 milliGRAM(s) IV Push every 6 hours PRN Nausea and/or Vomiting

## 2024-05-31 NOTE — CONSULT NOTE ADULT - ASSESSMENT
89y old Female with PMH CAD status post PCI, dementia, hypertension, hard of hearing, PAD, TIA, with covid-19  1. hypoxic resp failure, covid-19  -patient son declines remdesivir and is aware of risks of doing so  -continue dexamethasone  -continue supplemental o2  -ambulate, oob to chair  -continue albuterol neb  2. ckd: continue to [place]  3. cad s/p pci: continue home meds  4. dementia: per primary team

## 2024-06-01 LAB
24R-OH-CALCIDIOL SERPL-MCNC: 42.7 NG/ML — SIGNIFICANT CHANGE UP (ref 30–80)
ALBUMIN SERPL ELPH-MCNC: 3.1 G/DL — LOW (ref 3.3–5)
ALP SERPL-CCNC: 134 U/L — HIGH (ref 40–120)
ALT FLD-CCNC: 12 U/L — SIGNIFICANT CHANGE UP (ref 12–78)
ANION GAP SERPL CALC-SCNC: 6 MMOL/L — SIGNIFICANT CHANGE UP (ref 5–17)
AST SERPL-CCNC: 23 U/L — SIGNIFICANT CHANGE UP (ref 15–37)
BASE EXCESS BLDV CALC-SCNC: -0.6 MMOL/L — SIGNIFICANT CHANGE UP (ref -2–3)
BASOPHILS # BLD AUTO: 0.01 K/UL — SIGNIFICANT CHANGE UP (ref 0–0.2)
BASOPHILS NFR BLD AUTO: 0.1 % — SIGNIFICANT CHANGE UP (ref 0–2)
BILIRUB SERPL-MCNC: 0.8 MG/DL — SIGNIFICANT CHANGE UP (ref 0.2–1.2)
BUN SERPL-MCNC: 25 MG/DL — HIGH (ref 7–23)
CALCIUM SERPL-MCNC: 10.1 MG/DL — SIGNIFICANT CHANGE UP (ref 8.5–10.1)
CHLORIDE SERPL-SCNC: 108 MMOL/L — SIGNIFICANT CHANGE UP (ref 96–108)
CO2 SERPL-SCNC: 25 MMOL/L — SIGNIFICANT CHANGE UP (ref 22–31)
CREAT SERPL-MCNC: 1.29 MG/DL — SIGNIFICANT CHANGE UP (ref 0.5–1.3)
CRP SERPL-MCNC: 15 MG/L — HIGH
D DIMER BLD IA.RAPID-MCNC: 161 NG/ML DDU — SIGNIFICANT CHANGE UP
EGFR: 40 ML/MIN/1.73M2 — LOW
EOSINOPHIL # BLD AUTO: 0.04 K/UL — SIGNIFICANT CHANGE UP (ref 0–0.5)
EOSINOPHIL NFR BLD AUTO: 0.6 % — SIGNIFICANT CHANGE UP (ref 0–6)
FERRITIN SERPL-MCNC: 241 NG/ML — SIGNIFICANT CHANGE UP (ref 13–330)
GAS PNL BLDV: SIGNIFICANT CHANGE UP
GLUCOSE SERPL-MCNC: 117 MG/DL — HIGH (ref 70–99)
HCO3 BLDV-SCNC: 25 MMOL/L — SIGNIFICANT CHANGE UP (ref 22–29)
HCT VFR BLD CALC: 34.9 % — SIGNIFICANT CHANGE UP (ref 34.5–45)
HGB BLD-MCNC: 11.6 G/DL — SIGNIFICANT CHANGE UP (ref 11.5–15.5)
IMM GRANULOCYTES NFR BLD AUTO: 0.6 % — SIGNIFICANT CHANGE UP (ref 0–0.9)
LYMPHOCYTES # BLD AUTO: 1.21 K/UL — SIGNIFICANT CHANGE UP (ref 1–3.3)
LYMPHOCYTES # BLD AUTO: 17.3 % — SIGNIFICANT CHANGE UP (ref 13–44)
MAGNESIUM SERPL-MCNC: 2.4 MG/DL — SIGNIFICANT CHANGE UP (ref 1.6–2.6)
MCHC RBC-ENTMCNC: 29.5 PG — SIGNIFICANT CHANGE UP (ref 27–34)
MCHC RBC-ENTMCNC: 33.2 GM/DL — SIGNIFICANT CHANGE UP (ref 32–36)
MCV RBC AUTO: 88.8 FL — SIGNIFICANT CHANGE UP (ref 80–100)
MONOCYTES # BLD AUTO: 0.3 K/UL — SIGNIFICANT CHANGE UP (ref 0–0.9)
MONOCYTES NFR BLD AUTO: 4.3 % — SIGNIFICANT CHANGE UP (ref 2–14)
NEUTROPHILS # BLD AUTO: 5.38 K/UL — SIGNIFICANT CHANGE UP (ref 1.8–7.4)
NEUTROPHILS NFR BLD AUTO: 77.1 % — HIGH (ref 43–77)
PCO2 BLDV: 46 MMHG — HIGH (ref 39–42)
PH BLDV: 7.35 — SIGNIFICANT CHANGE UP (ref 7.32–7.43)
PHOSPHATE SERPL-MCNC: 2.2 MG/DL — LOW (ref 2.5–4.5)
PLATELET # BLD AUTO: 219 K/UL — SIGNIFICANT CHANGE UP (ref 150–400)
PO2 BLDV: 176 MMHG — HIGH (ref 25–45)
POTASSIUM SERPL-MCNC: 4.1 MMOL/L — SIGNIFICANT CHANGE UP (ref 3.5–5.3)
POTASSIUM SERPL-SCNC: 4.1 MMOL/L — SIGNIFICANT CHANGE UP (ref 3.5–5.3)
PROCALCITONIN SERPL-MCNC: 0.11 NG/ML — HIGH (ref 0.02–0.1)
PROT SERPL-MCNC: 8 GM/DL — SIGNIFICANT CHANGE UP (ref 6–8.3)
RBC # BLD: 3.93 M/UL — SIGNIFICANT CHANGE UP (ref 3.8–5.2)
RBC # FLD: 13.6 % — SIGNIFICANT CHANGE UP (ref 10.3–14.5)
SAO2 % BLDV: 99 % — HIGH (ref 67–88)
SODIUM SERPL-SCNC: 139 MMOL/L — SIGNIFICANT CHANGE UP (ref 135–145)
WBC # BLD: 6.98 K/UL — SIGNIFICANT CHANGE UP (ref 3.8–10.5)
WBC # FLD AUTO: 6.98 K/UL — SIGNIFICANT CHANGE UP (ref 3.8–10.5)

## 2024-06-01 PROCEDURE — 99232 SBSQ HOSP IP/OBS MODERATE 35: CPT

## 2024-06-01 PROCEDURE — 93970 EXTREMITY STUDY: CPT | Mod: 26

## 2024-06-01 RX ADMIN — CHLORHEXIDINE GLUCONATE 1 APPLICATION(S): 213 SOLUTION TOPICAL at 06:02

## 2024-06-01 RX ADMIN — CARBIDOPA AND LEVODOPA 1 TABLET(S): 25; 100 TABLET ORAL at 18:51

## 2024-06-01 RX ADMIN — OLANZAPINE 2.5 MILLIGRAM(S): 15 TABLET, FILM COATED ORAL at 23:28

## 2024-06-01 RX ADMIN — CARBIDOPA AND LEVODOPA 1 TABLET(S): 25; 100 TABLET ORAL at 23:21

## 2024-06-01 RX ADMIN — CLOPIDOGREL BISULFATE 75 MILLIGRAM(S): 75 TABLET, FILM COATED ORAL at 08:58

## 2024-06-01 RX ADMIN — LOSARTAN POTASSIUM 50 MILLIGRAM(S): 100 TABLET, FILM COATED ORAL at 08:58

## 2024-06-01 RX ADMIN — HEPARIN SODIUM 5000 UNIT(S): 5000 INJECTION INTRAVENOUS; SUBCUTANEOUS at 23:22

## 2024-06-01 RX ADMIN — ALBUTEROL 2.5 MILLIGRAM(S): 90 AEROSOL, METERED ORAL at 20:39

## 2024-06-01 RX ADMIN — Medication 0.25 MILLIGRAM(S): at 23:21

## 2024-06-01 RX ADMIN — Medication 6 MILLIGRAM(S): at 08:58

## 2024-06-01 RX ADMIN — HEPARIN SODIUM 5000 UNIT(S): 5000 INJECTION INTRAVENOUS; SUBCUTANEOUS at 14:44

## 2024-06-01 RX ADMIN — CARBIDOPA AND LEVODOPA 1 TABLET(S): 25; 100 TABLET ORAL at 05:50

## 2024-06-01 RX ADMIN — CARBIDOPA AND LEVODOPA 1 TABLET(S): 25; 100 TABLET ORAL at 15:41

## 2024-06-01 RX ADMIN — Medication 40 MILLIGRAM(S): at 08:57

## 2024-06-01 RX ADMIN — DONEPEZIL HYDROCHLORIDE 5 MILLIGRAM(S): 10 TABLET, FILM COATED ORAL at 08:58

## 2024-06-01 RX ADMIN — ROSUVASTATIN CALCIUM 40 MILLIGRAM(S): 5 TABLET ORAL at 23:21

## 2024-06-01 RX ADMIN — Medication 25 MILLIGRAM(S): at 08:57

## 2024-06-01 RX ADMIN — HEPARIN SODIUM 5000 UNIT(S): 5000 INJECTION INTRAVENOUS; SUBCUTANEOUS at 05:50

## 2024-06-01 NOTE — PROGRESS NOTE ADULT - SUBJECTIVE AND OBJECTIVE BOX
DENA HENRY  MRN: [id number]    S: June 1, 2024:    No c/o shortness of breath at rest. Non-productive cough. Wants to go home.     PAST MEDICAL & SURGICAL HISTORY:  Hypertension      Hernia, hiatal      CAD (coronary artery disease)  LAD stent 5/2020      Dementia      S/P appendectomy      S/P tonsillectomy      S/P hysterectomy          O: T(C): 36.7 (06-01-24 @ 08:50), Max: 37.1 (05-31-24 @ 23:33)  HR: 107 (06-01-24 @ 08:50) (59 - 107)  BP: 120/78 (06-01-24 @ 08:50) (111/56 - 127/80)  RR: 18 (06-01-24 @ 08:50) (18 - 18)  SpO2: 96% (06-01-24 @ 08:50) (95% - 96%)  Wt(kg): --    PHYSICAL EXAM:      GENERAL: comfortable    NEURO: awake / alert    NECK: no JVD    CHEST: clear    CARDIAC: RR    EXT: no edema    LABS:                 11.1   4.54  )-----------( 179      ( 30 May 2024 07:45 )             34.4     05-30    137  |  111<H>  |  21  ----------------------------<  149<H>  3.9   |  21<L>  |  1.17    Ca    9.9      30 May 2024 07:45    TPro  7.4  /  Alb  3.0<L>  /  TBili  1.0  /  DBili  x   /  AST  26  /  ALT  12  /  AlkPhos  144<H>  05-30    PT/INR - ( 30 May 2024 07:45 )   PT: 11.4 sec;   INR: 1.01 ratio         PTT - ( 30 May 2024 07:45 )  PTT:30.9 sec  Urinalysis Basic - ( 30 May 2024 07:45 )    Color: x / Appearance: x / SG: x / pH: x  Gluc: 149 mg/dL / Ketone: x  / Bili: x / Urobili: x   Blood: x / Protein: x / Nitrite: x   Leuk Esterase: x / RBC: x / WBC x   Sq Epi: x / Non Sq Epi: x / Bacteria: x    < from: Xray Chest 1 View-PORTABLE IMMEDIATE (05.29.24 @ 14:11) >  PROCEDURE DATE:  05/29/2024          INTERPRETATION:  An AP chest radiograph was performed for sepsis.    Comparison is made to 2/6/2024.    There is a shallow inspiration with low lung volumes and the patient is   rotated to the left. There is no definite evidence of infiltrate on   either side. There is suggestion of very mild subsegmental atelectasis in   the peripheral left lower lobe. There is no pneumothorax. No pleural   fluid is seen. There is no hilar or mediastinal widening. The cardiac   silhouette is likely magnified by technique, and while noting left   coronary artery stents, there is no pulmonary edema. There are   degenerative changes of thethoracic spine.    IMPRESSION:  1. Suspected mild subsegmental atelectasis in the left lower lobe, with   no evidence of infiltrate.  2. Left coronary artery stent and atherosclerosis, without CHF.        MEDICATIONS  (STANDING):  albuterol    0.083% 2.5 milliGRAM(s) Nebulizer two times a day  ALPRAZolam 0.25 milliGRAM(s) Oral at bedtime  carbidopa/levodopa  25/100 1 Tablet(s) Oral <User Schedule>  carbidopa/levodopa CR 25/100 1 Tablet(s) Oral at bedtime  chlorhexidine 4% Liquid 1 Application(s) Topical <User Schedule>  clopidogrel Tablet 75 milliGRAM(s) Oral daily  dexAMETHasone  Injectable 6 milliGRAM(s) IV Push daily  donepezil 5 milliGRAM(s) Oral daily  heparin   Injectable 5000 Unit(s) SubCutaneous every 8 hours  losartan 50 milliGRAM(s) Oral daily  metoprolol succinate ER 25 milliGRAM(s) Oral daily  OLANZapine 2.5 milliGRAM(s) Oral at bedtime  PARoxetine 40 milliGRAM(s) Oral daily  rosuvastatin 40 milliGRAM(s) Oral at bedtime    MEDICATIONS  (PRN):  acetaminophen     Tablet .. 650 milliGRAM(s) Oral every 4 hours PRN Temp greater or equal to 38C (100.4F), Mild Pain (1 - 3), Moderate Pain (4 - 6)  ALPRAZolam 0.125 milliGRAM(s) Oral at bedtime PRN auditory hallucination  guaifenesin/dextromethorphan Oral Liquid 5 milliLiter(s) Oral every 4 hours PRN Cough  ibuprofen  Tablet. 200 milliGRAM(s) Oral every 8 hours PRN Mild Pain (1 - 3), Moderate Pain (4 - 6)  ondansetron Injectable 4 milliGRAM(s) IV Push every 6 hours PRN Nausea and/or Vomiting        A/P: 89y old Female with PMH CAD status post PCI, dementia, hypertension, hard of hearing, PAD, TIA, with covid-19    1. Hypoxic Respiratory Failure secondary to COVID 19 infection. Patients son declines Remdesivir;  continue dexamethasone. Supplemental O2; supportive care.     2. Chronic kidney disease. Monitor BMP.     3. CAD s/p PCI. Continue mes per hospitalist team.     4. Dementia: per primary team.    5. DVT prophylaxis. On s.c. heparin.

## 2024-06-01 NOTE — PROGRESS NOTE ADULT - SUBJECTIVE AND OBJECTIVE BOX
CC - cough     6/1 - pt seen and examined, poor historian, + heard to hear, denies cp, dyspnea, + cough, no abdominal pain afebrile    Review of system- Rest of the review of system are negative except mentioned in HPI    ICU Vital Signs Last 24 Hrs  T(C): 36.6 (01 Jun 2024 11:12), Max: 37.1 (31 May 2024 23:33)  T(F): 97.9 (01 Jun 2024 11:12), Max: 98.8 (31 May 2024 23:33)  HR: 56 (01 Jun 2024 11:12) (56 - 107)  BP: 133/74 (01 Jun 2024 11:12) (120/78 - 133/74)  BP(mean): --  ABP: --  ABP(mean): --  RR: 18 (01 Jun 2024 11:12) (18 - 18)  SpO2: 91% (01 Jun 2024 11:12) (91% - 96%)    O2 Parameters below as of 01 Jun 2024 11:12  Patient On (Oxygen Delivery Method): room air     Physical exam :   Constitutional: Weak and ill appearing  HEENT: Atraumatic, MARILYN,   Respiratory: Breath Sounds normal, no rhonchi/wheeze  Cardiovascular: N S1S2;   Gastrointestinal: Abdomen soft, non tender, Bowel Sounds present  Extremities: No edema, peripheral pulses present  Neurological: AAO x 2, no gross focal motor deficits  Skin: Non cellulitic, no rash, ulcers  Lymph Nodes: No lymphadenopathy noted  Back: No CVA tenderness   Musculoskeletal: non tender  Breasts: Deferred  Genitourinary: deferred  Rectal: Deferred    All Labs/EKG/Radiology/Meds reviewed by me                          11.6   6.98  )-----------( 219      ( 01 Jun 2024 12:17 )             34.9     06-01    139  |  108  |  25<H>  ----------------------------<  117<H>  4.1   |  25  |  1.29    Ca    10.1      01 Jun 2024 12:17  Phos  2.2     06-01  Mg     2.4     06-01    TPro  8.0  /  Alb  3.1<L>  /  TBili  0.8  /  DBili  x   /  AST  23  /  ALT  12  /  AlkPhos  134<H>  06-01        LIVER FUNCTIONS - ( 01 Jun 2024 12:17 )  Alb: 3.1 g/dL / Pro: 8.0 gm/dL / ALK PHOS: 134 U/L / ALT: 12 U/L / AST: 23 U/L / GGT: x           Xray Chest 1 View-PORTABLE IMMEDIATE (05.29.24 @ 14:11) >  IMPRESSION:  1. Suspected mild subsegmental atelectasis in the left lower lobe, with   no evidence of infiltrate.  2. Left coronary artery stent and atherosclerosis, without CHF.            MEDICATIONS  (STANDING):  albuterol    0.083% 2.5 milliGRAM(s) Nebulizer two times a day  ALPRAZolam 0.25 milliGRAM(s) Oral at bedtime  carbidopa/levodopa  25/100 1 Tablet(s) Oral <User Schedule>  carbidopa/levodopa CR 25/100 1 Tablet(s) Oral at bedtime  chlorhexidine 4% Liquid 1 Application(s) Topical <User Schedule>  clopidogrel Tablet 75 milliGRAM(s) Oral daily  dexAMETHasone  Injectable 6 milliGRAM(s) IV Push daily  donepezil 5 milliGRAM(s) Oral daily  heparin   Injectable 5000 Unit(s) SubCutaneous every 8 hours  losartan 50 milliGRAM(s) Oral daily  metoprolol succinate ER 25 milliGRAM(s) Oral daily  OLANZapine 2.5 milliGRAM(s) Oral at bedtime  PARoxetine 40 milliGRAM(s) Oral daily  rosuvastatin 40 milliGRAM(s) Oral at bedtime    MEDICATIONS  (PRN):  acetaminophen     Tablet .. 650 milliGRAM(s) Oral every 4 hours PRN Temp greater or equal to 38C (100.4F), Mild Pain (1 - 3), Moderate Pain (4 - 6)  ALPRAZolam 0.125 milliGRAM(s) Oral at bedtime PRN auditory hallucination  guaifenesin/dextromethorphan Oral Liquid 5 milliLiter(s) Oral every 4 hours PRN Cough  ibuprofen  Tablet. 200 milliGRAM(s) Oral every 8 hours PRN Mild Pain (1 - 3), Moderate Pain (4 - 6)  ondansetron Injectable 4 milliGRAM(s) IV Push every 6 hours PRN Nausea and/or Vomiting

## 2024-06-01 NOTE — PROGRESS NOTE ADULT - ASSESSMENT
89 y/o female with PMH of CAD, dementia , CKD ,  recent influenza who presents to ED on 5/29/24 with o generalized malaise associated with cough, subjective fever, hypoxia admitted due to:    #Acute hypoxic respiratory failure in setting of COVID viral infection , LLL atelectasis  -  continuous pulse ox and supplemental O2   - CXR - LLL atelectasis   - continue with dexamethasone  - D-dimer within normal limits when adjusted for age,  repeat wnl   - doppler LE to r/o DVT  - pending  - Turn and reposition every 2 hours, Out of bed and ambulate with assistance  - Isolation precautions, Aspiration precautions, Fall precautions  - Incentive spirometry  - Healthcare proxy declining initiation of remdesivir  - Robitussin as needed  - Nebulizers as needed–patient uses this twice a day at home  - Pulmo consult      #CKD   - Renally dose meds, Avoid nephrotoxic medications  - Baseline creatinine approximately 1.4, today 1.36–at baseline  - Creatinine Trend: 1.29<--, 1.17<--, 1.36<--    #CAD status post PCI  - c/w plavix,  statin,  olmesartan,  metoprolol    #Dementia   - bed alarm, fall and aspiration precautions   - c/w carbidopa/levidopa  - c/w xanax standing and prn  - c/w paroxetine    # Mildly elevated glucose   - due to IV steroids  - check A1C       Heparin subcu for DVT prophylaxis    DISPO: cont O2, iv steroids  , labs in am    Full code   pt lacking capacity to make decisions due to dementia  HCP Fredis son [phone] left the message to call me back  Claudine daughter leaves with patient   d/c home once better

## 2024-06-02 LAB
A1C WITH ESTIMATED AVERAGE GLUCOSE RESULT: 6.2 % — HIGH (ref 4–5.6)
ALBUMIN SERPL ELPH-MCNC: 3.1 G/DL — LOW (ref 3.3–5)
ALP SERPL-CCNC: 142 U/L — HIGH (ref 40–120)
ALT FLD-CCNC: 11 U/L — LOW (ref 12–78)
ANION GAP SERPL CALC-SCNC: 6 MMOL/L — SIGNIFICANT CHANGE UP (ref 5–17)
AST SERPL-CCNC: 28 U/L — SIGNIFICANT CHANGE UP (ref 15–37)
BASOPHILS # BLD AUTO: 0.01 K/UL — SIGNIFICANT CHANGE UP (ref 0–0.2)
BASOPHILS NFR BLD AUTO: 0.1 % — SIGNIFICANT CHANGE UP (ref 0–2)
BILIRUB SERPL-MCNC: 0.8 MG/DL — SIGNIFICANT CHANGE UP (ref 0.2–1.2)
BUN SERPL-MCNC: 27 MG/DL — HIGH (ref 7–23)
CALCIUM SERPL-MCNC: 10 MG/DL — SIGNIFICANT CHANGE UP (ref 8.5–10.1)
CHLORIDE SERPL-SCNC: 108 MMOL/L — SIGNIFICANT CHANGE UP (ref 96–108)
CO2 SERPL-SCNC: 25 MMOL/L — SIGNIFICANT CHANGE UP (ref 22–31)
CREAT SERPL-MCNC: 1.12 MG/DL — SIGNIFICANT CHANGE UP (ref 0.5–1.3)
CRP SERPL-MCNC: 10 MG/L — HIGH
EGFR: 47 ML/MIN/1.73M2 — LOW
EOSINOPHIL # BLD AUTO: 0.02 K/UL — SIGNIFICANT CHANGE UP (ref 0–0.5)
EOSINOPHIL NFR BLD AUTO: 0.3 % — SIGNIFICANT CHANGE UP (ref 0–6)
ESTIMATED AVERAGE GLUCOSE: 131 MG/DL — HIGH (ref 68–114)
GLUCOSE SERPL-MCNC: 106 MG/DL — HIGH (ref 70–99)
HCT VFR BLD CALC: 37.3 % — SIGNIFICANT CHANGE UP (ref 34.5–45)
HGB BLD-MCNC: 11.8 G/DL — SIGNIFICANT CHANGE UP (ref 11.5–15.5)
IMM GRANULOCYTES NFR BLD AUTO: 1.2 % — HIGH (ref 0–0.9)
LYMPHOCYTES # BLD AUTO: 2.29 K/UL — SIGNIFICANT CHANGE UP (ref 1–3.3)
LYMPHOCYTES # BLD AUTO: 30.8 % — SIGNIFICANT CHANGE UP (ref 13–44)
MAGNESIUM SERPL-MCNC: 2.5 MG/DL — SIGNIFICANT CHANGE UP (ref 1.6–2.6)
MCHC RBC-ENTMCNC: 28 PG — SIGNIFICANT CHANGE UP (ref 27–34)
MCHC RBC-ENTMCNC: 31.6 GM/DL — LOW (ref 32–36)
MCV RBC AUTO: 88.4 FL — SIGNIFICANT CHANGE UP (ref 80–100)
MONOCYTES # BLD AUTO: 0.67 K/UL — SIGNIFICANT CHANGE UP (ref 0–0.9)
MONOCYTES NFR BLD AUTO: 9 % — SIGNIFICANT CHANGE UP (ref 2–14)
NEUTROPHILS # BLD AUTO: 4.35 K/UL — SIGNIFICANT CHANGE UP (ref 1.8–7.4)
NEUTROPHILS NFR BLD AUTO: 58.6 % — SIGNIFICANT CHANGE UP (ref 43–77)
PHOSPHATE SERPL-MCNC: 1.9 MG/DL — LOW (ref 2.5–4.5)
PLATELET # BLD AUTO: 232 K/UL — SIGNIFICANT CHANGE UP (ref 150–400)
POTASSIUM SERPL-MCNC: 3.7 MMOL/L — SIGNIFICANT CHANGE UP (ref 3.5–5.3)
POTASSIUM SERPL-SCNC: 3.7 MMOL/L — SIGNIFICANT CHANGE UP (ref 3.5–5.3)
PROT SERPL-MCNC: 7.6 GM/DL — SIGNIFICANT CHANGE UP (ref 6–8.3)
RBC # BLD: 4.22 M/UL — SIGNIFICANT CHANGE UP (ref 3.8–5.2)
RBC # FLD: 13.4 % — SIGNIFICANT CHANGE UP (ref 10.3–14.5)
SODIUM SERPL-SCNC: 139 MMOL/L — SIGNIFICANT CHANGE UP (ref 135–145)
WBC # BLD: 7.43 K/UL — SIGNIFICANT CHANGE UP (ref 3.8–10.5)
WBC # FLD AUTO: 7.43 K/UL — SIGNIFICANT CHANGE UP (ref 3.8–10.5)

## 2024-06-02 PROCEDURE — 99232 SBSQ HOSP IP/OBS MODERATE 35: CPT

## 2024-06-02 RX ADMIN — HEPARIN SODIUM 5000 UNIT(S): 5000 INJECTION INTRAVENOUS; SUBCUTANEOUS at 22:38

## 2024-06-02 RX ADMIN — Medication 0.25 MILLIGRAM(S): at 22:38

## 2024-06-02 RX ADMIN — ALBUTEROL 2.5 MILLIGRAM(S): 90 AEROSOL, METERED ORAL at 07:36

## 2024-06-02 RX ADMIN — CARBIDOPA AND LEVODOPA 1 TABLET(S): 25; 100 TABLET ORAL at 17:50

## 2024-06-02 RX ADMIN — DONEPEZIL HYDROCHLORIDE 5 MILLIGRAM(S): 10 TABLET, FILM COATED ORAL at 12:32

## 2024-06-02 RX ADMIN — CARBIDOPA AND LEVODOPA 1 TABLET(S): 25; 100 TABLET ORAL at 22:38

## 2024-06-02 RX ADMIN — CLOPIDOGREL BISULFATE 75 MILLIGRAM(S): 75 TABLET, FILM COATED ORAL at 12:24

## 2024-06-02 RX ADMIN — ROSUVASTATIN CALCIUM 40 MILLIGRAM(S): 5 TABLET ORAL at 22:38

## 2024-06-02 RX ADMIN — OLANZAPINE 2.5 MILLIGRAM(S): 15 TABLET, FILM COATED ORAL at 22:38

## 2024-06-02 RX ADMIN — Medication 6 MILLIGRAM(S): at 12:26

## 2024-06-02 RX ADMIN — CHLORHEXIDINE GLUCONATE 1 APPLICATION(S): 213 SOLUTION TOPICAL at 12:32

## 2024-06-02 RX ADMIN — HEPARIN SODIUM 5000 UNIT(S): 5000 INJECTION INTRAVENOUS; SUBCUTANEOUS at 15:54

## 2024-06-02 RX ADMIN — Medication 40 MILLIGRAM(S): at 12:26

## 2024-06-02 RX ADMIN — HEPARIN SODIUM 5000 UNIT(S): 5000 INJECTION INTRAVENOUS; SUBCUTANEOUS at 06:44

## 2024-06-02 RX ADMIN — CARBIDOPA AND LEVODOPA 1 TABLET(S): 25; 100 TABLET ORAL at 12:24

## 2024-06-02 RX ADMIN — CARBIDOPA AND LEVODOPA 1 TABLET(S): 25; 100 TABLET ORAL at 06:44

## 2024-06-02 NOTE — PROGRESS NOTE ADULT - ASSESSMENT
87 y/o female with PMH of CAD, dementia , CKD ,  recent influenza who presents to ED on 5/29/24 with o generalized malaise associated with cough, subjective fever, hypoxia admitted due to:    #Acute hypoxic respiratory failure in setting of COVID viral infection , LLL atelectasis  -  continuous pulse ox and supplemental O2   - CXR - LLL atelectasis   - continue with dexamethasone#3  - D-dimer within normal limits when adjusted for age,  repeat wnl   - doppler LE to r/o DVT  - Neg  - Turn and reposition every 2 hours, Out of bed and ambulate with assistance  - Isolation precautions, Aspiration precautions, Fall precautions  - Incentive spirometry  - Healthcare proxy declining initiation of remdesivir  - Robitussin as needed  - Nebulizers as needed–patient uses this twice a day at home  - Pulm consult appreciated   - Home O2 evaluation prior to D/C     #CKD   - Renally dose meds, Avoid nephrotoxic medications  - Baseline creatinine approximately 1.4, today 1.36–at baseline  - Creatinine Trend: 1.29<--, 1.17<--, 1.36<--    #CAD status post PCI  - c/w plavix,  statin,  olmesartan,  metoprolol    #Dementia   - bed alarm, fall and aspiration precautions   - c/w carbidopa/levidopa  - c/w xanax standing and prn  - c/w paroxetine    # Mildly elevated glucose   - due to IV steroids  - check A1C 6/2      Heparin subcu for DVT prophylaxis    DISPO: cont O2, iv steroids  , labs in am, home o2 prior to D/C     Full code   pt lacking capacity to make decisions due to dementia  HCP Fredis son [phone] left the message to call me back  Claudine daughter leaves with patient   d/c home once better

## 2024-06-03 LAB
ALBUMIN SERPL ELPH-MCNC: 3.1 G/DL — LOW (ref 3.3–5)
ALP SERPL-CCNC: 136 U/L — HIGH (ref 40–120)
ALT FLD-CCNC: 9 U/L — LOW (ref 12–78)
ANION GAP SERPL CALC-SCNC: 3 MMOL/L — LOW (ref 5–17)
AST SERPL-CCNC: 22 U/L — SIGNIFICANT CHANGE UP (ref 15–37)
BILIRUB SERPL-MCNC: 0.8 MG/DL — SIGNIFICANT CHANGE UP (ref 0.2–1.2)
BUN SERPL-MCNC: 28 MG/DL — HIGH (ref 7–23)
CALCIUM SERPL-MCNC: 10.1 MG/DL — SIGNIFICANT CHANGE UP (ref 8.5–10.1)
CHLORIDE SERPL-SCNC: 110 MMOL/L — HIGH (ref 96–108)
CO2 SERPL-SCNC: 25 MMOL/L — SIGNIFICANT CHANGE UP (ref 22–31)
CREAT SERPL-MCNC: 1.12 MG/DL — SIGNIFICANT CHANGE UP (ref 0.5–1.3)
CULTURE RESULTS: SIGNIFICANT CHANGE UP
CULTURE RESULTS: SIGNIFICANT CHANGE UP
EGFR: 47 ML/MIN/1.73M2 — LOW
GLUCOSE SERPL-MCNC: 120 MG/DL — HIGH (ref 70–99)
HCT VFR BLD CALC: 37 % — SIGNIFICANT CHANGE UP (ref 34.5–45)
HGB BLD-MCNC: 12.1 G/DL — SIGNIFICANT CHANGE UP (ref 11.5–15.5)
MAGNESIUM SERPL-MCNC: 2.3 MG/DL — SIGNIFICANT CHANGE UP (ref 1.6–2.6)
MCHC RBC-ENTMCNC: 29 PG — SIGNIFICANT CHANGE UP (ref 27–34)
MCHC RBC-ENTMCNC: 32.7 GM/DL — SIGNIFICANT CHANGE UP (ref 32–36)
MCV RBC AUTO: 88.7 FL — SIGNIFICANT CHANGE UP (ref 80–100)
PHOSPHATE SERPL-MCNC: 1.9 MG/DL — LOW (ref 2.5–4.5)
PLATELET # BLD AUTO: 241 K/UL — SIGNIFICANT CHANGE UP (ref 150–400)
POTASSIUM SERPL-MCNC: 3.9 MMOL/L — SIGNIFICANT CHANGE UP (ref 3.5–5.3)
POTASSIUM SERPL-SCNC: 3.9 MMOL/L — SIGNIFICANT CHANGE UP (ref 3.5–5.3)
PROT SERPL-MCNC: 7.3 GM/DL — SIGNIFICANT CHANGE UP (ref 6–8.3)
RBC # BLD: 4.17 M/UL — SIGNIFICANT CHANGE UP (ref 3.8–5.2)
RBC # FLD: 13.4 % — SIGNIFICANT CHANGE UP (ref 10.3–14.5)
SODIUM SERPL-SCNC: 138 MMOL/L — SIGNIFICANT CHANGE UP (ref 135–145)
SPECIMEN SOURCE: SIGNIFICANT CHANGE UP
SPECIMEN SOURCE: SIGNIFICANT CHANGE UP
WBC # BLD: 9.23 K/UL — SIGNIFICANT CHANGE UP (ref 3.8–10.5)
WBC # FLD AUTO: 9.23 K/UL — SIGNIFICANT CHANGE UP (ref 3.8–10.5)

## 2024-06-03 PROCEDURE — 99232 SBSQ HOSP IP/OBS MODERATE 35: CPT

## 2024-06-03 RX ADMIN — CARBIDOPA AND LEVODOPA 1 TABLET(S): 25; 100 TABLET ORAL at 11:27

## 2024-06-03 RX ADMIN — HEPARIN SODIUM 5000 UNIT(S): 5000 INJECTION INTRAVENOUS; SUBCUTANEOUS at 11:26

## 2024-06-03 RX ADMIN — LOSARTAN POTASSIUM 50 MILLIGRAM(S): 100 TABLET, FILM COATED ORAL at 11:27

## 2024-06-03 RX ADMIN — CARBIDOPA AND LEVODOPA 1 TABLET(S): 25; 100 TABLET ORAL at 21:04

## 2024-06-03 RX ADMIN — OLANZAPINE 2.5 MILLIGRAM(S): 15 TABLET, FILM COATED ORAL at 21:04

## 2024-06-03 RX ADMIN — CARBIDOPA AND LEVODOPA 1 TABLET(S): 25; 100 TABLET ORAL at 06:37

## 2024-06-03 RX ADMIN — DONEPEZIL HYDROCHLORIDE 5 MILLIGRAM(S): 10 TABLET, FILM COATED ORAL at 11:27

## 2024-06-03 RX ADMIN — HEPARIN SODIUM 5000 UNIT(S): 5000 INJECTION INTRAVENOUS; SUBCUTANEOUS at 21:05

## 2024-06-03 RX ADMIN — ALBUTEROL 2.5 MILLIGRAM(S): 90 AEROSOL, METERED ORAL at 21:14

## 2024-06-03 RX ADMIN — CARBIDOPA AND LEVODOPA 1 TABLET(S): 25; 100 TABLET ORAL at 17:28

## 2024-06-03 RX ADMIN — Medication 6 MILLIGRAM(S): at 11:26

## 2024-06-03 RX ADMIN — ROSUVASTATIN CALCIUM 40 MILLIGRAM(S): 5 TABLET ORAL at 21:05

## 2024-06-03 RX ADMIN — CHLORHEXIDINE GLUCONATE 1 APPLICATION(S): 213 SOLUTION TOPICAL at 06:37

## 2024-06-03 RX ADMIN — Medication 40 MILLIGRAM(S): at 11:28

## 2024-06-03 RX ADMIN — Medication 0.25 MILLIGRAM(S): at 21:04

## 2024-06-03 RX ADMIN — CLOPIDOGREL BISULFATE 75 MILLIGRAM(S): 75 TABLET, FILM COATED ORAL at 11:27

## 2024-06-03 RX ADMIN — HEPARIN SODIUM 5000 UNIT(S): 5000 INJECTION INTRAVENOUS; SUBCUTANEOUS at 06:38

## 2024-06-03 RX ADMIN — Medication 25 MILLIGRAM(S): at 11:26

## 2024-06-03 NOTE — PROGRESS NOTE ADULT - ASSESSMENT
89y old Female with PMH CAD status post PCI, dementia, hypertension, hard of hearing, PAD, TIA, with covid-19  1. hypoxic resp failure, covid-19  -patient son declines remdesivir and is aware of risks of doing so  -continue dexamethasone  -continue supplemental o2. 2 eval prior to d/c  -incentive spriometry  -ambulate, oob to chair  -continue albuterol neb  2. ckd: continue to monito  3. cad s/p pci: continue home meds  4. dementia: per primary team

## 2024-06-03 NOTE — PROGRESS NOTE ADULT - REASON FOR ADMISSION
Acute hypoxic respiratory failure 2/2 COVID

## 2024-06-03 NOTE — PROGRESS NOTE ADULT - NUTRITIONAL ASSESSMENT
This patient has been assessed with a concern for Malnutrition and has been determined to have a diagnosis/diagnoses of Moderate protein-calorie malnutrition.    This patient is being managed with:   Diet Regular-  DASH/TLC {Sodium & Cholesterol Restricted} (DASH)  Entered: May 29 2024  9:06PM    The following pending diet order is being considered for treatment of Moderate protein-calorie malnutrition:  Diet Regular-  Supplement Feeding Modality:  Oral  Ensure Plus High Protein Cans or Servings Per Day:  2       Frequency:  Two Times a day  Entered: May 30 2024 10:33AM  

## 2024-06-03 NOTE — PROGRESS NOTE ADULT - SUBJECTIVE AND OBJECTIVE BOX
HOSPITALIST PROGRESS NOTE:  SUBJECTIVE:  PCP:  Chief Complaint: Patient is a 89y old  Female who presents with a chief complaint of Acute hypoxic respiratory failure 2/2 COVID (01 Jun 2024 16:57)      HPI:    Patient is an 88-year-old female with past medical history of CAD status post PCI, dementia, hypertension, hard of hearing, PAD, TIA, recent discharge due to influenza A on February 9, who presents to ED secondary to increasing generalized malaise and fatigue over the past 5 days with subjective fevers at home sore throat, coughing, and 1 episode of watery stool.  Patient's son at bedside to provide history as patient with dementia.  Patient arrived to ED with pulse ox in 80s which resolved after 2 L nasal cannula in place.  Patient found to be COVID-positive and admitted to medicine.    Of note son is healthcare proxy and is declining remdesivir be started and accepts risk of worsening of condition including death, worsening symptoms, more severe symptoms.  Son states understanding that patient will be admitted for steroid therapy supplemental oxygen and plan for DC home when appropriate.  Son states that patient has a history of sundowning, hearing voices, and is high fall risk. (29 May 2024 21:09)    6/2: Above reviewed; Patient has no complaints. NO CP, dyspnea or cough.  06/03/24: Patient seen and examined. Sitting in chair. On 3L NC saturating 97%.       REVIEW OF SYSTEMS:  See HPI. All other review of systems is negative unless indicated above.     OBJECTIVE  Physical Exam:  Vital Signs:    Vital Signs Last 24 Hrs  T(C): 36.7 (03 Jun 2024 08:28), Max: 36.7 (02 Jun 2024 22:11)  T(F): 98.1 (03 Jun 2024 08:28), Max: 98.1 (02 Jun 2024 22:11)  HR: 56 (03 Jun 2024 08:28) (56 - 71)  BP: 130/64 (03 Jun 2024 08:28) (112/60 - 130/64)  BP(mean): --  RR: 18 (03 Jun 2024 08:28) (18 - 18)  SpO2: 97% (03 Jun 2024 08:28) (97% - 97%)    Parameters below as of 03 Jun 2024 08:28  Patient On (Oxygen Delivery Method): nasal cannula  O2 Flow (L/min): 3        Constitutional: NAD, awake and alert  Neurological: AAO x 3, no focal deficits  HEENT: PERRLA, EOMI, MMM  Neck: Soft and supple, No LAD, No JVD  Respiratory: Breath sounds are clear bilaterally, No wheezing, rales or rhonchi  Cardiovascular: S1 and S2, regular rate and rhythm; no Murmurs, gallops or rubs  Gastrointestinal: Bowel Sounds present, soft, nontender, nondistended, no guarding, no rebound tenderness  Back: No CVA tenderness   Extremities: No peripheral edema  Vascular: 2+ peripheral pulses  Musculoskeletal: 5/5 strength b/l upper and lower extremities  Skin: No rashes      MEDICATIONS  (STANDING):  albuterol    0.083% 2.5 milliGRAM(s) Nebulizer two times a day  ALPRAZolam 0.25 milliGRAM(s) Oral at bedtime  carbidopa/levodopa  25/100 1 Tablet(s) Oral <User Schedule>  carbidopa/levodopa CR 25/100 1 Tablet(s) Oral at bedtime  chlorhexidine 4% Liquid 1 Application(s) Topical <User Schedule>  clopidogrel Tablet 75 milliGRAM(s) Oral daily  dexAMETHasone  Injectable 6 milliGRAM(s) IV Push daily  donepezil 5 milliGRAM(s) Oral daily  heparin   Injectable 5000 Unit(s) SubCutaneous every 8 hours  losartan 50 milliGRAM(s) Oral daily  metoprolol succinate ER 25 milliGRAM(s) Oral daily  OLANZapine 2.5 milliGRAM(s) Oral at bedtime  PARoxetine 40 milliGRAM(s) Oral daily  rosuvastatin 40 milliGRAM(s) Oral at bedtime      LABS: All Labs Reviewed:                                     12.1   9.23  )-----------( 241      ( 03 Jun 2024 07:13 )             37.0     03 Jun 2024 07:13    138    |  110    |  28     ----------------------------<  120    3.9     |  25     |  1.12     Ca    10.1       03 Jun 2024 07:13  Phos  1.9       03 Jun 2024 07:13  Mg     2.3       03 Jun 2024 07:13    TPro  7.3    /  Alb  3.1    /  TBili  0.8    /  DBili  x      /  AST  22     /  ALT  9      /  AlkPhos  136    03 Jun 2024 07:13    LIVER FUNCTIONS - ( 03 Jun 2024 07:13 )  Alb: 3.1 g/dL / Pro: 7.3 gm/dL / ALK PHOS: 136 U/L / ALT: 9 U/L / AST: 22 U/L / GGT: x             CAPILLARY BLOOD GLUCOSE            Urinalysis Basic - ( 03 Jun 2024 07:13 )    Color: x / Appearance: x / SG: x / pH: x  Gluc: 120 mg/dL / Ketone: x  / Bili: x / Urobili: x   Blood: x / Protein: x / Nitrite: x   Leuk Esterase: x / RBC: x / WBC x   Sq Epi: x / Non Sq Epi: x / Bacteria: x

## 2024-06-03 NOTE — PROGRESS NOTE ADULT - SUBJECTIVE AND OBJECTIVE BOX
Subjective:    Review of Systems:  All 10 systems reviewed in detailed and found to be negative with the exception of what has already been described above    Allergies:  doxycycline (Other)  Biaxin (Other)  penicillins (Other)  verapamil (Other)  tetracyclines (Unknown)  Indocin (Other)  Demerol HCl (Other)    Meds  MEDICATIONS  (STANDING):  albuterol    0.083% 2.5 milliGRAM(s) Nebulizer two times a day  ALPRAZolam 0.25 milliGRAM(s) Oral at bedtime  carbidopa/levodopa  25/100 1 Tablet(s) Oral <User Schedule>  carbidopa/levodopa CR 25/100 1 Tablet(s) Oral at bedtime  chlorhexidine 4% Liquid 1 Application(s) Topical <User Schedule>  clopidogrel Tablet 75 milliGRAM(s) Oral daily  dexAMETHasone  Injectable 6 milliGRAM(s) IV Push daily  donepezil 5 milliGRAM(s) Oral daily  heparin   Injectable 5000 Unit(s) SubCutaneous every 8 hours  losartan 50 milliGRAM(s) Oral daily  metoprolol succinate ER 25 milliGRAM(s) Oral daily  OLANZapine 2.5 milliGRAM(s) Oral at bedtime  PARoxetine 40 milliGRAM(s) Oral daily  rosuvastatin 40 milliGRAM(s) Oral at bedtime    MEDICATIONS  (PRN):  acetaminophen     Tablet .. 650 milliGRAM(s) Oral every 4 hours PRN Temp greater or equal to 38C (100.4F), Mild Pain (1 - 3), Moderate Pain (4 - 6)  ALPRAZolam 0.125 milliGRAM(s) Oral at bedtime PRN auditory hallucination  guaifenesin/dextromethorphan Oral Liquid 5 milliLiter(s) Oral every 4 hours PRN Cough  ibuprofen  Tablet. 200 milliGRAM(s) Oral every 8 hours PRN Mild Pain (1 - 3), Moderate Pain (4 - 6)  ondansetron Injectable 4 milliGRAM(s) IV Push every 6 hours PRN Nausea and/or Vomiting    Physical Exam  T(C): 36.8 (06-03-24 @ 17:09), Max: 36.8 (06-03-24 @ 17:09)  HR: 62 (06-03-24 @ 17:09) (56 - 71)  BP: 111/60 (06-03-24 @ 17:09) (111/60 - 130/64)  RR: 18 (06-03-24 @ 17:09) (18 - 18)  SpO2: 94% (06-03-24 @ 17:09) (94% - 97%)  Gen: Alert, oriented, no distress  HEENT: Anicteric sclera, moist mucous membranes, no JVD, no lymphadenopathy, good dentition  Cardio: Regular rhythm and rate, normal S1S2, no murmurs  Resp: Clear to auscultation bilaterally, no wheezing or rhonchi  GI: Nontender, nondistended, normoactive bowel sounds  Ext: No cyanosis, clubbing or edema  Neuro: Nonfocal    Labs:                        12.1   9.23  )-----------( 241      ( 03 Jun 2024 07:13 )             37.0     06-03    138  |  110<H>  |  28<H>  ----------------------------<  120<H>  3.9   |  25  |  1.12    Ca    10.1      03 Jun 2024 07:13  Phos  1.9     06-03  Mg     2.3     06-03    TPro  7.3  /  Alb  3.1<L>  /  TBili  0.8  /  DBili  x   /  AST  22  /  ALT  9<L>  /  AlkPhos  136<H>  06-03      Urinalysis Basic - ( 03 Jun 2024 07:13 )    Color: x / Appearance: x / SG: x / pH: x  Gluc: 120 mg/dL / Ketone: x  / Bili: x / Urobili: x   Blood: x / Protein: x / Nitrite: x   Leuk Esterase: x / RBC: x / WBC x   Sq Epi: x / Non Sq Epi: x / Bacteria: x     Subjective:  sleeping comfortably  offers no complaints     Review of Systems:  All 10 systems reviewed in detailed and found to be negative with the exception of what has already been described above    Allergies:  doxycycline (Other)  Biaxin (Other)  penicillins (Other)  verapamil (Other)  tetracyclines (Unknown)  Indocin (Other)  Demerol HCl (Other)    Meds  MEDICATIONS  (STANDING):  albuterol    0.083% 2.5 milliGRAM(s) Nebulizer two times a day  ALPRAZolam 0.25 milliGRAM(s) Oral at bedtime  carbidopa/levodopa  25/100 1 Tablet(s) Oral <User Schedule>  carbidopa/levodopa CR 25/100 1 Tablet(s) Oral at bedtime  chlorhexidine 4% Liquid 1 Application(s) Topical <User Schedule>  clopidogrel Tablet 75 milliGRAM(s) Oral daily  dexAMETHasone  Injectable 6 milliGRAM(s) IV Push daily  donepezil 5 milliGRAM(s) Oral daily  heparin   Injectable 5000 Unit(s) SubCutaneous every 8 hours  losartan 50 milliGRAM(s) Oral daily  metoprolol succinate ER 25 milliGRAM(s) Oral daily  OLANZapine 2.5 milliGRAM(s) Oral at bedtime  PARoxetine 40 milliGRAM(s) Oral daily  rosuvastatin 40 milliGRAM(s) Oral at bedtime    MEDICATIONS  (PRN):  acetaminophen     Tablet .. 650 milliGRAM(s) Oral every 4 hours PRN Temp greater or equal to 38C (100.4F), Mild Pain (1 - 3), Moderate Pain (4 - 6)  ALPRAZolam 0.125 milliGRAM(s) Oral at bedtime PRN auditory hallucination  guaifenesin/dextromethorphan Oral Liquid 5 milliLiter(s) Oral every 4 hours PRN Cough  ibuprofen  Tablet. 200 milliGRAM(s) Oral every 8 hours PRN Mild Pain (1 - 3), Moderate Pain (4 - 6)  ondansetron Injectable 4 milliGRAM(s) IV Push every 6 hours PRN Nausea and/or Vomiting    Physical Exam  T(C): 36.8 (06-03-24 @ 17:09), Max: 36.8 (06-03-24 @ 17:09)  HR: 62 (06-03-24 @ 17:09) (56 - 71)  BP: 111/60 (06-03-24 @ 17:09) (111/60 - 130/64)  RR: 18 (06-03-24 @ 17:09) (18 - 18)  SpO2: 94% (06-03-24 @ 17:09) (94% - 97%)  Gen: Alert, forgetful no distress  HEENT: Anicteric sclera, moist mucous membranes  Cardio: Regular rhythm and rate, normal S1S2, no murmurs  Resp: Clear to auscultation bilaterally, no wheezing or rhonchi  GI: Nontender, nondistended, normoactive bowel sounds  Ext: No cyanosis, clubbing or edema  Neuro: Nonfocal    Labs:                        12.1   9.23  )-----------( 241      ( 03 Jun 2024 07:13 )             37.0     06-03    138  |  110<H>  |  28<H>  ----------------------------<  120<H>  3.9   |  25  |  1.12    Ca    10.1      03 Jun 2024 07:13  Phos  1.9     06-03  Mg     2.3     06-03    TPro  7.3  /  Alb  3.1<L>  /  TBili  0.8  /  DBili  x   /  AST  22  /  ALT  9<L>  /  AlkPhos  136<H>  06-03      Urinalysis Basic - ( 03 Jun 2024 07:13 )    Color: x / Appearance: x / SG: x / pH: x  Gluc: 120 mg/dL / Ketone: x  / Bili: x / Urobili: x   Blood: x / Protein: x / Nitrite: x   Leuk Esterase: x / RBC: x / WBC x   Sq Epi: x / Non Sq Epi: x / Bacteria: x    < from: Xray Chest 1 View-PORTABLE IMMEDIATE (05.29.24 @ 14:11) >  PROCEDURE DATE:  05/29/2024          INTERPRETATION:  An AP chest radiograph was performed for sepsis.    Comparison is made to 2/6/2024.    There is a shallow inspiration with low lung volumes and the patient is   rotated to the left. There is no definite evidence of infiltrate on   either side. There is suggestion of very mild subsegmental atelectasis in   the peripheral left lower lobe. There is no pneumothorax. No pleural   fluid is seen. There is no hilar or mediastinal widening. The cardiac   silhouette is likely magnified by technique, and while noting left   coronary artery stents, there is no pulmonary edema. There are   degenerative changes of thethoracic spine.    IMPRESSION:  1. Suspected mild subsegmental atelectasis in the left lower lobe, with   no evidence of infiltrate.  2. Left coronary artery stent and atherosclerosis, without CHF.

## 2024-06-03 NOTE — PHYSICAL THERAPY INITIAL EVALUATION ADULT - MODALITIES TREATMENT COMMENTS
pt left seated in chair post Eval; chair alarm, O2 2L/min nc in place; callbell in reach; pt instructed not to get up alone; call nursing for assist; sammy well; denied pain; isolation maintained; RN Mushtaq made aware pt OOB

## 2024-06-03 NOTE — PROGRESS NOTE ADULT - ASSESSMENT
87 y/o female with PMH of CAD, dementia , CKD ,  recent influenza who presents to ED on 5/29/24 with o generalized malaise associated with cough, subjective fever, hypoxia admitted due to:    #Acute hypoxic respiratory failure in setting of COVID viral infection , LLL atelectasis  -  continuous pulse ox and supplemental O2   - CXR - LLL atelectasis   - continue with dexamethasone#4  - D-dimer within normal limits when adjusted for age,  repeat wnl   - doppler LE to r/o DVT  - Neg  - Turn and reposition every 2 hours, Out of bed and ambulate with assistance  - Isolation precautions, Aspiration precautions, Fall precautions  - Incentive spirometry  - Healthcare proxy declining initiation of remdesivir  - Robitussin as needed  - Nebulizers as needed–patient uses this twice a day at home  - Pulm consult appreciated   - Home O2 evaluation prior to D/C     #CKD   - Renally dose meds, Avoid nephrotoxic medications  - Baseline creatinine approximately 1.4, today 1.36–at baseline  - Creatinine Trend: 1.29<--, 1.17<--, 1.36<--    #CAD status post PCI  - c/w plavix,  statin,  olmesartan,  metoprolol    #Dementia   - bed alarm, fall and aspiration precautions   - c/w carbidopa/levidopa  - c/w xanax standing and prn  - c/w paroxetine    # Mildly elevated glucose   - due to IV steroids  - check A1C 6/2      Heparin subcu for DVT prophylaxis    DISPO: cont O2, iv steroids  , labs in am, home o2 prior to D/C     Full code   pt lacking capacity to make decisions due to dementia  HCP Bill son [phone]   d/c home once better

## 2024-06-04 ENCOUNTER — TRANSCRIPTION ENCOUNTER (OUTPATIENT)
Age: 89
End: 2024-06-04

## 2024-06-04 VITALS
TEMPERATURE: 98 F | SYSTOLIC BLOOD PRESSURE: 126 MMHG | OXYGEN SATURATION: 94 % | DIASTOLIC BLOOD PRESSURE: 74 MMHG | RESPIRATION RATE: 18 BRPM | HEART RATE: 57 BPM

## 2024-06-04 PROCEDURE — 99239 HOSP IP/OBS DSCHRG MGMT >30: CPT

## 2024-06-04 RX ORDER — ERGOCALCIFEROL 1.25 MG/1
1 CAPSULE ORAL
Refills: 0 | DISCHARGE

## 2024-06-04 RX ORDER — IBUPROFEN 200 MG
2 TABLET ORAL
Refills: 0 | DISCHARGE

## 2024-06-04 RX ORDER — DEXAMETHASONE 0.5 MG/5ML
1 ELIXIR ORAL
Qty: 5 | Refills: 0
Start: 2024-06-04 | End: 2024-06-08

## 2024-06-04 RX ORDER — DEXAMETHASONE 0.5 MG/5ML
1 ELIXIR ORAL
Qty: 5 | Refills: 0 | DISCHARGE
Start: 2024-06-04

## 2024-06-04 RX ADMIN — CLOPIDOGREL BISULFATE 75 MILLIGRAM(S): 75 TABLET, FILM COATED ORAL at 11:24

## 2024-06-04 RX ADMIN — DONEPEZIL HYDROCHLORIDE 5 MILLIGRAM(S): 10 TABLET, FILM COATED ORAL at 11:23

## 2024-06-04 RX ADMIN — CARBIDOPA AND LEVODOPA 1 TABLET(S): 25; 100 TABLET ORAL at 05:40

## 2024-06-04 RX ADMIN — Medication 6 MILLIGRAM(S): at 11:23

## 2024-06-04 RX ADMIN — HEPARIN SODIUM 5000 UNIT(S): 5000 INJECTION INTRAVENOUS; SUBCUTANEOUS at 11:22

## 2024-06-04 RX ADMIN — CARBIDOPA AND LEVODOPA 1 TABLET(S): 25; 100 TABLET ORAL at 16:48

## 2024-06-04 RX ADMIN — Medication 25 MILLIGRAM(S): at 11:23

## 2024-06-04 RX ADMIN — Medication 40 MILLIGRAM(S): at 11:23

## 2024-06-04 RX ADMIN — LOSARTAN POTASSIUM 50 MILLIGRAM(S): 100 TABLET, FILM COATED ORAL at 11:23

## 2024-06-04 RX ADMIN — CARBIDOPA AND LEVODOPA 1 TABLET(S): 25; 100 TABLET ORAL at 11:24

## 2024-06-04 RX ADMIN — ALBUTEROL 2.5 MILLIGRAM(S): 90 AEROSOL, METERED ORAL at 09:34

## 2024-06-04 RX ADMIN — HEPARIN SODIUM 5000 UNIT(S): 5000 INJECTION INTRAVENOUS; SUBCUTANEOUS at 05:40

## 2024-06-04 NOTE — DISCHARGE NOTE PROVIDER - NSDCCPCAREPLAN_GEN_ALL_CORE_FT
PRINCIPAL DISCHARGE DIAGNOSIS  Diagnosis: Acute hypoxic respiratory failure  Assessment and Plan of Treatment: Patient got treatment with IV steroid and Nasal Canula Oxygen. Patient is improved now, Will finish 5 more days of PO Dexamethasone at home, Will need Home Oxygen therapy.      SECONDARY DISCHARGE DIAGNOSES  Diagnosis: Pneumonia due to COVID-19 virus  Assessment and Plan of Treatment: Patient got treatment with IV steroid and Nasal Canula Oxygen. Patient is improved now, Will finish 5 more days of PO Dexamethasone at home, Will need Home Oxygen therapy.

## 2024-06-04 NOTE — PROGRESS NOTE ADULT - ASSESSMENT
89y old Female with PMH CAD status post PCI, dementia, hypertension, hard of hearing, PAD, TIA, with covid-19  1. hypoxic resp failure, covid-19  -patient son declines remdesivir and is aware of risks of doing so  -continue dexamethasone  -continue supplemental o2. 2 eval prior to d/c  -incentive spriometry  -ambulate, oob to chair  -continue albuterol neb  2. ckd: continue to monito  3. cad s/p pci: continue home meds  4. dementia: per primary team    89y old Female with PMH CAD status post PCI, dementia, hypertension, hard of hearing, PAD, TIA, with covid-19  1. hypoxic resp failure, covid-19  -patient son declines remdesivir and is aware of risks of doing so  -completed course of dexamethasone  -continue supplemental o2. 2 eval prior to d/c  -incentive spriometry  -ambulate, oob to chair  -continue albuterol neb  2. ckd: continue to monito  3. cad s/p pci: continue home meds  4. dementia: per primary team

## 2024-06-04 NOTE — DISCHARGE NOTE NURSING/CASE MANAGEMENT/SOCIAL WORK - PATIENT PORTAL LINK FT
You can access the FollowMyHealth Patient Portal offered by [facility] by registering at the following website: [email or web address]. By joining [facility]’s FollowMyHealth portal, you will also be able to view your health information using other applications (apps) compatible with our system.

## 2024-06-04 NOTE — DISCHARGE NOTE NURSING/CASE MANAGEMENT/SOCIAL WORK - NSSCNAMETXT_GEN_ALL_CORE
[facility]  36.3 WGA female born via repeat Cesarian section due to elevated BP to 26 y/o [id number] mother. GBS u/k. no labor. AROM at delivery- clear. HIV, Hep B negative. Rubella immune. Hx of syphillis that was treated- FTA reactive, RPR negative. C/S due to preeclamptic range BPs. HELLP labs WNL. Baby born at 18:23, required stimulation at birth, was warmed, dried and suctioned. APGAR's 9/9.     Physcial Exam:   Gen: No Acute Distress, alert, active, afebrile  HEENT: Normal cephalic/ atraumatic, moist mucus membranes, anterior fontanel open soft and flat. No cleft lip/palate, ears normal set, no ear pits or tags. no lesions in mouth/throat.  Red reflex positive bilaterally, nares clinically patent.  Resp: good air entry and clear to auscultation bilaterally, no increased work of breathing.  Cardiac: Normal s1/s2, regular rate and rhythm, no murmurs, rubs or gallops, 2+ femoral pulses bilaterally  Abd: soft, non tender, non distended, normal bowel sounds, no organomegaly.  umbilicus clear/dry/intact.  Neuro: +grasp/suck/eugenia/babinski  Ortho: negative Bartlow and Ortlani, full range of motion x 4, no crepitus  Skin: no rash, pink  Genitourinary: Normal female anatomy.  Agustin 1 36.3 WGA female born via repeat Cesarian section due to elevated BP to 26 y/o [id number] mother. GBS u/k. no labor. AROM at delivery- clear. HIV, Hep B negative. Rubella immune. Hx of syphillis that was treated- FTA reactive, RPR negative. C/S due to preeclamptic range BPs. HELLP labs WNL. Baby born at 18:23, required stimulation at birth, was warmed, dried and suctioned. APGAR's 9/9.     Physcial Exam:   Gen: No Acute Distress, alert, active, afebrile  HEENT: Normal cephalic/ atraumatic, moist mucus membranes, anterior fontanel open soft and flat. No cleft lip/palate, ears normal set, no ear pits or tags. no lesions in mouth/throat.  Nares clinically patent.  Resp: good air entry and clear to auscultation bilaterally, no increased work of breathing.  Cardiac: Normal s1/s2, regular rate and rhythm, no murmurs, rubs or gallops, 2+ femoral pulses bilaterally  Abd: soft, non tender, non distended, normal bowel sounds, no organomegaly.  umbilicus clear/dry/intact.  Neuro: +grasp/suck/eugenia/babinski  Ortho: negative Bartlow and Ortlani, full range of motion x 4, no crepitus  Skin: no rash, pink  Genitourinary: Normal female anatomy.  Agustin 1

## 2024-06-04 NOTE — DISCHARGE NOTE PROVIDER - CARE PROVIDERS DIRECT ADDRESSES
[email or web address]

## 2024-06-04 NOTE — CHART NOTE - NSCHARTNOTEFT_GEN_A_CORE
Dx : Acute Hypoxic respiratory failure due to COVID 19   - Room air pulse ox at rest: 89%  - Room air Pulse Ox while ambulatin%  - Pulse ox while ambulating on 2 liters Nasal Canula Oxygen : 95%    Patient will require home O2 for discharge.  Patient is aware and agreeable to home O2.  Patient is in a Acute Hypoxic Respiratory failure due to COVID 19.
HOME O2 EVALUATION    Pulse Ox Room Air Rest:89    Pulse Ox Room Air Ambulatin    Pulse Ox on O2   2       L Ambulatin    Pulse Ox Post Ambulation:95 on o2 2l

## 2024-06-04 NOTE — PROGRESS NOTE ADULT - SUBJECTIVE AND OBJECTIVE BOX
Subjective:    Review of Systems:  All 10 systems reviewed in detailed and found to be negative with the exception of what has already been described above    Allergies:  doxycycline (Other)  Biaxin (Other)  penicillins (Other)  verapamil (Other)  tetracyclines (Unknown)  Indocin (Other)  Demerol HCl (Other)    Meds  MEDICATIONS  (STANDING):  albuterol    0.083% 2.5 milliGRAM(s) Nebulizer two times a day  ALPRAZolam 0.25 milliGRAM(s) Oral at bedtime  carbidopa/levodopa  25/100 1 Tablet(s) Oral <User Schedule>  carbidopa/levodopa CR 25/100 1 Tablet(s) Oral at bedtime  chlorhexidine 4% Liquid 1 Application(s) Topical <User Schedule>  clopidogrel Tablet 75 milliGRAM(s) Oral daily  dexAMETHasone  Injectable 6 milliGRAM(s) IV Push daily  donepezil 5 milliGRAM(s) Oral daily  heparin   Injectable 5000 Unit(s) SubCutaneous every 8 hours  losartan 50 milliGRAM(s) Oral daily  metoprolol succinate ER 25 milliGRAM(s) Oral daily  OLANZapine 2.5 milliGRAM(s) Oral at bedtime  PARoxetine 40 milliGRAM(s) Oral daily  rosuvastatin 40 milliGRAM(s) Oral at bedtime    MEDICATIONS  (PRN):  acetaminophen     Tablet .. 650 milliGRAM(s) Oral every 4 hours PRN Temp greater or equal to 38C (100.4F), Mild Pain (1 - 3), Moderate Pain (4 - 6)  ALPRAZolam 0.125 milliGRAM(s) Oral at bedtime PRN auditory hallucination  guaifenesin/dextromethorphan Oral Liquid 5 milliLiter(s) Oral every 4 hours PRN Cough  ibuprofen  Tablet. 200 milliGRAM(s) Oral every 8 hours PRN Mild Pain (1 - 3), Moderate Pain (4 - 6)  ondansetron Injectable 4 milliGRAM(s) IV Push every 6 hours PRN Nausea and/or Vomiting    Physical Exam  T(C): 36.8 (06-04-24 @ 08:36), Max: 36.8 (06-03-24 @ 17:09)  HR: 103 (06-04-24 @ 08:36) (58 - 103)  BP: 142/79 (06-04-24 @ 08:36) (111/60 - 142/79)  RR: 18 (06-04-24 @ 08:36) (18 - 19)  SpO2: 96% (06-04-24 @ 08:36) (94% - 96%)  Gen: Alert, forgetful no distress  HEENT: Anicteric sclera, moist mucous membranes  Cardio: Regular rhythm and rate, normal S1S2, no murmurs  Resp: Clear to auscultation bilaterally, no wheezing or rhonchi  GI: Nontender, nondistended, normoactive bowel sounds  Ext: No cyanosis, clubbing or edema  Neuro: Nonfocal    Labs:                        12.1   9.23  )-----------( 241      ( 03 Jun 2024 07:13 )             37.0     06-03    138  |  110<H>  |  28<H>  ----------------------------<  120<H>  3.9   |  25  |  1.12    Ca    10.1      03 Jun 2024 07:13  Phos  1.9     06-03  Mg     2.3     06-03    TPro  7.3  /  Alb  3.1<L>  /  TBili  0.8  /  DBili  x   /  AST  22  /  ALT  9<L>  /  AlkPhos  136<H>  06-03      Urinalysis Basic - ( 03 Jun 2024 07:13 )    Color: x / Appearance: x / SG: x / pH: x  Gluc: 120 mg/dL / Ketone: x  / Bili: x / Urobili: x   Blood: x / Protein: x / Nitrite: x   Leuk Esterase: x / RBC: x / WBC x   Sq Epi: x / Non Sq Epi: x / Bacteria: x    < from: Xray Chest 1 View-PORTABLE IMMEDIATE (05.29.24 @ 14:11) >  PROCEDURE DATE:  05/29/2024          INTERPRETATION:  An AP chest radiograph was performed for sepsis.    Comparison is made to 2/6/2024.    There is a shallow inspiration with low lung volumes and the patient is   rotated to the left. There is no definite evidence of infiltrate on   either side. There is suggestion of very mild subsegmental atelectasis in   the peripheral left lower lobe. There is no pneumothorax. No pleural   fluid is seen. There is no hilar or mediastinal widening. The cardiac   silhouette is likely magnified by technique, and while noting left   coronary artery stents, there is no pulmonary edema. There are   degenerative changes of thethoracic spine.    IMPRESSION:  1. Suspected mild subsegmental atelectasis in the left lower lobe, with   no evidence of infiltrate.  2. Left coronary artery stent and atherosclerosis, without CHF.    < from: US Duplex Venous Lower Ext Complete, Bilateral (06.01.24 @ 18:19) >  PROCEDURE DATE:  06/01/2024          INTERPRETATION:  CLINICAL INFORMATION: Hypoxia. COVID Positive. Evaluate   for DVT.    COMPARISON: None available.    TECHNIQUE: Duplex sonography of the BILATERAL LOWER extremity veins with   color and spectral Doppler, with and without compression.    FINDINGS:    RIGHT:  Normal compressibility of the RIGHT common femoral, femoral and popliteal   veins.  Doppler examination shows normal spontaneous and phasic flow.  No RIGHT calf vein thrombosis is detected.    LEFT:  Normal compressibility of the LEFT common femoral, femoral and popliteal   veins.  Doppler examination shows normal spontaneous and phasic flow.  NoLEFT calf vein thrombosis is detected.    IMPRESSION:  No evidence of deep venous thrombosis in either lower extremity.    < end of copied text >   Subjective:  doing well  getting ready for d/c  completed course of methylprednisolone    Review of Systems:  All 10 systems reviewed in detailed and found to be negative with the exception of what has already been described above    Allergies:  doxycycline (Other)  Biaxin (Other)  penicillins (Other)  verapamil (Other)  tetracyclines (Unknown)  Indocin (Other)  Demerol HCl (Other)    Meds  MEDICATIONS  (STANDING):  albuterol    0.083% 2.5 milliGRAM(s) Nebulizer two times a day  ALPRAZolam 0.25 milliGRAM(s) Oral at bedtime  carbidopa/levodopa  25/100 1 Tablet(s) Oral <User Schedule>  carbidopa/levodopa CR 25/100 1 Tablet(s) Oral at bedtime  chlorhexidine 4% Liquid 1 Application(s) Topical <User Schedule>  clopidogrel Tablet 75 milliGRAM(s) Oral daily  dexAMETHasone  Injectable 6 milliGRAM(s) IV Push daily  donepezil 5 milliGRAM(s) Oral daily  heparin   Injectable 5000 Unit(s) SubCutaneous every 8 hours  losartan 50 milliGRAM(s) Oral daily  metoprolol succinate ER 25 milliGRAM(s) Oral daily  OLANZapine 2.5 milliGRAM(s) Oral at bedtime  PARoxetine 40 milliGRAM(s) Oral daily  rosuvastatin 40 milliGRAM(s) Oral at bedtime    MEDICATIONS  (PRN):  acetaminophen     Tablet .. 650 milliGRAM(s) Oral every 4 hours PRN Temp greater or equal to 38C (100.4F), Mild Pain (1 - 3), Moderate Pain (4 - 6)  ALPRAZolam 0.125 milliGRAM(s) Oral at bedtime PRN auditory hallucination  guaifenesin/dextromethorphan Oral Liquid 5 milliLiter(s) Oral every 4 hours PRN Cough  ibuprofen  Tablet. 200 milliGRAM(s) Oral every 8 hours PRN Mild Pain (1 - 3), Moderate Pain (4 - 6)  ondansetron Injectable 4 milliGRAM(s) IV Push every 6 hours PRN Nausea and/or Vomiting    Physical Exam  T(C): 36.8 (06-04-24 @ 08:36), Max: 36.8 (06-03-24 @ 17:09)  HR: 103 (06-04-24 @ 08:36) (58 - 103)  BP: 142/79 (06-04-24 @ 08:36) (111/60 - 142/79)  RR: 18 (06-04-24 @ 08:36) (18 - 19)  SpO2: 96% (06-04-24 @ 08:36) (94% - 96%)  Gen: Alert, forgetful no distress  HEENT: Anicteric sclera, moist mucous membranes  Cardio: Regular rhythm and rate, normal S1S2, no murmurs  Resp: Clear to auscultation bilaterally, no wheezing or rhonchi  GI: Nontender, nondistended, normoactive bowel sounds  Ext: No cyanosis, clubbing or edema  Neuro: Nonfocal    Labs:                        12.1   9.23  )-----------( 241      ( 03 Jun 2024 07:13 )             37.0     06-03    138  |  110<H>  |  28<H>  ----------------------------<  120<H>  3.9   |  25  |  1.12    Ca    10.1      03 Jun 2024 07:13  Phos  1.9     06-03  Mg     2.3     06-03    TPro  7.3  /  Alb  3.1<L>  /  TBili  0.8  /  DBili  x   /  AST  22  /  ALT  9<L>  /  AlkPhos  136<H>  06-03      Urinalysis Basic - ( 03 Jun 2024 07:13 )    Color: x / Appearance: x / SG: x / pH: x  Gluc: 120 mg/dL / Ketone: x  / Bili: x / Urobili: x   Blood: x / Protein: x / Nitrite: x   Leuk Esterase: x / RBC: x / WBC x   Sq Epi: x / Non Sq Epi: x / Bacteria: x    < from: Xray Chest 1 View-PORTABLE IMMEDIATE (05.29.24 @ 14:11) >  PROCEDURE DATE:  05/29/2024          INTERPRETATION:  An AP chest radiograph was performed for sepsis.    Comparison is made to 2/6/2024.    There is a shallow inspiration with low lung volumes and the patient is   rotated to the left. There is no definite evidence of infiltrate on   either side. There is suggestion of very mild subsegmental atelectasis in   the peripheral left lower lobe. There is no pneumothorax. No pleural   fluid is seen. There is no hilar or mediastinal widening. The cardiac   silhouette is likely magnified by technique, and while noting left   coronary artery stents, there is no pulmonary edema. There are   degenerative changes of thethoracic spine.    IMPRESSION:  1. Suspected mild subsegmental atelectasis in the left lower lobe, with   no evidence of infiltrate.  2. Left coronary artery stent and atherosclerosis, without CHF.    < from: US Duplex Venous Lower Ext Complete, Bilateral (06.01.24 @ 18:19) >  PROCEDURE DATE:  06/01/2024          INTERPRETATION:  CLINICAL INFORMATION: Hypoxia. COVID Positive. Evaluate   for DVT.    COMPARISON: None available.    TECHNIQUE: Duplex sonography of the BILATERAL LOWER extremity veins with   color and spectral Doppler, with and without compression.    FINDINGS:    RIGHT:  Normal compressibility of the RIGHT common femoral, femoral and popliteal   veins.  Doppler examination shows normal spontaneous and phasic flow.  No RIGHT calf vein thrombosis is detected.    LEFT:  Normal compressibility of the LEFT common femoral, femoral and popliteal   veins.  Doppler examination shows normal spontaneous and phasic flow.  NoLEFT calf vein thrombosis is detected.    IMPRESSION:  No evidence of deep venous thrombosis in either lower extremity.    < end of copied text >

## 2024-06-04 NOTE — DISCHARGE NOTE PROVIDER - HOSPITAL COURSE
88-year-old female with past medical history of CAD status post PCI, dementia, hypertension, hard of hearing, PAD, TIA, recent discharge due to influenza A on February 9, who presents to ED secondary to increasing generalized malaise and fatigue over the past 5 days with subjective fevers at home sore throat, coughing, and 1 episode of watery stool.  Patient's son at bedside to provide history as patient with dementia.  Patient arrived to ED with pulse ox in 80s which resolved after 2 L nasal cannula in place.  Patient found to be COVID-positive and admitted to medicine.    Patient was admitted with Acute Hypoxic respiratory failure due to COVID 19. She was treated with IV Steroid. HCP refused Remdesevir. Patient is improved significantly. She is not requiring Oxygen as rest. But with exercise/ambulation she is requiring oxygen. Will discharge patient home with home oxygen. She is stable for discharge.     Patient is seen and examined today.   Vital Signs Last 24 Hrs  T(C): 36.8 (04 Jun 2024 08:36), Max: 36.8 (03 Jun 2024 17:09)  T(F): 98.2 (04 Jun 2024 08:36), Max: 98.2 (03 Jun 2024 17:09)  HR: 103 (04 Jun 2024 08:36) (58 - 103)  BP: 142/79 (04 Jun 2024 08:36) (111/60 - 142/79)  BP(mean): --  RR: 18 (04 Jun 2024 08:36) (18 - 19)  SpO2: 96% (04 Jun 2024 08:36) (94% - 96%)    Parameters below as of 03 Jun 2024 23:42  Patient On (Oxygen Delivery Method): room air    PHYSICAL EXAM:  GENERAL: NAD, lying in bed comfortably  HEAD:  Atraumatic, Normocephalic  EYES: conjunctiva and sclera clear  ENT: Moist mucous membranes  NECK: Supple, No JVD  CHEST/LUNG: Clear to auscultation bilaterally; No rales, rhonchi, wheezing. Unlabored respirations  HEART: Regular rate and rhythm; No murmurs  ABDOMEN: Bowel sounds present; Soft, Nontender, Nondistended.   EXTREMITIES:  2+ Peripheral Pulses, brisk capillary refill. No clubbing, cyanosis, or edema  NERVOUS SYSTEM:  Alert & Oriented X3, speech clear. No deficits   MSK: FROM all 4 extremities, full and equal strength      #Acute hypoxic respiratory failure in setting of COVID viral infection , LLL atelectasis  - improved  - Need Home O2 arrnagement prior to D/C     #CKD   - Cr stable     #CAD status post PCI  - c/w plavix,  statin,  olmesartan,  metoprolol    #Dementia   - c/w carbidopa/levidopa  - c/w xanax standing and prn  - c/w paroxetine

## 2024-06-04 NOTE — DISCHARGE NOTE PROVIDER - CARE PROVIDER_API CALL
Lidna Ott  Internal Medicine  [address] [place] [id number]  Phone: [phone]  Fax: [phone]  Established Patient  Follow Up Time:     Ricardo Philip  Pulmonary Disease  [address] [place] [id number]  Phone: [phone]  Fax: [phone]  Follow Up Time:

## 2024-06-04 NOTE — DISCHARGE NOTE PROVIDER - NSDCMRMEDTOKEN_GEN_ALL_CORE_FT
albuterol 2.5 mg/3 mL (0.083%) inhalation solution: 3 milliliter(s) by nebulizer 2 times a day in the afternoon (12 - 1pm) and the evening  ALPRAZolam 0.25 mg oral tablet: 1 tab(s) orally once a day (at bedtime) , pt take 1/2 to 1 tab at bedtime.  ALPRAZolam 0.25 mg oral tablet: 0.5 tab(s) orally every 15 minutes as needed for auditory hallucination .  Pt receives 1/2 tab when she hears voices and repeat every 15 minutes as needed.  carbidopa-levodopa 25 mg-100 mg oral tablet: 1 tab(s) orally 3 times a day  carbidopa-levodopa 25 mg-100 mg oral tablet, extended release: 1 tab(s) orally once a day (at bedtime)  clopidogrel 75 mg oral tablet: 1 tab(s) orally once a day (in the evening)  dexAMETHasone 6 mg oral tablet: 1 tab(s) orally once a day  donepezil 5 mg oral tablet: 1 tab(s) orally once a day  metoprolol succinate 25 mg oral tablet, extended release: 2 tab(s) orally once a day (in the evening)  OLANZapine 2.5 mg oral tablet: 1 tab(s) orally once a day (in the evening)  olmesartan 20 mg oral tablet: 1 tab(s) orally once a day  PARoxetine 40 mg oral tablet: 1 tab(s) orally once a day (in the evening)  rosuvastatin 40 mg oral tablet: 1 tab(s) orally once a day

## 2024-06-04 NOTE — DISCHARGE NOTE NURSING/CASE MANAGEMENT/SOCIAL WORK - NSDCPEFALRISK_GEN_ALL_CORE
For information on Fall & Injury Prevention, visit: [email or web address] OR  [email or web address] OR  https://www.cdc.gov/steadi/patient.html

## 2024-06-04 NOTE — DISCHARGE NOTE PROVIDER - DETAILS OF MALNUTRITION DIAGNOSIS/DIAGNOSES
This patient has been assessed with a concern for Malnutrition and was treated during this hospitalization for the following Nutrition diagnosis/diagnoses:     -  05/30/2024: Moderate protein-calorie malnutrition

## 2024-06-09 ENCOUNTER — INPATIENT (INPATIENT)
Facility: HOSPITAL | Age: 89
LOS: 3 days | Discharge: HOME HEALTH | DRG: 310 | End: 2024-06-13
Attending: HOSPITALIST | Admitting: STUDENT IN AN ORGANIZED HEALTH CARE EDUCATION/TRAINING PROGRAM
Payer: MEDICARE

## 2024-06-09 VITALS
RESPIRATION RATE: 16 BRPM | SYSTOLIC BLOOD PRESSURE: 113 MMHG | DIASTOLIC BLOOD PRESSURE: 54 MMHG | HEIGHT: 64 IN | HEART RATE: 90 BPM | OXYGEN SATURATION: 96 % | TEMPERATURE: 98 F

## 2024-06-09 DIAGNOSIS — R00.1 BRADYCARDIA, UNSPECIFIED: ICD-10-CM

## 2024-06-09 DIAGNOSIS — Z90.49 ACQUIRED ABSENCE OF OTHER SPECIFIED PARTS OF DIGESTIVE TRACT: Chronic | ICD-10-CM

## 2024-06-09 DIAGNOSIS — Z90.89 ACQUIRED ABSENCE OF OTHER ORGANS: Chronic | ICD-10-CM

## 2024-06-09 DIAGNOSIS — G93.41 METABOLIC ENCEPHALOPATHY: ICD-10-CM

## 2024-06-09 DIAGNOSIS — G20.A1 PARKINSON'S DISEASE WITHOUT DYSKINESIA, WITHOUT MENTION OF FLUCTUATIONS: ICD-10-CM

## 2024-06-09 DIAGNOSIS — U07.1 COVID-19: ICD-10-CM

## 2024-06-09 DIAGNOSIS — R65.10 SYSTEMIC INFLAMMATORY RESPONSE SYNDROME (SIRS) OF NON-INFECTIOUS ORIGIN WITHOUT ACUTE ORGAN DYSFUNCTION: ICD-10-CM

## 2024-06-09 DIAGNOSIS — Z90.710 ACQUIRED ABSENCE OF BOTH CERVIX AND UTERUS: Chronic | ICD-10-CM

## 2024-06-09 DIAGNOSIS — I25.10 ATHEROSCLEROTIC HEART DISEASE OF NATIVE CORONARY ARTERY WITHOUT ANGINA PECTORIS: ICD-10-CM

## 2024-06-09 DIAGNOSIS — F03.90 UNSPECIFIED DEMENTIA, UNSPECIFIED SEVERITY, WITHOUT BEHAVIORAL DISTURBANCE, PSYCHOTIC DISTURBANCE, MOOD DISTURBANCE, AND ANXIETY: ICD-10-CM

## 2024-06-09 LAB
ALBUMIN SERPL ELPH-MCNC: 2.9 G/DL — LOW (ref 3.3–5)
ALP SERPL-CCNC: 130 U/L — HIGH (ref 40–120)
ALT FLD-CCNC: 28 U/L — SIGNIFICANT CHANGE UP (ref 12–78)
ANION GAP SERPL CALC-SCNC: 3 MMOL/L — LOW (ref 5–17)
APPEARANCE UR: ABNORMAL
AST SERPL-CCNC: 71 U/L — HIGH (ref 15–37)
BASOPHILS # BLD AUTO: 0.02 K/UL — SIGNIFICANT CHANGE UP (ref 0–0.2)
BASOPHILS NFR BLD AUTO: 0.1 % — SIGNIFICANT CHANGE UP (ref 0–2)
BILIRUB SERPL-MCNC: 1.1 MG/DL — SIGNIFICANT CHANGE UP (ref 0.2–1.2)
BILIRUB UR-MCNC: NEGATIVE — SIGNIFICANT CHANGE UP
BUN SERPL-MCNC: 30 MG/DL — HIGH (ref 7–23)
CALCIUM SERPL-MCNC: 9.7 MG/DL — SIGNIFICANT CHANGE UP (ref 8.5–10.1)
CHLORIDE SERPL-SCNC: 108 MMOL/L — SIGNIFICANT CHANGE UP (ref 96–108)
CO2 SERPL-SCNC: 24 MMOL/L — SIGNIFICANT CHANGE UP (ref 22–31)
COLOR SPEC: YELLOW — SIGNIFICANT CHANGE UP
CREAT SERPL-MCNC: 1.13 MG/DL — SIGNIFICANT CHANGE UP (ref 0.5–1.3)
DIFF PNL FLD: NEGATIVE — SIGNIFICANT CHANGE UP
EGFR: 47 ML/MIN/1.73M2 — LOW
EOSINOPHIL # BLD AUTO: 0 K/UL — SIGNIFICANT CHANGE UP (ref 0–0.5)
EOSINOPHIL NFR BLD AUTO: 0 % — SIGNIFICANT CHANGE UP (ref 0–6)
GLUCOSE SERPL-MCNC: 98 MG/DL — SIGNIFICANT CHANGE UP (ref 70–99)
GLUCOSE UR QL: NEGATIVE MG/DL — SIGNIFICANT CHANGE UP
HCT VFR BLD CALC: 36 % — SIGNIFICANT CHANGE UP (ref 34.5–45)
HGB BLD-MCNC: 11.7 G/DL — SIGNIFICANT CHANGE UP (ref 11.5–15.5)
IMM GRANULOCYTES NFR BLD AUTO: 1.3 % — HIGH (ref 0–0.9)
KETONES UR-MCNC: NEGATIVE MG/DL — SIGNIFICANT CHANGE UP
LEUKOCYTE ESTERASE UR-ACNC: ABNORMAL
LIDOCAIN IGE QN: 86 U/L — HIGH (ref 13–75)
LYMPHOCYTES # BLD AUTO: 1.9 K/UL — SIGNIFICANT CHANGE UP (ref 1–3.3)
LYMPHOCYTES # BLD AUTO: 13.5 % — SIGNIFICANT CHANGE UP (ref 13–44)
MAGNESIUM SERPL-MCNC: 2.4 MG/DL — SIGNIFICANT CHANGE UP (ref 1.6–2.6)
MCHC RBC-ENTMCNC: 28.7 PG — SIGNIFICANT CHANGE UP (ref 27–34)
MCHC RBC-ENTMCNC: 32.5 GM/DL — SIGNIFICANT CHANGE UP (ref 32–36)
MCV RBC AUTO: 88.2 FL — SIGNIFICANT CHANGE UP (ref 80–100)
MONOCYTES # BLD AUTO: 0.94 K/UL — HIGH (ref 0–0.9)
MONOCYTES NFR BLD AUTO: 6.7 % — SIGNIFICANT CHANGE UP (ref 2–14)
NEUTROPHILS # BLD AUTO: 11.05 K/UL — HIGH (ref 1.8–7.4)
NEUTROPHILS NFR BLD AUTO: 78.4 % — HIGH (ref 43–77)
NITRITE UR-MCNC: NEGATIVE — SIGNIFICANT CHANGE UP
NT-PROBNP SERPL-SCNC: 645 PG/ML — HIGH (ref 0–450)
PH UR: 5.5 — SIGNIFICANT CHANGE UP (ref 5–8)
PLATELET # BLD AUTO: 219 K/UL — SIGNIFICANT CHANGE UP (ref 150–400)
POTASSIUM SERPL-MCNC: 4.6 MMOL/L — SIGNIFICANT CHANGE UP (ref 3.5–5.3)
POTASSIUM SERPL-SCNC: 4.6 MMOL/L — SIGNIFICANT CHANGE UP (ref 3.5–5.3)
PROT SERPL-MCNC: 6.9 GM/DL — SIGNIFICANT CHANGE UP (ref 6–8.3)
PROT UR-MCNC: 100 MG/DL
RBC # BLD: 4.08 M/UL — SIGNIFICANT CHANGE UP (ref 3.8–5.2)
RBC # FLD: 13.8 % — SIGNIFICANT CHANGE UP (ref 10.3–14.5)
SODIUM SERPL-SCNC: 135 MMOL/L — SIGNIFICANT CHANGE UP (ref 135–145)
SP GR SPEC: 1.02 — SIGNIFICANT CHANGE UP (ref 1–1.03)
TROPONIN I, HIGH SENSITIVITY RESULT: 6.12 NG/L — SIGNIFICANT CHANGE UP
TSH SERPL-MCNC: 0.37 UU/ML — SIGNIFICANT CHANGE UP (ref 0.34–4.82)
UROBILINOGEN FLD QL: 1 MG/DL — SIGNIFICANT CHANGE UP (ref 0.2–1)
WBC # BLD: 14.1 K/UL — HIGH (ref 3.8–10.5)
WBC # FLD AUTO: 14.1 K/UL — HIGH (ref 3.8–10.5)

## 2024-06-09 PROCEDURE — 71045 X-RAY EXAM CHEST 1 VIEW: CPT | Mod: 26

## 2024-06-09 PROCEDURE — 81001 URINALYSIS AUTO W/SCOPE: CPT

## 2024-06-09 PROCEDURE — 92526 ORAL FUNCTION THERAPY: CPT | Mod: GN

## 2024-06-09 PROCEDURE — 84443 ASSAY THYROID STIM HORMONE: CPT

## 2024-06-09 PROCEDURE — 85025 COMPLETE CBC W/AUTO DIFF WBC: CPT

## 2024-06-09 PROCEDURE — 83735 ASSAY OF MAGNESIUM: CPT

## 2024-06-09 PROCEDURE — 93306 TTE W/DOPPLER COMPLETE: CPT

## 2024-06-09 PROCEDURE — 97163 PT EVAL HIGH COMPLEX 45 MIN: CPT | Mod: GP

## 2024-06-09 PROCEDURE — 92610 EVALUATE SWALLOWING FUNCTION: CPT | Mod: GN

## 2024-06-09 PROCEDURE — 85027 COMPLETE CBC AUTOMATED: CPT

## 2024-06-09 PROCEDURE — 84436 ASSAY OF TOTAL THYROXINE: CPT

## 2024-06-09 PROCEDURE — 93010 ELECTROCARDIOGRAM REPORT: CPT

## 2024-06-09 PROCEDURE — 80048 BASIC METABOLIC PNL TOTAL CA: CPT

## 2024-06-09 PROCEDURE — 36415 COLL VENOUS BLD VENIPUNCTURE: CPT

## 2024-06-09 PROCEDURE — 99223 1ST HOSP IP/OBS HIGH 75: CPT

## 2024-06-09 PROCEDURE — 93005 ELECTROCARDIOGRAM TRACING: CPT

## 2024-06-09 PROCEDURE — 87040 BLOOD CULTURE FOR BACTERIA: CPT

## 2024-06-09 PROCEDURE — 87798 DETECT AGENT NOS DNA AMP: CPT

## 2024-06-09 PROCEDURE — 84484 ASSAY OF TROPONIN QUANT: CPT

## 2024-06-09 PROCEDURE — 87086 URINE CULTURE/COLONY COUNT: CPT

## 2024-06-09 PROCEDURE — 80053 COMPREHEN METABOLIC PANEL: CPT

## 2024-06-09 PROCEDURE — 84100 ASSAY OF PHOSPHORUS: CPT

## 2024-06-09 PROCEDURE — 99285 EMERGENCY DEPT VISIT HI MDM: CPT

## 2024-06-09 PROCEDURE — 97116 GAIT TRAINING THERAPY: CPT | Mod: GP

## 2024-06-09 RX ORDER — ALPRAZOLAM 0.25 MG
1 TABLET ORAL
Refills: 0 | DISCHARGE

## 2024-06-09 RX ORDER — CARBIDOPA AND LEVODOPA 25; 100 MG/1; MG/1
1 TABLET ORAL THREE TIMES A DAY
Refills: 0 | Status: DISCONTINUED | OUTPATIENT
Start: 2024-06-09 | End: 2024-06-13

## 2024-06-09 RX ORDER — DONEPEZIL HYDROCHLORIDE 10 MG/1
5 TABLET, FILM COATED ORAL AT BEDTIME
Refills: 0 | Status: DISCONTINUED | OUTPATIENT
Start: 2024-06-09 | End: 2024-06-10

## 2024-06-09 RX ORDER — CHOLECALCIFEROL (VITAMIN D3) 125 MCG
1 CAPSULE ORAL
Refills: 0 | DISCHARGE

## 2024-06-09 RX ORDER — CARBIDOPA AND LEVODOPA 25; 100 MG/1; MG/1
1 TABLET ORAL
Refills: 0 | DISCHARGE

## 2024-06-09 RX ORDER — ASCORBIC ACID 60 MG
1 TABLET,CHEWABLE ORAL
Refills: 0 | DISCHARGE

## 2024-06-09 RX ORDER — CLOPIDOGREL BISULFATE 75 MG/1
75 TABLET, FILM COATED ORAL AT BEDTIME
Refills: 0 | Status: DISCONTINUED | OUTPATIENT
Start: 2024-06-09 | End: 2024-06-13

## 2024-06-09 RX ORDER — ALPRAZOLAM 0.25 MG
0.5 TABLET ORAL
Refills: 0 | DISCHARGE

## 2024-06-09 RX ORDER — CARBIDOPA AND LEVODOPA 25; 100 MG/1; MG/1
1 TABLET ORAL ONCE
Refills: 0 | Status: DISCONTINUED | OUTPATIENT
Start: 2024-06-09 | End: 2024-06-09

## 2024-06-09 RX ORDER — CARBIDOPA AND LEVODOPA 25; 100 MG/1; MG/1
1 TABLET ORAL AT BEDTIME
Refills: 0 | Status: DISCONTINUED | OUTPATIENT
Start: 2024-06-09 | End: 2024-06-13

## 2024-06-09 RX ORDER — ALBUTEROL 90 UG/1
3 AEROSOL, METERED ORAL
Refills: 0 | DISCHARGE

## 2024-06-09 RX ORDER — DEXAMETHASONE 0.5 MG/5ML
6 ELIXIR ORAL ONCE
Refills: 0 | Status: COMPLETED | OUTPATIENT
Start: 2024-06-10 | End: 2024-06-10

## 2024-06-09 RX ORDER — ALPRAZOLAM 0.25 MG
0.12 TABLET ORAL AT BEDTIME
Refills: 0 | Status: DISCONTINUED | OUTPATIENT
Start: 2024-06-09 | End: 2024-06-11

## 2024-06-09 RX ORDER — ATORVASTATIN CALCIUM 80 MG/1
80 TABLET, FILM COATED ORAL AT BEDTIME
Refills: 0 | Status: DISCONTINUED | OUTPATIENT
Start: 2024-06-09 | End: 2024-06-13

## 2024-06-09 RX ORDER — OLANZAPINE 15 MG/1
2.5 TABLET, FILM COATED ORAL AT BEDTIME
Refills: 0 | Status: DISCONTINUED | OUTPATIENT
Start: 2024-06-09 | End: 2024-06-12

## 2024-06-09 RX ORDER — ROSUVASTATIN CALCIUM 5 MG/1
1 TABLET ORAL
Qty: 0 | Refills: 0 | DISCHARGE

## 2024-06-09 RX ORDER — ENOXAPARIN SODIUM 100 MG/ML
40 INJECTION SUBCUTANEOUS EVERY 24 HOURS
Refills: 0 | Status: COMPLETED | OUTPATIENT
Start: 2024-06-09 | End: 2024-06-12

## 2024-06-09 RX ADMIN — CARBIDOPA AND LEVODOPA 1 TABLET(S): 25; 100 TABLET ORAL at 22:23

## 2024-06-09 RX ADMIN — CLOPIDOGREL BISULFATE 75 MILLIGRAM(S): 75 TABLET, FILM COATED ORAL at 22:23

## 2024-06-09 RX ADMIN — Medication 0.12 MILLIGRAM(S): at 23:11

## 2024-06-09 NOTE — ED PROVIDER NOTE - OBJECTIVE STATEMENT
89 y/o female with PMHx of Alzheimer's dementia, Parkinson's disease on Sinemet, HTN, HLD, CAD status post PCI, hypertension, [place], PAD, TIA, recent admission 5/29-6/4 discharged on O2 (not using b/c O2 sat good) for covid hypoxia; presents to ED BIBEMS from home c/o bradycardia and "less alert and less talkative" per EMS. Son reports HR 37 lowest, had indigestion x2 days ago but baseline and self-resolved. No fever. Med list: Benicar 20 mg, Metoprolol 25 mg XR

## 2024-06-09 NOTE — ED ADULT NURSE REASSESSMENT NOTE - GENERAL PATIENT STATE
comfortable appearance/improvement verbalized/family/SO at bedside
comfortable appearance/family/SO at bedside

## 2024-06-09 NOTE — H&P ADULT - NSHPLABSRESULTS_GEN_ALL_CORE
Personally reviewed old records.  Personally reviewed labs.  Personally reviewed imaging.  Personally reviewed EKG. SB rate 44. . TWI in V1-V3. Grossly unchanged from prior EKGs.                          11.7   14.10 )-----------( 219      ( 09 Jun 2024 18:42 )             36.0       06-09    135  |  108  |  30<H>  ----------------------------<  98  4.6   |  24  |  1.13    Ca    9.7      09 Jun 2024 18:42  Mg     2.4     06-09    TPro  6.9  /  Alb  2.9<L>  /  TBili  1.1  /  DBili  x   /  AST  71<H>  /  ALT  28  /  AlkPhos  130<H>  06-09            LIVER FUNCTIONS - ( 09 Jun 2024 18:42 )  Alb: 2.9 g/dL / Pro: 6.9 gm/dL / ALK PHOS: 130 U/L / ALT: 28 U/L / AST: 71 U/L / GGT: x                 Urinalysis Basic - ( 09 Jun 2024 18:42 )    Color: x / Appearance: x / SG: x / pH: x  Gluc: 98 mg/dL / Ketone: x  / Bili: x / Urobili: x   Blood: x / Protein: x / Nitrite: x   Leuk Esterase: x / RBC: x / WBC x   Sq Epi: x / Non Sq Epi: x / Bacteria: x

## 2024-06-09 NOTE — ED ADULT NURSE NOTE - NSFALLHARMRISKINTERV_ED_ALL_ED

## 2024-06-09 NOTE — H&P ADULT - ASSESSMENT
89F with past medical history of CAD s/p stent, alzheimer's dementia (A&Ox1-2 baseline), parkinson's disease, HTN, [place], PAD, ALYSON, TIA, recent influenza A (Feb '24) and recent covid (d/c'd 6/4/24) s/p decadron course (last dose tomorrow) and refusal of remdesivir, c/b new hypoxia (hasn't been using the O2 at home), pw sinus bradycardia and lethargy

## 2024-06-09 NOTE — H&P ADULT - NSHPPHYSICALEXAM_GEN_ALL_CORE
PHYSICAL EXAM:   GENERAL: Alert. +confused. No acute distress. Not thin. Not cachectic. Not obese.  HEAD:  Atraumatic. Normocephalic.  EYES: EOMI. PERRLA. Normal conjunctiva/sclera.  ENT: Neck supple. No JVD. Moist oral mucosa. Not edentulous. No thrush.  LYMPH: Normal supraclavicular/cervical lymph nodes.   CARDIAC: Not tachy, +norma. Regular rhythm. Not irregularly irregular. S1. S2. No murmur. No rub. +distant heart sounds.  LUNG/CHEST: BS equal bilaterally. No wheezes. +rales. No rhonchi.  ABDOMEN: Soft. No tenderness. No distension. No fluid wave. Normal bowel sounds.  BACK: No midline/vertebral tenderness. No flank tenderness.  VASCULAR: +2 b/l radial or ulnar pulses. Palpable DP pulses.  EXTREMITIES:  No clubbing. No cyanosis. No edema. Moving all 4.  NEUROLOGY: A&Ox1. Non-focal exam. Cranial nerves intact. Normal speech. Sensation intact.  PSYCH: Normal behavior. Flat affect.    Vital Signs Last 24 Hrs  T(C): 36.4 (09 Jun 2024 16:34), Max: 36.4 (09 Jun 2024 16:34)  T(F): 97.6 (09 Jun 2024 16:34), Max: 97.6 (09 Jun 2024 16:34)  HR: 44 (09 Jun 2024 18:49) (42 - 90)  BP: 149/69 (09 Jun 2024 18:49) (113/54 - 149/69)  BP(mean): --  ABP: --  ABP(mean): --  RR: 18 (09 Jun 2024 18:49) (16 - 20)  SpO2: 96% (09 Jun 2024 18:49) (89% - 96%)    O2 Parameters below as of 09 Jun 2024 18:49  Patient On (Oxygen Delivery Method): room air          I&O's Summary

## 2024-06-09 NOTE — ED ADULT TRIAGE NOTE - CHIEF COMPLAINT QUOTE
Patient presents to the ER from home with complaints of being "less alert" and "less talkative" per EMS. Patient with dementia, discharged from the hospital for covid on Tuesday. Patient found to be bradycardic with EMS in the 40's.

## 2024-06-09 NOTE — ED PROVIDER NOTE - CLINICAL SUMMARY MEDICAL DECISION MAKING FREE TEXT BOX
89 year old female with history of alzheimer dementia, +beta-blocker; presents to ED c/o asymptomatic bradycardia as low as 37,  all other vitals normal, BP normal. No complete heart block. Will check for ischemia, electrolyte derangement, cardiac monitor, and admit for EP eval.

## 2024-06-09 NOTE — H&P ADULT - HISTORY OF PRESENT ILLNESS
89F with past medical history of CAD s/p stent, alzheimer's dementia (A&Ox1-2 baseline), parkinson's disease, HTN, [place], PAD, ALYSON, TIA, recent influenza A (Feb '24) and recent covid (d/c'd 6/4/24) s/p decadron course (last dose tomorrow) and refusal of remdesivir, c/b new hypoxia (hasn't been using the O2 at home), pw bradycardia, lethargy    History from pt's son/HCP Fredis Rene at bedside, and pt's daughter/primary caretaker Claudine also at bedside.  At baseline pt walks with walker without assistance. Pt has poor short term memory, but feeds self and can make needs known.  Starting yesterday, family noticed pt was sleeping more than usual. Today, pt's son placed pulse ox and noticed that although O2 saturation was in the mid to high 90s, pt's HR was in the mid 40s, which is new for patient. Pt also with increased drowsiness, lethargy today. Pt herself has no complaints. Denies fevers, chills, URI symptoms, urinary symptoms, GI symptoms, CP, SOB, palpitations. Pt takes metoprolol ER 50mg at night, and last took dose yesterday. Pt has not taken her metoprolol dose today.

## 2024-06-09 NOTE — PHARMACOTHERAPY INTERVENTION NOTE - COMMENTS
Medication history complete. Medications and allergies reviewed with patient and confirmed with .  Medication history complete. Medications and allergies reviewed with patient's son, Bill at bedside and confirmed with .

## 2024-06-09 NOTE — PATIENT PROFILE ADULT - FALL HARM RISK - HARM RISK INTERVENTIONS
Assistance with ambulation/Assistance OOB with selected safe patient handling equipment/Communicate Risk of Fall with Harm to all staff/Discuss with provider need for PT consult/Monitor for mental status changes/Monitor gait and stability/Move patient closer to nurses' station/Provide patient with walking aids - walker, cane, crutches/Reinforce activity limits and safety measures with patient and family/Reorient to person, place and time as needed/Tailored Fall Risk Interventions/Toileting schedule using arm’s reach rule for commode and bathroom/Use of alarms - bed, chair and/or voice tab/Visual Cue: Yellow wristband and red socks/Bed in lowest position, wheels locked, appropriate side rails in place/Call bell, personal items and telephone in reach/Instruct patient to call for assistance before getting out of bed or chair/Non-slip footwear when patient is out of bed/[place] to call system/Physically safe environment - no spills, clutter or unnecessary equipment/Purposeful Proactive Rounding/Room/bathroom lighting operational, light cord in reach

## 2024-06-09 NOTE — ED ADULT NURSE NOTE - OBJECTIVE STATEMENT
hx dementia, per Son, patient was diagnosed with COVID on Tuesday, states she has been progressively getting weaker and took HR and 02 at home today noted to be low. Patient reports "too tired" to open eyes. Bradycardic to 40s and Hypoxic to 89% on monitor.

## 2024-06-09 NOTE — PATIENT PROFILE ADULT - FUNCTIONAL ASSESSMENT - BASIC MOBILITY 6.
4-calculated by average/Not able to assess (calculate score using [facility] averaging method)

## 2024-06-09 NOTE — H&P ADULT - NSHPSOCIALHISTORY_GEN_ALL_CORE
Social History:    Marital Status: (  ) , (  ) Single, (  ) , (x  ) , (  )   # of Children: 3  Lives with: (  ) alone, ( x ) children, (  ) spouse, (  ) parents, (  ) siblings, (  ) friends, (  ) other:   Occupation:     Substance Use/Illicit Drugs: (  ) never used vs other:   Tobacco Usage: ( x ) never smoked, (  ) former smoker, (  ) current smoker and Total Pack-Years:   Last Alcohol Usage/Frequency/Amount/Withdrawal/Hx of Abuse:  none  Foreign travel:   Animal exposure:

## 2024-06-09 NOTE — H&P ADULT - PROBLEM SELECTOR PLAN 2
- look for poss source infection  - ua, ur cx  - monitor for fevers  - mental status improved currently back to baseline

## 2024-06-09 NOTE — H&P ADULT - UNABLE TO OBTAIN
Duplicate request. Refill already completed.  
Simvastatin 20 MG Tablet   Qty: 45.00    [facility] [place]  
Dementia

## 2024-06-09 NOTE — H&P ADULT - NSHPREVIEWOFSYSTEMS_GEN_ALL_CORE
REVIEW OF SYSTEMS:  CONSTITUTIONAL: +weakness. No fevers. No chills. No rigors. +poor appetite.  ENT: +hearing difficulty. No sore throat. No Sinusitis/rhinorrhea.   NECK: No pain. No stiffness/rigidity.  CARDIAC: No chest pain. No palpitations. No lightheadedness. No syncope.  RESPIRATORY: No cough. No SOB.   GASTROINTESTINAL: No abdominal pain. No nausea. No vomiting. No diarrhea. No constipation.   GENITOURINARY: No dysuria. No frequency. No oliguria.  NEUROLOGICAL: No numbness/tingling. No focal weakness. No headache. +unsteady gait.  BACK: No back pain. +buttock pain from stretcher. No flank pain.  EXTREMITIES: No lower extremity edema. Full ROM. No joint pain.  SKIN: No rashes. No itching. No other lesions.    All other review of systems is negative unless indicated above.  Unless indicated above, unable to assess ROS 2/2

## 2024-06-09 NOTE — H&P ADULT - PROBLEM SELECTOR PLAN 3
- leukocytosis likely contributed to by steroids  - f/u cultures  - hold off empiric abx for now, but low threshold for starting abx

## 2024-06-09 NOTE — PATIENT PROFILE ADULT - FALL HARM RISK - PATIENT NEEDS ASSISTANCE
CHI VARGHESE  : 1947  ACCOUNT:  [id number]  [phone]  PCP: Dr. DIVINA Sal     TODAY'S DATE: 05/10/2018  DICTATED BY:  [DOTTIE Phillips]      CHIEF COMPLAINT: [hospital follow up.]    HPI:    [On 05/10/2018, Chi Varghese, a 70 year old male, presented with no interim cardiac complaints.]    [Patient is here for follow-up.  He sees Dr. Cameron he had an angiogram after an abnormal stress test which showed severe three-vessel coronary disease went for bypass surgery also is requiring lobectomy and AAA repair at some point.  He is here today after surgery and is doing well.  Sternal wound is healing nicely he does not have any chest pain or shortness of breath he sees surgeon's office next week during hospitalization he did have atrial fibrillation and is on amiodarone and maintaining sinus rhythm now.  His simvastatin was switched to atorvastatin for an LDL of 103.  Blood pressure and pulse are stable today mildly anemic anemic.]        RISK FACTORS:  CAD - Smoker  CAD Equivalent - Carotid Artery Disease AAA Peripheral Vasc Disease    REVIEW OF SYSTEMS:    CONS: no fever, chills, or recent weight changes. ENMT: denies difficulties with hearing, otherwise negative. CV: no chest pains. RESP: denies dyspnea, cough or wheezing. INTEG: no new rashes, lesions. MS: no limiting arthritis. NEURO: no localized deficits. HEM/LYMPH: denies easy bruising. ALL: no new allergies.      PAST HISTORY: TIA, subclavian steal syndrome, lung cancer and back surgery    PAST CV HISTORY: 5 vessel CABG 2018, AAA, CAD and dyslipidemia    FAMILY HISTORY: Negative for premature CAD.  SOCIAL HISTORY: SMOKING: Current some day tobacco use, advised to quit and trying to quit. smokes, advised to quit and trying to quit. CAFFEINE: 2 cups coffee daily. ALCOHOL: beer 3-4 per day and beer. EXERCISE: no regular exercise. DIET: no special diet. MARITAL STATUS: . EDUCATION: high school graduate.     ALLERGIES: No Known  Allergies    MEDICATIONS: Selected prescriptions see below    VITAL SIGNS: [B/P - 110/52 , Pulse - 55, Respiration - 18, Weight -  166, Height -   67 , BMI - 26.0 ]    CONS: well developed, well nourished. WEIGHT: BMI parameters reviewed and discussed. HEAD/FACE: no trauma and normocephalic. ENT: mucosa pink and moist. NECK: jugular venous pressure not elevated. RESP: respirations with normal rate and rhythm, clear to auscultation. MS: adequate gait for exercise/testing. EXT: no clubbing or cyanosis and right radial pulse 2+.  SKIN: no rashes, lesions, ulcers.  NEURO/PSYCH: alert and oriented to time, place and person and normal affect.      CV: PALP: PMI not displaced, no lifts and thrills or rub. AUSC:  regularly irregular rhythm, normal S1, S2 and without S3; no pathologic murmurs. ABD AORTA: deferred. FEM: deferred. PEDAL: pedal pulses diminished. EXT: no peripheral edema.     DECISION MAKING:    [Patient is doing well status post bypass surgery for multivessel disease also is requiring lung resection and AAA repair.  These surgeries have not been scheduled but he does have an appointment to see Dr. Cameron end of this month.  I did give him an order for cardiac rehab that he can do when he is recovered from his surgeries.  I did encourage him to do some walking he will eventually need a cholesterol check in 2 months.  He will call sooner if any questions or problems]    ASSESSMENT:  1. History of CABG, x5, 5/2018  2. Atherosclerosis, extremity  3. Carotid artery stenosis  4. TIA  5. Abnormal stress test  6. Aneurysm, abdominal  7. Atherosclerotic Heart Disease Of Native Coronary Artery  8. CVA  9. Hypercholesteremia, pure  10. Smoker      PLAN:  [0 #1 continue same medications for now see the surgeon next week Dr. Cameron following cardiac rehab when ready 0]    PRESCRIPTIONS:   11/06/12 *Metoprolol Succinate 25MG      ONE TABLET BY MOUTH EVERY DAY            05/10/18 Albuterol Sulfate     (5 MG/ML  as directed                               05/10/18 Alfuzosin HCl ER      10MG      1 daily                                  05/10/18 Amiodarone HCl        200MG     1 tab twice daily                        05/10/18 Atorvastatin Calcium  40MG      1 daily                                  05/10/18 BuPROPion HCl ER (XL) 150MG     1 daily                                  05/10/18 DilTIAZem CD          120MG     1 daily                                  05/10/18 Docusate Sodium       100MG     1 cap twice daily                        05/10/18 Famotidine            20MG      1 tab twice daily                        05/10/18 Ferrous Sulfate       325 (65   1 tab 3x daily                           05/10/18 Metoprolol Tartrate   25MG      1 tab twice daily                        05/10/18 Multivitamin Adults             1 daily                                  05/10/18 Norco                 5-325MG   as needed                                05/10/18 PredniSONE            10MG      tapering dose                            05/10/18 Vitamin C             500MG     1 cap 3x daily                           02/17/17 Aspirin               81MG      1 TABLET DAILY                           02/17/17 Plavix                75MG      1 TABLET DAILY                           03/09/11 Doxycycline Hyclate   100MG     1 daily                                           Standing/Walking/Toileting/Moving from bed to chair

## 2024-06-10 ENCOUNTER — RESULT REVIEW (OUTPATIENT)
Age: 89
End: 2024-06-10

## 2024-06-10 LAB
ALBUMIN SERPL ELPH-MCNC: 2.9 G/DL — LOW (ref 3.3–5)
ALP SERPL-CCNC: 125 U/L — HIGH (ref 40–120)
ALT FLD-CCNC: 46 U/L — SIGNIFICANT CHANGE UP (ref 12–78)
ANION GAP SERPL CALC-SCNC: 3 MMOL/L — LOW (ref 5–17)
AST SERPL-CCNC: 79 U/L — HIGH (ref 15–37)
BACTERIA # UR AUTO: ABNORMAL /HPF
BASOPHILS # BLD AUTO: 0.02 K/UL — SIGNIFICANT CHANGE UP (ref 0–0.2)
BASOPHILS NFR BLD AUTO: 0.2 % — SIGNIFICANT CHANGE UP (ref 0–2)
BILIRUB SERPL-MCNC: 1.2 MG/DL — SIGNIFICANT CHANGE UP (ref 0.2–1.2)
BUN SERPL-MCNC: 31 MG/DL — HIGH (ref 7–23)
CALCIUM SERPL-MCNC: 9.8 MG/DL — SIGNIFICANT CHANGE UP (ref 8.5–10.1)
CAST: 2 /LPF — SIGNIFICANT CHANGE UP (ref 0–4)
CHLORIDE SERPL-SCNC: 109 MMOL/L — HIGH (ref 96–108)
CO2 SERPL-SCNC: 25 MMOL/L — SIGNIFICANT CHANGE UP (ref 22–31)
CREAT SERPL-MCNC: 1.11 MG/DL — SIGNIFICANT CHANGE UP (ref 0.5–1.3)
EGFR: 48 ML/MIN/1.73M2 — LOW
EOSINOPHIL # BLD AUTO: 0 K/UL — SIGNIFICANT CHANGE UP (ref 0–0.5)
EOSINOPHIL NFR BLD AUTO: 0 % — SIGNIFICANT CHANGE UP (ref 0–6)
GLUCOSE SERPL-MCNC: 137 MG/DL — HIGH (ref 70–99)
HCT VFR BLD CALC: 35.1 % — SIGNIFICANT CHANGE UP (ref 34.5–45)
HGB BLD-MCNC: 11.6 G/DL — SIGNIFICANT CHANGE UP (ref 11.5–15.5)
IMM GRANULOCYTES NFR BLD AUTO: 1.1 % — HIGH (ref 0–0.9)
LYMPHOCYTES # BLD AUTO: 1.89 K/UL — SIGNIFICANT CHANGE UP (ref 1–3.3)
LYMPHOCYTES # BLD AUTO: 16.3 % — SIGNIFICANT CHANGE UP (ref 13–44)
MAGNESIUM SERPL-MCNC: 2.3 MG/DL — SIGNIFICANT CHANGE UP (ref 1.6–2.6)
MCHC RBC-ENTMCNC: 28.9 PG — SIGNIFICANT CHANGE UP (ref 27–34)
MCHC RBC-ENTMCNC: 33 GM/DL — SIGNIFICANT CHANGE UP (ref 32–36)
MCV RBC AUTO: 87.5 FL — SIGNIFICANT CHANGE UP (ref 80–100)
MONOCYTES # BLD AUTO: 0.45 K/UL — SIGNIFICANT CHANGE UP (ref 0–0.9)
MONOCYTES NFR BLD AUTO: 3.9 % — SIGNIFICANT CHANGE UP (ref 2–14)
NEUTROPHILS # BLD AUTO: 9.1 K/UL — HIGH (ref 1.8–7.4)
NEUTROPHILS NFR BLD AUTO: 78.5 % — HIGH (ref 43–77)
PHOSPHATE SERPL-MCNC: 3.4 MG/DL — SIGNIFICANT CHANGE UP (ref 2.5–4.5)
PLATELET # BLD AUTO: 216 K/UL — SIGNIFICANT CHANGE UP (ref 150–400)
POTASSIUM SERPL-MCNC: 4.4 MMOL/L — SIGNIFICANT CHANGE UP (ref 3.5–5.3)
POTASSIUM SERPL-SCNC: 4.4 MMOL/L — SIGNIFICANT CHANGE UP (ref 3.5–5.3)
PROT SERPL-MCNC: 6.8 GM/DL — SIGNIFICANT CHANGE UP (ref 6–8.3)
RBC # BLD: 4.01 M/UL — SIGNIFICANT CHANGE UP (ref 3.8–5.2)
RBC # FLD: 13.7 % — SIGNIFICANT CHANGE UP (ref 10.3–14.5)
RBC CASTS # UR COMP ASSIST: 1738 /HPF — HIGH (ref 0–4)
SODIUM SERPL-SCNC: 137 MMOL/L — SIGNIFICANT CHANGE UP (ref 135–145)
SQUAMOUS # UR AUTO: 4 /HPF — SIGNIFICANT CHANGE UP (ref 0–5)
T4 AB SER-ACNC: 5 UG/DL — SIGNIFICANT CHANGE UP (ref 4.6–12)
TROPONIN I, HIGH SENSITIVITY RESULT: 5.52 NG/L — SIGNIFICANT CHANGE UP
TSH SERPL-MCNC: 0.41 UU/ML — SIGNIFICANT CHANGE UP (ref 0.34–4.82)
WBC # BLD: 11.59 K/UL — HIGH (ref 3.8–10.5)
WBC # FLD AUTO: 11.59 K/UL — HIGH (ref 3.8–10.5)
WBC UR QL: 5 /HPF — SIGNIFICANT CHANGE UP (ref 0–5)

## 2024-06-10 PROCEDURE — 99232 SBSQ HOSP IP/OBS MODERATE 35: CPT

## 2024-06-10 PROCEDURE — 93306 TTE W/DOPPLER COMPLETE: CPT | Mod: 26

## 2024-06-10 RX ADMIN — Medication 62.5 MILLIMOLE(S): at 06:01

## 2024-06-10 RX ADMIN — OLANZAPINE 2.5 MILLIGRAM(S): 15 TABLET, FILM COATED ORAL at 21:20

## 2024-06-10 RX ADMIN — ENOXAPARIN SODIUM 40 MILLIGRAM(S): 100 INJECTION SUBCUTANEOUS at 05:56

## 2024-06-10 RX ADMIN — CARBIDOPA AND LEVODOPA 1 TABLET(S): 25; 100 TABLET ORAL at 17:33

## 2024-06-10 RX ADMIN — CARBIDOPA AND LEVODOPA 1 TABLET(S): 25; 100 TABLET ORAL at 21:20

## 2024-06-10 RX ADMIN — Medication 40 MILLIGRAM(S): at 00:20

## 2024-06-10 RX ADMIN — ATORVASTATIN CALCIUM 80 MILLIGRAM(S): 80 TABLET, FILM COATED ORAL at 21:19

## 2024-06-10 RX ADMIN — CARBIDOPA AND LEVODOPA 1 TABLET(S): 25; 100 TABLET ORAL at 14:20

## 2024-06-10 RX ADMIN — CARBIDOPA AND LEVODOPA 1 TABLET(S): 25; 100 TABLET ORAL at 08:34

## 2024-06-10 RX ADMIN — Medication 40 MILLIGRAM(S): at 21:20

## 2024-06-10 RX ADMIN — Medication 0.12 MILLIGRAM(S): at 19:31

## 2024-06-10 RX ADMIN — OLANZAPINE 2.5 MILLIGRAM(S): 15 TABLET, FILM COATED ORAL at 00:20

## 2024-06-10 RX ADMIN — Medication 6 MILLIGRAM(S): at 05:53

## 2024-06-10 RX ADMIN — CLOPIDOGREL BISULFATE 75 MILLIGRAM(S): 75 TABLET, FILM COATED ORAL at 21:20

## 2024-06-10 NOTE — DIETITIAN INITIAL EVALUATION ADULT - ORAL INTAKE PTA/DIET HISTORY
Unable to obtain meaningful information 2/2 AMS, dementia. No information per transfer paper work. As per previous RD note on 2/7/24: "Obtained all information from aide at bed-side; pt w/ dementia/AMS. Lives at home w/ daughter (that lives up-stairs); full-time aide normally cook/grocery shop for household. Aide endorses pt w/ good appetite, but is "picky," and consumes 3 meals/day. Normally fair PO intake 50-75% of ENN"

## 2024-06-10 NOTE — PHYSICAL THERAPY INITIAL EVALUATION ADULT - PLANNED THERAPY INTERVENTIONS, PT EVAL
Eval, amb, transfers, bed mobility x 15'. Pt left on stretcher to go down for test with transporter. PAINAD- 0/10. Will cont to follow./bed mobility training/gait training/ROM/stretching/transfer training

## 2024-06-10 NOTE — PROGRESS NOTE ADULT - NUTRITIONAL ASSESSMENT
This patient has been assessed with a concern for Malnutrition and has been determined to have a diagnosis/diagnoses of Moderate protein-calorie malnutrition.    This patient is being managed with:   Diet Regular-  Soft and Bite Sized (SOFTBTSZ)  Supplement Feeding Modality:  Oral  Ensure Plus High Protein Cans or Servings Per Day:  2       Frequency:  Two Times a day  Entered: Joaquín 10 2024 10:59AM

## 2024-06-10 NOTE — SWALLOW BEDSIDE ASSESSMENT ADULT - COMMENTS
89F with past medical history of CAD s/p stent, alzheimer's dementia (A&Ox1-2 baseline), parkinson's disease, HTN, [place], PAD, [place], TIA, recent influenza A (Feb '24) and recent covid (d/c'd 6/4/24) s/p decadron course (last dose tomorrow) and refusal of remdesivir, c/b new hypoxia (hasn't been using the O2 at home), pw bradycardia, lethargy.   History from pt's son/HCP Fredis Monkflyak at bedside, and pt's daughter/primary caretaker Claudine also at bedside.   At baseline pt walks with walker without assistance. Pt has poor short term memory, but feeds self and can make needs known.  Starting yesterday, family noticed pt was sleeping more than usual. Today, pt's son placed pulse ox and noticed that although O2 saturation was in the mid to high 90s, pt's HR was in the mid 40s, which is new for patient. Pt also with increased drowsiness, lethargy today. Pt herself has no complaints. Denies fevers, chills, URI symptoms, urinary symptoms, GI symptoms, CP, SOB, palpitations. Pt takes metoprolol ER 50mg at night, and last took dose yesterday. Pt has not taken her metoprolol dose today.   Service is consulted for po intake.

## 2024-06-10 NOTE — PHYSICAL THERAPY INITIAL EVALUATION ADULT - LEVEL OF INDEPENDENCE: GAIT, REHAB EVAL
show minimum assist (75% patients effort)/moderate assist (50% patients effort) minimum assist (75% patients effort)

## 2024-06-10 NOTE — SWALLOW BEDSIDE ASSESSMENT ADULT - DIET PRIOR TO ADMI
as per pt's son: regular texture;  pt feeds self but is supervised by Aides and by family. regular texture liquid

## 2024-06-10 NOTE — DIETITIAN NUTRITION RISK NOTIFICATION - ADDITIONAL COMMENTS/DIETITIAN RECOMMENDATIONS
1) Liberalize diet to regular to maximize caloric and nutrient intake; c/w soft and bite sized diet for now until seen by SLP  2) Suggest SLP consult to confirm consistency of diet for safest/least restrictive consistency diet  3) Add ensure + HP shake BID (350kcal, 20g protein); currently out of ensure pudding. Will trial high-protein chocolate mousse 1x/day (394kcal, 14g protein)  4) Maintain aspiration precautions, back of bed >35 degrees.  5) Encourage protein-rich foods, maximize food preferences  6) MVI w/ minerals daily to ensure 100% RDA met  7) Consider adding thiamine 100 mg daily 2/2 poor PO intake/ malnutrition  8) Monitor bowel movements, if no BM for >3 days, consider implementing bowel regimen.  9) Monitor lytes/ min and replete prn.  10) Confirm goals of care regarding nutrition support. Nutrition support is not recommended due to overall declining medical status which evidenced based studies indicate EN is not effective in prolonging survival and improving quality of life. It can also increase risk of aspiration pneumonia as well as other related issues (infection, GI complications, and worsening/ non-healing PI's). However, will provide nutrition/ hydration within GOC.  RD will continue to monitor PO intake, labs, hydration, and wt prn.

## 2024-06-10 NOTE — DIETITIAN INITIAL EVALUATION ADULT - ORAL NUTRITION SUPPLEMENTS
Add ensure + HP shake BID (350kcal, 20g protein) and high-protein chocolate mousse 1x/day (394kcal, 14g protein)

## 2024-06-10 NOTE — PHYSICAL THERAPY INITIAL EVALUATION ADULT - GENERAL OBSERVATIONS, REHAB EVAL
Pt found supine in bed with tele monitor and bed alarm activated. Pt with no c/o pain. PAINAD- 0/10.

## 2024-06-10 NOTE — DIETITIAN NUTRITION RISK NOTIFICATION - TREATMENT: THE FOLLOWING DIET HAS BEEN RECOMMENDED
Diet, DASH/TLC:   Sodium & Cholesterol Restricted  Soft and Bite Sized (SOFTBTSZ)  Supplement Feeding Modality:  Oral  Ensure Enlive Cans or Servings Per Day:  1       Frequency:  Daily  Ensure Pudding Cans or Servings Per Day:  1       Frequency:  Daily (06-09-24 @ 22:13) [Active]

## 2024-06-10 NOTE — SWALLOW BEDSIDE ASSESSMENT ADULT - SLP GENERAL OBSERVATIONS
pt seated in bed: serving herself a veggie burger wit focus.  drinking too via straw:  sparse verbal output but presently targeted to ongoing events.  Son remarked on red bags under eyes, and that pt kept closing eyes.

## 2024-06-10 NOTE — DIETITIAN INITIAL EVALUATION ADULT - PERTINENT MEDS FT
MEDICATIONS  (STANDING):  ALPRAZolam 0.125 milliGRAM(s) Oral at bedtime  atorvastatin 80 milliGRAM(s) Oral at bedtime  carbidopa/levodopa  25/100 1 Tablet(s) Oral three times a day  carbidopa/levodopa CR 25/100 1 Tablet(s) Oral at bedtime  clopidogrel Tablet 75 milliGRAM(s) Oral at bedtime  enoxaparin Injectable 40 milliGRAM(s) SubCutaneous every 24 hours  OLANZapine 2.5 milliGRAM(s) Oral at bedtime  PARoxetine 40 milliGRAM(s) Oral at bedtime    MEDICATIONS  (PRN):

## 2024-06-10 NOTE — DIETITIAN INITIAL EVALUATION ADULT - OTHER INFO
89F with past medical history of CAD s/p stent, alzheimer's dementia (A&Ox1-2 baseline), parkinson's disease, HTN, [place], PAD, ALYSON, TIA, recent influenza A (Feb '24) and recent covid (d/c'd 6/4/24) s/p decadron course (last dose tomorrow) and refusal of remdesivir, c/b new hypoxia (hasn't been using the O2 at home), pw bradycardia, lethargy  Admit for acute metabolic encephalopathy, SIRs     Known to nutr services and dx'd w/ mal on multiple admissions; still meets criteria. As per EMR, w/ short-term memory loss, but can feed self & make needs known noted. Unable to obtain diet/wt hx 2/2 AMS/dementia. Bed scale wt of 143# taken by RD on 6/10/24. Wt hx as per EMR: 147# (taken by RD on 5/20/24); 151# (bed scale wt taken by RD on 2/7/24). Unintentional wt loss of 8# / 5.3% wt loss x 4 mo; not clinically significant. Noted w/ gradual wt loss x 4 mo as per wt hx. Appears appropriate wt for height; however, NFPE reveals mild-mod muscle/ fat wasting. Liberalize diet to regular to maximize caloric and nutrient intake; currently on soft and bite sized diet, but has NOT been seen by SLP. Suggest SLP consult to confirm consistency of diet for safest/least restrictive consistency diet. Currently on ensure + HP shake 1x/day (350kcal, 20g protein) and ensure pudding 1x/day (170kcal, 4g protein) ordered by the MD. Currently out of ensure pudding, will substitute w/ high-protein chocolate mousse (394kcal, 14g protein) & increase ensure to BID to help optimize nutrition. See below for other recommendations.

## 2024-06-10 NOTE — SWALLOW BEDSIDE ASSESSMENT ADULT - SWALLOW EVAL: DIAGNOSIS
oral-pharyngeal swallow skills within functional limits for regualr texture diet: pt lacks some self monitoring in that she tended to take slightly large mouthfuls when eating finger food.

## 2024-06-10 NOTE — SWALLOW BEDSIDE ASSESSMENT ADULT - SWALLOW EVAL: RECOMMENDED FEEDING/EATING TECHNIQUES
straw drinking ok./allow for swallow between intakes/alternate food with liquid/maintain upright posture during/after eating for 30 mins/position upright (90 degrees)/small sips/bites

## 2024-06-10 NOTE — PHYSICAL THERAPY INITIAL EVALUATION ADULT - THERAPY FREQUENCY, PT EVAL
Location of patient: [place]    Received call from jyothi at [facility] with Red Flag Complaint. Subjective: Caller states \"numbness in legs when lies down\"     Current Symptoms: numbness in feet tiffani toes both sides when lies down and when walking on/off has pain in toes and legs states last year had anemia even had a blood transfusion, denies cp or sob no other medical issues, occas ankle swelling occas calf pain on/off  , occas h/a no dizzinss or blurred vision, no ones sided weakness    Onset: 2 months    Associated Symptoms: NA    Pain Severity: none    Temperature: none     What has been tried:     LMP:  last week  Pregnant: NA    Recommended disposition: See PCP within 3 Days    Care advice provided, patient verbalizes understanding; denies any other questions or concerns; instructed to call back for any new or worsening symptoms. Patient/Caller agrees with recommended disposition; writer provided warm transfer to [facility] at [facility] for appointment scheduling    Attention Provider: Thank you for allowing me to participate in the care of your patient. The patient was connected to triage in response to information provided to the ECC. Please do not respond through this encounter as the response is not directed to a shared pool.       Reason for Disposition   Numbness or tingling in one or both feet is a chronic symptom (recurrent or ongoing problem lasting > 4 weeks)    Protocols used: Neurologic Deficit-ADULT-[place]
3-5x/week

## 2024-06-10 NOTE — SWALLOW BEDSIDE ASSESSMENT ADULT - SWALLOW EVAL: CURRENT DIET
pending SLP evaluation:  presently on soft and bite and regular liquids. meds as tolerated.  cut food up for pt.

## 2024-06-10 NOTE — SWALLOW BEDSIDE ASSESSMENT ADULT - NS SPL SWALLOW CLINIC TRIAL FT
RX:: Regular texture diet; no oral-pharyngeal contraindication for regular texture; cut food up; if food is cut up, the issue of too large a mouthful will be lessened.  make sure pt has hearing aids in.   HHA will help in supervising meals.

## 2024-06-10 NOTE — DIETITIAN INITIAL EVALUATION ADULT - NS FNS DIET ORDER
Diet, DASH/TLC:   Sodium & Cholesterol Restricted  Soft and Bite Sized (SOFTBTSZ)  Supplement Feeding Modality:  Oral  Ensure Enlive Cans or Servings Per Day:  1       Frequency:  Daily  Ensure Pudding Cans or Servings Per Day:  1       Frequency:  Daily (06-09-24 @ 22:13)

## 2024-06-10 NOTE — DIETITIAN INITIAL EVALUATION ADULT - PERTINENT LABORATORY DATA
06-10    137  |  109<H>  |  31<H>  ----------------------------<  137<H>  4.4   |  25  |  1.11    Ca    9.8      10 Joaquín 2024 06:52  Phos  3.4     06-10  Mg     2.3     06-10    TPro  6.8  /  Alb  2.9<L>  /  TBili  1.2  /  DBili  x   /  AST  79<H>  /  ALT  46  /  AlkPhos  125<H>  06-10  A1C with Estimated Average Glucose Result: 6.2 % (06-02-24 @ 07:28)

## 2024-06-10 NOTE — PROGRESS NOTE ADULT - ASSESSMENT
89F with past medical history of CAD s/p stent, alzheimer's dementia (A&Ox1-2 baseline), parkinson's disease, HTN, [place], PAD, ALYSON, TIA, recent influenza A (Feb '24) and recent covid (d/c'd 6/4/24) s/p decadron course (last dose tomorrow) and refusal of remdesivir, c/b new hypoxia (hasn't been using the O2 at home), pw sinus bradycardia and lethargy        ·  Problem: Sinus bradycardia.   ·  Plan: - brie trimble reportedly contacted by the ED  - tele  - tsh - WNL   - TTE -pending   -  tropnin neg x 2   - hold metoprolol and aricept        ·  Problem: Acute metabolic encephalopathy.   - monitor for fevers  - mental status improved currently back to baseline.    ·  Problem: Systemic inflammatory response syndrome (SIRS).   ·  Plan: - leukocytosis likely contributed to by steroids  - f/u cultures  - hold off empiric abx for now, but low threshold for starting abx.      ·  Problem: 2019 novel coronavirus disease (COVID-19).   ·  Plan: - Completed course of Decadron    ·  Problem: Dementia.   ·  Plan: - cont home meds, including zyprexa, paroxetine, donepezil, xanax.      ·  Problem: Parkinsons.   ·  Plan: - cont sinemet.      ·  Problem: CAD (coronary artery disease).   ·  Plan: - cont plavix, statin.

## 2024-06-10 NOTE — PHYSICAL THERAPY INITIAL EVALUATION ADULT - PERTINENT HX OF CURRENT PROBLEM, REHAB EVAL
Pt admitted to  secondary to bradycardia and lethargy. Pt with a hx of AD and PD. Pt with recent dx of flu A 2/20/2024 and recent COVID D/C 6/4/2024.

## 2024-06-11 ENCOUNTER — TRANSCRIPTION ENCOUNTER (OUTPATIENT)
Age: 89
End: 2024-06-11

## 2024-06-11 DIAGNOSIS — E78.5 HYPERLIPIDEMIA, UNSPECIFIED: ICD-10-CM

## 2024-06-11 DIAGNOSIS — N18.9 CHRONIC KIDNEY DISEASE, UNSPECIFIED: ICD-10-CM

## 2024-06-11 DIAGNOSIS — I25.10 ATHEROSCLEROTIC HEART DISEASE OF NATIVE CORONARY ARTERY WITHOUT ANGINA PECTORIS: ICD-10-CM

## 2024-06-11 DIAGNOSIS — J98.11 ATELECTASIS: ICD-10-CM

## 2024-06-11 DIAGNOSIS — Z11.52 ENCOUNTER FOR SCREENING FOR COVID-19: ICD-10-CM

## 2024-06-11 DIAGNOSIS — E44.0 MODERATE PROTEIN-CALORIE MALNUTRITION: ICD-10-CM

## 2024-06-11 DIAGNOSIS — I12.9 HYPERTENSIVE CHRONIC KIDNEY DISEASE WITH STAGE 1 THROUGH STAGE 4 CHRONIC KIDNEY DISEASE, OR UNSPECIFIED CHRONIC KIDNEY DISEASE: ICD-10-CM

## 2024-06-11 DIAGNOSIS — Z86.73 PERSONAL HISTORY OF TRANSIENT ISCHEMIC ATTACK (TIA), AND CEREBRAL INFARCTION WITHOUT RESIDUAL DEFICITS: ICD-10-CM

## 2024-06-11 DIAGNOSIS — G30.9 ALZHEIMER'S DISEASE, UNSPECIFIED: ICD-10-CM

## 2024-06-11 DIAGNOSIS — Z95.5 PRESENCE OF CORONARY ANGIOPLASTY IMPLANT AND GRAFT: ICD-10-CM

## 2024-06-11 DIAGNOSIS — H91.90 UNSPECIFIED HEARING LOSS, UNSPECIFIED EAR: ICD-10-CM

## 2024-06-11 DIAGNOSIS — Z90.710 ACQUIRED ABSENCE OF BOTH CERVIX AND UTERUS: ICD-10-CM

## 2024-06-11 DIAGNOSIS — Z88.0 ALLERGY STATUS TO PENICILLIN: ICD-10-CM

## 2024-06-11 DIAGNOSIS — I73.9 PERIPHERAL VASCULAR DISEASE, UNSPECIFIED: ICD-10-CM

## 2024-06-11 DIAGNOSIS — J96.01 ACUTE RESPIRATORY FAILURE WITH HYPOXIA: ICD-10-CM

## 2024-06-11 DIAGNOSIS — Z90.49 ACQUIRED ABSENCE OF OTHER SPECIFIED PARTS OF DIGESTIVE TRACT: ICD-10-CM

## 2024-06-11 DIAGNOSIS — U07.1 COVID-19: ICD-10-CM

## 2024-06-11 DIAGNOSIS — K44.9 DIAPHRAGMATIC HERNIA WITHOUT OBSTRUCTION OR GANGRENE: ICD-10-CM

## 2024-06-11 DIAGNOSIS — F02.80 DEMENTIA IN OTHER DISEASES CLASSIFIED ELSEWHERE, UNSPECIFIED SEVERITY, WITHOUT BEHAVIORAL DISTURBANCE, PSYCHOTIC DISTURBANCE, MOOD DISTURBANCE, AND ANXIETY: ICD-10-CM

## 2024-06-11 DIAGNOSIS — G20.A1 PARKINSON'S DISEASE WITHOUT DYSKINESIA, WITHOUT MENTION OF FLUCTUATIONS: ICD-10-CM

## 2024-06-11 LAB
ANION GAP SERPL CALC-SCNC: 6 MMOL/L — SIGNIFICANT CHANGE UP (ref 5–17)
BUN SERPL-MCNC: 30 MG/DL — HIGH (ref 7–23)
CALCIUM SERPL-MCNC: 9.6 MG/DL — SIGNIFICANT CHANGE UP (ref 8.5–10.1)
CHLORIDE SERPL-SCNC: 111 MMOL/L — HIGH (ref 96–108)
CO2 SERPL-SCNC: 24 MMOL/L — SIGNIFICANT CHANGE UP (ref 22–31)
CREAT SERPL-MCNC: 1.01 MG/DL — SIGNIFICANT CHANGE UP (ref 0.5–1.3)
CULTURE RESULTS: SIGNIFICANT CHANGE UP
EGFR: 53 ML/MIN/1.73M2 — LOW
GLUCOSE SERPL-MCNC: 114 MG/DL — HIGH (ref 70–99)
HCT VFR BLD CALC: 36.6 % — SIGNIFICANT CHANGE UP (ref 34.5–45)
HGB BLD-MCNC: 11.9 G/DL — SIGNIFICANT CHANGE UP (ref 11.5–15.5)
MCHC RBC-ENTMCNC: 28.5 PG — SIGNIFICANT CHANGE UP (ref 27–34)
MCHC RBC-ENTMCNC: 32.5 GM/DL — SIGNIFICANT CHANGE UP (ref 32–36)
MCV RBC AUTO: 87.6 FL — SIGNIFICANT CHANGE UP (ref 80–100)
PLATELET # BLD AUTO: 190 K/UL — SIGNIFICANT CHANGE UP (ref 150–400)
POTASSIUM SERPL-MCNC: 3.7 MMOL/L — SIGNIFICANT CHANGE UP (ref 3.5–5.3)
POTASSIUM SERPL-SCNC: 3.7 MMOL/L — SIGNIFICANT CHANGE UP (ref 3.5–5.3)
RBC # BLD: 4.18 M/UL — SIGNIFICANT CHANGE UP (ref 3.8–5.2)
RBC # FLD: 13.7 % — SIGNIFICANT CHANGE UP (ref 10.3–14.5)
SODIUM SERPL-SCNC: 141 MMOL/L — SIGNIFICANT CHANGE UP (ref 135–145)
SPECIMEN SOURCE: SIGNIFICANT CHANGE UP
WBC # BLD: 12.61 K/UL — HIGH (ref 3.8–10.5)
WBC # FLD AUTO: 12.61 K/UL — HIGH (ref 3.8–10.5)

## 2024-06-11 PROCEDURE — 99232 SBSQ HOSP IP/OBS MODERATE 35: CPT

## 2024-06-11 RX ORDER — METOPROLOL TARTRATE 50 MG
12.5 TABLET ORAL
Refills: 0 | Status: DISCONTINUED | OUTPATIENT
Start: 2024-06-11 | End: 2024-06-13

## 2024-06-11 RX ORDER — DONEPEZIL HYDROCHLORIDE 10 MG/1
1 TABLET, FILM COATED ORAL
Qty: 0 | Refills: 0 | DISCHARGE

## 2024-06-11 RX ORDER — METOPROLOL TARTRATE 50 MG
2 TABLET ORAL
Refills: 0 | DISCHARGE

## 2024-06-11 RX ORDER — ALPRAZOLAM 0.25 MG
0.12 TABLET ORAL
Refills: 0 | Status: DISCONTINUED | OUTPATIENT
Start: 2024-06-11 | End: 2024-06-13

## 2024-06-11 RX ADMIN — Medication 12.5 MILLIGRAM(S): at 16:43

## 2024-06-11 RX ADMIN — CARBIDOPA AND LEVODOPA 1 TABLET(S): 25; 100 TABLET ORAL at 08:43

## 2024-06-11 RX ADMIN — CLOPIDOGREL BISULFATE 75 MILLIGRAM(S): 75 TABLET, FILM COATED ORAL at 21:15

## 2024-06-11 RX ADMIN — CARBIDOPA AND LEVODOPA 1 TABLET(S): 25; 100 TABLET ORAL at 14:00

## 2024-06-11 RX ADMIN — Medication 40 MILLIGRAM(S): at 21:16

## 2024-06-11 RX ADMIN — ATORVASTATIN CALCIUM 80 MILLIGRAM(S): 80 TABLET, FILM COATED ORAL at 21:15

## 2024-06-11 RX ADMIN — Medication 0.12 MILLIGRAM(S): at 16:20

## 2024-06-11 RX ADMIN — CARBIDOPA AND LEVODOPA 1 TABLET(S): 25; 100 TABLET ORAL at 16:18

## 2024-06-11 RX ADMIN — CARBIDOPA AND LEVODOPA 1 TABLET(S): 25; 100 TABLET ORAL at 21:15

## 2024-06-11 RX ADMIN — Medication 0.12 MILLIGRAM(S): at 23:44

## 2024-06-11 RX ADMIN — OLANZAPINE 2.5 MILLIGRAM(S): 15 TABLET, FILM COATED ORAL at 21:15

## 2024-06-11 RX ADMIN — ENOXAPARIN SODIUM 40 MILLIGRAM(S): 100 INJECTION SUBCUTANEOUS at 05:40

## 2024-06-11 NOTE — DISCHARGE NOTE PROVIDER - NSDCCPCAREPLAN_GEN_ALL_CORE_FT
PRINCIPAL DISCHARGE DIAGNOSIS  Diagnosis: Sinus bradycardia  Assessment and Plan of Treatment: Discontinue metoprolol and aricept , follow up with cardiology      SECONDARY DISCHARGE DIAGNOSES  Diagnosis: Parkinsons  Assessment and Plan of Treatment:     Diagnosis: Dementia  Assessment and Plan of Treatment:     Diagnosis: Acute metabolic encephalopathy  Assessment and Plan of Treatment:      PRINCIPAL DISCHARGE DIAGNOSIS  Diagnosis: Sinus bradycardia  Assessment and Plan of Treatment: Stop aricept. Take a reduced dose of metoprolol. An implanted loop recorder has been placed prior to discharge. Follow up with cardiology and electrophysiology (EP)      SECONDARY DISCHARGE DIAGNOSES  Diagnosis: Dementia  Assessment and Plan of Treatment: Stop aricept and zyprexa. Start seroquel.     PRINCIPAL DISCHARGE DIAGNOSIS  Diagnosis: Sinus bradycardia  Assessment and Plan of Treatment: Stop aricept. Take a reduced dose of metoprolol. You have been started on eliquis for Aflutter. Follow up with cardiology and electrophysiology (EP)      SECONDARY DISCHARGE DIAGNOSES  Diagnosis: Dementia  Assessment and Plan of Treatment: Stop aricept and zyprexa. Start seroquel.

## 2024-06-11 NOTE — DISCHARGE NOTE PROVIDER - NSDCPNSUBOBJ_GEN_ALL_CORE
VITALS:  T(F): 98.3 (06-13-24 @ 07:15), Max: 98.3 (06-13-24 @ 07:15)  HR: 76 (06-13-24 @ 07:15) (59 - 76)  BP: 128/59 (06-13-24 @ 07:15) (118/54 - 128/59)  RR: 18 (06-13-24 @ 07:15) (18 - 18)  SpO2: 95% (06-13-24 @ 07:15) (95% - 97%)    PHYSICAL EXAM:  Gen: NAD  HEENT:  pupils equal and reactive, EOMI, no oropharyngeal lesions, erythema, exudates, oral thrush  NECK:   supple, no carotid bruits, no palpable lymph nodes, no thyromegaly  CV:  +S1, +S2, regular, no murmurs or rubs  RESP:   lungs clear to auscultation bilaterally, no wheezing, rales, rhonchi, good air entry bilaterally  BREAST:  not examined  GI:  abdomen soft, non-tender, non-distended, normal BS, no bruits, no abdominal masses, no palpable masses  RECTAL:  not examined  :  not examined  MSK:   normal muscle tone, no atrophy, no rigidity, no contractions  EXT:  no clubbing, no cyanosis, no edema, no calf pain, swelling or erythema  VASCULAR:  pulses equal and symmetric in the upper and lower extremities  NEURO:  Awake, no focal neurological deficits, follows all commands, able to move extremities spontaneously  SKIN:  no ulcers, lesions or rashes    #Sinus norma, now w pAflutter (?has hx of Aflutter)  -tele  -TSH wnl  -echo as above  -Now on Metop at reduced dose  -Aricept held  -not on full a/c  (?hx Aflutter, ?hx falls), will monitor ILR to determine  -ILR prior to d/c  -f/u EP and cards  recs    #Acute metabolic encephalopathy.   - monitor for fevers  - mental status improved currently back to baseline.    #SIRS  -cx neg    #DVT ppx-  SCDs    F2F on d/c   VITALS:  T(F): 98.3 (06-13-24 @ 07:15), Max: 98.3 (06-13-24 @ 07:15)  HR: 76 (06-13-24 @ 07:15) (59 - 76)  BP: 128/59 (06-13-24 @ 07:15) (118/54 - 128/59)  RR: 18 (06-13-24 @ 07:15) (18 - 18)  SpO2: 95% (06-13-24 @ 07:15) (95% - 97%)    PHYSICAL EXAM:  Gen: NAD  HEENT:  pupils equal and reactive, EOMI, no oropharyngeal lesions, erythema, exudates, oral thrush  NECK:   supple, no carotid bruits, no palpable lymph nodes, no thyromegaly  CV:  +S1, +S2, regular, no murmurs or rubs  RESP:   lungs clear to auscultation bilaterally, no wheezing, rales, rhonchi, good air entry bilaterally  BREAST:  not examined  GI:  abdomen soft, non-tender, non-distended, normal BS, no bruits, no abdominal masses, no palpable masses  RECTAL:  not examined  :  not examined  MSK:   normal muscle tone, no atrophy, no rigidity, no contractions  EXT:  no clubbing, no cyanosis, no edema, no calf pain, swelling or erythema  VASCULAR:  pulses equal and symmetric in the upper and lower extremities  NEURO:  Awake, no focal neurological deficits, follows all commands, able to move extremities spontaneously  SKIN:  no ulcers, lesions or rashes    #Sinus norma, now w pAflutter (?has hx of Aflutter)  -tele  -TSH wnl  -echo as above  -Now on Metop at reduced dose  -Aricept held  -start on Eliquis given 1 hour of aflutter  -f/u EP and cards recs    #Acute metabolic encephalopathy.   - monitor for fevers  - mental status improved currently back to baseline.    #SIRS  -cx neg    #DVT ppx-  SCDs    F2F on d/c

## 2024-06-11 NOTE — DISCHARGE NOTE PROVIDER - PROVIDER TOKENS
PROVIDER:[TOKEN:[[id number]],FOLLOWUP:[1 week]],PROVIDER:[TOKEN:[[id number]],FOLLOWUP:[1 week]]

## 2024-06-11 NOTE — DISCHARGE NOTE PROVIDER - NSDCFUADDAPPT_GEN_ALL_CORE_FT
APPTS ARE READY TO BE MADE: [x] YES    Best Family or Patient Contact (if needed):    Additional Information about above appointments (if needed):    1:  Dr. Yates (Cardiology) in one week  2:  Dr. Ott (PCP) in one week  3:     Other comments or requests:    APPTS ARE READY TO BE MADE: [x] YES    Best Family or Patient Contact (if needed):    Additional Information about above appointments (if needed):    1:  Dr. Yates (Cardiology) in one week  2:  Dr. Ott (PCP) in one week  3:     Other comments or requests:     Patient advised they did not want to proceed with scheduling appointments with the providers on their referrals. They will coordinate care on their own.

## 2024-06-11 NOTE — DISCHARGE NOTE PROVIDER - CARE PROVIDERS DIRECT ADDRESSES
[email or web address]

## 2024-06-11 NOTE — DISCHARGE NOTE PROVIDER - DETAILS OF MALNUTRITION DIAGNOSIS/DIAGNOSES
This patient has been assessed with a concern for Malnutrition and was treated during this hospitalization for the following Nutrition diagnosis/diagnoses:     -  06/10/2024: Moderate protein-calorie malnutrition

## 2024-06-11 NOTE — DISCHARGE NOTE PROVIDER - HOSPITAL COURSE
89F with past medical history of CAD s/p stent, alzheimer's dementia (A&Ox1-2 baseline), parkinson's disease, HTN, [place], PAD, [place]YS[place], TIA, recent influenza A (Feb '24) and recent covid (d/c'd 6/4/24) s/p decadron course (last dose tomorrow) and refusal of remdesivir, c/b new hypoxia (hasn't been using the O2 at home), pw bradycardia, lethargy  History from pt's son/HCP Fredis Monkflyak at bedside, and pt's daughter/primary caretaker Claudine also at bedside.  At baseline pt walks with walker without assistance. Pt has poor short term memory, but feeds self and can make needs known.  Starting yesterday, family noticed pt was sleeping more than usual. Today, pt's son placed pulse ox and noticed that although O2 saturation was in the mid to high 90s, pt's HR was in the mid 40s, which is new for patient. Pt also with increased drowsiness, lethargy today. Pt herself has no complaints. Denies fevers, chills, URI symptoms, urinary symptoms, GI symptoms, CP, SOB, palpitations. Pt takes metoprolol ER 50mg at night, and last took dose yesterday. Pt has not taken her metoprolol dose today.  Cardiology consulted, recommendations appreciated. Discontinued Metoprolol and Aricept as they may precipitate bradycardia. HR returned into the 80s. Continue home olmesartan on discharge. Left ventricular systolic function is normal with an ejection fraction visually estimated at 55 to 60%. no indication for any further cardiac testing or intervention. Patient to be discharged to rehab. Follow up with PCP and Cardiology in one week. Advised to return to ED with any reoccurrence of symptoms chest pain shortness of breath lightheadedness drowsiness fevers chills nausea vomiting diarrhea.       Require 3 midnight stay for rehab, Possible DC in AM, Continue to monitor of telemetry   89F with past medical history of CAD s/p stent, alzheimer's dementia (A&Ox1-2 baseline), parkinson's disease, HTN, [place], PAD, [place]YS[place], TIA, recent influenza A (Feb '24) and recent covid (d/c'd 6/4/24) s/p decadron course (last dose tomorrow) and refusal of remdesivir, c/b new hypoxia (hasn't been using the O2 at home), pw bradycardia, lethargy  History from pt's son/HCP Fredis Monkflyak at bedside, and pt's daughter/primary caretaker Claudine also at bedside.  At baseline pt walks with walker without assistance. Pt has poor short term memory, but feeds self and can make needs known.  Starting yesterday, family noticed pt was sleeping more than usual. Today, pt's son placed pulse ox and noticed that although O2 saturation was in the mid to high 90s, pt's HR was in the mid 40s, which is new for patient. Pt also with increased drowsiness, lethargy today. Pt herself has no complaints. Denies fevers, chills, URI symptoms, urinary symptoms, GI symptoms, CP, SOB, palpitations. Pt takes metoprolol ER 50mg at night, and last took dose yesterday. Pt has not taken her metoprolol dose today.  Cardiology consulted, recommendations appreciated. Discontinued Metoprolol and Aricept as they may precipitate bradycardia. HR returned into the 80s. Continue home olmesartan on discharge. Left ventricular systolic function is normal with an ejection fraction visually estimated at 55 to 60%. no indication for any further cardiac testing or intervention. Patient to be discharged to rehab. Follow up with PCP and Cardiology in one week. Advised to return to ED with any reoccurrence of symptoms chest pain shortness of breath lightheadedness drowsiness fevers chills nausea vomiting diarrhea.       Require 3 midnight stay for rehab, Possible DC in AM, Continue to monitor of telemetry    Addendum: family declines ZEB, if BP and HR stable , DC home with services in AM   89F with past medical history of CAD s/p stent, alzheimer's dementia (A&Ox1-2 baseline), parkinson's disease, HTN, [place], PAD, [place]YS[place], TIA, recent influenza A (Feb '24) and recent covid (d/c'd 6/4/24) s/p decadron course (last dose tomorrow) and refusal of remdesivir, c/b new hypoxia (hasn't been using the O2 at home), pw bradycardia, lethargy  History from pt's son/HCP Fredis Monkflyak at bedside, and pt's daughter/primary caretaker Claudine also at bedside.  At baseline pt walks with walker without assistance. Pt has poor short term memory, but feeds self and can make needs known.  Starting yesterday, family noticed pt was sleeping more than usual. Today, pt's son placed pulse ox and noticed that although O2 saturation was in the mid to high 90s, pt's HR was in the mid 40s, which is new for patient. Pt also with increased drowsiness, lethargy today. Pt herself has no complaints. Denies fevers, chills, URI symptoms, urinary symptoms, GI symptoms, CP, SOB, palpitations. Pt takes metoprolol ER 50mg at night, and last took dose yesterday. Pt has not taken her metoprolol dose today.  Cardiology consulted, recommendations appreciated. Discontinued Metoprolol and Aricept as they may precipitate bradycardia. HR returned into the 80s. Continue home olmesartan on discharge. Left ventricular systolic function is normal with an ejection fraction visually estimated at 55 to 60%. no indication for any further cardiac testing or intervention. Patient to be discharged to rehab. Follow up with PCP and Cardiology in one week. Advised to return to ED with any reoccurrence of symptoms chest pain shortness of breath lightheadedness drowsiness fevers chills nausea vomiting diarrhea.       Require 3 midnight stay for rehab, Possible DC in AM, Continue to monitor of telemetry    Addendum: family declines ZEB, if BP and HR stable , DC home with services in AM    Addendum x 2: Patient went into afib when ambulating, will restart metoprolol at lower dose 12.5mg BID. Consult EP for ? tachybrady   CC: bradycardia  HPI and Hospital Course: 89F with past medical history of CAD s/p stent, alzheimer's dementia (A&Ox1-2 baseline), parkinson's disease, HTN, [place], PAD, [place]YS[place], TIA, recent influenza A (Feb '24) and recent covid (d/c'd 6/4/24) s/p decadron course (last dose tomorrow) and refusal of remdesivir, c/b new hypoxia (hasn't been using the O2 at home), pw sinus bradycardia and lethargy. Metoprolol and aricept stopped. Then w aflutter w RVR, improved after reintroducing low dose metoprolol. ILR prior to d/c.    D/c to home, F2F, aides. Updated daughter at bedside.  I have spent 34 min on day of d/c coordinating care.  See progress note for problem based plan. CC: bradycardia  HPI and Hospital Course: 89F with past medical history of CAD s/p stent, alzheimer's dementia (A&Ox1-2 baseline), parkinson's disease, HTN, [place], PAD, ALYSON, TIA, recent influenza A (Feb '24) and recent covid (d/c'd 6/4/24) s/p decadron course (last dose tomorrow) and refusal of remdesivir, c/b new hypoxia (hasn't been using the O2 at home), pw sinus bradycardia and lethargy. Metoprolol and aricept stopped. Then w aflutter w RVR, improved after reintroducing low dose metoprolol, started on Eliquis.    D/c to home, F2F, aides. Updated daughter at bedside.  I have spent 34 min on day of d/c coordinating care.  See progress note for problem based plan.

## 2024-06-11 NOTE — DISCHARGE NOTE PROVIDER - CARE PROVIDER_API CALL
Annie Yates  Cardiovascular Disease  [address] [place] [id number]  Phone: [phone]  Fax: [phone]  Follow Up Time: 1 week    Linda Ott  Internal Medicine  [address] [place] [id number]  Phone: [phone]  Fax: [phone]  Follow Up Time: 1 week

## 2024-06-11 NOTE — DISCHARGE NOTE PROVIDER - NSDCMRMEDTOKEN_GEN_ALL_CORE_FT
ALPRAZolam 0.25 mg oral tablet: 1 tab(s) orally once a day (at bedtime) , pt take 1/2 to 1 tab at bedtime.  ALPRAZolam 0.25 mg oral tablet: 0.5 tab(s) orally every 15 minutes as needed for auditory hallucination .  Pt receives 1/2 tab when she hears voices and repeat every 15 minutes as needed.  ascorbic acid 500 mg oral tablet: 1 tab(s) orally once a day  carbidopa-levodopa 25 mg-100 mg oral tablet: 1 tab(s) orally 3 times a day *** first dose between 9 &amp; 11am  second dose between 2 &amp; 3pm  third dose between 5 &amp; 6pm ***  carbidopa-levodopa 25 mg-100 mg oral tablet, extended release: 1 tab(s) orally once a day (at bedtime) ***dose between 8:30 &amp; 9pm***  cholecalciferol 25 mcg (1000 intl units) oral tablet: 1 tab(s) orally once a day  clopidogrel 75 mg oral tablet: 1 tab(s) orally once a day (in the evening)  OLANZapine 2.5 mg oral tablet: 1 tab(s) orally once a day (in the evening)  olmesartan 20 mg oral tablet: 1 tab(s) orally once a day  PARoxetine 40 mg oral tablet: 1 tab(s) orally once a day (in the evening)  rosuvastatin 40 mg oral tablet: 1 tab(s) orally once a day  Vitamin B Complex oral tablet: 1 tab(s) orally once a day   ALPRAZolam 0.25 mg oral tablet: 1 tab(s) orally once a day (at bedtime) , pt take 1/2 to 1 tab at bedtime.  ALPRAZolam 0.25 mg oral tablet: 0.5 tab(s) orally every 15 minutes as needed for auditory hallucination .  Pt receives 1/2 tab when she hears voices and repeat every 15 minutes as needed.  ascorbic acid 500 mg oral tablet: 1 tab(s) orally once a day  carbidopa-levodopa 25 mg-100 mg oral tablet: 1 tab(s) orally 3 times a day *** first dose between 9 &amp; 11am  second dose between 2 &amp; 3pm  third dose between 5 &amp; 6pm ***  carbidopa-levodopa 25 mg-100 mg oral tablet, extended release: 1 tab(s) orally once a day (at bedtime) ***dose between 8:30 &amp; 9pm***  cholecalciferol 25 mcg (1000 intl units) oral tablet: 1 tab(s) orally once a day  clopidogrel 75 mg oral tablet: 1 tab(s) orally once a day (in the evening)  metoprolol tartrate 25 mg oral tablet: 0.5 tab(s) orally 2 times a day  PARoxetine 40 mg oral tablet: 1 tab(s) orally once a day (in the evening)  QUEtiapine 25 mg oral tablet: 1 tab(s) orally once a day (at bedtime)  rosuvastatin 40 mg oral tablet: 1 tab(s) orally once a day  Vitamin B Complex oral tablet: 1 tab(s) orally once a day   ALPRAZolam 0.25 mg oral tablet: 1 tab(s) orally once a day (at bedtime) , pt take 1/2 to 1 tab at bedtime.  ALPRAZolam 0.25 mg oral tablet: 0.5 tab(s) orally every 15 minutes as needed for auditory hallucination .  Pt receives 1/2 tab when she hears voices and repeat every 15 minutes as needed.  apixaban 5 mg oral tablet: 1 tab(s) orally 2 times a day  ascorbic acid 500 mg oral tablet: 1 tab(s) orally once a day  carbidopa-levodopa 25 mg-100 mg oral tablet: 1 tab(s) orally 3 times a day *** first dose between 9 &amp; 11am  second dose between 2 &amp; 3pm  third dose between 5 &amp; 6pm ***  carbidopa-levodopa 25 mg-100 mg oral tablet, extended release: 1 tab(s) orally once a day (at bedtime) ***dose between 8:30 &amp; 9pm***  cholecalciferol 25 mcg (1000 intl units) oral tablet: 1 tab(s) orally once a day  clopidogrel 75 mg oral tablet: 1 tab(s) orally once a day (in the evening)  metoprolol tartrate 25 mg oral tablet: 0.5 tab(s) orally 2 times a day  PARoxetine 40 mg oral tablet: 1 tab(s) orally once a day (in the evening)  QUEtiapine 25 mg oral tablet: 1 tab(s) orally once a day (at bedtime)  rosuvastatin 40 mg oral tablet: 1 tab(s) orally once a day  Vitamin B Complex oral tablet: 1 tab(s) orally once a day

## 2024-06-11 NOTE — DISCHARGE NOTE PROVIDER - NSDCCAREPROVSEEN_GEN_ALL_CORE_FT
Isaac Samaniego (Hospitalist)   Annie Yates (Cardiology) Annie Yates (Cardiology)  eLena Peoples (hospital medicine)  Jacqueline Lea (EP)

## 2024-06-11 NOTE — CONSULT NOTE ADULT - SUBJECTIVE AND OBJECTIVE BOX
89F with past medical history of CAD s/p stent, alzheimer's dementia (A&Ox1-2 baseline), parkinson's disease, HTN, [place], PAD, [place], TIA, recent influenza A () and recent covid (d/c'd 24) s/p decadron course (last dose tomorrow) and refusal of remdesivir, c/b new hypoxia (hasn't been using the O2 at home), pw bradycardia, lethargy    History from pt's son/HCP Fredis Monkflyak at bedside, and pt's daughter/primary caretaker Claudine also at bedside.  At baseline pt walks with walker without assistance. Pt has poor short term memory, but feeds self and can make needs known.  Starting yesterday, family noticed pt was sleeping more than usual. Today, pt's son placed pulse ox and noticed that although O2 saturation was in the mid to high 90s, pt's HR was in the mid 40s, which is new for patient. Pt also with increased drowsiness, lethargy today. Pt herself has no complaints. Denies fevers, chills, URI symptoms, urinary symptoms, GI symptoms, CP, SOB, palpitations. Pt takes metoprolol ER 50mg at night, and last took dose yesterday. Pt has not taken her metoprolol dose today. (2024 21:47)    2024  remains in a sinus bradycardia.  she is awake and alert. Doesn't remember me.   denies chest pain and shortness of breath. Eating well.    CARDIAC STUDIES  Echocardiogram: 6/10/2024   1. Left ventricular systolic function is normal with an ejection fraction visually estimated at 55 to 60 %.   2. Mild to moderate mitral regurgitation.   3. Mild tricuspid regurgitation.   4. Interatrial septum is aneurysmal.   5. Mild pulmonary hypertension.   6. Mild to moderate aortic regurgitation.   7. There is mild calcification of the aortic valve leaflets.   8. There is mild calcification of the mitral valve annulus.      PAST MEDICAL & SURGICAL HISTORY:  Hypertension      Hernia, hiatal      CAD (coronary artery disease)  LAD stent 2020      Dementia      S/P appendectomy      S/P tonsillectomy      S/P hysterectomy        MEDICATIONS  (STANDING):  ALPRAZolam 0.125 milliGRAM(s) Oral at bedtime  atorvastatin 80 milliGRAM(s) Oral at bedtime  carbidopa/levodopa  25/100 1 Tablet(s) Oral three times a day  carbidopa/levodopa CR 25/100 1 Tablet(s) Oral at bedtime  clopidogrel Tablet 75 milliGRAM(s) Oral at bedtime  enoxaparin Injectable 40 milliGRAM(s) SubCutaneous every 24 hours  OLANZapine 2.5 milliGRAM(s) Oral at bedtime  PARoxetine 40 milliGRAM(s) Oral at bedtime    MEDICATIONS  (PRN):    Allergies    Biaxin (Other)  verapamil (Other)  penicillins (Other)  doxycycline (Other)  Demerol HCl (Other)  tetracyclines (Unknown)  Indocin (Other)    Intolerances      FAMILY HISTORY:  FHx: heart disease          REVIEW OF SYSTEMS:    CONSTITUTIONAL: No weakness, fevers or chills  EYES/ENT: No visual changes;  No vertigo or throat pain   NECK: No pain or stiffness  RESPIRATORY: No cough, wheezing, hemoptysis; No shortness of breath  CARDIOVASCULAR: No chest pain or palpitations  GASTROINTESTINAL: No abdominal or epigastric pain. No nausea, vomiting, or hematemesis; No diarrhea or constipation. No melena or hematochezia.  GENITOURINARY: No dysuria, frequency or hematuria  NEUROLOGICAL: No numbness or weakness  SKIN: No itching, burning, rashes, or lesions   All other review of systems is negative unless indicated above      PHYSICAL EXAM:  Daily     Daily Weight in k.2 (2024 06:31)  Vital Signs Last 24 Hrs  T(C): 36.4 (2024 07:40), Max: 36.7 (10 Joaquín 2024 20:40)  T(F): 97.6 (2024 07:40), Max: 98 (10 Joaquín 2024 20:40)  HR: 87 (2024 07:40) (61 - 87)  BP: 148/64 (2024 07:40) (123/57 - 148/64)  BP(mean): --  RR: 18 (2024 07:40) (18 - 19)  SpO2: 97% (2024 07:40) (96% - 97%)    Parameters below as of 2024 07:40  Patient On (Oxygen Delivery Method): room air        Constitutional: NAD, awake and alert, well-developed  HEENT: PERR, EOMI, Normal Hearing, MMM  Neck: Soft and supple, No LAD, No JVD  Respiratory: Breath sounds are clear bilaterally, No wheezing, rales or rhonchi  Cardiovascular: S1 and S2, regular rate and rhythm, no Murmurs, gallops or rubs  Gastrointestinal: Bowel Sounds present, soft, nontender, nondistended, no guarding, no rebound  Extremities: No peripheral edema  Vascular: 2+ peripheral pulses  Neurological: A/O x 3, no focal deficits  Musculoskeletal: 5/5 strength b/l upper and lower extremities  Skin: No rashes    LABS: All Labs Reviewed:                        11.6   11.59 )-----------( 216      ( 10 Joaquín 2024 06:52 )             35.1     06-10    137  |  109<H>  |  31<H>  ----------------------------<  137<H>  4.4   |  25  |  1.11    Ca    9.8      10 Joaquín 2024 06:52  Phos  3.4     06-10  Mg     2.3     06-10    TPro  6.8  /  Alb  2.9<L>  /  TBili  1.2  /  DBili  x   /  AST  79<H>  /  ALT  46  /  AlkPhos  125<H>  06-10          Blood Culture:     RADIOLOGY: < from: Xray Chest 1 View- PORTABLE-Urgent (24 @ 19:14) >  ACC: [id number] EXAM:  XR CHEST PORTABLE URGENT 1V   ORDERED BY: GARRISON ACHARYA     PROCEDURE DATE:  2024          INTERPRETATION:  TIME OF EXAM: 2024 at 6:48 PM.    CLINICAL INFORMATION: Chest pain. Shortness of breath.    COMPARISON:  May 29, 2024.    TECHNIQUE:   AP Portable chest x-ray.    INTERPRETATION:    Heart size and the mediastinum cannot be accurately evaluated on this   projection.  There are low lung volumes.  Mild elevation of left hemidiaphragm is again seen.  There is left basilar linear atelectasis. The lungs are otherwise clear.  No pleural effusion or pneumothorax is seen.  There is osteoarthritic degenerative change of the spine.  Bilateral neck soft tissue calcifications may be vascular.      IMPRESSION: Low lung volumes.    Left basilar linear atelectasis.    < end of copied text >    EKG: Sinus bradycardia

## 2024-06-11 NOTE — DISCHARGE NOTE PROVIDER - ATTENDING DISCHARGE PHYSICAL EXAMINATION:
GENERAL: Alert. +confused. No acute distress. Not thin. Not cachectic. Not obese.  HEAD:  Atraumatic. Normocephalic.  EYES: EOMI. PERRLA. Normal conjunctiva/sclera.  ENT: Neck supple. No JVD. Moist oral mucosa. Not edentulous. No thrush.  LYMPH: Normal supraclavicular/cervical lymph nodes.   CARDIAC: Not tachy, +norma. Regular rhythm. Not irregularly irregular. S1. S2. No murmur. No rub. +distant heart sounds.  LUNG/CHEST: BS equal bilaterally. No wheezes. +rales. No rhonchi.  ABDOMEN: Soft. No tenderness. No distension. No fluid wave. Normal bowel sounds.  BACK: No midline/vertebral tenderness. No flank tenderness.  VASCULAR: +2 b/l radial or ulnar pulses. Palpable DP pulses.  EXTREMITIES:  No clubbing. No cyanosis. No edema. Moving all 4.  NEUROLOGY: A&Ox1. Non-focal exam. Cranial nerves intact. Normal speech. Sensation intact.  PSYCH: Normal behavior. Flat affect.

## 2024-06-12 ENCOUNTER — TRANSCRIPTION ENCOUNTER (OUTPATIENT)
Age: 89
End: 2024-06-12

## 2024-06-12 LAB
A PHAGOCYTOPH DNA BLD QL NAA+PROBE: NEGATIVE — SIGNIFICANT CHANGE UP
ANION GAP SERPL CALC-SCNC: 4 MMOL/L — LOW (ref 5–17)
B MICROTI DNA BLD QL NAA+PROBE: NEGATIVE — SIGNIFICANT CHANGE UP
B MIYAMOTOI GLPQ BLD QL NAA+NON-PROBE: NEGATIVE — SIGNIFICANT CHANGE UP
BABESIA DNA SPEC QL NAA+PROBE: NEGATIVE — SIGNIFICANT CHANGE UP
BABESIA DNA SPEC QL NAA+PROBE: NEGATIVE — SIGNIFICANT CHANGE UP
BUN SERPL-MCNC: 32 MG/DL — HIGH (ref 7–23)
CALCIUM SERPL-MCNC: 9.5 MG/DL — SIGNIFICANT CHANGE UP (ref 8.5–10.1)
CHLORIDE SERPL-SCNC: 107 MMOL/L — SIGNIFICANT CHANGE UP (ref 96–108)
CO2 SERPL-SCNC: 25 MMOL/L — SIGNIFICANT CHANGE UP (ref 22–31)
CREAT SERPL-MCNC: 1.04 MG/DL — SIGNIFICANT CHANGE UP (ref 0.5–1.3)
E CHAFFEENSIS DNA BLD QL NAA+PROBE: NEGATIVE — SIGNIFICANT CHANGE UP
E EWINGII DNA SPEC QL NAA+PROBE: NEGATIVE — SIGNIFICANT CHANGE UP
EGFR: 51 ML/MIN/1.73M2 — LOW
EHRLICHIA DNA SPEC QL NAA+PROBE: NEGATIVE — SIGNIFICANT CHANGE UP
GLUCOSE SERPL-MCNC: 95 MG/DL — SIGNIFICANT CHANGE UP (ref 70–99)
HCT VFR BLD CALC: 37.4 % — SIGNIFICANT CHANGE UP (ref 34.5–45)
HGB BLD-MCNC: 12.2 G/DL — SIGNIFICANT CHANGE UP (ref 11.5–15.5)
MCHC RBC-ENTMCNC: 28.6 PG — SIGNIFICANT CHANGE UP (ref 27–34)
MCHC RBC-ENTMCNC: 32.6 GM/DL — SIGNIFICANT CHANGE UP (ref 32–36)
MCV RBC AUTO: 87.6 FL — SIGNIFICANT CHANGE UP (ref 80–100)
PLATELET # BLD AUTO: 215 K/UL — SIGNIFICANT CHANGE UP (ref 150–400)
POTASSIUM SERPL-MCNC: 4 MMOL/L — SIGNIFICANT CHANGE UP (ref 3.5–5.3)
POTASSIUM SERPL-SCNC: 4 MMOL/L — SIGNIFICANT CHANGE UP (ref 3.5–5.3)
RBC # BLD: 4.27 M/UL — SIGNIFICANT CHANGE UP (ref 3.8–5.2)
RBC # FLD: 14 % — SIGNIFICANT CHANGE UP (ref 10.3–14.5)
SODIUM SERPL-SCNC: 136 MMOL/L — SIGNIFICANT CHANGE UP (ref 135–145)
WBC # BLD: 10.68 K/UL — HIGH (ref 3.8–10.5)
WBC # FLD AUTO: 10.68 K/UL — HIGH (ref 3.8–10.5)

## 2024-06-12 PROCEDURE — 99233 SBSQ HOSP IP/OBS HIGH 50: CPT

## 2024-06-12 PROCEDURE — 93010 ELECTROCARDIOGRAM REPORT: CPT

## 2024-06-12 RX ORDER — OLANZAPINE 15 MG/1
1 TABLET, FILM COATED ORAL
Refills: 0 | DISCHARGE

## 2024-06-12 RX ORDER — QUETIAPINE FUMARATE 200 MG/1
25 TABLET, FILM COATED ORAL AT BEDTIME
Refills: 0 | Status: DISCONTINUED | OUTPATIENT
Start: 2024-06-12 | End: 2024-06-13

## 2024-06-12 RX ADMIN — CLOPIDOGREL BISULFATE 75 MILLIGRAM(S): 75 TABLET, FILM COATED ORAL at 21:22

## 2024-06-12 RX ADMIN — CARBIDOPA AND LEVODOPA 1 TABLET(S): 25; 100 TABLET ORAL at 21:23

## 2024-06-12 RX ADMIN — ATORVASTATIN CALCIUM 80 MILLIGRAM(S): 80 TABLET, FILM COATED ORAL at 21:22

## 2024-06-12 RX ADMIN — ENOXAPARIN SODIUM 40 MILLIGRAM(S): 100 INJECTION SUBCUTANEOUS at 05:39

## 2024-06-12 RX ADMIN — Medication 12.5 MILLIGRAM(S): at 10:16

## 2024-06-12 RX ADMIN — CARBIDOPA AND LEVODOPA 1 TABLET(S): 25; 100 TABLET ORAL at 18:21

## 2024-06-12 RX ADMIN — CARBIDOPA AND LEVODOPA 1 TABLET(S): 25; 100 TABLET ORAL at 14:05

## 2024-06-12 RX ADMIN — Medication 12.5 MILLIGRAM(S): at 21:23

## 2024-06-12 RX ADMIN — Medication 40 MILLIGRAM(S): at 21:22

## 2024-06-12 RX ADMIN — CARBIDOPA AND LEVODOPA 1 TABLET(S): 25; 100 TABLET ORAL at 10:16

## 2024-06-12 RX ADMIN — QUETIAPINE FUMARATE 25 MILLIGRAM(S): 200 TABLET, FILM COATED ORAL at 21:22

## 2024-06-12 NOTE — CONSULT NOTE ADULT - SUBJECTIVE AND OBJECTIVE BOX
89F with past medical history of CAD s/p stent, alzheimer's dementia (A&Ox1-2 baseline), parkinson's disease, HTN, [place], PAD, ALYSON, TIA, recent influenza A (Feb '24) and recent covid (d/c'd 6/4/24) s/p decadron course (last dose tomorrow) and refusal of remdesivir, c/b new hypoxia (hasn't been using the O2 at home), pw bradycardia, lethargy.  EP called to assess for narrow complex tachy arrhytmia      PAST MEDICAL & SURGICAL HISTORY:  Hypertension  Hernia, hiatal  CAD (coronary artery disease)-LAD stent 5/2020  Dementia  S/P appendectomy  S/P tonsillectomy  S/P hysterectomy          FAMILY HISTORY:  FHx: heart disease        MEDICATIONS  (STANDING):  atorvastatin 80 milliGRAM(s) Oral at bedtime  carbidopa/levodopa  25/100 1 Tablet(s) Oral three times a day  carbidopa/levodopa CR 25/100 1 Tablet(s) Oral at bedtime  clopidogrel Tablet 75 milliGRAM(s) Oral at bedtime  enoxaparin Injectable 40 milliGRAM(s) SubCutaneous every 24 hours  metoprolol tartrate 12.5 milliGRAM(s) Oral two times a day  OLANZapine 2.5 milliGRAM(s) Oral at bedtime  PARoxetine 40 milliGRAM(s) Oral at bedtime    MEDICATIONS  (PRN):  ALPRAZolam 0.125 milliGRAM(s) Oral two times a day PRN anxiety  aluminum hydroxide/magnesium hydroxide/simethicone Suspension 30 milliLiter(s) Oral every 6 hours PRN Dyspepsia      Allergies    Biaxin (Other)  verapamil (Other)  penicillins (Other)  doxycycline (Other)  Demerol HCl (Other)  tetracyclines (Unknown)  Indocin (Other)    Intolerances        SOCIAL HISTORY: Denies tobacco, etoh abuse or illicit drug use        Vital Signs Last 24 Hrs  T(C): 36.3 (12 Jun 2024 07:44), Max: 37.1 (11 Jun 2024 20:33)  T(F): 97.3 (12 Jun 2024 07:44), Max: 98.7 (11 Jun 2024 20:33)  HR: 54 (12 Jun 2024 07:44) (54 - 59)  BP: 114/63 (12 Jun 2024 07:44) (114/53 - 114/63)  BP(mean): --  RR: 18 (12 Jun 2024 07:44) (17 - 18)  SpO2: 93% (12 Jun 2024 07:44) (93% - 93%)    Parameters below as of 12 Jun 2024 07:44  Patient On (Oxygen Delivery Method): room air        REVIEW OF SYSTEMS:    CONSTITUTIONAL:  As per HPI.  HEENT:  Eyes:  No diplopia or blurred vision. ENT:  No earache, sore throat or runny nose.  CARDIOVASCULAR:  No pressure, squeezing, strangling, tightness, heaviness or aching about the chest, neck, axilla or epigastrium.  RESPIRATORY:  No cough, shortness of breath, PND or orthopnea.  GASTROINTESTINAL:  No nausea, vomiting or diarrhea.  GENITOURINARY:  No dysuria, frequency or urgency.  MUSCULOSKELETAL:  As per HPI.  SKIN:  No change in skin, hair or nails.  NEUROLOGIC:  No paresthesias, fasciculations, seizures or weakness.  PSYCHIATRIC:  No disorder of thought or mood.  ENDOCRINE:  No heat or cold intolerance, polyuria or polydipsia.  HEMATOLOGICAL:  No easy bruising or bleedings:  .     PHYSICAL EXAMINATION:    GENERAL APPEARANCE:  Pt. is not currently dyspneic, in no distress. Pt. is alert, oriented, and pleasant.  HEENT:  Pupils are normal and react normally. No icterus. Mucous membranes well colored.  NECK:  Supple. No lymphadenopathy. Jugular venous pressure not elevated. Carotids equal.   HEART:   The cardiac impulse has a normal quality. There are no murmurs, rubs or gallops noted  CHEST:  Chest is clear to auscultation. Normal respiratory effort.  ABDOMEN:  Soft and nontender.   EXTREMITIES:  There is no edema.   SKIN:  No rash or significant lesions are noted.    I&O's Summary    11 Jun 2024 07:01  -  12 Jun 2024 07:00  --------------------------------------------------------  IN: 400 mL / OUT: 1525 mL / NET: -1125 mL        LABS:                        12.2   10.68 )-----------( 215      ( 12 Jun 2024 06:34 )             37.4     06-12    136  |  107  |  32<H>  ----------------------------<  95  4.0   |  25  |  1.04    Ca    9.5      12 Jun 2024 06:34    Urinalysis Basic - ( 12 Jun 2024 06:34 )    Color: x / Appearance: x / SG: x / pH: x  Gluc: 95 mg/dL / Ketone: x  / Bili: x / Urobili: x   Blood: x / Protein: x / Nitrite: x   Leuk Esterase: x / RBC: x / WBC x   Sq Epi: x / Non Sq Epi: x / Bacteria: x          EKG:    TELEMETRY:     CARDIAC TESTS:    RADIOLOGY & ADDITIONAL STUDIES:           89F admitted with hypoxia and Bradycardia   past medical history of CAD s/p stent, alzheimer's dementia (A&Ox1-2 baseline), parkinson's disease, HTN, [place], PAD, [place], TIA, recent influenza A (Feb '24) and recent covid (d/c'd 6/4/24) s/p decadron course (last dose tomorrow) and refusal of remdesivir, c/b new hypoxia (hasn't been using the O2 at home), pw bradycardia, lethargy.  EP called to assess for narrow complex tachy arrythmia      PAST MEDICAL & SURGICAL HISTORY:  Hypertension  Hernia, hiatal  CAD (coronary artery disease)-LAD stent 5/2020  Dementia  S/P appendectomy  S/P tonsillectomy  S/P hysterectomy          FAMILY HISTORY:  FHx: heart disease        MEDICATIONS  (STANDING):  atorvastatin 80 milliGRAM(s) Oral at bedtime  carbidopa/levodopa  25/100 1 Tablet(s) Oral three times a day  carbidopa/levodopa CR 25/100 1 Tablet(s) Oral at bedtime  clopidogrel Tablet 75 milliGRAM(s) Oral at bedtime  enoxaparin Injectable 40 milliGRAM(s) SubCutaneous every 24 hours  metoprolol tartrate 12.5 milliGRAM(s) Oral two times a day  OLANZapine 2.5 milliGRAM(s) Oral at bedtime  PARoxetine 40 milliGRAM(s) Oral at bedtime    MEDICATIONS  (PRN):  ALPRAZolam 0.125 milliGRAM(s) Oral two times a day PRN anxiety  aluminum hydroxide/magnesium hydroxide/simethicone Suspension 30 milliLiter(s) Oral every 6 hours PRN Dyspepsia      Allergies    Biaxin (Other)  verapamil (Other)  penicillins (Other)  doxycycline (Other)  Demerol HCl (Other)  tetracyclines (Unknown)  Indocin (Other)    Intolerances        SOCIAL HISTORY: Denies tobacco, etoh abuse or illicit drug use        Vital Signs Last 24 Hrs  T(C): 36.3 (12 Jun 2024 07:44), Max: 37.1 (11 Jun 2024 20:33)  T(F): 97.3 (12 Jun 2024 07:44), Max: 98.7 (11 Jun 2024 20:33)  HR: 54 (12 Jun 2024 07:44) (54 - 59)  BP: 114/63 (12 Jun 2024 07:44) (114/53 - 114/63)  BP(mean): --  RR: 18 (12 Jun 2024 07:44) (17 - 18)  SpO2: 93% (12 Jun 2024 07:44) (93% - 93%)    Parameters below as of 12 Jun 2024 07:44  Patient On (Oxygen Delivery Method): room air        REVIEW OF SYSTEMS:    CONSTITUTIONAL:  As per HPI.  HEENT:  Eyes:  No diplopia or blurred vision. ENT:  No earache, sore throat or runny nose.  CARDIOVASCULAR:  No pressure, squeezing, strangling, tightness, heaviness or aching about the chest, neck, axilla or epigastrium.  RESPIRATORY:  No cough, shortness of breath, PND or orthopnea.  GASTROINTESTINAL:  No nausea, vomiting or diarrhea.  GENITOURINARY:  No dysuria, frequency or urgency.  MUSCULOSKELETAL:  As per HPI.  SKIN:  No change in skin, hair or nails.  NEUROLOGIC:  No paresthesias, fasciculations, seizures or weakness.  PSYCHIATRIC:  No disorder of thought or mood.  ENDOCRINE:  No heat or cold intolerance, polyuria or polydipsia.  HEMATOLOGICAL:  No easy bruising or bleedings:  .     PHYSICAL EXAMINATION:    GENERAL APPEARANCE:  Pt. is not currently dyspneic, in no distress. Pt. is alert, oriented, and pleasant.  HEENT:  Pupils are normal and react normally. No icterus. Mucous membranes well colored.  NECK:  Supple. No lymphadenopathy. Jugular venous pressure not elevated. Carotids equal.   HEART:   The cardiac impulse has a normal quality. There are no murmurs, rubs or gallops noted  CHEST:  Chest is clear to auscultation. Normal respiratory effort.  ABDOMEN:  Soft and nontender.   EXTREMITIES:  There is no edema.   SKIN:  No rash or significant lesions are noted.    I&O's Summary    11 Jun 2024 07:01  -  12 Jun 2024 07:00  --------------------------------------------------------  IN: 400 mL / OUT: 1525 mL / NET: -1125 mL        LABS:                        12.2   10.68 )-----------( 215      ( 12 Jun 2024 06:34 )             37.4     06-12    136  |  107  |  32<H>  ----------------------------<  95  4.0   |  25  |  1.04    Ca    9.5      12 Jun 2024 06:34    Urinalysis Basic - ( 12 Jun 2024 06:34 )    Color: x / Appearance: x / SG: x / pH: x  Gluc: 95 mg/dL / Ketone: x  / Bili: x / Urobili: x   Blood: x / Protein: x / Nitrite: x   Leuk Esterase: x / RBC: x / WBC x   Sq Epi: x / Non Sq Epi: x / Bacteria: x          EKG:    TELEMETRY:     CARDIAC TESTS:    RADIOLOGY & ADDITIONAL STUDIES:

## 2024-06-12 NOTE — PHARMACOTHERAPY INTERVENTION NOTE - COMMENTS
change zyprexa to seroquel - Several studies and case reports have found that in the majority of patients, the initiation of aripiprazole, olanzapine, clozapine, or risperidone in patients with Parkinson disease taking dopamine agonists (sinemet) has resulted in worsening motor function.  Consider avoiding antipsychotic use in patients with Parkinson disease receiving a dopamine agonist. If antipsychotic therapy is necessary consider using, clozapine, quetiapine, or ziprasidone at lower initial doses, or a non-dopamine antagonist.

## 2024-06-12 NOTE — PROGRESS NOTE ADULT - SUBJECTIVE AND OBJECTIVE BOX
HOSPITALIST ATTENDING PROGRESS NOTE    Chart and meds reviewed.      Subjective: Patient seen and examined. Reports feeling better but still fatigued. HR 86, Discussed with pharmacy, will discontinue aricept as that may be contributing to bradycardia. Continue telemetry.       Additional results/Imaging, I have personally reviewed:    LABS:                            11.6   11.59 )-----------( 216      ( 10 Joaquín 2024 06:52 )             35.1     06-10    137  |  109<H>  |  31<H>  ----------------------------<  137<H>  4.4   |  25  |  1.11    Ca    9.8      10 Joaquín 2024 06:52  Phos  3.4     06-10  Mg     2.3     06-10    TPro  6.8  /  Alb  2.9<L>  /  TBili  1.2  /  DBili  x   /  AST  79<H>  /  ALT  46  /  AlkPhos  125<H>  06-10        LIVER FUNCTIONS - ( 10 Joaquín 2024 06:52 )  Alb: 2.9 g/dL / Pro: 6.8 gm/dL / ALK PHOS: 125 U/L / ALT: 46 U/L / AST: 79 U/L / GGT: x             Urinalysis Basic - ( 10 Joaquín 2024 06:52 )    Color: x / Appearance: x / SG: x / pH: x  Gluc: 137 mg/dL / Ketone: x  / Bili: x / Urobili: x   Blood: x / Protein: x / Nitrite: x   Leuk Esterase: x / RBC: x / WBC x   Sq Epi: x / Non Sq Epi: x / Bacteria: x              All other systems reviewed and found to be negative with the exception of what has been described above.    MEDICATIONS  (STANDING):  ALPRAZolam 0.125 milliGRAM(s) Oral at bedtime  atorvastatin 80 milliGRAM(s) Oral at bedtime  carbidopa/levodopa  25/100 1 Tablet(s) Oral three times a day  carbidopa/levodopa CR 25/100 1 Tablet(s) Oral at bedtime  clopidogrel Tablet 75 milliGRAM(s) Oral at bedtime  enoxaparin Injectable 40 milliGRAM(s) SubCutaneous every 24 hours  OLANZapine 2.5 milliGRAM(s) Oral at bedtime  PARoxetine 40 milliGRAM(s) Oral at bedtime    MEDICATIONS  (PRN):      VITALS:  T(F): 97.4 (06-10-24 @ 07:50), Max: 98.3 (06-09-24 @ 22:14)  HR: 86 (06-10-24 @ 07:50) (42 - 90)  BP: 153/62 (06-10-24 @ 07:50) (113/54 - 153/62)  RR: 18 (06-10-24 @ 07:50) (16 - 20)  SpO2: 94% (06-10-24 @ 07:50) (89% - 100%)  Wt(kg): --    I&O's Summary    10 Joaquín 2024 07:01  -  10 Joaquín 2024 14:18  --------------------------------------------------------  IN: 160 mL / OUT: 300 mL / NET: -140 mL        CAPILLARY BLOOD GLUCOSE          PHYSICAL EXAM:  GENERAL: Alert. +confused. No acute distress. Not thin. Not cachectic. Not obese.  HEAD:  Atraumatic. Normocephalic.  EYES: EOMI. PERRLA. Normal conjunctiva/sclera.  ENT: Neck supple. No JVD. Moist oral mucosa. Not edentulous. No thrush.  LYMPH: Normal supraclavicular/cervical lymph nodes.   CARDIAC: Not tachy, +norma. Regular rhythm. Not irregularly irregular. S1. S2. No murmur. No rub. +distant heart sounds.  LUNG/CHEST: BS equal bilaterally. No wheezes. +rales. No rhonchi.  ABDOMEN: Soft. No tenderness. No distension. No fluid wave. Normal bowel sounds.  BACK: No midline/vertebral tenderness. No flank tenderness.  VASCULAR: +2 b/l radial or ulnar pulses. Palpable DP pulses.  EXTREMITIES:  No clubbing. No cyanosis. No edema. Moving all 4.  NEUROLOGY: A&Ox1. Non-focal exam. Cranial nerves intact. Normal speech. Sensation intact.  PSYCH: Normal behavior. Flat affect.    CULTURES:  Blood, Urine: Negative (06-09 @ 22:30)      Telemetry, personally reviewed
HOSPITALIST ATTENDING PROGRESS NOTE    Chart and meds reviewed.  Patient seen and examined.    CC: norma    Subjective:  W afib/flutter yesterday afternoon. Updated granddaughter at bedside.    All other systems reviewed and found to be negative with the exception of what has been described above.    MEDICATIONS  (STANDING):  atorvastatin 80 milliGRAM(s) Oral at bedtime  carbidopa/levodopa  25/100 1 Tablet(s) Oral three times a day  carbidopa/levodopa CR 25/100 1 Tablet(s) Oral at bedtime  clopidogrel Tablet 75 milliGRAM(s) Oral at bedtime  metoprolol tartrate 12.5 milliGRAM(s) Oral two times a day  PARoxetine 40 milliGRAM(s) Oral at bedtime  QUEtiapine 25 milliGRAM(s) Oral at bedtime    MEDICATIONS  (PRN):  ALPRAZolam 0.125 milliGRAM(s) Oral two times a day PRN anxiety  aluminum hydroxide/magnesium hydroxide/simethicone Suspension 30 milliLiter(s) Oral every 6 hours PRN Dyspepsia      VITALS:  T(F): 97.3 (06-12-24 @ 07:44), Max: 98.7 (06-11-24 @ 20:33)  HR: 54 (06-12-24 @ 07:44) (54 - 59)  BP: 114/63 (06-12-24 @ 07:44) (114/53 - 114/63)  RR: 18 (06-12-24 @ 07:44) (17 - 18)  SpO2: 93% (06-12-24 @ 07:44) (93% - 93%)  Wt(kg): --    I&O's Summary    11 Jun 2024 07:01 - 12 Jun 2024 07:00  --------------------------------------------------------  IN: 400 mL / OUT: 1525 mL / NET: -1125 mL    12 Jun 2024 07:01  -  12 Jun 2024 19:48  --------------------------------------------------------  IN: 720 mL / OUT: 800 mL / NET: -80 mL        CAPILLARY BLOOD GLUCOSE          PHYSICAL EXAM:  Gen: NAD  HEENT:  pupils equal and reactive, EOMI, no oropharyngeal lesions, erythema, exudates, oral thrush  NECK:   supple, no carotid bruits, no palpable lymph nodes, no thyromegaly  CV:  +S1, +S2, regular, no murmurs or rubs  RESP:   lungs clear to auscultation bilaterally, no wheezing, rales, rhonchi, good air entry bilaterally  BREAST:  not examined  GI:  abdomen soft, non-tender, non-distended, normal BS, no bruits, no abdominal masses, no palpable masses  RECTAL:  not examined  :  not examined  MSK:   normal muscle tone, no atrophy, no rigidity, no contractions  EXT:  no clubbing, no cyanosis, no edema, no calf pain, swelling or erythema  VASCULAR:  pulses equal and symmetric in the upper and lower extremities  NEURO:  AAOX3, no focal neurological deficits, follows all commands, able to move extremities spontaneously  SKIN:  no ulcers, lesions or rashes    LABS:                            12.2   10.68 )-----------( 215      ( 12 Jun 2024 06:34 )             37.4     06-12    136  |  107  |  32<H>  ----------------------------<  95  4.0   |  25  |  1.04    Ca    9.5      12 Jun 2024 06:34              Urinalysis Basic - ( 12 Jun 2024 06:34 )    Color: x / Appearance: x / SG: x / pH: x  Gluc: 95 mg/dL / Ketone: x  / Bili: x / Urobili: x   Blood: x / Protein: x / Nitrite: x   Leuk Esterase: x / RBC: x / WBC x   Sq Epi: x / Non Sq Epi: x / Bacteria: x    CULTURES:  BCx NG  UCx contam    Additional results/Imaging, I have personally reviewed:  CXR  6/9/24: Low lung volumes. Left basilar linear atelectasis.    Echo 6/10/24: 1. Left ventricular systolic function is normal with an ejection fraction visually estimated at 55 to 60 %. 2. Mild to moderate mitral regurgitation. 3. Mild tricuspid regurgitation. 4. Interatrial septum is aneurysmal. 5. Mild pulmonary hypertension. 6. Mild to moderate aortic regurgitation. 7. There is mild calcification of the aortic valve leaflets. 8. There is mild calcification of the mitral valve annulus.    Telemetry, personally reviewed:  6/12/24: sinus 50-60, on 6/11 afternoon w aflutter 160s

## 2024-06-12 NOTE — CONSULT NOTE ADULT - PROVIDER SPECIALTY LIST ADULT
September 16, 2020       Summer Gutiérrez MD  [address] [facility] [id number]  Via In Basket      Patient: Joseline Oliver   YOB: 1968   Date of Visit: 9/16/2020       Dear Dr. Gutiérrez:    I saw your patient, Joseline Oliver, for an evaluation. Below are my notes for this visit with her.    If you have questions, please do not hesitate to call me.      Sincerely,        Jez Pastor MD        CC: No Recipients              
Cardiology
Electrophysiology

## 2024-06-12 NOTE — DISCHARGE NOTE NURSING/CASE MANAGEMENT/SOCIAL WORK - NSDCPEFALRISK_GEN_ALL_CORE
For information on Fall & Injury Prevention, visit: [email or web address] OR  [email or web address] OR  https://www.cdc.gov/steadi/patient.html

## 2024-06-12 NOTE — DISCHARGE NOTE NURSING/CASE MANAGEMENT/SOCIAL WORK - PATIENT PORTAL LINK FT
You can access the FollowMyHealth Patient Portal offered by [facility] by registering at the following website: [email or web address]. By joining [facility]’s FollowMyHealth portal, you will also be able to view your health information using other applications (apps) compatible with our system.

## 2024-06-12 NOTE — DISCHARGE NOTE NURSING/CASE MANAGEMENT/SOCIAL WORK - NSDCFUADDAPPT_GEN_ALL_CORE_FT
APPTS ARE READY TO BE MADE: [x] YES    Best Family or Patient Contact (if needed):    Additional Information about above appointments (if needed):    1:  Dr. Yates (Cardiology) in one week  2:  Dr. Ott (PCP) in one week  3:     Other comments or requests:     Patient advised they did not want to proceed with scheduling appointments with the providers on their referrals. They will coordinate care on their own.  APPTS ARE READY TO BE MADE: [x] YES    Best Family or Patient Contact (if needed):    Additional Information about above appointments (if needed):    1:  Dr. Yates (Cardiology) Appt is on 7/9/24 @ 11:40am   2:  Dr. Ott (PCP) in one week  3:     Other comments or requests:     Patient advised they did not want to proceed with scheduling appointments with the providers on their referrals. They will coordinate care on their own.

## 2024-06-12 NOTE — PROGRESS NOTE ADULT - NUTRITIONAL ASSESSMENT
This patient has been assessed with a concern for Malnutrition and has been determined to have a diagnosis/diagnoses of Moderate protein-calorie malnutrition.    This patient is being managed with:   Diet Regular-  Entered: Joaquín 10 2024  4:28PM

## 2024-06-12 NOTE — PROGRESS NOTE ADULT - ASSESSMENT
89F with past medical history of CAD s/p stent, alzheimer's dementia (A&Ox1-2 baseline), parkinson's disease, HTN, [place], PAD, ALYS[place], TIA, recent influenza A (Feb '24) and recent covid (d/c'd 6/4/24) s/p decadron course (last dose tomorrow) and refusal of remdesivir, c/b new hypoxia (hasn't been using the O2 at home), pw sinus bradycardia and lethargy    #Sinus norma, now w pAflutter (?has hx of Aflutter)  -tele  -TSH wnl  -echo as above  -Now on Metop at reduced dose  -Aricept held  -not on full a/c  (?hx Aflutter, ?hx falls)  -MCOT vs ILR  -f/u EP and cards  recs    #Acute metabolic encephalopathy.   - monitor for fevers  - mental status improved currently back to baseline.    #SIRS  -cx neg    #DVT ppx-  SCDs    D/c pending tele and EP recs- MCOT vs ILR,  hopeful for  d/c 6/13

## 2024-06-12 NOTE — PROGRESS NOTE ADULT - TIME BILLING
Time spent  coordinating the patient's care. This includes reviewing documentation pertinent to this admission, results and imaging. Further tests, medications, and procedures have been ordered as indicated. Laboratory results and the plan of care were communicated to the patient  and granddaughter. Discussed care plan with consultants including EP,  cards. Supporting documentation was completed and added to the patient's chart.

## 2024-06-12 NOTE — CONSULT NOTE ADULT - ASSESSMENT
89F with past medical history of CAD s/p stent, alzheimer's dementia (A&Ox1-2 baseline), parkinson's disease, HTN, [place], PAD, ALYSON, TIA, recent influenza A (Feb '24) and recent covid (d/c'd 6/4/24) s/p decadron course (last dose tomorrow) and refusal of remdesivir, c/b new hypoxia (hasn't been using the O2 at home), pw bradycardia, lethargy    6/11/2024  she is stable from a cardiac perspective with an underlying sinus bradycardia and normal LV function by echocardiogram.   At this time, no indication for any further cardiac testing or intervention.  Please reconsult as needed
89 year female that was admitted for new hypoxia (hasn't been using the O2 at home), pw bradycardia, lethargy.  past medical history of CAD s/p stent, alzheimer's dementia (A&Ox1-2 baseline), parkinson's disease, HTN, [place], PAD, ALYSON, TIA, recent influenza A (Feb '24) and recent covid (d/c'd 6/4/24) s/p decadron course (last dose tomorrow) and refusal of remdesivir.  She has been on metoprolol 12.5 mg bid      Pt has been in SB, heart rate  50s with brief runs of AT Flutter  Continue to monitor  WBC 10.68 and is afebrile, receives Lovenox Prophylaxis dose at 6am  She is a candidate for [facility] ILR  Will talk with pts daughter and discuss options

## 2024-06-13 VITALS
DIASTOLIC BLOOD PRESSURE: 59 MMHG | HEART RATE: 76 BPM | OXYGEN SATURATION: 95 % | TEMPERATURE: 98 F | RESPIRATION RATE: 18 BRPM | SYSTOLIC BLOOD PRESSURE: 128 MMHG

## 2024-06-13 PROCEDURE — 99239 HOSP IP/OBS DSCHRG MGMT >30: CPT

## 2024-06-13 RX ORDER — QUETIAPINE FUMARATE 200 MG/1
1 TABLET, FILM COATED ORAL
Qty: 30 | Refills: 0
Start: 2024-06-13 | End: 2024-07-12

## 2024-06-13 RX ORDER — OLMESARTAN MEDOXOMIL 5 MG/1
1 TABLET, FILM COATED ORAL
Refills: 0 | DISCHARGE

## 2024-06-13 RX ORDER — APIXABAN 2.5 MG/1
5 TABLET, FILM COATED ORAL
Refills: 0 | Status: DISCONTINUED | OUTPATIENT
Start: 2024-06-13 | End: 2024-06-13

## 2024-06-13 RX ORDER — METOPROLOL TARTRATE 50 MG
0.5 TABLET ORAL
Qty: 30 | Refills: 0
Start: 2024-06-13 | End: 2024-07-12

## 2024-06-13 RX ORDER — APIXABAN 2.5 MG/1
1 TABLET, FILM COATED ORAL
Qty: 60 | Refills: 0
Start: 2024-06-13 | End: 2024-07-12

## 2024-06-13 RX ADMIN — Medication 12.5 MILLIGRAM(S): at 09:31

## 2024-06-13 RX ADMIN — CARBIDOPA AND LEVODOPA 1 TABLET(S): 25; 100 TABLET ORAL at 17:02

## 2024-06-13 RX ADMIN — CARBIDOPA AND LEVODOPA 1 TABLET(S): 25; 100 TABLET ORAL at 09:31

## 2024-06-13 RX ADMIN — APIXABAN 5 MILLIGRAM(S): 2.5 TABLET, FILM COATED ORAL at 17:02

## 2024-06-13 NOTE — CHART NOTE - NSCHARTNOTEFT_GEN_A_CORE
89 year female that was admitted for new hypoxia (hasn't been using the O2 at home), pw bradycardia, lethargy.  past medical history of CAD s/p stent, alzheimer's dementia (A&Ox1-2 baseline), parkinson's disease, HTN, [place], PAD, ALYSON, TIA, recent influenza A (Feb '24) and recent covid (d/c'd 6/4/24) s/p decadron course (last dose tomorrow) and refusal of remdesivir.  She has been on metoprolol 12.5 mg bid  tele reviewed : had 1 hr of AF on 6/11/24, given h/o TIA with prolonged PAF, recommend OAC  d/w , will defer ILR.   Plan d/w pt/family/hospitalist.

## 2024-06-21 DIAGNOSIS — E44.0 MODERATE PROTEIN-CALORIE MALNUTRITION: ICD-10-CM

## 2024-06-21 DIAGNOSIS — Z90.49 ACQUIRED ABSENCE OF OTHER SPECIFIED PARTS OF DIGESTIVE TRACT: ICD-10-CM

## 2024-06-21 DIAGNOSIS — I48.92 UNSPECIFIED ATRIAL FLUTTER: ICD-10-CM

## 2024-06-21 DIAGNOSIS — Z53.20 PROCEDURE AND TREATMENT NOT CARRIED OUT BECAUSE OF PATIENT'S DECISION FOR UNSPECIFIED REASONS: ICD-10-CM

## 2024-06-21 DIAGNOSIS — I25.10 ATHEROSCLEROTIC HEART DISEASE OF NATIVE CORONARY ARTERY WITHOUT ANGINA PECTORIS: ICD-10-CM

## 2024-06-21 DIAGNOSIS — Z88.0 ALLERGY STATUS TO PENICILLIN: ICD-10-CM

## 2024-06-21 DIAGNOSIS — G30.9 ALZHEIMER'S DISEASE, UNSPECIFIED: ICD-10-CM

## 2024-06-21 DIAGNOSIS — Z90.710 ACQUIRED ABSENCE OF BOTH CERVIX AND UTERUS: ICD-10-CM

## 2024-06-21 DIAGNOSIS — F02.80 DEMENTIA IN OTHER DISEASES CLASSIFIED ELSEWHERE, UNSPECIFIED SEVERITY, WITHOUT BEHAVIORAL DISTURBANCE, PSYCHOTIC DISTURBANCE, MOOD DISTURBANCE, AND ANXIETY: ICD-10-CM

## 2024-06-21 DIAGNOSIS — I48.0 PAROXYSMAL ATRIAL FIBRILLATION: ICD-10-CM

## 2024-06-21 DIAGNOSIS — D72.829 ELEVATED WHITE BLOOD CELL COUNT, UNSPECIFIED: ICD-10-CM

## 2024-06-21 DIAGNOSIS — Z86.73 PERSONAL HISTORY OF TRANSIENT ISCHEMIC ATTACK (TIA), AND CEREBRAL INFARCTION WITHOUT RESIDUAL DEFICITS: ICD-10-CM

## 2024-06-21 DIAGNOSIS — E78.5 HYPERLIPIDEMIA, UNSPECIFIED: ICD-10-CM

## 2024-06-21 DIAGNOSIS — R65.10 SYSTEMIC INFLAMMATORY RESPONSE SYNDROME (SIRS) OF NON-INFECTIOUS ORIGIN WITHOUT ACUTE ORGAN DYSFUNCTION: ICD-10-CM

## 2024-06-21 DIAGNOSIS — Z88.1 ALLERGY STATUS TO OTHER ANTIBIOTIC AGENTS STATUS: ICD-10-CM

## 2024-06-21 DIAGNOSIS — G93.41 METABOLIC ENCEPHALOPATHY: ICD-10-CM

## 2024-06-21 DIAGNOSIS — G20.A1 PARKINSON'S DISEASE WITHOUT DYSKINESIA, WITHOUT MENTION OF FLUCTUATIONS: ICD-10-CM

## 2024-06-21 DIAGNOSIS — U07.1 COVID-19: ICD-10-CM

## 2024-06-21 DIAGNOSIS — I73.9 PERIPHERAL VASCULAR DISEASE, UNSPECIFIED: ICD-10-CM

## 2024-06-21 DIAGNOSIS — R00.1 BRADYCARDIA, UNSPECIFIED: ICD-10-CM

## 2024-06-21 DIAGNOSIS — R09.02 HYPOXEMIA: ICD-10-CM

## 2024-06-21 DIAGNOSIS — I10 ESSENTIAL (PRIMARY) HYPERTENSION: ICD-10-CM

## 2024-06-28 NOTE — PHARMACOTHERAPY INTERVENTION NOTE - INTERVENTION TYPE MED REC
Adult Annual Physical  Name: Greta Fischer      : 2004      MRN: [id number]  Encounter Provider: John Salamanca MD  Encounter Date: 2024   Encounter department: [facility]    Assessment & Plan   1. Annual physical exam      Immunizations and preventive care screenings were discussed with patient today. Appropriate education was printed on patient's after visit summary.    Counseling:  Alcohol/drug use: discussed moderation in alcohol intake, the recommendations for healthy alcohol use, and avoidance of illicit drug use.  Dental Health: discussed importance of regular tooth brushing, flossing, and dental visits.  Injury prevention: discussed safety/seat belts, safety helmets, smoke detectors, carbon dioxide detectors, and smoking near bedding or upholstery.  Sexual health: discussed sexually transmitted diseases, partner selection, use of condoms, avoidance of unintended pregnancy, and contraceptive alternatives.  Exercise: the importance of regular exercise/physical activity was discussed. Recommend exercise 3-5 times per week for at least 30 minutes.          History of Present Illness     Adult Annual Physical:  Patient presents for annual physical. 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     Diet and Physical Activity:  - Diet/Nutrition: well balanced diet and consuming 3-5 servings of fruits/vegetables daily.  - Exercise: 3-4 times a week on average. swimming    Depression Screening:  - PHQ-2 Score: 0    General Health:  - Sleep: sleeps well.  - Hearing: normal hearing bilateral ears.  - Vision: wears glasses.  - Dental: brushes teeth twice daily and regular dental visits.    /GYN Health:  - Follows with GYN: yes.   - Menopause: premenopausal.   - Contraception:. irrigular, every 2 month      Advanced Care Planning:  - Has an advanced directive?: no    - Has a durable medical POA?: no    - ACP document given to patient?: no      Review of Systems  "  Constitutional:  Negative for chills and fever.   HENT:  Negative for congestion, rhinorrhea and sore throat.    Respiratory:  Negative for chest tightness and shortness of breath.    Cardiovascular:  Negative for chest pain.   Gastrointestinal:  Negative for abdominal pain.   Musculoskeletal:  Negative for arthralgias, back pain and gait problem.   Allergic/Immunologic: Positive for environmental allergies.   Neurological:  Negative for dizziness, light-headedness and headaches.   Psychiatric/Behavioral:  Negative for sleep disturbance.        Objective     /68 (BP Location: Right arm, Patient Position: Sitting, Cuff Size: Standard)   Pulse 66   Temp 97.8 °F (36.6 °C) (Temporal)   Ht 5' 4.25\" (1.632 m)   Wt 68.4 kg (150 lb 12.8 oz)   LMP 06/10/2024 (Exact Date)   SpO2 97%   BMI 25.68 kg/m²     Physical Exam  Vitals reviewed.   Constitutional:       General: She is not in acute distress.     Appearance: Normal appearance. She is not ill-appearing, toxic-appearing or diaphoretic.   Cardiovascular:      Rate and Rhythm: Normal rate and regular rhythm.      Pulses: Normal pulses.      Heart sounds: Normal heart sounds. No murmur heard.  Pulmonary:      Effort: Pulmonary effort is normal. No respiratory distress.      Breath sounds: Normal breath sounds. No wheezing.   Abdominal:      General: Abdomen is flat. Bowel sounds are normal. There is no distension.      Palpations: Abdomen is soft.      Tenderness: There is no abdominal tenderness.   Musculoskeletal:         General: No swelling or deformity.   Skin:     General: Skin is warm and dry.      Capillary Refill: Capillary refill takes less than 2 seconds.      Coloration: Skin is not jaundiced.   Neurological:      General: No focal deficit present.      Mental Status: She is alert and oriented to person, place, and time.   Psychiatric:         Mood and Affect: Mood normal.       John Salamanca M.D.  Family Medicine    Please excuse any \"sound-alike\" " Med Rec - Admission errors that may have ocurred during the process of dictation. Parts of this note have been dictated and there may be errors present in the transcription process. Thank you.

## 2024-07-01 ENCOUNTER — APPOINTMENT (OUTPATIENT)
Dept: ELECTROPHYSIOLOGY | Facility: CLINIC | Age: 89
End: 2024-07-01

## 2024-07-14 ENCOUNTER — INPATIENT (INPATIENT)
Facility: HOSPITAL | Age: 89
LOS: 4 days | Discharge: HOME HEALTH | DRG: 871 | End: 2024-07-19
Attending: STUDENT IN AN ORGANIZED HEALTH CARE EDUCATION/TRAINING PROGRAM | Admitting: EMERGENCY MEDICINE
Payer: MEDICARE

## 2024-07-14 VITALS
DIASTOLIC BLOOD PRESSURE: 85 MMHG | HEIGHT: 64 IN | SYSTOLIC BLOOD PRESSURE: 150 MMHG | OXYGEN SATURATION: 92 % | HEART RATE: 96 BPM | RESPIRATION RATE: 19 BRPM

## 2024-07-14 DIAGNOSIS — Z90.89 ACQUIRED ABSENCE OF OTHER ORGANS: Chronic | ICD-10-CM

## 2024-07-14 DIAGNOSIS — R41.82 ALTERED MENTAL STATUS, UNSPECIFIED: ICD-10-CM

## 2024-07-14 DIAGNOSIS — Z90.710 ACQUIRED ABSENCE OF BOTH CERVIX AND UTERUS: Chronic | ICD-10-CM

## 2024-07-14 DIAGNOSIS — Z90.49 ACQUIRED ABSENCE OF OTHER SPECIFIED PARTS OF DIGESTIVE TRACT: Chronic | ICD-10-CM

## 2024-07-14 LAB
ALBUMIN SERPL ELPH-MCNC: 3.2 G/DL — LOW (ref 3.3–5)
ALP SERPL-CCNC: 123 U/L — HIGH (ref 40–120)
ALT FLD-CCNC: 15 U/L — SIGNIFICANT CHANGE UP (ref 12–78)
ANION GAP SERPL CALC-SCNC: 7 MMOL/L — SIGNIFICANT CHANGE UP (ref 5–17)
APPEARANCE UR: ABNORMAL
APTT BLD: 28 SEC — SIGNIFICANT CHANGE UP (ref 24.5–35.6)
AST SERPL-CCNC: 37 U/L — SIGNIFICANT CHANGE UP (ref 15–37)
BACTERIA # UR AUTO: ABNORMAL /HPF
BASOPHILS # BLD AUTO: 0.06 K/UL — SIGNIFICANT CHANGE UP (ref 0–0.2)
BASOPHILS NFR BLD AUTO: 0.4 % — SIGNIFICANT CHANGE UP (ref 0–2)
BILIRUB SERPL-MCNC: 2.1 MG/DL — HIGH (ref 0.2–1.2)
BILIRUB UR-MCNC: NEGATIVE — SIGNIFICANT CHANGE UP
BUN SERPL-MCNC: 15 MG/DL — SIGNIFICANT CHANGE UP (ref 7–23)
CALCIUM SERPL-MCNC: 10.5 MG/DL — HIGH (ref 8.5–10.1)
CAST: 21 /LPF — HIGH (ref 0–4)
CHLORIDE SERPL-SCNC: 111 MMOL/L — HIGH (ref 96–108)
CO2 SERPL-SCNC: 24 MMOL/L — SIGNIFICANT CHANGE UP (ref 22–31)
COLOR SPEC: YELLOW — SIGNIFICANT CHANGE UP
COMMENT - URINE: SIGNIFICANT CHANGE UP
CREAT SERPL-MCNC: 1.3 MG/DL — SIGNIFICANT CHANGE UP (ref 0.5–1.3)
DIFF PNL FLD: ABNORMAL
EGFR: 39 ML/MIN/1.73M2 — LOW
EOSINOPHIL # BLD AUTO: 0.01 K/UL — SIGNIFICANT CHANGE UP (ref 0–0.5)
EOSINOPHIL NFR BLD AUTO: 0.1 % — SIGNIFICANT CHANGE UP (ref 0–6)
FLUAV AG NPH QL: SIGNIFICANT CHANGE UP
FLUBV AG NPH QL: SIGNIFICANT CHANGE UP
GLUCOSE SERPL-MCNC: 119 MG/DL — HIGH (ref 70–99)
GLUCOSE UR QL: NEGATIVE MG/DL — SIGNIFICANT CHANGE UP
HCT VFR BLD CALC: 43.3 % — SIGNIFICANT CHANGE UP (ref 34.5–45)
HGB BLD-MCNC: 13.8 G/DL — SIGNIFICANT CHANGE UP (ref 11.5–15.5)
HYALINE CASTS # UR AUTO: PRESENT
IMM GRANULOCYTES NFR BLD AUTO: 0.5 % — SIGNIFICANT CHANGE UP (ref 0–0.9)
INR BLD: 1.36 RATIO — HIGH (ref 0.85–1.18)
KETONES UR-MCNC: ABNORMAL MG/DL
LACTATE SERPL-SCNC: 1.5 MMOL/L — SIGNIFICANT CHANGE UP (ref 0.7–2)
LEUKOCYTE ESTERASE UR-ACNC: ABNORMAL
LYMPHOCYTES # BLD AUTO: 23.4 % — SIGNIFICANT CHANGE UP (ref 13–44)
LYMPHOCYTES # BLD AUTO: 3.41 K/UL — HIGH (ref 1–3.3)
MCHC RBC-ENTMCNC: 28.5 PG — SIGNIFICANT CHANGE UP (ref 27–34)
MCHC RBC-ENTMCNC: 31.9 GM/DL — LOW (ref 32–36)
MCV RBC AUTO: 89.3 FL — SIGNIFICANT CHANGE UP (ref 80–100)
MONOCYTES # BLD AUTO: 1.13 K/UL — HIGH (ref 0–0.9)
MONOCYTES NFR BLD AUTO: 7.8 % — SIGNIFICANT CHANGE UP (ref 2–14)
NEUTROPHILS # BLD AUTO: 9.88 K/UL — HIGH (ref 1.8–7.4)
NEUTROPHILS NFR BLD AUTO: 67.8 % — SIGNIFICANT CHANGE UP (ref 43–77)
NITRITE UR-MCNC: NEGATIVE — SIGNIFICANT CHANGE UP
PH UR: 7 — SIGNIFICANT CHANGE UP (ref 5–8)
PLATELET # BLD AUTO: 277 K/UL — SIGNIFICANT CHANGE UP (ref 150–400)
POTASSIUM SERPL-MCNC: 3.4 MMOL/L — LOW (ref 3.5–5.3)
POTASSIUM SERPL-SCNC: 3.4 MMOL/L — LOW (ref 3.5–5.3)
PROT SERPL-MCNC: 7.1 GM/DL — SIGNIFICANT CHANGE UP (ref 6–8.3)
PROT UR-MCNC: 300 MG/DL
PROTHROM AB SERPL-ACNC: 15.2 SEC — HIGH (ref 9.5–13)
RBC # BLD: 4.85 M/UL — SIGNIFICANT CHANGE UP (ref 3.8–5.2)
RBC # FLD: 14.1 % — SIGNIFICANT CHANGE UP (ref 10.3–14.5)
RBC CASTS # UR COMP ASSIST: 292 /HPF — HIGH (ref 0–4)
RSV RNA NPH QL NAA+NON-PROBE: SIGNIFICANT CHANGE UP
SARS-COV-2 RNA SPEC QL NAA+PROBE: SIGNIFICANT CHANGE UP
SODIUM SERPL-SCNC: 142 MMOL/L — SIGNIFICANT CHANGE UP (ref 135–145)
SP GR SPEC: 1.02 — SIGNIFICANT CHANGE UP (ref 1–1.03)
SQUAMOUS # UR AUTO: 6 /HPF — HIGH (ref 0–5)
UROBILINOGEN FLD QL: 1 MG/DL — SIGNIFICANT CHANGE UP (ref 0.2–1)
WBC # BLD: 14.56 K/UL — HIGH (ref 3.8–10.5)
WBC # FLD AUTO: 14.56 K/UL — HIGH (ref 3.8–10.5)
WBC UR QL: 5 /HPF — SIGNIFICANT CHANGE UP (ref 0–5)

## 2024-07-14 PROCEDURE — 92610 EVALUATE SWALLOWING FUNCTION: CPT | Mod: GN

## 2024-07-14 PROCEDURE — 82140 ASSAY OF AMMONIA: CPT

## 2024-07-14 PROCEDURE — 92507 TX SP LANG VOICE COMM INDIV: CPT | Mod: GN

## 2024-07-14 PROCEDURE — 93010 ELECTROCARDIOGRAM REPORT: CPT | Mod: 76

## 2024-07-14 PROCEDURE — 36415 COLL VENOUS BLD VENIPUNCTURE: CPT

## 2024-07-14 PROCEDURE — 99285 EMERGENCY DEPT VISIT HI MDM: CPT

## 2024-07-14 PROCEDURE — 99223 1ST HOSP IP/OBS HIGH 75: CPT

## 2024-07-14 PROCEDURE — 93005 ELECTROCARDIOGRAM TRACING: CPT

## 2024-07-14 PROCEDURE — 92523 SPEECH SOUND LANG COMPREHEN: CPT | Mod: GN

## 2024-07-14 PROCEDURE — 84443 ASSAY THYROID STIM HORMONE: CPT

## 2024-07-14 PROCEDURE — 85027 COMPLETE CBC AUTOMATED: CPT

## 2024-07-14 PROCEDURE — 95816 EEG AWAKE AND DROWSY: CPT

## 2024-07-14 PROCEDURE — 36600 WITHDRAWAL OF ARTERIAL BLOOD: CPT

## 2024-07-14 PROCEDURE — 85610 PROTHROMBIN TIME: CPT

## 2024-07-14 PROCEDURE — 84100 ASSAY OF PHOSPHORUS: CPT

## 2024-07-14 PROCEDURE — 71045 X-RAY EXAM CHEST 1 VIEW: CPT | Mod: 26

## 2024-07-14 PROCEDURE — 70450 CT HEAD/BRAIN W/O DYE: CPT | Mod: 26,MC

## 2024-07-14 PROCEDURE — 80053 COMPREHEN METABOLIC PANEL: CPT

## 2024-07-14 PROCEDURE — 71250 CT THORAX DX C-: CPT | Mod: MC

## 2024-07-14 PROCEDURE — 92526 ORAL FUNCTION THERAPY: CPT | Mod: GN

## 2024-07-14 PROCEDURE — 82803 BLOOD GASES ANY COMBINATION: CPT

## 2024-07-14 PROCEDURE — 80048 BASIC METABOLIC PNL TOTAL CA: CPT

## 2024-07-14 PROCEDURE — 82607 VITAMIN B-12: CPT

## 2024-07-14 PROCEDURE — 83735 ASSAY OF MAGNESIUM: CPT

## 2024-07-14 RX ORDER — SODIUM CHLORIDE 0.9 % (FLUSH) 0.9 %
500 SYRINGE (ML) INJECTION ONCE
Refills: 0 | Status: COMPLETED | OUTPATIENT
Start: 2024-07-14 | End: 2024-07-14

## 2024-07-14 RX ORDER — SODIUM CHLORIDE 0.9 % (FLUSH) 0.9 %
1700 SYRINGE (ML) INJECTION ONCE
Refills: 0 | Status: COMPLETED | OUTPATIENT
Start: 2024-07-14 | End: 2024-07-14

## 2024-07-14 RX ORDER — ACETAMINOPHEN 325 MG
650 TABLET ORAL ONCE
Refills: 0 | Status: COMPLETED | OUTPATIENT
Start: 2024-07-14 | End: 2024-07-14

## 2024-07-14 RX ORDER — CEFTRIAXONE SODIUM 500 MG
1000 VIAL (EA) INJECTION ONCE
Refills: 0 | Status: DISCONTINUED | OUTPATIENT
Start: 2024-07-14 | End: 2024-07-14

## 2024-07-14 RX ORDER — METOPROLOL TARTRATE 50 MG
12.5 TABLET ORAL
Refills: 0 | Status: DISCONTINUED | OUTPATIENT
Start: 2024-07-14 | End: 2024-07-15

## 2024-07-14 RX ORDER — CLOPIDOGREL BISULFATE 75 MG/1
75 TABLET, FILM COATED ORAL DAILY
Refills: 0 | Status: DISCONTINUED | OUTPATIENT
Start: 2024-07-14 | End: 2024-07-19

## 2024-07-14 RX ORDER — METOPROLOL TARTRATE 50 MG
12.5 TABLET ORAL ONCE
Refills: 0 | Status: COMPLETED | OUTPATIENT
Start: 2024-07-14 | End: 2024-07-14

## 2024-07-14 RX ORDER — POTASSIUM CHLORIDE 600 MG/1
20 TABLET, FILM COATED, EXTENDED RELEASE ORAL ONCE
Refills: 0 | Status: DISCONTINUED | OUTPATIENT
Start: 2024-07-14 | End: 2024-07-15

## 2024-07-14 RX ORDER — ATORVASTATIN CALCIUM 20 MG/1
80 TABLET, FILM COATED ORAL AT BEDTIME
Refills: 0 | Status: DISCONTINUED | OUTPATIENT
Start: 2024-07-14 | End: 2024-07-19

## 2024-07-14 RX ORDER — CEFTRIAXONE SODIUM 500 MG
1000 VIAL (EA) INJECTION ONCE
Refills: 0 | Status: COMPLETED | OUTPATIENT
Start: 2024-07-14 | End: 2024-07-14

## 2024-07-14 RX ORDER — METOPROLOL TARTRATE 50 MG
25 TABLET ORAL ONCE
Refills: 0 | Status: COMPLETED | OUTPATIENT
Start: 2024-07-14 | End: 2024-07-14

## 2024-07-14 RX ORDER — APIXABAN 5 MG/1
5 TABLET, FILM COATED ORAL EVERY 12 HOURS
Refills: 0 | Status: DISCONTINUED | OUTPATIENT
Start: 2024-07-14 | End: 2024-07-19

## 2024-07-14 RX ORDER — CEFTRIAXONE SODIUM 500 MG
1000 VIAL (EA) INJECTION EVERY 24 HOURS
Refills: 0 | Status: DISCONTINUED | OUTPATIENT
Start: 2024-07-14 | End: 2024-07-17

## 2024-07-14 RX ADMIN — Medication 260 MILLIGRAM(S): at 16:15

## 2024-07-14 RX ADMIN — Medication 1000 MILLILITER(S): at 18:34

## 2024-07-14 RX ADMIN — Medication 1000 MILLIGRAM(S): at 14:50

## 2024-07-14 RX ADMIN — Medication 1700 MILLILITER(S): at 14:17

## 2024-07-14 RX ADMIN — Medication 1000 MILLILITER(S): at 17:47

## 2024-07-14 NOTE — ED ADULT NURSE NOTE - NSFALLHARMRISKINTERV_ED_ALL_ED
Assistance OOB with selected safe patient handling equipment if applicable/Assistance with ambulation/Communicate risk of Fall with Harm to all staff, patient, and family/Monitor gait and stability/Monitor for mental status changes and reorient to person, place, and time, as needed/Move patient closer to nursing station/within visual sight of ED staff/Provide patient with walking aids/Provide visual cue: red socks, yellow wristband, yellow gown, etc/Reinforce activity limits and safety measures with patient and family/Toileting schedule using arm’s reach rule for commode and bathroom/Use of alarms - bed, stretcher, chair and/or video monitoring/Bed in lowest position, wheels locked, appropriate side rails in place/Call bell, personal items and telephone in reach/Instruct patient to call for assistance before getting out of bed/chair/stretcher/Non-slip footwear applied when patient is off stretcher/[place] to call system/Physically safe environment - no spills, clutter or unnecessary equipment/Purposeful Proactive Rounding/Room/bathroom lighting operational, light cord in reach

## 2024-07-14 NOTE — PATIENT PROFILE ADULT - FALL HARM RISK - HARM RISK INTERVENTIONS

## 2024-07-14 NOTE — ED ADULT NURSE NOTE - PRIMARY CARE PROVIDER
If your neurological testing is not going to be scheduled today our Pre-Service Department will contact you to schedule within one to two days. If you would like to call and schedule when it is convenient for you or if you need to reschedule your appointment please call the Pre-Service Department at [phone].    Your tests will be reviewed by the Benefits Department and if there are any concerns regarding prior authorization or financial obligation they will contact you. We recommend you still contact your insurance company to see if the test, provider, and location of service are covered, and if so, will there be any out of pocket expenses.     If you should have any concerns regarding the financial obligation of the tests please contact our Financial Advocates at [phone] and they will be happy to assist you.                 Thank you for letting us care for you today.       #######################################    If you receive a survey via e-mail or mail, please take the time to complete and return as your feedback is very helpful to our practice.   The components of a brain-healthy lifestyle:     * Regular Exercise.Either walking or a stationary recumbent bike(30 minutes daily).   * Maintain a normal BMI(Body Mass Index).  * Healthy diet, including 4-5 servings of fruits and vegetables daily.  * Only 1 serving of red meat daily.  * Mental Stimulation.  * Active treatment for any type of sleep disorder.  * Stress Management and/or treatment for anxiety/depression.   * An active social life.   * Vitamin D 5000 units daily.  * Glass of alcohol at night.   * Drink coffee.  *Fresh Berries.      
Dr. Ott

## 2024-07-14 NOTE — ED ADULT NURSE REASSESSMENT NOTE - NS ED NURSE REASSESS COMMENT FT1
MD Seymour aware of patient's HR converting to atrial flutter in the 120-140's. Per MD, administer 500 cc bolus and monitor HR and BP. MD aware of patient's SBP in the 170's-180's. Continue with bolus and monitor patient response.

## 2024-07-14 NOTE — ED ADULT NURSE NOTE - OBJECTIVE STATEMENT
patient brought in by EMS from home with family at bedside c/o AMS.  family report worsening lethargy, weakness for the last few days.  report recent change in seroquel dose.  hx dementia, CAD, HTN.  patient is hard of hearing.  opens eyes to stimuli but is not speakingge

## 2024-07-14 NOTE — ED ADULT TRIAGE NOTE - CHIEF COMPLAINT QUOTE
pt brought in by EMS from home c/o AMS beginning this morning when pt woke up. per EMS, family reporting increased weakness and fatigue, difficulty completing ADLs. hx TIA, dementia, HTN. pt poor historian, unable to answer questions in triage. pt on 2L NC at baseline per EMS.

## 2024-07-14 NOTE — H&P ADULT - NEUROLOGICAL
Patient is difficult to arouse. Needs multiple verbal prompts and tactile stimulation, and then she opens her eyes and mumbles and falls back asleep./responds to pain

## 2024-07-14 NOTE — PATIENT PROFILE ADULT - NSTOBACCONEVERSMOKERY/N_GEN_A
No Exclude Pending Lab and Radiology orders from printing on the Patient's Discharge Instructions, due to Privacy Concerns.

## 2024-07-14 NOTE — ED ADULT NURSE REASSESSMENT NOTE - NS ED NURSE REASSESS COMMENT FT1
Patient converted to normal sinus rhythm with PAC's, EKG done and in chart. MD Seymour aware. Cardiac monitoring maintained.

## 2024-07-14 NOTE — ED ADULT NURSE REASSESSMENT NOTE - NS ED NURSE REASSESS COMMENT FT1
Received patient AxOx1, lethargic to voice, arousable but not opening eyes. Son at bedside and states this has been patient's status for the past 3 weeks, worsening in the past 48 hours. Patient spontaneously opened eyes and appeared more alert and conversing, however, goes back to sleep and appearing lethargic. Per son, this is consistent with patient's presentation at home. Comfort and safety measures maintained, side rails up. All needs addressed. Son updated on plan of care, all questions and concerns addressed. Will continue to monitor.

## 2024-07-14 NOTE — ED PROVIDER NOTE - OBJECTIVE STATEMENT
89 y/o F with PMHx of TIA, dementia, CAD s/p LAD stent placement 2020, hiatal hernia, HTN, hysterectomy, tonsillectomy, appendectomy presents to the ED BIBEMS from home c/o AMS upon waking up this morning. Family at bedside reports pt has had an increase in weakness and fatigue, difficulty completing ADLs. Pt here 3 weeks ago for bradycardia and had a few of her medications changed. Recently tapered off Seroquel. Due to visit neurologist tomorrow, but family did not want to wait, so pt brought to ED. Cardiologist [place].

## 2024-07-14 NOTE — ED ADULT NURSE REASSESSMENT NOTE - NS ED NURSE REASSESS COMMENT FT1
Patient appears more lethargic, arousing to voice but only able to keep eyes open briefly. MD Seymour at bedside and aware. No new orders received at this time, cardiac monitoring maintained, son at bedside, comfort and safety measures maintained, will continue to monitor.

## 2024-07-14 NOTE — PATIENT PROFILE ADULT - FUNCTIONAL SCREEN CURRENT LEVEL: SWALLOWING (IF SCORE 2 OR MORE FOR ANY ITEM, CONSULT REHAB SERVICES), MLM)
Name: Liliana Romeo  YOB: 1968  Gender: female  MRN: [id number]      CC: Injections (R knee (Euflexxa #2))       HPI: Liliana Romeo is a 47 y.o. female who presents with Injections (R knee (Euflexxa #2))  . Patient returns for second injection in the Euflexxa series. Patient tolerated the initial injection well and wishes to proceed with second injection today. Physical Examination:  General: no acute distress  right Knee: Exam is unchanged, the knee continues to be painful with palpation and range of motion. There is no effusion or concern for infection. Assessment:   1. Primary osteoarthritis of right knee          Plan:     Right knee injected from a anterior lateral approach with 2 cc Euflexxa viscosupplementation after sterile prep. Patient tolerated the procedure well was given postinjection flare precautions.             Martha Wagner, [facility]    Orthopaedics and Sports Medicine
Per son, at baseline, patient is able to swallow food and liquids without difficulty. At present, patient with extreme lethargy and per son, patient has been having trouble swallowing at home x 48 hours

## 2024-07-14 NOTE — H&P ADULT - HISTORY OF PRESENT ILLNESS
88 y/o F w/ PMH of TIA, Alzheimer's dementia, parkinson's disease, CAD s/p PCI, HTN, PAD, a-flutter (on eliquis), p/w AMS. Patient's son at bedside states that his brother informed him that patient has been more lethargic and w/ decreased reponsiveness for the last 3 weeks, and that she has had the current mental status during that time. Patient has been sleeping more and more every day. Patient's seroquel has been tapered off, and patient suppose to see neurologist tomorrow. Patient unable to provide any useful history. Appears very sedated, needed to be called multiple times verbally along with some tactile stimulation to wake up, but patient still goes right back to closing her eyes and is having difficulty staying awake.     PSH: PCI, hysterectomy tonsillectomy, appendectomy     Social Hx: Denies tobacco / etoh / drugs     Family hx: heart disease

## 2024-07-14 NOTE — ED ADULT NURSE REASSESSMENT NOTE - NS ED NURSE REASSESS COMMENT FT1
Patient remains in atrial flutter with HR fluctuations between 120's-150's. MD Seymour aware, ordered to administer additional 500 cc bolus. SBP improving and in the 120's at present. Both sons at bedside and updated on plan of care. Patient ate dinner and appeared much more alert, however, falls asleep often. Per son, this is consistent with the past 3 weeks at home. Comfort and safety measures maintained, will continue to monitor.

## 2024-07-14 NOTE — PATIENT PROFILE ADULT - NSPROMEDSADMININFO_GEN_A_NUR
Patient currently with extreme lethargy. Per son, at baseline, patient swallows pills whole with water

## 2024-07-14 NOTE — H&P ADULT - ASSESSMENT
88 y/o F w/ PMH of TIA, Alzheimer's dementia, parkinson's disease, CAD s/p PCI, HTN, PAD, a-flutter (on eliquis), p/w AMS    *Metabolic Encephalopathy 2/2 Sepsis 2/2 UTI  -CT head: No acute findings   -Ceftriaxone  -F/u urine / blood cx  -ID consult  -S/p IVF   -Lactate = 1.5     *Worsening mental status over the last 3 weeks  -Patient was suppose to see neurologist tomorrow. Given the level of sedation, will consult neuro to check for any underlying neurological reason for patient's worsening mental status as well   -Will hold all sedating medications temporarily tonight(paroxetine / seroquel / carbidopa-levadopa), will need to resume when patient is more awake   -Speech and swallow consult  -Keep head of bed elevated  -Aspiration precautions     *A-flutter w/ RVR  -Likely 2/2 underlying infection   -Rate controlled at this time   -Remote Tele  -On Eliquis   -Cardio consult     *Hypokalemia  -Replete and recheck     *H/o TIA / alzheimer's dementia / parkinson's disease / CAD / HTN / PAD   -C/w home meds and f/u outpatient for further management if conditions remain stable during hospitalization     *DVT ppx   -On Eliquis     >75 mins required for admission    88 y/o F w/ PMH of TIA, Alzheimer's dementia, parkinson's disease, CAD s/p PCI, HTN, PAD, a-flutter (on eliquis), p/w AMS    *Metabolic Encephalopathy 2/2 Sepsis 2/2 UTI  -CT head: No acute findings   -Ceftriaxone  -F/u culture  -ID consult  -S/p IVF   -Lactate = 1.5     *Worsening mental status over the last 3 weeks  -Patient was suppose to see neurologist tomorrow. Given the level of sedation, will consult neuro to check for any underlying neurological reason for patient's worsening mental status as well   -Will hold all sedating medications temporarily tonight(paroxetine / seroquel / carbidopa-levadopa), will need to resume when patient is more awake   -Speech and swallow consult  -Aspiration precautions     *A-flutter w/ RVR  -Likely 2/2 underlying infection   -Rate controlled at this time   -Remote Tele  -On Eliquis   -Cardio consult     *Hypokalemia  -Replete and recheck     *H/o TIA / alzheimer's dementia / parkinson's disease / CAD / HTN / PAD   -C/w home meds and f/u outpatient for further management if conditions remain stable during hospitalization     *UNABLE TO PLACE ANY NEW LAB ORDERS AT THIS TIME DUE TO DOWN TIME IN LAB ORDERING SYSTEM.     *DVT ppx   -On Eliquis     >75 mins required for admission    88 y/o F w/ PMH of TIA, Alzheimer's dementia, parkinson's disease, CAD s/p PCI, HTN, PAD, a-flutter (on eliquis), p/w AMS    *Metabolic Encephalopathy 2/2 Sepsis 2/2 UTI  -CT head: No acute findings   -Ceftriaxone  -F/u culture  -ID consult  -S/p IVF   -Lactate = 1.5     *Worsening mental status over the last 3 weeks  -Patient is suppose to see neurologist tomorrow. Given the level of sedation, will consult neuro to check for any underlying neurological reason for patient's worsening mental status   -Will hold all sedating medications temporarily tonight(paroxetine / seroquel / carbidopa-levadopa), will need to resume when patient is more awake   -Speech and swallow consult  -Aspiration precautions     *A-flutter w/ RVR  -Likely 2/2 underlying infection   -Rate controlled at this time   -Remote Tele  -On Eliquis   -Cardio consult     *Hypokalemia  -Replete and recheck     *H/o TIA / alzheimer's dementia / parkinson's disease / CAD / HTN / PAD   -C/w home meds and f/u outpatient for further management if conditions remain stable during hospitalization     *UNABLE TO PLACE ANY NEW LAB ORDERS AT THIS TIME DUE TO DOWN TIME IN LAB ORDERING SYSTEM.     *DVT ppx   -On Eliquis     >75 mins required for admission    88 y/o F w/ PMH of TIA, Alzheimer's dementia, parkinson's disease, CAD s/p PCI, HTN, PAD, a-flutter (on eliquis), p/w AMS    *Metabolic Encephalopathy 2/2 Sepsis 2/2 UTI  -CT head: No acute findings   -Ceftriaxone  -F/u culture  -ID consult  -S/p IVF   -Lactate = 1.5     *Worsening mental status over the last 3 weeks  -Patient is suppose to see neurologist tomorrow. Given the level of sedation, will consult neuro to check for any underlying neurological reason for patient's worsening mental status   -Will hold all sedating medications temporarily tonight(paroxetine / seroquel / carbidopa-levadopa), will need to resume when patient is more awake   -Speech and swallow consult  -Aspiration precautions     *A-flutter w/ RVR  -Likely 2/2 underlying infection   -Rate controlled at this time   -Remote Tele  -On Eliquis   -Cardio consult     *Hypokalemia  -Replete and recheck     *H/o TIA / alzheimer's dementia / parkinson's disease / CAD / HTN / PAD   -C/w home meds and f/u outpatient for further management if conditions remain stable during hospitalization     *DVT ppx   -On Eliquis     >75 mins required for admission

## 2024-07-14 NOTE — ED ADULT NURSE NOTE - CAS TRG GENERAL NORM CIRC DET
[FreeTextEntry6] : Procedure performed: Nasal Endoscopy- Diagnostic\par Pre / post -procedure indication(s): nasal congestion\par Verbal and/or written consent obtained from patient\par Scope #: 29,  flexible fiber optic telescope used\par The scope was introduced in the nasal passage between the middle and inferior turbinates to exam the inferior portion of the middle meatus and the fontanelle, as well as the maxillary ostia.  Next, the scope was passed medically and posteriorly to the middle turbinates to examine the sphenoethmoid recess and the superior turbinate region.\par Upon visualization the finders are as follows:\par Nasal Septum: left septal deviation\par B/L: Mucosa: pink and moist, Mucous: scant PND, Polyp: not seen, Inferior Turbinate, Middle and Superior Turbinate: hypertrophy, Inferior Meatus: narrow, Middle Meatus: narrow, Super Meatus:normal, Sphenoethmoidal Recess: clear.  Eustachian tube clear. \par The patient tolerated the procedure well\par  [de-identified] : Procedure performed: laryngeal Endoscopy- Diagnostic\par Pre-op/post op indication: globus\par Verbal and/or written consent obtained from patient\par Scope #: 29, flexible fiber optic telescope used.\par Scope was introduced through the nose passed on the floor of the nose to the nasopharynx and then followed down the soft palate to the lower pharynx. The tongue Base, Larynx, Hypopharynx were examined. Base of tongue was symmetric, vallecular was clear, epiglottis was not deformed, Glottis with fully mobile vocal cords without lesions or masses. Visualized subglottis clear. Postcricoid area with erythema and edema.  Pos intra-arytenoid bar. No pooling of secretions, masses or lesions. Airway patent, no foreign body visualized. No glottic/supraglottic edema. True vocal cords, arytenoids, vestibular folds, ventricles, pyriform sinuses, and aryepiglottic folds appear normal bilaterally. Vocal cords mobile with good contact b/l.\par  Strong peripheral pulses

## 2024-07-14 NOTE — PATIENT PROFILE ADULT - NSPROEXTENSIONSOFSELF_GEN_A_NUR
Patient Education   Patient Education   Patient Education     Understanding Smokeless Tobacco   Smokeless tobacco products are made from the tobacco plant. They are harmful to your body. Smokeless tobacco causes oral cancer, esophageal cancer, and pancreatic cancer. These products are not safer than other forms of tobacco. Tobacco is not safe in any form.   Smokeless tobacco is used by about 7 in 100 [place] adults. Most users are men. About 1 in 10 adolescent boys also use it.   What is smokeless tobacco?  Smokeless tobacco comes in 3 types. These are chewing tobacco, snuff, and dissolvables. Many of these products are flavored to make them more appealing.   Snuff is the most widely used kind of smokeless tobacco. This finely ground tobacco can be dry or moist. The dry powder is sniffed. Moist snuff is put in the mouth between the gums and cheeks. Often packaged in round cans, it’s called dip. Snuff also includes snus. This is moist tobacco sold in small pouches.   Chewing tobacco—or chew—is sold as loose leaves, plugs, or twists. A piece is pressed between the gums and cheek. Dissolvables are tobacco that has been made into small shapes, like lozenges or tablets. They are often sucked on until they melt.   How is smokeless tobacco bad for you?  All tobacco products contain dangerous chemicals. Tobacco use is not safe in any amount or in any form.   All forms of tobacco have nicotine in them. Nicotine is very addictive. You can become physically and emotionally hooked on it. You may even crave it. In children, nicotine can harm the developing brain.   Smokeless tobacco also has at least 30 harmful chemicals. The worst are tobacco-specific nitrosamines. These come from the way tobacco is processed. You may also be exposed to other chemicals like lead and mercury. All of these have been linked to cancer. If you use smokeless tobacco, you are more at risk for cancer of the mouth, esophagus, and pancreas.   Smokeless  tobacco may also lead to other health problems. These include:  · Heart disease and stroke  · Small white patches in the mouth that can turn into cancer (leukoplakia)  · Gum disease, tooth decay, and tooth loss  · Stained teeth and bad breath  · Early birth or miscarriage if used while pregnant    How can you quit smokeless tobacco?  Quitting any form of tobacco can be hard to do. The nicotine can cause physical, mental, and emotional withdrawal symptoms. You may have headaches, depression, and trouble sleeping. You may feel anxious, angry, tired, or restless. You may also feel the need to put something in your mouth to replace the chew, dip, or other smokeless tobacco product.  If you want to quit, talk with your healthcare provider, pharmacist, or dentist. He or she can tell you about all your options. Many of the same tactics people use to quit cigarettes may help you. You may want to try a support group, a tobacco cessation program, medicine, or nicotine replacement therapy. Many resources are also available through your smart phone.      More resources  · American Lung Association  · CDC  · National Cancer [place]   Date Last Reviewed: 5/1/2017  © 4112-0886 [employer]. [address] [place] [address]. All rights reserved. This information is not intended as a substitute for professional medical care. Always follow your healthcare professional's instructions.           Exercise: Making the Most of Your Time  Your exercise goal is 30 minutes on most days. Aim for a total of 150 or more minutes a week. Not sure you can fit one 30-minute block of exercise time into your day? Split it up into shorter 10- and 15-minute blocks. Do this 2 to 3 times a day. You'll still get all of the benefits of exercise.    Use your whole body  Aerobic exercise works your heart and lungs. Some examples are walking, running, and cycling. Try adding a few other activities, too. This can help you strengthen and  stretch many different muscles in your body.  Strengthen your:  · Lower legs. Go up and down slowly on your toes, while you’re filing papers or washing dishes.  · Upper legs. Lower yourself slowly into a chair without using your arms.  Stretch your:  · Back. After you get up from a chair, place your palms on your low back and lean your upper body back.  · Shoulders and chest. Lift your arms overhead and reach tall while waiting for your computer to warm up.  · Lower legs. Raise your toes and press them against a wall (with your heel on the ground).  Find a few extra minutes  Try these tips to add some extra exercise and activity to your day:  · At work. Pick a lunch spot a few blocks away and walk there and back. Take a brisk walk on your break.  · At home. Ride bikes with your kids. Use an exercise bike in the living room while you watch TV.  · On errands. Park a few blocks away from where you need to go and walk there. Power-walk in malls by doing a fast lap or two before shopping.  · At play. Go hiking with friends instead of sitting on a bench. Walk through a street fair instead of sitting in a movie.  Tips for sticking with it  · Plan your activity by writing it on a calendar.  · Find a johana at work to walk with during a lunch break.  · Take your kids with you on a short walk after dinner.  · Post a reminder list of the benefits of being active where you can see it.  · Set an alarm to tell you when it’s time for an activity break.   Date Last Reviewed: 3/1/2018  © 7617-1210 [employer]. [address] [place] [address]. All rights reserved. This information is not intended as a substitute for professional medical care. Always follow your healthcare professional's instructions.           4 Steps for Eating Healthier  Changing the way you eat can improve your health. It can lower your cholesterol and blood pressure, and help you stay at a healthy weight. Your diet doesn’t have to be bland  and boring to be healthy. Just watch your calories and follow these steps:    Step 1. Eat fewer unhealthy fats  · Choose more fish and lean meats instead of fatty cuts of meat.  · Skip butter and lard, and use less margarine.  · Pass on foods that have palm, coconut, or hydrogenated oils.  · Eat fewer high-fat dairy foods like cheese, ice cream, and whole milk.  · Get a heart-healthy cookbook and try some low-fat recipes.  Step 2. Go light on salt  · Keep the saltshaker off the table.  · Limit high-salt ingredients, such as soy sauce, bouillon, and garlic salt.  · Instead of adding salt when cooking, season your food with herbs and flavorings. Try lemon, garlic, and onion, or salt-free herb seasonings.  · Limit convenience foods, such as boxed or canned foods and restaurant food.  · Read food labels and choose lower-sodium options.  Step 3. Limit sugar  · Pause before you add sugars to pancakes, cereal, coffee, or tea. This includes white and brown table sugar, syrup, honey, and molasses. Cut your usual amount by half.  · Use non-sugar sweeteners. Stevia, aspartame, and sucralose can satisfy a sweet tooth without adding calories.  · Swap out sugar-filled soda and other drinks. Buy sugar-free or low-calorie beverages. Remember water is always the best choice.  · Read labels and choose foods with less added sugar. Keep in mind that dairy foods and foods with fruit will have some natural sugar.  · Cut the sugar in recipes by 1/3 to 1/2. Boost the flavor with extracts like almond, vanilla, or orange. Or add spices such as cinnamon or nutmeg.  Step 4. Eat more fiber  · Eat fresh fruits and vegetables every day.  · Boost your diet with whole grains. Go for oats, whole-grain rice, and bran.  · Add beans and lentils to your meals.  · Drink more water to match your fiber increase to help prevent constipation.  Date Last Reviewed: 6/1/2017  © 4615-3942 [employer]. [address] [place] [address], [place] [address]. All  rights reserved. This information is not intended as a substitute for professional medical care. Always follow your healthcare professional's instructions.            hearing aid

## 2024-07-14 NOTE — ED ADULT NURSE NOTE - ED COMFORT CARE
CHIEF COMPLAINT/HISTORY OF PRESENT ILLNESS:    Claudia Rossi is a 50 year old female that presents to the Urgent Care for    Chief Complaint   Patient presents with   • Ear Pain    Claudia has a right ear infection and she finished her amoxicillin today. She was put on amoxicillin on 1/17/18 for 10 days. She did finish this within a week. She still having right ear pain and drainage. She was using tramadol for pain which is gone. She cannot sleep due to the pain and ringing in her ears. She was seen by me in November 2017 and was told to see ENT and audiology for ear ringing. She has not done so. The order was removed since she has not attempted to make an appointment.  She's not having any chills, nausea, vomiting, or diarrhea. She does report fevers of 99.2, and right ear pain. She has taken too much of her amoxicillin to quickly and finished a ten-day course with in less than a week. No sick contacts. Reviewed nurse's notes.    Past Medical History:  Problem list: Reviewed.  Allergies: NKDA    Medication list:  Current Outpatient Prescriptions   Medication Sig Dispense Refill   • cefdinir (OMNICEF) 300 MG capsule Take 1 capsule by mouth 2 times daily for 10 days. 20 capsule 0   • traMADol (ULTRAM) 50 MG tablet Take 1 tablet by mouth every 4 hours as needed for Pain. 1 Tab po hs as needed for pain. 10 tablet 0   • amoxicillin (AMOXIL) 875 MG tablet Take 1 tablet by mouth 2 times daily. 20 tablet 0   • ferrous sulfate 325 (65 FE) MG tablet Take 1 tablet by mouth 2 times daily (with meals). 60 tablet 1   • vitamin - therapeutic multivitamins w/minerals (CENTRUM SILVER,THERA-M) Tab Take 1 tablet by mouth daily. 30 tablet 0   • thiamine (VITAMIN B1) 100 MG tablet Take 1 tablet by mouth daily. 15 tablet 0   • magnesium lactate (MAG-TAB SR) 84 MG (7MEQ) Tab CR Take 1 tablet by mouth daily (with breakfast). 30 tablet 1   • diphenhydrAMINE (BENADRYL) 25 MG capsule Take 25 mg by mouth as needed for Allergies.       No  current facility-administered medications for this visit.      Social History:  No secondhand smoke exposure, nonsmoker, unemployed      GENERAL:  General appearance:  Well developed and well nourished.  VITAL SIGNS:  low-grade temp, WNL  Vitals:    01/24/18 1206   BP: 148/89   Pulse: 98   Resp: 22   Temp: 99.2 °F (37.3 °C)   TempSrc: Oral   SpO2: 97%   Weight: 53.1 kg   PainSc: 7-8   PainLoc: Ear     SKIN:  Clear to inspection and palpation.  HEAD:  Normocephalic   EYES: Conjunctivae and lids clear without injection.  EMANUEL.  EOMI.  ENT: Normal ear canals.  left TM clear, right TM partially cleared erythema.  Hearing within normal limits.  Sinuses nontender to palpation and gravity.  Lips and dentition within normal limits.   Oropharynx erythema.  3+ tonsils with erythema with edema. No petechiae or exudate.  NECK:  Supple.   No lymphadenopathy.  HEART:  Normal S1 and S2.  No murmurs, rubs or gallops.   RESPIRATORY:  Full, easy respiratory effort.  Clear to auscultation bilaterally.    PSYCHOLOGICAL: Alert and oriented ×3, Pleasant, Cooperative    Procedures/Diagnostic Tests: None    Claudia was seen today for ear pain.    Diagnoses and all orders for this visit:    Recurrent acute serous otitis media of right ear  -     cefdinir (OMNICEF) 300 MG capsule; Take 1 capsule by mouth 2 times daily for 10 days.    Right ear pain  -     traMADol (ULTRAM) 50 MG tablet; Take 1 tablet by mouth every 4 hours as needed for Pain. 1 Tab po hs as needed for pain.    Noncompliance with medications      I discussed with Claudia that she took the antibiotics  too quickly. She is aware that she was supposed today the antibiotic twice daily for 10 days not 7 days. However considering she still has a right ear infection I will treat her with Omnicef 300 mg b.i.d. ×10 days. I would like her to make a post-it notes or marked on the bottle 2 sets of 10 lines that she can check cough make sure she is taking it appropriately daily.  I suggest  that they start a probiotic and/or start eating yogurt while taking the antibiotic. She may take Tylenol or ibuprofen as needed for pain relief. I will also give her an order for Ultram 50 mg every 4-6 hours but I would like her to mainly take it at night for pain relief..   Claudia is in agreement with this plan.    Greater than 30 minutes were spent with this patient in direct and indirect care.    For any worsening symptoms return to the clinic, primary care provider, or emergency room.     Claudia was appreciative of her care today.      ADELA Rico: SELINA  This patient was seen under the supervision of Dr. Riley     Patient informed/Family informed/Warm blanket/Pillow

## 2024-07-14 NOTE — H&P ADULT - CARDIOVASCULAR
"Pt calling states she has cellulitis and thrombophlebitis. States that she now has fever, reports she is usually at 97F and currently temp is 99.3F oral. Reports 30 minutes ago it was 99.5F oral. Reports she hasnt started new antibiotic yet. Endorses blistering to site and states her arm where the IV was feels like someone is squeezing a bruise. Rates at "6" out of 10. Per protocol advised to ED NOW. verbalized understanding. Denies any further questions or concerns at this time, advised to call back if they have any that come up. Advised pt to call back with any other concerns or worsening symptoms. Verbalized understanding and will route message to provider.     Reason for Disposition   Black (necrotic), dark purple, or blisters develop in area of cellulitis    Additional Information   Negative: Shock suspected (e.g., cold/pale/clammy skin, too weak to stand, low BP, rapid pulse)   Negative: Sounds like a life-threatening emergency to the triager   Negative: SEVERE pain   Negative: SEVERE pain with bending of finger (or toe) and cellulitis on hand (or foot)   Negative: Widespread rash and bright red, sunburn-like   Negative: Fever > 103 F (39.4 C)    Protocols used: Cellulitis on Antibiotic Follow-up Call-[facility]-[place]    "
normal/regular rate and rhythm/S1 S2 present/no gallops/no rub/no murmur

## 2024-07-14 NOTE — H&P ADULT - CONVERSATION DETAILS
Patient's son at bedside states that his brother Fabrice is HCP, but denies patient having any limitations on resuscitation status for this admission

## 2024-07-15 LAB
ALBUMIN SERPL ELPH-MCNC: 3 G/DL — LOW (ref 3.3–5)
ALP SERPL-CCNC: 108 U/L — SIGNIFICANT CHANGE UP (ref 40–120)
ALT FLD-CCNC: 25 U/L — SIGNIFICANT CHANGE UP (ref 12–78)
AMMONIA BLD-MCNC: 37 UMOL/L — HIGH (ref 11–32)
ANION GAP SERPL CALC-SCNC: 6 MMOL/L — SIGNIFICANT CHANGE UP (ref 5–17)
AST SERPL-CCNC: 29 U/L — SIGNIFICANT CHANGE UP (ref 15–37)
BASE EXCESS BLDA CALC-SCNC: -2.4 MMOL/L — LOW (ref -2–3)
BILIRUB SERPL-MCNC: 1.4 MG/DL — HIGH (ref 0.2–1.2)
BUN SERPL-MCNC: 14 MG/DL — SIGNIFICANT CHANGE UP (ref 7–23)
CALCIUM SERPL-MCNC: 9.8 MG/DL — SIGNIFICANT CHANGE UP (ref 8.5–10.1)
CHLORIDE SERPL-SCNC: 115 MMOL/L — HIGH (ref 96–108)
CO2 SERPL-SCNC: 23 MMOL/L — SIGNIFICANT CHANGE UP (ref 22–31)
CREAT SERPL-MCNC: 1.07 MG/DL — SIGNIFICANT CHANGE UP (ref 0.5–1.3)
EGFR: 50 ML/MIN/1.73M2 — LOW
GLUCOSE SERPL-MCNC: 121 MG/DL — HIGH (ref 70–99)
HCO3 BLDA-SCNC: 22 MMOL/L — SIGNIFICANT CHANGE UP (ref 21–28)
HCT VFR BLD CALC: 37.6 % — SIGNIFICANT CHANGE UP (ref 34.5–45)
HGB BLD-MCNC: 12 G/DL — SIGNIFICANT CHANGE UP (ref 11.5–15.5)
INR BLD: 1.23 RATIO — HIGH (ref 0.85–1.18)
MAGNESIUM SERPL-MCNC: 2.5 MG/DL — SIGNIFICANT CHANGE UP (ref 1.6–2.6)
MCHC RBC-ENTMCNC: 29 PG — SIGNIFICANT CHANGE UP (ref 27–34)
MCHC RBC-ENTMCNC: 31.9 GM/DL — LOW (ref 32–36)
MCV RBC AUTO: 90.8 FL — SIGNIFICANT CHANGE UP (ref 80–100)
PCO2 BLDA: 34 MMHG — SIGNIFICANT CHANGE UP (ref 32–45)
PH BLDA: 7.41 — SIGNIFICANT CHANGE UP (ref 7.35–7.45)
PLATELET # BLD AUTO: 196 K/UL — SIGNIFICANT CHANGE UP (ref 150–400)
PO2 BLDA: 136 MMHG — HIGH (ref 83–108)
POTASSIUM SERPL-MCNC: 3.7 MMOL/L — SIGNIFICANT CHANGE UP (ref 3.5–5.3)
POTASSIUM SERPL-SCNC: 3.7 MMOL/L — SIGNIFICANT CHANGE UP (ref 3.5–5.3)
PROT SERPL-MCNC: 6.5 GM/DL — SIGNIFICANT CHANGE UP (ref 6–8.3)
PROTHROM AB SERPL-ACNC: 13.8 SEC — HIGH (ref 9.5–13)
RBC # BLD: 4.14 M/UL — SIGNIFICANT CHANGE UP (ref 3.8–5.2)
RBC # FLD: 14.1 % — SIGNIFICANT CHANGE UP (ref 10.3–14.5)
SAO2 % BLDA: 99 % — HIGH (ref 94–98)
SODIUM SERPL-SCNC: 144 MMOL/L — SIGNIFICANT CHANGE UP (ref 135–145)
TSH SERPL-MCNC: 1.02 UU/ML — SIGNIFICANT CHANGE UP (ref 0.34–4.82)
VIT B12 SERPL-MCNC: 1118 PG/ML — SIGNIFICANT CHANGE UP (ref 232–1245)
WBC # BLD: 9.26 K/UL — SIGNIFICANT CHANGE UP (ref 3.8–10.5)
WBC # FLD AUTO: 9.26 K/UL — SIGNIFICANT CHANGE UP (ref 3.8–10.5)

## 2024-07-15 PROCEDURE — 95816 EEG AWAKE AND DROWSY: CPT | Mod: 26

## 2024-07-15 PROCEDURE — 99223 1ST HOSP IP/OBS HIGH 75: CPT

## 2024-07-15 PROCEDURE — 99233 SBSQ HOSP IP/OBS HIGH 50: CPT

## 2024-07-15 RX ORDER — PAROXETINE HYDROCHLORIDE 37.5 MG/1
40 TABLET, FILM COATED, EXTENDED RELEASE ORAL DAILY
Refills: 0 | Status: DISCONTINUED | OUTPATIENT
Start: 2024-07-15 | End: 2024-07-19

## 2024-07-15 RX ORDER — METOPROLOL TARTRATE 50 MG
5 TABLET ORAL ONCE
Refills: 0 | Status: COMPLETED | OUTPATIENT
Start: 2024-07-15 | End: 2024-07-15

## 2024-07-15 RX ORDER — POTASSIUM CHLORIDE 600 MG/1
20 TABLET, FILM COATED, EXTENDED RELEASE ORAL ONCE
Refills: 0 | Status: COMPLETED | OUTPATIENT
Start: 2024-07-15 | End: 2024-07-15

## 2024-07-15 RX ORDER — ACETAMINOPHEN 325 MG
1000 TABLET ORAL ONCE
Refills: 0 | Status: COMPLETED | OUTPATIENT
Start: 2024-07-15 | End: 2024-07-15

## 2024-07-15 RX ORDER — CARBIDOPA AND LEVODOPA 10; 100 MG/1; MG/1
1 TABLET ORAL
Refills: 0 | Status: DISCONTINUED | OUTPATIENT
Start: 2024-07-15 | End: 2024-07-19

## 2024-07-15 RX ORDER — METOPROLOL TARTRATE 50 MG
25 TABLET ORAL
Refills: 0 | Status: DISCONTINUED | OUTPATIENT
Start: 2024-07-15 | End: 2024-07-19

## 2024-07-15 RX ORDER — METOPROLOL TARTRATE 50 MG
2.5 TABLET ORAL ONCE
Refills: 0 | Status: COMPLETED | OUTPATIENT
Start: 2024-07-15 | End: 2024-07-16

## 2024-07-15 RX ORDER — SODIUM CHLORIDE 0.9 % (FLUSH) 0.9 %
1000 SYRINGE (ML) INJECTION
Refills: 0 | Status: DISCONTINUED | OUTPATIENT
Start: 2024-07-15 | End: 2024-07-15

## 2024-07-15 RX ADMIN — Medication 100 MILLILITER(S): at 00:59

## 2024-07-15 RX ADMIN — Medication 25 MILLIGRAM(S): at 22:53

## 2024-07-15 RX ADMIN — CLOPIDOGREL BISULFATE 75 MILLIGRAM(S): 75 TABLET, FILM COATED ORAL at 10:43

## 2024-07-15 RX ADMIN — Medication 400 MILLIGRAM(S): at 15:41

## 2024-07-15 RX ADMIN — POTASSIUM CHLORIDE 20 MILLIEQUIVALENT(S): 600 TABLET, FILM COATED, EXTENDED RELEASE ORAL at 00:58

## 2024-07-15 RX ADMIN — Medication 1000 UNIT(S): at 10:43

## 2024-07-15 RX ADMIN — Medication 5 MILLIGRAM(S): at 14:06

## 2024-07-15 RX ADMIN — Medication 12.5 MILLIGRAM(S): at 10:43

## 2024-07-15 RX ADMIN — APIXABAN 5 MILLIGRAM(S): 5 TABLET, FILM COATED ORAL at 21:09

## 2024-07-15 RX ADMIN — PAROXETINE HYDROCHLORIDE 40 MILLIGRAM(S): 37.5 TABLET, FILM COATED, EXTENDED RELEASE ORAL at 20:39

## 2024-07-15 RX ADMIN — ATORVASTATIN CALCIUM 80 MILLIGRAM(S): 20 TABLET, FILM COATED ORAL at 22:53

## 2024-07-15 RX ADMIN — CARBIDOPA AND LEVODOPA 1 TABLET(S): 10; 100 TABLET ORAL at 20:39

## 2024-07-15 RX ADMIN — APIXABAN 5 MILLIGRAM(S): 5 TABLET, FILM COATED ORAL at 10:43

## 2024-07-15 RX ADMIN — Medication 5 MILLIGRAM(S): at 15:41

## 2024-07-15 RX ADMIN — Medication 1000 MILLIGRAM(S): at 10:43

## 2024-07-15 NOTE — SWALLOW BEDSIDE ASSESSMENT ADULT - SWALLOW EVAL: FEEDING ASSISTANCE
PT REQUIRED TOTAL ASSISTANCE TO FEED. ALERTNESS FOR FEEDING SEEMED TO WAX/WANE. PT MUST BE ALERT WHEN FEEDING.

## 2024-07-15 NOTE — CONSULT NOTE ADULT - SUBJECTIVE AND OBJECTIVE BOX
CC: worsening mental staus, very sedated      HPI:  89 y/o F w/ PMH of TIA, Alzheimer's dementia, parkinson's disease, CAD s/p PCI, HTN, PAD, a-flutter (on eliquis), p/w AMS. Patient's son at bedside states that his brother informed him that patient has been more lethargic and w/ decreased reponsiveness for the last 3 weeks, and that she has had the current mental status during that time. Patient has been sleeping more and more every day. Patient's seroquel has been tapered off, and patient suppose to see neurologist tomorrow. Patient unable to provide any useful history. Appears very sedated, needed to be called multiple times verbally along with some tactile stimulation to wake up, but patient still goes right back to closing her eyes and is having difficulty staying awake.     PSH: PCI, hysterectomy tonsillectomy, appendectomy     Social Hx: Denies tobacco / etoh / drugs     Family hx: heart disease    (14 Jul 2024 23:29)      PAST MEDICAL & SURGICAL HISTORY:  Hypertension      Hernia, hiatal      CAD (coronary artery disease)  LAD stent 5/2020      Dementia      S/P appendectomy      S/P tonsillectomy      S/P hysterectomy          FAMILY HISTORY:  FHx: heart disease        Social Hx:  Unknown    MEDICATIONS  (STANDING):  apixaban 5 milliGRAM(s) Oral every 12 hours  atorvastatin 80 milliGRAM(s) Oral at bedtime  cefTRIAXone Injectable. 1000 milliGRAM(s) IV Push every 24 hours  cholecalciferol 1000 Unit(s) Oral daily  clopidogrel Tablet 75 milliGRAM(s) Oral daily  metoprolol tartrate 12.5 milliGRAM(s) Oral two times a day       Allergies  Indocin (Other)  doxycycline (Other)  verapamil (Other)  Biaxin (Other)  Demerol HCl (Other)  tetracyclines (Unknown)  penicillins (Other)          ROS: Pertinent positives in HPI, all other ROS were reviewed and are negative.      Vital Signs Last 24 Hrs  T(C): 38 (15 Jul 2024 14:44), Max: 38 (15 Jul 2024 14:44)  T(F): 100.4 (15 Jul 2024 14:44), Max: 100.4 (15 Jul 2024 14:44)  HR: 102 (15 Jul 2024 15:41) (79 - 132)  BP: 147/93 (15 Jul 2024 15:41) (117/79 - 171/93)  BP(mean): --  RR: 18 (15 Jul 2024 14:44) (17 - 22)  SpO2: 100% (15 Jul 2024 14:44) (94% - 100%)    Parameters below as of 15 Jul 2024 14:44  Patient On (Oxygen Delivery Method): nasal cannula  O2 Flow (L/min): 3          Constitutional: Somnolent, arousable to verbal stimuli, NAD  HEAD: Normocephalic  Neck: Supple.  Extremities:  no edema  Musculoskeletal: Involuntary rhythmic movements b/l feet  Skin: No rashes    Neurological exam:  HF: A x O x O.  Somnolent.  Not engaging, flat affect, masked facies.  Speech fluent, hyphonic, No Aphasia or paraphasic errors. Slow to name, repeat  CN: ISABELLE, EOMI, VFF, facial sensation normal, asymmetrical smile, tongue midline, Palate moves equally, unable to check SCM-not cooperating  Motor: No pronator drift, Strength 4/5 in all 4 ext-poor effort and stiff, normal bulk and tone, no tremor, +rigidity L>R wrist.  Rythmic movements b/l feet  Sens: Intact to light touch    Reflexes: Symmetric and normal, downgoing toes b/l  Coord:  Not cooperating with exam  Gait/Balance: Cannot test              Labs:   07-15    144  |  115<H>  |  14  ----------------------------<  121<H>  3.7   |  23  |  1.07    Ca    9.8      15 Jul 2024 07:01  Mg     2.5     07-15    TPro  6.5  /  Alb  3.0<L>  /  TBili  1.4<H>  /  DBili  x   /  AST  29  /  ALT  25  /  AlkPhos  108  07-15                              12.0   9.26  )-----------( 196      ( 15 Jul 2024 07:01 )             37.6       Radiology:    < from: CT Head No Cont (07.14.24 @ 15:20) >  IMPRESSION:    No evidence of acute intracranial hemorrhage, midline shift or CT   evidence of acute territorial infarct.    If the patient's symptoms persist, consider short interval follow-up head   CT or brain MRI if there are no MRI contraindications.           CC: Acute mental status, very lethargic / somnolent    HPI:  88 y/o F w/ PMH of TIA, Alzheimer's dementia, parkinson's disease, CAD s/p PCI, HTN, PAD, A-flutter (on eliquis), p/w AMS upon waking up this morning.. Patient's son at bedside states that his brother informed him that patient has been more lethargic with decreased reponsiveness for the last 3 weeks, has been sleeping more and more every day and seems fatigued, unable to complete her ADL'S. Patient was admitted to  3 weeks ago for bradycardia - dose of Metoprolol was reduced, subsequently Seroquel, Olanzipine and Aricept were d/c, she f/u with neuro Dr. Laureano, was supposed to see neurologist tomorrow.     Patient is unable to provide any history, appears somnolent, needed to be repeatedly stimulated to wake up, goed right back to closing her eyes and is having difficulty staying awake.         PAST MEDICAL & SURGICAL HISTORY:  Hypertension  Hernia, hiatal  CAD (coronary artery disease) LAD stent 5/2020  Dementia  S/P appendectomy  S/P tonsillectomy  S/P hysterectomy      FAMILY HISTORY:  FHx: heart disease      Social Hx:  Unknown      MEDICATIONS  (STANDING):  apixaban 5 milliGRAM(s) Oral every 12 hours  atorvastatin 80 milliGRAM(s) Oral at bedtime  cefTRIAXone Injectable. 1000 milliGRAM(s) IV Push every 24 hours  cholecalciferol 1000 Unit(s) Oral daily  clopidogrel Tablet 75 milliGRAM(s) Oral daily  metoprolol tartrate 12.5 milliGRAM(s) Oral two times a day       Home Medications:   * Patient Currently Takes Medications as of 13-Jun-2024 12:53 documented in Structured Notes  · 	apixaban 5 mg oral tablet: 1 tab(s) orally 2 times a day  · 	QUEtiapine 25 mg oral tablet: 1 tab(s) orally once a day (at bedtime)  · 	metoprolol tartrate 25 mg oral tablet: 0.5 tab(s) orally 2 times a day  · 	clopidogrel 75 mg oral tablet: 1 tab(s) orally once a day (in the evening)  · 	rosuvastatin 40 mg oral tablet: 1 tab(s) orally once a day  · 	carbidopa-levodopa 25 mg-100 mg oral tablet, extended release: 1 tab(s) orally once a day (at bedtime) ***dose between 8:30 & 9pm***  · 	PARoxetine 40 mg oral tablet: 1 tab(s) orally once a day (in the evening)  · 	Vitamin B Complex oral tablet: 1 tab(s) orally once a day  · 	cholecalciferol 25 mcg (1000 intl units) oral tablet: 1 tab(s) orally once a day  · 	ascorbic acid 500 mg oral tablet: 1 tab(s) orally once a day  · 	ALPRAZolam 0.25 mg oral tablet: 0.5 tab(s) orally every 15 minutes as needed for auditory hallucination .  Pt receives 1/2 tab when she hears voices and repeat every 15 minutes as needed.  · 	carbidopa-levodopa 25 mg-100 mg oral tablet: 1 tab(s) orally 3 times a day *** first dose between 9 & 11am, second dose between 2 & 3pm, third dose between 5 & 6pm ***  · 	ALPRAZolam 0.25 mg oral tablet: 1 tab(s) orally once a day (at bedtime) , pt take 1/2 to 1 tab at bedtime.    Allergies  Indocin (Other)  doxycycline (Other)  verapamil (Other)  Biaxin (Other)  Demerol HCl (Other)  tetracyclines (Unknown)  penicillins (Other)        ROS: Pertinent positives in HPI, all other ROS were reviewed and are negative.        Vital Signs Last 24 Hrs  T(C): 38 (15 Jul 2024 14:44), Max: 38 (15 Jul 2024 14:44)  T(F): 100.4 (15 Jul 2024 14:44), Max: 100.4 (15 Jul 2024 14:44)  HR: 102 (15 Jul 2024 15:41) (79 - 132)  BP: 147/93 (15 Jul 2024 15:41) (117/79 - 171/93)  BP(mean): --  RR: 18 (15 Jul 2024 14:44) (17 - 22)  SpO2: 100% (15 Jul 2024 14:44) (94% - 100%)    Parameters below as of 15 Jul 2024 14:44  Patient On (Oxygen Delivery Method): nasal cannula  O2 Flow (L/min): 3        Constitutional: Somnolent, arousable to verbal stimuli, NAD  HEAD: Normocephalic  Neck: Supple.  Extremities:  no edema  Musculoskeletal: Involuntary rhythmic movements b/l feet  Skin: No rashes      Neurological exam:  HF: A x O x O.  Somnolent.  Not engaging, flat affect, masked facies, speech hyphonic, No Aphasia, slow to name, repeat  CN: ISABELLE, EOMI, VFF, facial sensation normal, asymmetrical smile, tongue midline, unable to check SCM-not cooperating  Motor: No pronator drift, Strength 4/5 in all 4 ext-poor effort and stiff, normal bulk, no tremor, cog-wheel rigidity L>R wrist. Intermittent dyskinesias b/l feet  Sens: Intact to light touch    Reflexes: Symmetric and normal, downgoing toes b/l  Coord:  Not cooperating with exam  Gait/Balance: Cannot test          Labs:   07-15    144  |  115<H>  |  14  ----------------------------<  121<H>  3.7   |  23  |  1.07    Ca    9.8      15 Jul 2024 07:01  Mg     2.5     07-15    TPro  6.5  /  Alb  3.0<L>  /  TBili  1.4<H>  /  DBili  x   /  AST  29  /  ALT  25  /  AlkPhos  108  07-15                              12.0   9.26  )-----------( 196      ( 15 Jul 2024 07:01 )             37.6       Radiology:    < from: CT Head No Cont (07.14.24 @ 15:20) >  IMPRESSION:    No evidence of acute intracranial hemorrhage, midline shift or CT   evidence of acute territorial infarct.    If the patient's symptoms persist, consider short interval follow-up head   CT or brain MRI if there are no MRI contraindications.

## 2024-07-15 NOTE — CONSULT NOTE ADULT - SUBJECTIVE AND OBJECTIVE BOX
Patient is a 89y old  Female who presents with a chief complaint of AMS (15 Jul 2024 09:34)    HPI:  89F with TIA, Alzheimer's dementia, parkinson's disease, CAD s/p PCI, HTN, PAD, a-flutter (on eliquis), presents with AMS, reportedly less responsive for 3 weeks  Febrile 102F  WBC 14  Cr 1  UA with 5 WBC, 290 RBC, 6 SQE  CTH with No evidence of acute intracranial hemorrhage, midline shift or CT evidence of acute territorial infarct.  Flu, RSV, COVID negative  ID consulted for assistance      PMH: as above  PSH: PCI, hysterectomy tonsillectomy, appendectomy   SocH: unable to obtain due to mental status  FAMILY HISTORY:  FHx: heart disease        Allergies  Indocin (Other)  doxycycline (Other)  verapamil (Other)  Biaxin (Other)  Demerol HCl (Other)  tetracyclines (Unknown)  penicillins (Other)    ANTIMICROBIALS (past 90 days)  MEDICATIONS  (STANDING):    cefTRIAXone Injectable.   1000 milliGRAM(s) IV Push (07-15-24 @ 10:43)    cefTRIAXone Injectable.   1000 milliGRAM(s) IV Push (07-14-24 @ 14:50)        ACTIVE ANTIMICROBIALS  cefTRIAXone Injectable. 1000 every 24 hours (7/14 --- )    MEDICATIONS  (STANDING):  apixaban 5 every 12 hours  atorvastatin 80 at bedtime  clopidogrel Tablet 75 daily  metoprolol tartrate 12.5 two times a day      REVIEW OF SYSTEMS  [  ] ROS unobtainable because:    [  ] All other systems negative except as noted below:	    Constitutional:  [ ] fever [ ] chills  [ ] weight loss  [ ] weakness  Skin:  [ ] rash [ ] phlebitis	  Eyes: [ ] icterus [ ] pain  [ ] discharge	  ENMT: [ ] sore throat  [ ] thrush [ ] ulcers [ ] exudates  Respiratory: [ ] dyspnea [ ] hemoptysis [ ] cough [ ] sputum	  Cardiovascular:  [ ] chest pain [ ] palpitations [ ] edema	  Gastrointestinal:  [ ] nausea [ ] vomiting [ ] diarrhea [ ] constipation [ ] pain	  Genitourinary:  [ ] dysuria [ ] frequency [ ] hematuria [ ] discharge [ ] flank pain  [ ] incontinence  Musculoskeletal:  [ ] myalgias [ ] arthralgias [ ] arthritis  [ ] back pain  Neurological:  [ ] headache [ ] seizures  [ ] confusion/altered mental status  Psychiatric:  [ ] anxiety [ ] depression	  Hematology/Lymphatics:  [ ] lymphadenopathy  Endocrine:  [ ] adrenal [ ] thyroid  Allergic/Immunologic:	 [ ] transplant [ ] seasonal    Vital Signs Last 24 Hrs  T(C): 36.9 (15 Jul 2024 08:15), Max: 38.9 (14 Jul 2024 14:03)  T(F): 98.5 (15 Jul 2024 08:15), Max: 102.1 (14 Jul 2024 14:03)  HR: 79 (15 Jul 2024 08:15) (75 - 132)  BP: 128/94 (15 Jul 2024 08:15) (117/79 - 188/97)  BP(mean): 128 (14 Jul 2024 16:15) (116 - 145)  RR: 18 (15 Jul 2024 08:15) (16 - 22)  SpO2: 99% (15 Jul 2024 08:15) (92% - 100%)    Parameters below as of 15 Jul 2024 08:15  Patient On (Oxygen Delivery Method): nasal cannula  O2 Flow (L/min): 3      Physical Exam:      Labs: all available labs reviewed                        12.0   9.26  )-----------( 196      ( 15 Jul 2024 07:01 )             37.6     07-15    144  |  115<H>  |  14  ----------------------------<  121<H>  3.7   |  23  |  1.07    Ca    9.8      15 Jul 2024 07:01  Mg     2.5     07-15    TPro  6.5  /  Alb  3.0<L>  /  TBili  1.4<H>  /  DBili  x   /  AST  29  /  ALT  25  /  AlkPhos  108  07-15     LIVER FUNCTIONS - ( 15 Jul 2024 07:01 )  Alb: 3.0 g/dL / Pro: 6.5 gm/dL / ALK PHOS: 108 U/L / ALT: 25 U/L / AST: 29 U/L / GGT: x           Urinalysis Basic - ( 15 Jul 2024 07:01 )    Color: x / Appearance: x / SG: x / pH: x  Gluc: 121 mg/dL / Ketone: x  / Bili: x / Urobili: x   Blood: x / Protein: x / Nitrite: x   Leuk Esterase: x / RBC: x / WBC x   Sq Epi: x / Non Sq Epi: x / Bacteria: x          Radiology: all available radiological tests reviewed  < from: CT Head No Cont (07.14.24 @ 15:20) >  FINDINGS:  INTRA-AXIAL: No intracranial mass effect, acute hemorrhage, midline shift   or acute transcortical infarct is seen. There are periventricular white   matter hypodensities that are nonspecific in nature but may reflect   chronic ischemic microvascular disease.  EXTRA-AXIAL: No extra-axial fluid collection is present.  VENTRICLES AND SULCI: Parenchymal volume is commensurate with patient   age. No hydrocephalus.  VISUALIZED SINUSES: Mild mucosal thickening.  VISUALIZED MASTOIDS: Clear.  CALVARIUM: Normal.    IMPRESSION:    No evidence of acute intracranial hemorrhage, midline shift or CT   evidence of acute territorial infarct.    If the patient's symptoms persist, consider short interval follow-up head   CT or brain MRI if there are no MRI contraindications.    < end of copied text >      Advanced directives addressed: full resuscitation Patient is a 89y old  Female who presents with a chief complaint of AMS (15 Jul 2024 09:34)    HPI:  89F with TIA, Alzheimer's dementia, parkinson's disease, CAD s/p PCI, HTN, PAD, a-flutter (on eliquis), presents with AMS, reportedly less responsive for few months  Febrile 102F  WBC 14  Cr 1  UA with 5 WBC, 290 RBC, 6 SQE  CTH with No evidence of acute intracranial hemorrhage, midline shift or CT evidence of acute territorial infarct.  Flu, RSV, COVID negative  ID consulted for assistance      PMH: as above  PSH: PCI, hysterectomy tonsillectomy, appendectomy   SocH: unable to obtain due to mental status  FAMILY HISTORY:  FHx: heart disease        Allergies  Indocin (Other)  doxycycline (Other)  verapamil (Other)  Biaxin (Other)  Demerol HCl (Other)  tetracyclines (Unknown)  penicillins (Other)    ANTIMICROBIALS (past 90 days)  MEDICATIONS  (STANDING):    cefTRIAXone Injectable.   1000 milliGRAM(s) IV Push (07-15-24 @ 10:43)    cefTRIAXone Injectable.   1000 milliGRAM(s) IV Push (07-14-24 @ 14:50)        ACTIVE ANTIMICROBIALS  cefTRIAXone Injectable. 1000 every 24 hours (7/14 --- )    MEDICATIONS  (STANDING):  apixaban 5 every 12 hours  atorvastatin 80 at bedtime  clopidogrel Tablet 75 daily  metoprolol tartrate 12.5 two times a day      REVIEW OF SYSTEMS  [ x ] ROS unobtainable because:  mental status  [  ] All other systems negative except as noted below:	    Constitutional:  [ ] fever [ ] chills  [ ] weight loss  [ ] weakness  Skin:  [ ] rash [ ] phlebitis	  Eyes: [ ] icterus [ ] pain  [ ] discharge	  ENMT: [ ] sore throat  [ ] thrush [ ] ulcers [ ] exudates  Respiratory: [ ] dyspnea [ ] hemoptysis [ ] cough [ ] sputum	  Cardiovascular:  [ ] chest pain [ ] palpitations [ ] edema	  Gastrointestinal:  [ ] nausea [ ] vomiting [ ] diarrhea [ ] constipation [ ] pain	  Genitourinary:  [ ] dysuria [ ] frequency [ ] hematuria [ ] discharge [ ] flank pain  [ ] incontinence  Musculoskeletal:  [ ] myalgias [ ] arthralgias [ ] arthritis  [ ] back pain  Neurological:  [ ] headache [ ] seizures  [ ] confusion/altered mental status  Psychiatric:  [ ] anxiety [ ] depression	  Hematology/Lymphatics:  [ ] lymphadenopathy  Endocrine:  [ ] adrenal [ ] thyroid  Allergic/Immunologic:	 [ ] transplant [ ] seasonal    Vital Signs Last 24 Hrs  T(C): 36.9 (15 Jul 2024 08:15), Max: 38.9 (14 Jul 2024 14:03)  T(F): 98.5 (15 Jul 2024 08:15), Max: 102.1 (14 Jul 2024 14:03)  HR: 79 (15 Jul 2024 08:15) (75 - 132)  BP: 128/94 (15 Jul 2024 08:15) (117/79 - 188/97)  BP(mean): 128 (14 Jul 2024 16:15) (116 - 145)  RR: 18 (15 Jul 2024 08:15) (16 - 22)  SpO2: 99% (15 Jul 2024 08:15) (92% - 100%)    Parameters below as of 15 Jul 2024 08:15  Patient On (Oxygen Delivery Method): nasal cannula  O2 Flow (L/min): 3      Physical Exam:  Constitutional:  NAD, does not follow commands  Head/Eyes: no icterus  LUNGS:  CTA  CVS:  regular rhythm  Abd:  soft, non-tender; non-distended  Ext:  no edema  Vascular:  IV site no erythema tenderness or discharge  Neuro: AAO X 1    Labs: all available labs reviewed                        12.0   9.26  )-----------( 196      ( 15 Jul 2024 07:01 )             37.6     07-15    144  |  115<H>  |  14  ----------------------------<  121<H>  3.7   |  23  |  1.07    Ca    9.8      15 Jul 2024 07:01  Mg     2.5     07-15    TPro  6.5  /  Alb  3.0<L>  /  TBili  1.4<H>  /  DBili  x   /  AST  29  /  ALT  25  /  AlkPhos  108  07-15     LIVER FUNCTIONS - ( 15 Jul 2024 07:01 )  Alb: 3.0 g/dL / Pro: 6.5 gm/dL / ALK PHOS: 108 U/L / ALT: 25 U/L / AST: 29 U/L / GGT: x           Urinalysis Basic - ( 15 Jul 2024 07:01 )    Color: x / Appearance: x / SG: x / pH: x  Gluc: 121 mg/dL / Ketone: x  / Bili: x / Urobili: x   Blood: x / Protein: x / Nitrite: x   Leuk Esterase: x / RBC: x / WBC x   Sq Epi: x / Non Sq Epi: x / Bacteria: x          Radiology: all available radiological tests reviewed  < from: CT Head No Cont (07.14.24 @ 15:20) >  FINDINGS:  INTRA-AXIAL: No intracranial mass effect, acute hemorrhage, midline shift   or acute transcortical infarct is seen. There are periventricular white   matter hypodensities that are nonspecific in nature but may reflect   chronic ischemic microvascular disease.  EXTRA-AXIAL: No extra-axial fluid collection is present.  VENTRICLES AND SULCI: Parenchymal volume is commensurate with patient   age. No hydrocephalus.  VISUALIZED SINUSES: Mild mucosal thickening.  VISUALIZED MASTOIDS: Clear.  CALVARIUM: Normal.    IMPRESSION:    No evidence of acute intracranial hemorrhage, midline shift or CT   evidence of acute territorial infarct.    If the patient's symptoms persist, consider short interval follow-up head   CT or brain MRI if there are no MRI contraindications.    < end of copied text >      Advanced directives addressed: full resuscitation Patient is a 89y old  Female who presents with a chief complaint of AMS (15 Jul 2024 09:34)    HPI:  89F with TIA, Alzheimer's dementia, parkinson's disease, CAD s/p PCI, HTN, PAD, a-flutter (on eliquis), presents with AMS, reportedly less responsive for few months  Son at bedside helping with history, states progressive lethargy for months, tapered seroquil to help lessen drowziness  Febrile 89F  WBC 14  Cr 1  UA with 5 WBC, 290 RBC, 6 SQE  CTH with No evidence of acute intracranial hemorrhage, midline shift or CT evidence of acute territorial infarct.  Flu, RSV, COVID negative  ID consulted for assistance      PMH: as above  PSH: PCI, hysterectomy tonsillectomy, appendectomy   SocH: unable to obtain due to mental status  FAMILY HISTORY:  FHx: heart disease        Allergies  Indocin (Other)  doxycycline (Other)  verapamil (Other)  Biaxin (Other)  Demerol HCl (Other)  tetracyclines (Unknown)  penicillins (Other)    ANTIMICROBIALS (past 90 days)  MEDICATIONS  (STANDING):    cefTRIAXone Injectable.   1000 milliGRAM(s) IV Push (07-15-24 @ 10:43)    cefTRIAXone Injectable.   1000 milliGRAM(s) IV Push (07-14-24 @ 14:50)        ACTIVE ANTIMICROBIALS  cefTRIAXone Injectable. 1000 every 24 hours (7/14 --- )    MEDICATIONS  (STANDING):  apixaban 5 every 12 hours  atorvastatin 80 at bedtime  clopidogrel Tablet 75 daily  metoprolol tartrate 12.5 two times a day      REVIEW OF SYSTEMS  [ x ] ROS unobtainable because:  mental status  [  ] All other systems negative except as noted below:	    Constitutional:  [ ] fever [ ] chills  [ ] weight loss  [ ] weakness  Skin:  [ ] rash [ ] phlebitis	  Eyes: [ ] icterus [ ] pain  [ ] discharge	  ENMT: [ ] sore throat  [ ] thrush [ ] ulcers [ ] exudates  Respiratory: [ ] dyspnea [ ] hemoptysis [ ] cough [ ] sputum	  Cardiovascular:  [ ] chest pain [ ] palpitations [ ] edema	  Gastrointestinal:  [ ] nausea [ ] vomiting [ ] diarrhea [ ] constipation [ ] pain	  Genitourinary:  [ ] dysuria [ ] frequency [ ] hematuria [ ] discharge [ ] flank pain  [ ] incontinence  Musculoskeletal:  [ ] myalgias [ ] arthralgias [ ] arthritis  [ ] back pain  Neurological:  [ ] headache [ ] seizures  [ ] confusion/altered mental status  Psychiatric:  [ ] anxiety [ ] depression	  Hematology/Lymphatics:  [ ] lymphadenopathy  Endocrine:  [ ] adrenal [ ] thyroid  Allergic/Immunologic:	 [ ] transplant [ ] seasonal    Vital Signs Last 24 Hrs  T(C): 36.9 (15 Jul 2024 08:15), Max: 38.9 (14 Jul 2024 14:03)  T(F): 98.5 (15 Jul 2024 08:15), Max: 102.1 (14 Jul 2024 14:03)  HR: 79 (15 Jul 2024 08:15) (75 - 132)  BP: 128/94 (15 Jul 2024 08:15) (117/79 - 188/97)  BP(mean): 128 (14 Jul 2024 16:15) (116 - 145)  RR: 18 (15 Jul 2024 08:15) (16 - 22)  SpO2: 99% (15 Jul 2024 08:15) (92% - 100%)    Parameters below as of 15 Jul 2024 08:15  Patient On (Oxygen Delivery Method): nasal cannula  O2 Flow (L/min): 3      Physical Exam:  Constitutional:  NAD, does not follow commands  Head/Eyes: no icterus  LUNGS:  CTA  CVS:  regular rhythm  Abd:  soft, non-tender; non-distended  Ext:  no edema  Vascular:  IV site no erythema tenderness or discharge  Neuro: AAO X 1    Labs: all available labs reviewed                        12.0   9.26  )-----------( 196      ( 15 Jul 2024 07:01 )             37.6     07-15    144  |  115<H>  |  14  ----------------------------<  121<H>  3.7   |  23  |  1.07    Ca    9.8      15 Jul 2024 07:01  Mg     2.5     07-15    TPro  6.5  /  Alb  3.0<L>  /  TBili  1.4<H>  /  DBili  x   /  AST  29  /  ALT  25  /  AlkPhos  108  07-15     LIVER FUNCTIONS - ( 15 Jul 2024 07:01 )  Alb: 3.0 g/dL / Pro: 6.5 gm/dL / ALK PHOS: 108 U/L / ALT: 25 U/L / AST: 29 U/L / GGT: x           Urinalysis Basic - ( 15 Jul 2024 07:01 )    Color: x / Appearance: x / SG: x / pH: x  Gluc: 121 mg/dL / Ketone: x  / Bili: x / Urobili: x   Blood: x / Protein: x / Nitrite: x   Leuk Esterase: x / RBC: x / WBC x   Sq Epi: x / Non Sq Epi: x / Bacteria: x          Radiology: all available radiological tests reviewed  < from: CT Head No Cont (07.14.24 @ 15:20) >  FINDINGS:  INTRA-AXIAL: No intracranial mass effect, acute hemorrhage, midline shift   or acute transcortical infarct is seen. There are periventricular white   matter hypodensities that are nonspecific in nature but may reflect   chronic ischemic microvascular disease.  EXTRA-AXIAL: No extra-axial fluid collection is present.  VENTRICLES AND SULCI: Parenchymal volume is commensurate with patient   age. No hydrocephalus.  VISUALIZED SINUSES: Mild mucosal thickening.  VISUALIZED MASTOIDS: Clear.  CALVARIUM: Normal.    IMPRESSION:    No evidence of acute intracranial hemorrhage, midline shift or CT   evidence of acute territorial infarct.    If the patient's symptoms persist, consider short interval follow-up head   CT or brain MRI if there are no MRI contraindications.    < end of copied text >      Advanced directives addressed: full resuscitation Patient is a 89y old  Female who presents with a chief complaint of AMS (15 Jul 2024 09:34)    HPI:  89F with TIA, Alzheimer's dementia, parkinson's disease, CAD s/p PCI, HTN, PAD, a-flutter (on eliquis), presents with AMS, reportedly less responsive for few months  Son at bedside helping with history, states progressive lethargy for months, tapered seroquil to help lessen drowziness  Febrile 102F  WBC 14  Cr 1  UA with 5 WBC, 290 RBC, 6 SQE  CTH with No evidence of acute intracranial hemorrhage, midline shift or CT evidence of acute territorial infarct.  Flu, RSV, COVID negative  ID consulted for assistance      PMH: as above  PSH: PCI, hysterectomy tonsillectomy, appendectomy   SocH: unable to obtain due to mental status  FAMILY HISTORY:    FHx: heart disease        Allergies  Indocin (Other)  doxycycline (Other)  verapamil (Other)  Biaxin (Other)  Demerol HCl (Other)  tetracyclines (Unknown)  penicillins (Other)    ANTIMICROBIALS (past 90 days)  MEDICATIONS  (STANDING):    cefTRIAXone Injectable.   1000 milliGRAM(s) IV Push (07-15-24 @ 10:43)    cefTRIAXone Injectable.   1000 milliGRAM(s) IV Push (07-14-24 @ 14:50)        ACTIVE ANTIMICROBIALS  cefTRIAXone Injectable. 1000 every 24 hours (7/14 --- )    MEDICATIONS  (STANDING):  apixaban 5 every 12 hours  atorvastatin 80 at bedtime  clopidogrel Tablet 75 daily  metoprolol tartrate 12.5 two times a day      REVIEW OF SYSTEMS  [ x ] ROS unobtainable because:  mental status  [  ] All other systems negative except as noted below:	    Constitutional:  [ ] fever [ ] chills  [ ] weight loss  [ ] weakness  Skin:  [ ] rash [ ] phlebitis	  Eyes: [ ] icterus [ ] pain  [ ] discharge	  ENMT: [ ] sore throat  [ ] thrush [ ] ulcers [ ] exudates  Respiratory: [ ] dyspnea [ ] hemoptysis [ ] cough [ ] sputum	  Cardiovascular:  [ ] chest pain [ ] palpitations [ ] edema	  Gastrointestinal:  [ ] nausea [ ] vomiting [ ] diarrhea [ ] constipation [ ] pain	  Genitourinary:  [ ] dysuria [ ] frequency [ ] hematuria [ ] discharge [ ] flank pain  [ ] incontinence  Musculoskeletal:  [ ] myalgias [ ] arthralgias [ ] arthritis  [ ] back pain  Neurological:  [ ] headache [ ] seizures  [ ] confusion/altered mental status  Psychiatric:  [ ] anxiety [ ] depression	  Hematology/Lymphatics:  [ ] lymphadenopathy  Endocrine:  [ ] adrenal [ ] thyroid  Allergic/Immunologic:	 [ ] transplant [ ] seasonal    Vital Signs Last 24 Hrs  T(C): 36.9 (15 Jul 2024 08:15), Max: 38.9 (14 Jul 2024 14:03)  T(F): 98.5 (15 Jul 2024 08:15), Max: 102.1 (14 Jul 2024 14:03)  HR: 79 (15 Jul 2024 08:15) (75 - 132)  BP: 128/94 (15 Jul 2024 08:15) (117/79 - 188/97)  BP(mean): 128 (14 Jul 2024 16:15) (116 - 145)  RR: 18 (15 Jul 2024 08:15) (16 - 22)  SpO2: 99% (15 Jul 2024 08:15) (92% - 100%)    Parameters below as of 15 Jul 2024 08:15  Patient On (Oxygen Delivery Method): nasal cannula  O2 Flow (L/min): 3      Physical Exam:  Constitutional:  NAD, does not follow commands  Head/Eyes: no icterus  LUNGS:  CTA  CVS:  regular rhythm  Abd:  soft, non-tender; non-distended  Ext:  no edema  Vascular:  IV site no erythema tenderness or discharge  Neuro: AAO X 1    Labs: all available labs reviewed                        12.0   9.26  )-----------( 196      ( 15 Jul 2024 07:01 )             37.6     07-15    144  |  115<H>  |  14  ----------------------------<  121<H>  3.7   |  23  |  1.07    Ca    9.8      15 Jul 2024 07:01  Mg     2.5     07-15    TPro  6.5  /  Alb  3.0<L>  /  TBili  1.4<H>  /  DBili  x   /  AST  29  /  ALT  25  /  AlkPhos  108  07-15     LIVER FUNCTIONS - ( 15 Jul 2024 07:01 )  Alb: 3.0 g/dL / Pro: 6.5 gm/dL / ALK PHOS: 108 U/L / ALT: 25 U/L / AST: 29 U/L / GGT: x           Urinalysis Basic - ( 15 Jul 2024 07:01 )    Color: x / Appearance: x / SG: x / pH: x  Gluc: 121 mg/dL / Ketone: x  / Bili: x / Urobili: x   Blood: x / Protein: x / Nitrite: x   Leuk Esterase: x / RBC: x / WBC x   Sq Epi: x / Non Sq Epi: x / Bacteria: x          Radiology: all available radiological tests reviewed  < from: CT Head No Cont (07.14.24 @ 15:20) >  FINDINGS:  INTRA-AXIAL: No intracranial mass effect, acute hemorrhage, midline shift   or acute transcortical infarct is seen. There are periventricular white   matter hypodensities that are nonspecific in nature but may reflect   chronic ischemic microvascular disease.  EXTRA-AXIAL: No extra-axial fluid collection is present.  VENTRICLES AND SULCI: Parenchymal volume is commensurate with patient   age. No hydrocephalus.  VISUALIZED SINUSES: Mild mucosal thickening.  VISUALIZED MASTOIDS: Clear.  CALVARIUM: Normal.    IMPRESSION:    No evidence of acute intracranial hemorrhage, midline shift or CT   evidence of acute territorial infarct.    If the patient's symptoms persist, consider short interval follow-up head   CT or brain MRI if there are no MRI contraindications.    < end of copied text >      Advanced directives addressed: full resuscitation

## 2024-07-15 NOTE — SWALLOW BEDSIDE ASSESSMENT ADULT - ASR SWALLOW DENTITION
Unable to formally assess due to altered alertness/mentation with reduced active participation as well as frequent resistive mouth closing upon oral stimulation attempts.

## 2024-07-15 NOTE — ED ADULT NURSE REASSESSMENT NOTE - NS ED NURSE REASSESS COMMENT FT1
Patient without void since early this afternoon. Bladder scan performed and patient with residual of >275. Straight cath performed and 400 ml yobani colored urine in bag. Patient placed on purewick, will continue to monitor urine output. MD Seymour aware. Patient lethargy persists, however, arousable to voice, opened eyes and provided name. Able to follow simple commands. Son at bedside. Comfort and safety measures maintained, cardiac monitoring in place, call bell within reach, will continue to monitor.

## 2024-07-15 NOTE — PROGRESS NOTE ADULT - ASSESSMENT
88 y/o F w/ PMH of TIA, Alzheimer's dementia, parkinson's disease, CAD s/p PCI, HTN, PAD, a-flutter (on eliquis), p/w AMS    *Metabolic Encephalopathy 2/2 Sepsis 2/2 UTI vs other infection  -monitor MS  -monitor T and wean O2  -f/u cx  -continue CTX for now  -d/w ID re: covering for CAP/aspiration PNA  -s/p IVF  -appreciate ID input  -appreciate neuro input    #Worsening mental status over the last 3 weeks  -appreciate neuro and SLP evals  -protracted delirium in setting of recent COVID vs worsening dementia  -continue home meds  -lives w family and has aides    #A-flutter w/ RVR  -increased BB dose here  -Eliquis  -f/u cards recs    *Hypokalemia  -replete prn    *H/o TIA / alzheimer's dementia / parkinson's disease / CAD / HTN / PAD   -C/w home meds and f/u outpatient for further management if conditions remain stable during hospitalization     *DVT ppx   -On Eliquis     Dispo 2-3 days pending improvement in MS, temp, HR, O2 wean  Updated son at bedside (Bill)

## 2024-07-15 NOTE — SWALLOW BEDSIDE ASSESSMENT ADULT - SWALLOW EVAL: PROGNOSIS
2) On encounter, an O2 cannula was in place. The pt was arousable but lethargic, communicatively passive and internally distractible. Eye opening and the sustaining of joint attention were brief/fleeting. Unable to direct to structured communication tasks. However, the patient did occasionally attempt to verbally respond when asked concrete personally relevant questions. At these limited, times, her speech output was marked by reduced articulatory phonatory effort when attempting to speak in setting of fatigue associated with multi factorial acute medical deconditioning(i.e urosepsis, hypokalemia, atrial flutter with RVR) as well as known Parkinson's. Speech intelligibility was reduced at times. Additionally, the limited verbalizations that were produced were linguistically intact but typically brief/variably contextually inappropriate indicative of Cognitive Dysfunction. Cognitive Dysfunction in setting of Encephalopathy related to multiple acute medical issues(i.e ) as well as underl 2) On encounter, an O2 cannula was in place. The pt was arousable but lethargic, communicatively passive & internally distractible. Eye opening & the sustaining of joint attention were brief/fleeting. Unable to direct to structured communication tasks. However, the pt did occasionally attempt to verbally respond when asked concrete personally relevant ?'s. At these limited times, speech output was marked by reduced articulatory phonatory effort when attempting to talk in setting of fatigue associated w/multi factorial acute medical deconditioning(i.e urosepsis, hypokalemia, atrial flutter with RVR) as well as known Parkinson's. Speech intelligibility was reduced at times. Additionally, the limited verbalizations that were produced were linguistically intact but typically brief/variably contextually inappropriate indicative of Cognitive Dysfunction. Cognitive Dysfunction in setting of Encephalopathy related to above multiple acute medical issues as well as underlying Dementia.

## 2024-07-15 NOTE — SWALLOW BEDSIDE ASSESSMENT ADULT - SLP GENERAL OBSERVATIONS
On encounter, an O2 cannula was in place. The pt was arousable but lethargic, communicatively passive & internally distractible. Eye opening & the sustaining of joint attention were brief/fleeting. Unable to direct to structured communication tasks. However, the pt did occasionally attempt to verbally respond when asked concrete personally relevant questions. At these limited times, pt's speech output was marked by reduced articulatory phonatory effort when attempting to talk in setting of fatigue associated w/multi factorial acute medical deconditioning(i.e urosepsis, hypokalemia, atrial flutter with RVR) as well as known Parkinson's. Speech intelligibility was reduced at times. Additionally, the limited verbalizations that were produced were linguistically intact but typically brief/variably contextually inappropriate indicative of Cognitive Dysfunction. Cognitive Dysfunction is in setting of Encephalopathy related to multiple acute medical issues(i.e fever, leukocytosis, sepsis, UTI, hypokalemia, rapid A-flutter) as well as underlying Dementia. Response effectiveness during Q/A dyads was no greater than 30% or less. Over articulation cues, volumizing cues, verbal elaboration cues and cognitive redirection cues were of mild transient benefit.

## 2024-07-15 NOTE — DIETITIAN INITIAL EVALUATION ADULT - PERTINENT LABORATORY DATA
07-15    144  |  115<H>  |  14  ----------------------------<  121<H>  3.7   |  23  |  1.07    Ca    9.8      15 Jul 2024 07:01  Mg     2.5     07-15    TPro  6.5  /  Alb  3.0<L>  /  TBili  1.4<H>  /  DBili  x   /  AST  29  /  ALT  25  /  AlkPhos  108  07-15  A1C with Estimated Average Glucose Result: 6.2 % (06-02-24 @ 07:28)    Vitamin D, 25-Hydroxy: 42.7 ng/mL (06-01-24 @ 12:17)

## 2024-07-15 NOTE — SWALLOW BEDSIDE ASSESSMENT ADULT - SWALLOW EVAL: RECOMMENDED DIET
SUGGEST A PUREE TEXTURE DIET WITH THIN LIQUID CONSISTENCIES AS THE PATIENT APPEARED CLINICALLY TOLERANT OF THESE FOOD TEXTURES FROM AN OROPHARYNGEAL SWALLOWING PERSPECTIVE ON EXAM WHEN ALERT/COGNIZANT ENOUGH TO BE FED(INCONSISTENT). PT REQUIRED TOTAL ASSISTANCE TO FEED.

## 2024-07-15 NOTE — DIETITIAN INITIAL EVALUATION ADULT - PERTINENT MEDS FT
MEDICATIONS  (STANDING):  apixaban 5 milliGRAM(s) Oral every 12 hours  atorvastatin 80 milliGRAM(s) Oral at bedtime  cefTRIAXone Injectable. 1000 milliGRAM(s) IV Push every 24 hours  cholecalciferol 1000 Unit(s) Oral daily  clopidogrel Tablet 75 milliGRAM(s) Oral daily  metoprolol tartrate 12.5 milliGRAM(s) Oral two times a day    Home Medications:  carbidopa-levodopa 25 mg-100 mg oral tablet: 1 tab(s) orally 3 times a day *** first dose between 9 &amp; 11am  second dose between 2 &amp; 3pm  third dose between 5 &amp; 6pm *** (14 Jul 2024 23:38)  carbidopa-levodopa 25 mg-100 mg oral tablet, extended release: 1 tab(s) orally once a day (at bedtime) ***dose between 8:30 &amp; 9pm*** (14 Jul 2024 23:38)  cholecalciferol 25 mcg (1000 intl units) oral tablet: 1 tab(s) orally once a day (14 Jul 2024 23:38)  clopidogrel 75 mg oral tablet: 1 tab(s) orally once a day (in the evening) (14 Jul 2024 23:38)  PARoxetine 40 mg oral tablet: 1 tab(s) orally once a day (in the evening) (14 Jul 2024 23:38)  QUEtiapine: 6.25 milligram(s) once a day (at bedtime) (14 Jul 2024 23:37)  rosuvastatin 40 mg oral tablet: 1 tab(s) orally once a day (14 Jul 2024 23:38)

## 2024-07-15 NOTE — DIETITIAN INITIAL EVALUATION ADULT - OTHER INFO
89 y/o F with a PMHx of TIA, Alzheimer's dementia, parkinson's disease, CAD s/p PCI, HTN, PAD, a-flutter (on eliquis), p/w AMS. Patient's son at bedside states that his brother informed him that patient has been more lethargic and w/ decreased responsiveness for the last 3 weeks, and that she has had the current mental status during that time. Patient has been sleeping more and more every day. Patient's Seroquel has been tapered off, and patient suppose to see neurologist tomorrow. Admitted for metabolic encephalopathy 2/2 sepsis 2/2 UTI.    Unable to obtain meaningful information 2/2 pt's mental status. NA in room at time of visit, pt did not have breakfast yet at time of RD visit. RD obtained bedscale wt on 7/15 - 135#. Weight hx reviewed: 143# (taken by RD on 6/10/24; met criteria for moderate malnutrition), 147# (taken by RD on 5/30/24; met criteria for moderate malnutrition), 151# (taken by RD on 2/7/24); weight loss of 16# (10.6%) x 5 mon - clinsig and severe. Pt frail appearing. NFPE reveals mild to moderate muscle/ fat wasting, pt continues to meet criteria for PCM at this time. Consider SLP eval to assess for safest/ least restrictive diet consistency/ texture. Liberalize diet to regular + SLP recs to maximize caloric and nutrient intake. Will trial Ensure Plus High Protein BID in effort to optimize PO intake. Confirm goals of care regarding nutrition support - Nutrition support is not recommended due to overall declining medical status which evidenced based studies indicate EN is not effective in prolonging survival and improving quality of life. It can also increase risk of aspiration pneumonia as well as other related issues (infection, GI complications, and worsening/ non-healing PI's). However, will provide nutrition/ hydration within GOC. Please see additional recommendations below.

## 2024-07-15 NOTE — CONSULT NOTE ADULT - ASSESSMENT
89F with TIA, Alzheimer's dementia, parkinson's disease, CAD s/p PCI, HTN, PAD, a-flutter (on eliquis), presents with AMS, reportedly less responsive for 3 weeks  Febrile 102F  WBC 14  Cr 1  UA with 5 WBC, 290 RBC, 6 SQE  CTH with No evidence of acute intracranial hemorrhage, midline shift or CT evidence of acute territorial infarct.  Flu, RSV, COVID negative  ID consulted for assistance    Antimicrobials:  cefTRIAXone Injectable. 1000 every 24 hours (7/14 --- )    Impression:     Suggestions:        Angel Rubin, DO  Contact on [employer]   [facility]  [address] [place] [address]  Tel: [phone]   89F with TIA, Alzheimer's dementia, parkinson's disease, CAD s/p PCI, HTN, PAD, a-flutter (on eliquis), presents with AMS, reportedly less responsive for few months  Febrile 102F  WBC 14  Cr 1  UA with 5 WBC, 290 RBC, 6 SQE  CTH with No evidence of acute intracranial hemorrhage, midline shift or CT evidence of acute territorial infarct.  Flu, RSV, COVID negative  ID consulted for assistance    Antimicrobials:  cefTRIAXone Injectable. 1000 every 24 hours (7/14 --- )    Impression:   #Fever  #Abnormal UA  #Altered Mental Status  #Leukocytosis (resolved)  - unclear if fever is due to UTI, given minimal pyuria  - would evaluate for possible aspiration pneumonia, given difficulty with eating, sometimes choking on food    Suggestions:  - continue CTX 1G daily  - monitor temperature curve  - trend WBC  - obtain CXR  - follow up BCx x2      Angel Navdeep Rubin,   Contact on [employer]   [facility], [facility]  [address] [place] [address]  Tel: [phone]   89F with TIA, Alzheimer's dementia, parkinson's disease, CAD s/p PCI, HTN, PAD, a-flutter (on eliquis), presents with AMS, reportedly less responsive for few months  Febrile 102F  WBC 14  Cr 1  UA with 5 WBC, 290 RBC, 6 SQE  CTH with No evidence of acute intracranial hemorrhage, midline shift or CT evidence of acute territorial infarct.  Flu, RSV, COVID negative  ID consulted for assistance    Antimicrobials:  cefTRIAXone Injectable. 1000 every 24 hours (7/14 --- )    Impression:   #Fever  #Abnormal UA  #Altered Mental Status  #Leukocytosis (resolved)  - unclear if fever is due to UTI, given minimal pyuria  - would evaluate for possible aspiration pneumonia, given difficulty with eating, sometimes choking on food    Suggestions:  - continue CTX 1G daily  - monitor temperature curve  - trend WBC  - obtain CXR  - follow up BCx x2    Clinical team may change from intravenous to oral antibiotics when the following criteria are met:   1. Patient is clinically improving/stable       a)	Improved signs and symptoms of infection from initial presentation       b)	Afebrile for 24 hours       c)	Leukocytosis trending towards normal range   2. Patient is tolerating oral intake   3. Initial/repeat blood cultures are negative OR do not need to wait for preliminary blood cultures to result    Cannot advise changing to oral antibiotic therapy until culture sensitivity is available.      Angel Rubin, DO  Contact on [employer]   [facility], [facility]  [address] [place] [address]  Tel: [phone]   89F with TIA, Alzheimer's dementia, parkinson's disease, CAD s/p PCI, HTN, PAD, a-flutter (on eliquis), presents with AMS, reportedly less responsive for few months  Febrile 102F  WBC 14  Cr 1  UA with 5 WBC, 290 RBC, 6 SQE  CTH with No evidence of acute intracranial hemorrhage, midline shift or CT evidence of acute territorial infarct.  Flu, RSV, COVID negative  ID consulted for assistance    Antimicrobials:  cefTRIAXone Injectable. 1000 every 24 hours (7/14 --- )    Impression:   #Fever  #Abnormal UA  #Altered Mental Status  #Leukocytosis (resolved)  - unclear if fever is due to UTI, given minimal pyuria  - would evaluate for possible aspiration pneumonia, given difficulty with eating, sometimes choking on food    Suggestions:  - continue CTX 1G daily  - monitor temperature curve  - trend WBC  - follow up CXR  - follow up BCx x2  - discussed recommendations with primary team      Clinical team may change from intravenous to oral antibiotics when the following criteria are met:   1. Patient is clinically improving/stable       a)	Improved signs and symptoms of infection from initial presentation       b)	Afebrile for 24 hours       c)	Leukocytosis trending towards normal range   2. Patient is tolerating oral intake   3. Initial/repeat blood cultures are negative OR do not need to wait for preliminary blood cultures to result    Cannot advise changing to oral antibiotic therapy until culture sensitivity is available.      Angel Rubin, DO  Contact on [employer]   [facility]  [address] [place] [address]  Tel: [phone]

## 2024-07-15 NOTE — DIETITIAN NUTRITION RISK NOTIFICATION - ADDITIONAL COMMENTS/DIETITIAN RECOMMENDATIONS
1) Consider SLP eval to assess for safest/ least restrictive diet consistency/ texture  2) Liberalize diet to regular + SLP recs to maximize caloric and nutrient intake.   3) Add ensure plus high protein BID to optimize PO intake (provides 350 kcal, 20g protein/ shake)   4) Please obtain weekly weights  5) Consider adding thiamine 100 mg daily 2/2 poor PO intake/ malnutrition   6) MVI w/ minerals daily to ensure 100% RDA met   7) Encourage protein-rich foods, maximize food preferences  8) Monitor bowel movements, if no BM for >3 days, consider implementing bowel regimen.  9) Confirm goals of care regarding nutrition support - Nutrition support is not recommended due to overall declining medical status which evidenced based studies indicate EN is not effective in prolonging survival and improving quality of life. It can also increase risk of aspiration pneumonia as well as other related issues (infection, GI complications, and worsening/ non-healing PI's). However, will provide nutrition/ hydration within GOC.  RD will continue to monitor PO intake, labs, hydration, and wt prn.

## 2024-07-15 NOTE — SWALLOW BEDSIDE ASSESSMENT ADULT - NS SPL SWALLOW CLINIC TRIAL FT
The patient demonstrated periodically reduced alertness for/orientation to feeding atop Oropharyngeal Dysphagia which subjectively appeared to be a grossly functional condition with some restricted  modified food textures when she was alert/cognizant enough to be fed(not always the case). Oral aperture was reduced when accepting PO. Initiation of intra oral processing actions was latent. Bolus formation/transfer were achieved via moderately prolonged discontinuous lingual pumping actions that were felt to be grossly mechanically functional with some of the above modified food textures. Piecemeal deglutition was evident. Mild tongue debris noted with puree food. Swallow triggered in a grossly acceptable time frame for age. Laryngeal lift on palpation during swallowing trials was very mildly decreased but felt to be functional as well. No behavioral aspiration signs exhibited with thin liquids, mildly thick liquids & puree foods. Solids not offered given severity of pt's Dysphagia. Suggest a Puree Texture Diet with Thin Liquid Consistencies as the patient appeared clinically tolerant of these food textures from an oropharyngeal swallowing perspective during probes and food textures on this diet accommodates her oropharyngeal dysphagic sequel. Feeding compromise and Oropharyngeal Dysphagia are in setting of multi factorial acute medical deconditioning(i.e fever, leukocytosis, sepsis, UTI, hypokalemia, atrial flutter with RVR, encephalopathy), Dementia, as well as Parkinson's Disease.

## 2024-07-15 NOTE — SWALLOW BEDSIDE ASSESSMENT ADULT - ASPIRATION PRECAUTIONS
MAINTAIN ASPIRATION PRECAUTIONS AS CONSERVATIVE MEASURE. NOTE THAT WHILE PATIENT'S OROPHARYNGEAL DYSPHAGIA MAY PLACE PT AT A RELATIVELY HEIGHTENED RISK FOR EPISODIC ASPIRATION, SHE DID NOT DEMONSTRATE ANY BEHAVIORAL ASPIRATION SIGNS ON EXAM./yes

## 2024-07-15 NOTE — SWALLOW BEDSIDE ASSESSMENT ADULT - SWALLOW EVAL: DIAGNOSIS
1) The pt demonstrates periodically reduced alertness for/orientation to feeding atop Oropharyngeal Dysphagia which subjectively appeared to be a grossly functional condition with a restricted inventory of modified food textures when alerft/cognizant enough to be be fed(not always the case). No behavioral aspiration signs exhibited with thin liquids, mildly thick liquids & puree foods. Solids not offered given severity of pt's Dysphagia. Feeding compromise and Oropharyngeal Dysphagia are in setting of multi factorial acute medical deconditioning(i.e fever, leukocytosis, sepsis, UTI, hypokalemia, atrial flutter with RVR, encephalopathy), Dementia, as well as Parkinson's Disease. 1) The pt demonstrates periodically reduced alertness for/orientation to feeding atop Oropharyngeal Dysphagia which subjectively appeared to be a grossly functional condition with a restricted inventory of modified food textures when she was alert/cognizant enough to be fed(not always the case). No behavioral aspiration signs exhibited with thin liquids, mildly thick liquids & puree foods. Solids not offered given severity of pt's Dysphagia. Feeding compromise and Oropharyngeal Dysphagia are in setting of multi factorial acute medical deconditioning(i.e fever, leukocytosis, sepsis, UTI, hypokalemia, atrial flutter with RVR, encephalopathy), Dementia, as well as Parkinson's Disease.

## 2024-07-15 NOTE — PROGRESS NOTE ADULT - SUBJECTIVE AND OBJECTIVE BOX
HOSPITALIST ATTENDING PROGRESS NOTE    Chart and meds reviewed.  Patient seen and examined.    CC: AMS    Subjective: Pt denies complaints, in and out of sleep. Son at bedside reports recent decline since having COVID 4-5wks ago.     All other systems reviewed and found to be negative with the exception of what has been described above.    MEDICATIONS  (STANDING):  apixaban 5 milliGRAM(s) Oral every 12 hours  atorvastatin 80 milliGRAM(s) Oral at bedtime  carbidopa/levodopa  25/100 1 Tablet(s) Oral <User Schedule>  cefTRIAXone Injectable. 1000 milliGRAM(s) IV Push every 24 hours  cholecalciferol 1000 Unit(s) Oral daily  clopidogrel Tablet 75 milliGRAM(s) Oral daily  metoprolol tartrate 25 milliGRAM(s) Oral two times a day  PARoxetine 40 milliGRAM(s) Oral daily    MEDICATIONS  (PRN):      VITALS:  T(F): 100.5 (07-15-24 @ 17:24), Max: 100.5 (07-15-24 @ 17:24)  HR: 100 (07-15-24 @ 17:24) (79 - 122)  BP: 142/95 (07-15-24 @ 17:24) (128/94 - 158/90)  RR: 18 (07-15-24 @ 17:24) (17 - 18)  SpO2: 100% (07-15-24 @ 17:24) (95% - 100%)  Wt(kg): --    I&O's Summary    14 Jul 2024 07:01  -  15 Jul 2024 07:00  --------------------------------------------------------  IN: 0 mL / OUT: 400 mL / NET: -400 mL    15 Jul 2024 07:01  -  15 Jul 2024 21:39  --------------------------------------------------------  IN: 220 mL / OUT: 0 mL / NET: 220 mL        CAPILLARY BLOOD GLUCOSE          PHYSICAL EXAM:  Gen: NAD  HEENT:  pupils equal and reactive, EOMI, no oropharyngeal lesions, erythema, exudates, oral thrush  NECK:   supple, no carotid bruits, no palpable lymph nodes, no thyromegaly  CV:  +S1, +S2, regular, no murmurs or rubs  RESP:   lungs clear to auscultation bilaterally, no wheezing, rales, rhonchi, good air entry bilaterally  BREAST:  not examined  GI:  abdomen soft, non-tender, non-distended, normal BS, no bruits, no abdominal masses, no palpable masses  RECTAL:  not examined  :  not examined  MSK:   normal muscle tone, no atrophy, no rigidity, no contractions  EXT:  no clubbing, no cyanosis, no edema, no calf pain, swelling or erythema  VASCULAR:  pulses equal and symmetric in the upper and lower extremities  NEURO:  AAOX1, no focal neurological deficits, follows all commands, able to move extremities spontaneously  SKIN:  no ulcers, lesions or rashes    LABS:                            12.0   9.26  )-----------( 196      ( 15 Jul 2024 07:01 )             37.6     07-15    144  |  115<H>  |  14  ----------------------------<  121<H>  3.7   |  23  |  1.07    Ca    9.8      15 Jul 2024 07:01  Mg     2.5     07-15    TPro  6.5  /  Alb  3.0<L>  /  TBili  1.4<H>  /  DBili  x   /  AST  29  /  ALT  25  /  AlkPhos  108  07-15        LIVER FUNCTIONS - ( 15 Jul 2024 07:01 )  Alb: 3.0 g/dL / Pro: 6.5 gm/dL / ALK PHOS: 108 U/L / ALT: 25 U/L / AST: 29 U/L / GGT: x           PT/INR - ( 15 Jul 2024 07:01 )   PT: 13.8 sec;   INR: 1.23 ratio         PTT - ( 14 Jul 2024 14:53 )  PTT:28.0 sec  Urinalysis Basic - ( 15 Jul 2024 07:01 )    Color: x / Appearance: x / SG: x / pH: x  Gluc: 121 mg/dL / Ketone: x  / Bili: x / Urobili: x   Blood: x / Protein: x / Nitrite: x   Leuk Esterase: x / RBC: x / WBC x   Sq Epi: x / Non Sq Epi: x / Bacteria: x      ABG - ( 15 Jul 2024 00:35 )  pH, Arterial: 7.41  pH, Blood: x     /  pCO2: 34    /  pO2: 136   / HCO3: 22    / Base Excess: -2.4  /  SaO2: 99        CULTURES:  BCx NG  UCx not drawn    Additional results/Imaging, I have personally reviewed:  CXR 7/14/24: Low lung volumes. No change heart mediastinum. Subsegmental atelectasis left base. Correlate clinically for concomitant infection. No pleural effusion or pneumothorax.    CTH 7/15/24: No evidence of acute intracranial hemorrhage, midline shift or CT evidence of acute territorial infarct.    Telemetry, personally reviewed:  7/15/24: sinus 80s, aflutter up to 140s

## 2024-07-15 NOTE — DIETITIAN INITIAL EVALUATION ADULT - ETIOLOGY
r/t decreased ability to meet increased nutrient needs 2/2 dementia, wound healing, parkinson's disease

## 2024-07-15 NOTE — CONSULT NOTE ADULT - ASSESSMENT
88 y/o F w/ PMHx of TIA, Alzheimer's dementia, parkinson's disease, CAD s/p PCI, HTN, PAD, A-flutter (on Eliquis), p/w AMS upon waking up this morning. Patient has been more lethargic with decreased reponsiveness X 3 weeks, has been sleeping more, seems fatigued. Patient was admitted to  3 weeks ago for bradycardia - dose of Metoprolol was reduced, Seroquel, Olanzipine and Aricept were d/c, she f/u with neuro Dr. Laureano, was supposed to see neurologist tomorrow.       # Acute on subacute encephalopathy, fever - Rule out toxic-metabolic causes / sepsis    - Rule out sepsis  - EEG    # Somnolence / lethargy / History of PD - Dementia    - I recommend restarting carbidopa levodopa 25/100 mg 3 times a day  – I also recommend restarting paroxetine 40 Mg daily, sudden discontinuation of paroxetine can result in severe withdrawal    Above management discussed with Dr. Peoples

## 2024-07-15 NOTE — SWALLOW BEDSIDE ASSESSMENT ADULT - COMMENTS
The pt was admitted to  with lethargy and altered mentation. Hospital course is remarkable for fever, leukocytosis, sepsis, UTI, hypokalemia, atrial flutter with RVR and encephalopathy. This profile is superimposed upon a history of Dementia, Parkinson's, CAD s/p stent placement, PAD, HTN, hiatal hernia, past TIA, previous JOHN, past appy and prior tonsillectomy.

## 2024-07-15 NOTE — DIETITIAN NUTRITION RISK NOTIFICATION - TREATMENT: THE FOLLOWING DIET HAS BEEN RECOMMENDED
Diet, Regular:   DASH/TLC {Sodium & Cholesterol Restricted} (DASH) (07-14-24 @ 23:49) [Active]

## 2024-07-15 NOTE — DIETITIAN INITIAL EVALUATION ADULT - NSFNSGIIOFT_GEN_A_CORE
I&O's Detail    14 Jul 2024 07:01  -  15 Jul 2024 07:00  --------------------------------------------------------  IN:  Total IN: 0 mL    OUT:    Intermittent Catheterization - Urethral (mL): 400 mL  Total OUT: 400 mL    Total NET: -400 mL

## 2024-07-16 LAB
ALBUMIN SERPL ELPH-MCNC: 3.1 G/DL — LOW (ref 3.3–5)
ALP SERPL-CCNC: 114 U/L — SIGNIFICANT CHANGE UP (ref 40–120)
ALT FLD-CCNC: 20 U/L — SIGNIFICANT CHANGE UP (ref 12–78)
ANION GAP SERPL CALC-SCNC: 4 MMOL/L — LOW (ref 5–17)
ANION GAP SERPL CALC-SCNC: 5 MMOL/L — SIGNIFICANT CHANGE UP (ref 5–17)
AST SERPL-CCNC: 36 U/L — SIGNIFICANT CHANGE UP (ref 15–37)
BILIRUB SERPL-MCNC: 1.3 MG/DL — HIGH (ref 0.2–1.2)
BUN SERPL-MCNC: 12 MG/DL — SIGNIFICANT CHANGE UP (ref 7–23)
BUN SERPL-MCNC: 13 MG/DL — SIGNIFICANT CHANGE UP (ref 7–23)
CALCIUM SERPL-MCNC: 10 MG/DL — SIGNIFICANT CHANGE UP (ref 8.5–10.1)
CALCIUM SERPL-MCNC: 10.2 MG/DL — HIGH (ref 8.5–10.1)
CHLORIDE SERPL-SCNC: 116 MMOL/L — HIGH (ref 96–108)
CHLORIDE SERPL-SCNC: 116 MMOL/L — HIGH (ref 96–108)
CO2 SERPL-SCNC: 24 MMOL/L — SIGNIFICANT CHANGE UP (ref 22–31)
CO2 SERPL-SCNC: 25 MMOL/L — SIGNIFICANT CHANGE UP (ref 22–31)
CREAT SERPL-MCNC: 1.02 MG/DL — SIGNIFICANT CHANGE UP (ref 0.5–1.3)
CREAT SERPL-MCNC: 1.09 MG/DL — SIGNIFICANT CHANGE UP (ref 0.5–1.3)
EGFR: 49 ML/MIN/1.73M2 — LOW
EGFR: 53 ML/MIN/1.73M2 — LOW
GLUCOSE SERPL-MCNC: 122 MG/DL — HIGH (ref 70–99)
GLUCOSE SERPL-MCNC: 134 MG/DL — HIGH (ref 70–99)
MAGNESIUM SERPL-MCNC: 2.3 MG/DL — SIGNIFICANT CHANGE UP (ref 1.6–2.6)
PHOSPHATE SERPL-MCNC: 2.5 MG/DL — SIGNIFICANT CHANGE UP (ref 2.5–4.5)
POTASSIUM SERPL-MCNC: 3.4 MMOL/L — LOW (ref 3.5–5.3)
POTASSIUM SERPL-MCNC: 4 MMOL/L — SIGNIFICANT CHANGE UP (ref 3.5–5.3)
POTASSIUM SERPL-SCNC: 3.4 MMOL/L — LOW (ref 3.5–5.3)
POTASSIUM SERPL-SCNC: 4 MMOL/L — SIGNIFICANT CHANGE UP (ref 3.5–5.3)
PROT SERPL-MCNC: 6.8 GM/DL — SIGNIFICANT CHANGE UP (ref 6–8.3)
SODIUM SERPL-SCNC: 145 MMOL/L — SIGNIFICANT CHANGE UP (ref 135–145)
SODIUM SERPL-SCNC: 145 MMOL/L — SIGNIFICANT CHANGE UP (ref 135–145)

## 2024-07-16 PROCEDURE — 95816 EEG AWAKE AND DROWSY: CPT | Mod: 26

## 2024-07-16 PROCEDURE — 99233 SBSQ HOSP IP/OBS HIGH 50: CPT

## 2024-07-16 PROCEDURE — 99232 SBSQ HOSP IP/OBS MODERATE 35: CPT

## 2024-07-16 RX ORDER — METOPROLOL TARTRATE 50 MG
2.5 TABLET ORAL ONCE
Refills: 0 | Status: COMPLETED | OUTPATIENT
Start: 2024-07-16 | End: 2024-07-16

## 2024-07-16 RX ORDER — AMIODARONE HYDROCHLORIDE 50 MG/ML
INJECTION, SOLUTION INTRAVENOUS
Refills: 0 | Status: DISCONTINUED | OUTPATIENT
Start: 2024-07-16 | End: 2024-07-19

## 2024-07-16 RX ORDER — METOPROLOL TARTRATE 50 MG
12.5 TABLET ORAL ONCE
Refills: 0 | Status: COMPLETED | OUTPATIENT
Start: 2024-07-16 | End: 2024-07-16

## 2024-07-16 RX ORDER — POTASSIUM CHLORIDE 600 MG/1
20 TABLET, FILM COATED, EXTENDED RELEASE ORAL ONCE
Refills: 0 | Status: DISCONTINUED | OUTPATIENT
Start: 2024-07-16 | End: 2024-07-16

## 2024-07-16 RX ORDER — ACETAMINOPHEN 325 MG
1000 TABLET ORAL ONCE
Refills: 0 | Status: COMPLETED | OUTPATIENT
Start: 2024-07-16 | End: 2024-07-16

## 2024-07-16 RX ORDER — METOPROLOL TARTRATE 50 MG
5 TABLET ORAL ONCE
Refills: 0 | Status: COMPLETED | OUTPATIENT
Start: 2024-07-16 | End: 2024-07-16

## 2024-07-16 RX ORDER — POTASSIUM CHLORIDE 600 MG/1
20 TABLET, FILM COATED, EXTENDED RELEASE ORAL ONCE
Refills: 0 | Status: COMPLETED | OUTPATIENT
Start: 2024-07-16 | End: 2024-07-16

## 2024-07-16 RX ORDER — AMIODARONE HYDROCHLORIDE 50 MG/ML
400 INJECTION, SOLUTION INTRAVENOUS EVERY 8 HOURS
Refills: 0 | Status: DISCONTINUED | OUTPATIENT
Start: 2024-07-16 | End: 2024-07-19

## 2024-07-16 RX ORDER — AMIODARONE HYDROCHLORIDE 50 MG/ML
200 INJECTION, SOLUTION INTRAVENOUS DAILY
Refills: 0 | Status: DISCONTINUED | OUTPATIENT
Start: 2024-07-20 | End: 2024-07-19

## 2024-07-16 RX ADMIN — APIXABAN 5 MILLIGRAM(S): 5 TABLET, FILM COATED ORAL at 21:33

## 2024-07-16 RX ADMIN — Medication 400 MILLIGRAM(S): at 21:49

## 2024-07-16 RX ADMIN — Medication 12.5 MILLIGRAM(S): at 05:12

## 2024-07-16 RX ADMIN — Medication 2.5 MILLIGRAM(S): at 02:36

## 2024-07-16 RX ADMIN — CARBIDOPA AND LEVODOPA 1 TABLET(S): 10; 100 TABLET ORAL at 11:47

## 2024-07-16 RX ADMIN — APIXABAN 5 MILLIGRAM(S): 5 TABLET, FILM COATED ORAL at 11:51

## 2024-07-16 RX ADMIN — Medication 25 MILLIGRAM(S): at 21:34

## 2024-07-16 RX ADMIN — Medication 1000 UNIT(S): at 11:51

## 2024-07-16 RX ADMIN — Medication 1000 MILLIGRAM(S): at 11:55

## 2024-07-16 RX ADMIN — AMIODARONE HYDROCHLORIDE 400 MILLIGRAM(S): 50 INJECTION, SOLUTION INTRAVENOUS at 12:03

## 2024-07-16 RX ADMIN — Medication 2.5 MILLIGRAM(S): at 00:14

## 2024-07-16 RX ADMIN — CARBIDOPA AND LEVODOPA 1 TABLET(S): 10; 100 TABLET ORAL at 19:15

## 2024-07-16 RX ADMIN — CLOPIDOGREL BISULFATE 75 MILLIGRAM(S): 75 TABLET, FILM COATED ORAL at 11:52

## 2024-07-16 RX ADMIN — CARBIDOPA AND LEVODOPA 1 TABLET(S): 10; 100 TABLET ORAL at 11:53

## 2024-07-16 RX ADMIN — POTASSIUM CHLORIDE 20 MILLIEQUIVALENT(S): 600 TABLET, FILM COATED, EXTENDED RELEASE ORAL at 05:35

## 2024-07-16 RX ADMIN — ATORVASTATIN CALCIUM 80 MILLIGRAM(S): 20 TABLET, FILM COATED ORAL at 21:34

## 2024-07-16 RX ADMIN — PAROXETINE HYDROCHLORIDE 40 MILLIGRAM(S): 37.5 TABLET, FILM COATED, EXTENDED RELEASE ORAL at 11:47

## 2024-07-16 RX ADMIN — Medication 5 MILLIGRAM(S): at 04:22

## 2024-07-16 RX ADMIN — AMIODARONE HYDROCHLORIDE 400 MILLIGRAM(S): 50 INJECTION, SOLUTION INTRAVENOUS at 21:34

## 2024-07-16 RX ADMIN — Medication 25 MILLIGRAM(S): at 11:47

## 2024-07-16 NOTE — PROGRESS NOTE ADULT - ASSESSMENT
89 y/o F w/ PMHx of TIA, Alzheimer's dementia, parkinson's disease, CAD s/p PCI, HTN, PAD, A-flutter (on Eliquis), p/w AMS upon waking up this morning. Patient has been more lethargic with decreased reponsiveness X 3 weeks, has been sleeping more, seems fatigued. Patient was admitted to  3 weeks ago for bradycardia - dose of Metoprolol was reduced, Seroquel, Olanzipine and Aricept were d/c, she f/u with neuro Dr. Laureano, was supposed to see neurologist tomorrow.       # Acute on subacute encephalopathy, fever - possibly delirium - toxic-metabolic causes / sepsis; EEG c/w diffuse cerebral dysfunction/encephalopathy. No epileptiform activity, clinical events or seizures were recorded.    - management of sepsis      # Somnolence / lethargy / History of PD - Dementia    - I recommend continuing carbidopa levodopa 25/100 mg 3 times a day  – I also recommend continue paroxetine 40 Mg daily, sudden discontinuation of paroxetine can result in severe withdrawal  - Pt will f/u with her neuro Dr. Laureano as OP    Above management discussed with Dr. Peoples      Call neuro if needed henceforth

## 2024-07-16 NOTE — PROGRESS NOTE ADULT - SUBJECTIVE AND OBJECTIVE BOX
Follow Up:  Fever, AMS    Interval History/ROS: Febrile 100.5F overnight      REVIEW OF SYSTEMS  [  ] ROS unobtainable because:    [ x ] All other systems negative except as noted below    Constitutional:  [ ] fever [ ] chills  [ ] weight loss  [ ]night sweat  [ ]poor appetite/PO intake [ ]fatigue   Skin:  [ ] rash [ ] phlebitis	  Eyes: [ ] icterus [ ] pain  [ ] discharge	  ENMT: [ ] sore throat  [ ] thrush [ ] ulcers [ ] exudates [ ]anosmia  Respiratory: [ ] dyspnea [ ] hemoptysis [ ] cough [ ] sputum	  Cardiovascular:  [ ] chest pain [ ] palpitations [ ] edema	  Gastrointestinal:  [ ] nausea [ ] vomiting [ ] diarrhea [ ] constipation [ ] pain	  Genitourinary:  [ ] dysuria [ ] frequency [ ] hematuria [ ] discharge [ ] flank pain  [ ] incontinence  Musculoskeletal:  [ ] myalgias [ ] arthralgias [ ] arthritis  [ ] back pain  Neurological:  [ ] headache [ ] weakness [ ] seizures  [ ] confusion/altered mental status    Allergies  Indocin (Other)  doxycycline (Other)  verapamil (Other)  Biaxin (Other)  Demerol HCl (Other)  tetracyclines (Unknown)  penicillins (Other)        ANTIMICROBIALS:    cefTRIAXone Injectable. 1000 every 24 hours        OTHER MEDS: MEDICATIONS  (STANDING):  apixaban 5 every 12 hours  atorvastatin 80 at bedtime  carbidopa/levodopa  25/100 1 <User Schedule>  clopidogrel Tablet 75 daily  metoprolol tartrate 25 two times a day  PARoxetine 40 daily      Vital Signs Last 24 Hrs  T(F): 99.8 (07-16-24 @ 08:09), Max: 102.1 (07-14-24 @ 14:03)    Vital Signs Last 24 Hrs  HR: 98 (07-16-24 @ 08:09) (55 - 122)  BP: 159/94 (07-16-24 @ 08:09) (127/88 - 170/95)  RR: 16 (07-15-24 @ 23:54)  SpO2: 98% (07-15-24 @ 23:54) (94% - 100%)  Wt(kg): --    EXAM:    Constitutional:  NAD, does not follow commands  Head/Eyes: no icterus  LUNGS:  CTA  CVS:  regular rhythm  Abd:  soft, non-tender; non-distended  Ext:  no edema  Vascular:  IV site no erythema tenderness or discharge  Neuro: AAO X 1    Labs:                        12.0   9.26  )-----------( 196      ( 15 Jul 2024 07:01 )             37.6     07-16    145  |  116<H>  |  13  ----------------------------<  122<H>  3.4<L>   |  24  |  1.02    Ca    10.0      16 Jul 2024 02:24  Phos  2.5     07-16  Mg     2.3     07-16    TPro  6.5  /  Alb  3.0<L>  /  TBili  1.4<H>  /  DBili  x   /  AST  29  /  ALT  25  /  AlkPhos  108  07-15      WBC Trend:  WBC Count: 9.26 (07-15-24 @ 07:01)  WBC Count: 14.56 (07-14-24 @ 14:20)      Creatine Trend:  Creatinine: 1.02 (07-16)  Creatinine: 1.07 (07-15)  Creatinine: 1.30 (07-14)      Liver Biochemical Testing Trend:  Alanine Aminotransferase (ALT/SGPT): 25 (07-15)  Alanine Aminotransferase (ALT/SGPT): 15 (07-14)  Alanine Aminotransferase (ALT/SGPT): 46 (06-10)  Alanine Aminotransferase (ALT/SGPT): 28 (06-09)  Alanine Aminotransferase (ALT/SGPT): 9 *L* (06-03)  Aspartate Aminotransferase (AST/SGOT): 29 (07-15-24 @ 07:01)  Aspartate Aminotransferase (AST/SGOT): 37 (07-14-24 @ 14:53)  Aspartate Aminotransferase (AST/SGOT): 79 (06-10-24 @ 06:52)  Aspartate Aminotransferase (AST/SGOT): 71 (06-09-24 @ 18:42)  Aspartate Aminotransferase (AST/SGOT): 22 (06-03-24 @ 07:13)  Bilirubin Total: 1.4 (07-15)  Bilirubin Total: 2.1 (07-14)  Bilirubin Total: 1.2 (06-10)  Bilirubin Total: 1.1 (06-09)  Bilirubin Total: 0.8 (06-03)      Trend LDH      Urinalysis Basic - ( 16 Jul 2024 02:24 )    Color: x / Appearance: x / SG: x / pH: x  Gluc: 122 mg/dL / Ketone: x  / Bili: x / Urobili: x   Blood: x / Protein: x / Nitrite: x   Leuk Esterase: x / RBC: x / WBC x   Sq Epi: x / Non Sq Epi: x / Bacteria: x        MICROBIOLOGY:        Urinalysis with Rflx Culture (collected 14 Jul 2024 14:20)    Culture - Blood (collected 14 Jul 2024 14:20)  Source: .Blood None  Preliminary Report:    No growth at 24 hours    Culture - Blood (collected 14 Jul 2024 14:20)  Source: .Blood None  Preliminary Report:    No growth at 24 hours    Culture - Blood (collected 10 Joaquín 2024 06:56)  Source: .Blood None  Final Report:    No growth at 5 days    Culture - Blood (collected 10 Joaquín 2024 06:52)  Source: .Blood None  Final Report:    No growth at 5 days    Culture - Urine (collected 09 Jun 2024 22:30)  Source: Clean Catch Clean Catch (Midstream)  Final Report:    >=3 organisms. Probable collection contamination.    Culture - Blood (collected 29 May 2024 14:34)  Source: .Blood None  Final Report:    No growth at 5 days    Culture - Blood (collected 29 May 2024 14:34)  Source: .Blood None  Final Report:    No growth at 5 days    Culture - Urine (collected 07 Feb 2024 00:18)  Source: Catheterized None  Final Report:    No growth    Culture - Blood (collected 06 Feb 2024 16:42)  Source: .Blood Blood  Final Report:    No growth at 5 days      Lactate, Blood: 1.5 (07-14 @ 14:20)        RADIOLOGY:  imaging below personally reviewed    Xray Chest 1 View- PORTABLE-Urgent:  (14 Jul 2024 15:13)        CT Head No Cont:   ACC: [id number] EXAM:  CT BRAIN   ORDERED BY: ROGERIO CAMERON     PROCEDURE DATE:  07/14/2024          INTERPRETATION:  CLINICAL INDICATION:  ams    TECHNIQUE: Noncontrast CT examination of the head was performed.  Coronal and sagittal reformats werealso obtained.    COMPARISON: 2/6/2024    FINDINGS:  INTRA-AXIAL: No intracranial mass effect, acute hemorrhage, midline shift   or acute transcortical infarct is seen. There are periventricular white   matter hypodensities that are nonspecific in nature but may reflect   chronic ischemic microvascular disease.  EXTRA-AXIAL: No extra-axial fluid collection is present.  VENTRICLES AND SULCI: Parenchymal volume is commensurate with patient   age. No hydrocephalus.  VISUALIZED SINUSES: Mild mucosal thickening.  VISUALIZED MASTOIDS: Clear.  CALVARIUM: Normal.    IMPRESSION:    No evidence of acute intracranial hemorrhage, midline shift or CT   evidence of acute territorial infarct.    If the patient's symptoms persist, consider short interval follow-up head   CT or brain MRI if there are no MRI contraindications.    --- End of Report ---        KARLENE DOMINGUEZ MD; Attending Radiologist  This document has been electronically signed. Jul 14 2024  3:40PM (07-14-24 @ 15:20)       Follow Up:  Fever, AMS    Interval History/ROS: Febrile 100.5F overnight, remains weak, but now somewhat more awake compared to yesterday, ROS limited      REVIEW OF SYSTEMS  [ x ] ROS unobtainable because:  mental status  [  ] All other systems negative except as noted below    Constitutional:  [ ] fever [ ] chills  [ ] weight loss  [ ]night sweat  [ ]poor appetite/PO intake [ ]fatigue   Skin:  [ ] rash [ ] phlebitis	  Eyes: [ ] icterus [ ] pain  [ ] discharge	  ENMT: [ ] sore throat  [ ] thrush [ ] ulcers [ ] exudates [ ]anosmia  Respiratory: [ ] dyspnea [ ] hemoptysis [ ] cough [ ] sputum	  Cardiovascular:  [ ] chest pain [ ] palpitations [ ] edema	  Gastrointestinal:  [ ] nausea [ ] vomiting [ ] diarrhea [ ] constipation [ ] pain	  Genitourinary:  [ ] dysuria [ ] frequency [ ] hematuria [ ] discharge [ ] flank pain  [ ] incontinence  Musculoskeletal:  [ ] myalgias [ ] arthralgias [ ] arthritis  [ ] back pain  Neurological:  [ ] headache [ ] weakness [ ] seizures  [ ] confusion/altered mental status    Allergies  Indocin (Other)  doxycycline (Other)  verapamil (Other)  Biaxin (Other)  Demerol HCl (Other)  tetracyclines (Unknown)  penicillins (Other)        ANTIMICROBIALS:    cefTRIAXone Injectable. 1000 every 24 hours        OTHER MEDS: MEDICATIONS  (STANDING):  apixaban 5 every 12 hours  atorvastatin 80 at bedtime  carbidopa/levodopa  25/100 1 <User Schedule>  clopidogrel Tablet 75 daily  metoprolol tartrate 25 two times a day  PARoxetine 40 daily      Vital Signs Last 24 Hrs  T(F): 99.8 (07-16-24 @ 08:09), Max: 102.1 (07-14-24 @ 14:03)    Vital Signs Last 24 Hrs  HR: 98 (07-16-24 @ 08:09) (55 - 122)  BP: 159/94 (07-16-24 @ 08:09) (127/88 - 170/95)  RR: 16 (07-15-24 @ 23:54)  SpO2: 98% (07-15-24 @ 23:54) (94% - 100%)  Wt(kg): --    EXAM:    Constitutional:  NAD, does not follow commands  Head/Eyes: no icterus  LUNGS:  CTA  CVS:  regular rhythm  Abd:  soft, non-tender; non-distended  Ext:  no edema  Vascular:  IV site no erythema tenderness or discharge  Neuro: AAO X 1    Labs:                        12.0   9.26  )-----------( 196      ( 15 Jul 2024 07:01 )             37.6     07-16    145  |  116<H>  |  13  ----------------------------<  122<H>  3.4<L>   |  24  |  1.02    Ca    10.0      16 Jul 2024 02:24  Phos  2.5     07-16  Mg     2.3     07-16    TPro  6.5  /  Alb  3.0<L>  /  TBili  1.4<H>  /  DBili  x   /  AST  29  /  ALT  25  /  AlkPhos  108  07-15      WBC Trend:  WBC Count: 9.26 (07-15-24 @ 07:01)  WBC Count: 14.56 (07-14-24 @ 14:20)      Creatine Trend:  Creatinine: 1.02 (07-16)  Creatinine: 1.07 (07-15)  Creatinine: 1.30 (07-14)      Liver Biochemical Testing Trend:  Alanine Aminotransferase (ALT/SGPT): 25 (07-15)  Alanine Aminotransferase (ALT/SGPT): 15 (07-14)  Alanine Aminotransferase (ALT/SGPT): 46 (06-10)  Alanine Aminotransferase (ALT/SGPT): 28 (06-09)  Alanine Aminotransferase (ALT/SGPT): 9 *L* (06-03)  Aspartate Aminotransferase (AST/SGOT): 29 (07-15-24 @ 07:01)  Aspartate Aminotransferase (AST/SGOT): 37 (07-14-24 @ 14:53)  Aspartate Aminotransferase (AST/SGOT): 79 (06-10-24 @ 06:52)  Aspartate Aminotransferase (AST/SGOT): 71 (06-09-24 @ 18:42)  Aspartate Aminotransferase (AST/SGOT): 22 (06-03-24 @ 07:13)  Bilirubin Total: 1.4 (07-15)  Bilirubin Total: 2.1 (07-14)  Bilirubin Total: 1.2 (06-10)  Bilirubin Total: 1.1 (06-09)  Bilirubin Total: 0.8 (06-03)      Trend LDH      Urinalysis Basic - ( 16 Jul 2024 02:24 )    Color: x / Appearance: x / SG: x / pH: x  Gluc: 122 mg/dL / Ketone: x  / Bili: x / Urobili: x   Blood: x / Protein: x / Nitrite: x   Leuk Esterase: x / RBC: x / WBC x   Sq Epi: x / Non Sq Epi: x / Bacteria: x        MICROBIOLOGY:        Urinalysis with Rflx Culture (collected 14 Jul 2024 14:20)    Culture - Blood (collected 14 Jul 2024 14:20)  Source: .Blood None  Preliminary Report:    No growth at 24 hours    Culture - Blood (collected 14 Jul 2024 14:20)  Source: .Blood None  Preliminary Report:    No growth at 24 hours    Culture - Blood (collected 10 Joaquín 2024 06:56)  Source: .Blood None  Final Report:    No growth at 5 days    Culture - Blood (collected 10 Joaquín 2024 06:52)  Source: .Blood None  Final Report:    No growth at 5 days    Culture - Urine (collected 09 Jun 2024 22:30)  Source: Clean Catch Clean Catch (Midstream)  Final Report:    >=3 organisms. Probable collection contamination.    Culture - Blood (collected 29 May 2024 14:34)  Source: .Blood None  Final Report:    No growth at 5 days    Culture - Blood (collected 29 May 2024 14:34)  Source: .Blood None  Final Report:    No growth at 5 days    Culture - Urine (collected 07 Feb 2024 00:18)  Source: Catheterized None  Final Report:    No growth    Culture - Blood (collected 06 Feb 2024 16:42)  Source: .Blood Blood  Final Report:    No growth at 5 days      Lactate, Blood: 1.5 (07-14 @ 14:20)        RADIOLOGY:  imaging below personally reviewed    Xray Chest 1 View- PORTABLE-Urgent:  (14 Jul 2024 15:13)        CT Head No Cont:   ACC: [id number] EXAM:  CT BRAIN   ORDERED BY: ROGERIO CAMERON     PROCEDURE DATE:  07/14/2024          INTERPRETATION:  CLINICAL INDICATION:  ams    TECHNIQUE: Noncontrast CT examination of the head was performed.  Coronal and sagittal reformats werealso obtained.    COMPARISON: 2/6/2024    FINDINGS:  INTRA-AXIAL: No intracranial mass effect, acute hemorrhage, midline shift   or acute transcortical infarct is seen. There are periventricular white   matter hypodensities that are nonspecific in nature but may reflect   chronic ischemic microvascular disease.  EXTRA-AXIAL: No extra-axial fluid collection is present.  VENTRICLES AND SULCI: Parenchymal volume is commensurate with patient   age. No hydrocephalus.  VISUALIZED SINUSES: Mild mucosal thickening.  VISUALIZED MASTOIDS: Clear.  CALVARIUM: Normal.    IMPRESSION:    No evidence of acute intracranial hemorrhage, midline shift or CT   evidence of acute territorial infarct.    If the patient's symptoms persist, consider short interval follow-up head   CT or brain MRI if there are no MRI contraindications.    --- End of Report ---        KARLENE DOMINGUEZ MD; Attending Radiologist  This document has been electronically signed. Jul 14 2024  3:40PM (07-14-24 @ 15:20)

## 2024-07-16 NOTE — PROGRESS NOTE ADULT - ASSESSMENT
89 y/o F w/ PMH of TIA, Alzheimer's dementia, parkinson's disease, CAD s/p PCI, HTN, PAD, a-flutter (on eliquis), p/w AMS    *Metabolic Encephalopathy 2/2 Sepsis 2/2 UTI vs aspiration PNA  -monitor MS  -monitor T  -BCx NG  -now on RA  -continue CTX for now  -s/p IVF  -appreciate ID input  -appreciate neuro input    #Worsening mental status over the last 3 weeks  -appreciate neuro and SLP evals  -protracted delirium in setting of recent COVID vs worsening dementia  -continue home meds  -lives w family and has aides    #A-flutter w/ RVR  -increased BB dose here  -amiodarone started given continue aflutter  -Eliquis  -f/u cards recs    *Hypokalemia  -replete prn    *H/o TIA / alzheimer's dementia / parkinson's disease / CAD / HTN / PAD   -C/w home meds and f/u outpatient for further management if conditions remain stable during hospitalization     *DVT ppx   -On Eliquis     Dispo 1-2 days pending improvement in MS, temp, HR.  Updated son at bedside (Fredis) on 7/15. Updated daughter, Claudine on 7/16.

## 2024-07-16 NOTE — PROGRESS NOTE ADULT - SUBJECTIVE AND OBJECTIVE BOX
HOSPITALIST ATTENDING PROGRESS NOTE    Chart and meds reviewed.  Patient seen and examined.    CC: AMS    Subjective: Pt somewhat less confused. Now stable on RA. Updated daughter.     All other systems reviewed and found to be negative with the exception of what has been described above.    MEDICATIONS  (STANDING):  aMIOdarone    Tablet 400 milliGRAM(s) Oral every 8 hours  aMIOdarone    Tablet   Oral   apixaban 5 milliGRAM(s) Oral every 12 hours  atorvastatin 80 milliGRAM(s) Oral at bedtime  carbidopa/levodopa  25/100 1 Tablet(s) Oral <User Schedule>  cefTRIAXone Injectable. 1000 milliGRAM(s) IV Push every 24 hours  cholecalciferol 1000 Unit(s) Oral daily  clopidogrel Tablet 75 milliGRAM(s) Oral daily  metoprolol tartrate 25 milliGRAM(s) Oral two times a day  PARoxetine 40 milliGRAM(s) Oral daily    MEDICATIONS  (PRN):      VITALS:  T(F): 98.1 (07-16-24 @ 16:13), Max: 99.9 (07-16-24 @ 04:23)  HR: 65 (07-16-24 @ 16:13) (55 - 117)  BP: 160/73 (07-16-24 @ 16:13) (127/88 - 170/95)  RR: 17 (07-16-24 @ 16:13) (15 - 17)  SpO2: 95% (07-16-24 @ 16:13) (94% - 98%)  Wt(kg): --    I&O's Summary    15 Jul 2024 07:01  -  16 Jul 2024 07:00  --------------------------------------------------------  IN: 220 mL / OUT: 0 mL / NET: 220 mL    16 Jul 2024 07:01  -  16 Jul 2024 17:41  --------------------------------------------------------  IN: 710 mL / OUT: 0 mL / NET: 710 mL        CAPILLARY BLOOD GLUCOSE          PHYSICAL EXAM:  Gen: NAD  HEENT:  pupils equal and reactive, EOMI, no oropharyngeal lesions, erythema, exudates, oral thrush  NECK:   supple, no carotid bruits, no palpable lymph nodes, no thyromegaly  CV:  +S1, +S2, regular, no murmurs or rubs  RESP:   lungs clear to auscultation bilaterally, no wheezing, rales, rhonchi, good air entry bilaterally  BREAST:  not examined  GI:  abdomen soft, non-tender, non-distended, normal BS, no bruits, no abdominal masses, no palpable masses  RECTAL:  not examined  :  not examined  MSK:   normal muscle tone, no atrophy, no rigidity, no contractions  EXT:  no clubbing, no cyanosis, no edema, no calf pain, swelling or erythema  VASCULAR:  pulses equal and symmetric in the upper and lower extremities  NEURO:  AAOX1-2, no focal neurological deficits, follows all commands, able to move extremities spontaneously  SKIN:  no ulcers, lesions or rashes    LABS:                            12.0   9.26  )-----------( 196      ( 15 Jul 2024 07:01 )             37.6     07-16    145  |  116<H>  |  12  ----------------------------<  134<H>  4.0   |  25  |  1.09    Ca    10.2<H>      16 Jul 2024 08:04  Phos  2.5     07-16  Mg     2.3     07-16    TPro  6.8  /  Alb  3.1<L>  /  TBili  1.3<H>  /  DBili  x   /  AST  36  /  ALT  20  /  AlkPhos  114  07-16        LIVER FUNCTIONS - ( 16 Jul 2024 08:04 )  Alb: 3.1 g/dL / Pro: 6.8 gm/dL / ALK PHOS: 114 U/L / ALT: 20 U/L / AST: 36 U/L / GGT: x           PT/INR - ( 15 Jul 2024 07:01 )   PT: 13.8 sec;   INR: 1.23 ratio           Urinalysis Basic - ( 16 Jul 2024 08:04 )    Color: x / Appearance: x / SG: x / pH: x  Gluc: 134 mg/dL / Ketone: x  / Bili: x / Urobili: x   Blood: x / Protein: x / Nitrite: x   Leuk Esterase: x / RBC: x / WBC x   Sq Epi: x / Non Sq Epi: x / Bacteria: x      ABG - ( 15 Jul 2024 00:35 )  pH, Arterial: 7.41  pH, Blood: x     /  pCO2: 34    /  pO2: 136   / HCO3: 22    / Base Excess: -2.4  /  SaO2: 99        CULTURES:  BCx NG  UCx not drawn    Additional results/Imaging, I have personally reviewed:  CXR 7/14/24: Low lung volumes. No change heart mediastinum. Subsegmental atelectasis left base. Correlate clinically for concomitant infection. No pleural effusion or pneumothorax.    CTH 7/15/24: No evidence of acute intracranial hemorrhage, midline shift or CT evidence of acute territorial infarct.    Telemetry, personally reviewed:  7/15/24: sinus 80s, aflutter up to 140s  7/16/24: sinus 60-80s, afib/flutter up to 130s

## 2024-07-16 NOTE — PROGRESS NOTE ADULT - ASSESSMENT
89F with TIA, Alzheimer's dementia, parkinson's disease, CAD s/p PCI, HTN, PAD, a-flutter (on eliquis), presents with AMS, reportedly less responsive for few months  Febrile 102F  WBC 14  Cr 1  UA with 5 WBC, 290 RBC, 6 SQE  CTH with No evidence of acute intracranial hemorrhage, midline shift or CT evidence of acute territorial infarct.  Flu, RSV, COVID negative  CXR with low volume, segmental LLL atelectasis, collate clinically for concomitant infection. No effusions  BCx (7/14) NGTD    Antimicrobials:  cefTRIAXone Injectable. 1000 every 24 hours (7/14 --- )    Impression:   #Fever  #Abnormal UA  #Altered Mental Status  #Hypoxia  #Leukocytosis (resolved)  - unclear if fever is due to UTI, given minimal pyuria  - would evaluate for possible aspiration pneumonia, given difficulty with eating, sometimes choking on food  - CXR with LLL atelectasis, but cannot completely rule out concomitant infection    Suggestions:  - continue CTX 1G daily  - monitor temperature curve  - trend WBC  - consider CT Chest  - follow up BCx x2  - maintain aspiration precautions  - discussed recommendations with primary team      Clinical team may change from intravenous to oral antibiotics when the following criteria are met:   1. Patient is clinically improving/stable       a)	Improved signs and symptoms of infection from initial presentation       b)	Afebrile for 24 hours       c)	Leukocytosis trending towards normal range   2. Patient is tolerating oral intake   3. Initial/repeat blood cultures are negative OR do not need to wait for preliminary blood cultures to result    Cannot advise changing to oral antibiotic therapy until culture sensitivity is available.      Angel Rubin, DO  Contact on [employer]   [facility], [facility]  [address] [place] [address]  Tel: [phone] 89F with TIA, Alzheimer's dementia, parkinson's disease, CAD s/p PCI, HTN, PAD, a-flutter (on eliquis), presents with AMS, reportedly less responsive for few months  Febrile 102F  WBC 14  Cr 1  UA with 5 WBC, 290 RBC, 6 SQE  CTH with No evidence of acute intracranial hemorrhage, midline shift or CT evidence of acute territorial infarct.  Flu, RSV, COVID negative  CXR with low volume, segmental LLL atelectasis, collate clinically for concomitant infection. No effusions  BCx (7/14) NGTD    Antimicrobials:  cefTRIAXone Injectable. 1000 every 24 hours (7/14 --- )    Impression:   #Fever  #Abnormal UA  #Altered Mental Status  #Hypoxia  #Leukocytosis (resolved)  - unclear if fever is due to UTI, given minimal pyuria  - would evaluate for possible aspiration pneumonia, given difficulty with eating, sometimes choking on food  - CXR with LLL atelectasis, but cannot completely rule out concomitant infection    Suggestions:  - continue CTX 1G daily  - monitor temperature curve  - trend WBC  - if continue to be febrile, would obtain CT Chest  - follow up BCx x2  - maintain aspiration precautions  - discussed recommendations with primary team      Clinical team may change from intravenous to oral antibiotics when the following criteria are met:   1. Patient is clinically improving/stable       a)	Improved signs and symptoms of infection from initial presentation       b)	Afebrile for 24 hours       c)	Leukocytosis trending towards normal range   2. Patient is tolerating oral intake   3. Initial/repeat blood cultures are negative OR do not need to wait for preliminary blood cultures to result    Cannot advise changing to oral antibiotic therapy until culture sensitivity is available.      Angel Rubin, DO  Contact on [employer]   [facility], [facility]  [address] [place] [address]  Tel: [phone]

## 2024-07-16 NOTE — PROGRESS NOTE ADULT - SUBJECTIVE AND OBJECTIVE BOX
Patient lying in bed with eyes closed, son is by her bedside, I reviewed history with the son, he reports she follows up with Dr. Khan.    Carbidopa levodopa restarted, paroxetine 40 Mg daily also started    EEG showed mild to moderate generalized slowing, c/w diffuse cerebral   dysfunction/encephalopathy. No epileptiform activity, clinical events or seizures were recorded.    Seen by ID, is on antibiotics, Tmax 100.5    ROS as above, other ROS unable to obtain    MEDICATIONS  (STANDING):  aMIOdarone    Tablet 400 milliGRAM(s) Oral every 8 hours  aMIOdarone    Tablet   Oral   apixaban 5 milliGRAM(s) Oral every 12 hours  atorvastatin 80 milliGRAM(s) Oral at bedtime  carbidopa/levodopa  25/100 1 Tablet(s) Oral <User Schedule>  cefTRIAXone Injectable. 1000 milliGRAM(s) IV Push every 24 hours  cholecalciferol 1000 Unit(s) Oral daily  clopidogrel Tablet 75 milliGRAM(s) Oral daily  metoprolol tartrate 25 milliGRAM(s) Oral two times a day  PARoxetine 40 milliGRAM(s) Oral daily      Vital Signs Last 24 Hrs  T(C): 36.7 (16 Jul 2024 16:13), Max: 38.1 (15 Jul 2024 17:24)  T(F): 98.1 (16 Jul 2024 16:13), Max: 100.5 (15 Jul 2024 17:24)  HR: 65 (16 Jul 2024 16:13) (55 - 117)  BP: 160/73 (16 Jul 2024 16:13) (127/88 - 170/95)  BP(mean): 110 (15 Jul 2024 22:51) (110 - 110)  RR: 17 (16 Jul 2024 16:13) (15 - 18)  SpO2: 95% (16 Jul 2024 16:13) (94% - 100%)    Parameters below as of 16 Jul 2024 16:13  Patient On (Oxygen Delivery Method): room air    Constitutional: Somnolent but arousable to verbal stimuli, NAD  HEAD: Normocephalic  Neck: Supple.    Neurological exam:  HF: A x O x O.  Somnolent, Opened eyes during exam, made eye contact, engaged very briefly speech hyphonic, No Aphasia, slow to name, repeat  CN: ISABELLE, EOMI, VFF, facial sensation normal, asymmetrical smile, tongue midline, unable to check SCM-not cooperating  Motor: No pronator drift, Strength 4/5 in all 4 ext-poor effort and stiff, normal bulk, no tremor, cog-wheel rigidity L>R wrist. Intermittent dyskinesias b/l feet resolved  Sens: Intact to light touch    Reflexes: Symmetric and normal, downgoing toes b/l  Coord:  Not cooperating with exam  Gait/Balance: Cannot test                          12.0   9.26  )-----------( 196      ( 15 Jul 2024 07:01 )             37.6     07-16    145  |  116<H>  |  12  ----------------------------<  134<H>  4.0   |  25  |  1.09    Ca    10.2<H>      16 Jul 2024 08:04  Phos  2.5     07-16  Mg     2.3     07-16    TPro  6.8  /  Alb  3.1<L>  /  TBili  1.3<H>  /  DBili  x   /  AST  36  /  ALT  20  /  AlkPhos  114  07-16          Radiology report:  < from: CT Head No Cont (07.14.24 @ 15:20) >  IMPRESSION:    No evidence of acute intracranial hemorrhage, midline shift or CT   evidence of acute territorial infarct.      -< from: EEG Awake or Drowsy (07.16.24 @ 09:40) >  EEG Summary/Classification:  This was an abnormal EEG study in the awake and drowsy states due to the   presence of mild to moderate generalized slowing.    EEG Clinical Correlate/  Impression:  The findings are suggestive of diffuse cerebral   dysfunction/encephalopathy. No epileptiform activity was seen and no   clinical events or seizures were recorded

## 2024-07-16 NOTE — CHART NOTE - NSCHARTNOTEFT_GEN_A_CORE
Patient noted to be in rapid A.fib to the 130's. Tonight patient was initially in A.fib, was given her nighttime dose of oral metoprolol and the A.fib broke to NSR for a few minutes. Patient then went back into A.fib. Patient given Metoprolol 2.5mg IVP x 2 doses with no success in controlling HR. Labs including BMP, Mag, Phos ordered and reviewed. K: 3.4, Ma.3, Phos: 2.5.     Patient continues to be in rapid A.fib. Patient Asymptomatic.     T(C): 37.7 (07-15-24 @ 23:54), Max: 38.1 (07-15-24 @ 17:24)  HR: 117 (24 @ 03:22) (55 - 122)  BP: 154/97 (24 @ 03:22) (127/88 - 170/86)  RR: 16 (07-15-24 @ 23:54) (15 - 18)  SpO2: 98% (07-15-24 @ 23:54) (94% - 100%)    CONSTITUTIONAL: Well groomed, no apparent distress  RESP: No respiratory distress, no use of accessory muscles; CTA b/l, no WRR  CV: A.Fib , +S1S2, no MRG; no JVD; no peripheral edema  GI: Soft, NT, ND    Plan   Rapid A.fib   - Metoprolol 5mg IVP  - Metroprolol 12.5mg oral   - K replacement

## 2024-07-17 LAB
ALBUMIN SERPL ELPH-MCNC: 2.9 G/DL — LOW (ref 3.3–5)
ALP SERPL-CCNC: 106 U/L — SIGNIFICANT CHANGE UP (ref 40–120)
ALT FLD-CCNC: 16 U/L — SIGNIFICANT CHANGE UP (ref 12–78)
ANION GAP SERPL CALC-SCNC: 5 MMOL/L — SIGNIFICANT CHANGE UP (ref 5–17)
AST SERPL-CCNC: 23 U/L — SIGNIFICANT CHANGE UP (ref 15–37)
BILIRUB SERPL-MCNC: 1.4 MG/DL — HIGH (ref 0.2–1.2)
BUN SERPL-MCNC: 17 MG/DL — SIGNIFICANT CHANGE UP (ref 7–23)
CALCIUM SERPL-MCNC: 9.5 MG/DL — SIGNIFICANT CHANGE UP (ref 8.5–10.1)
CHLORIDE SERPL-SCNC: 113 MMOL/L — HIGH (ref 96–108)
CO2 SERPL-SCNC: 27 MMOL/L — SIGNIFICANT CHANGE UP (ref 22–31)
CREAT SERPL-MCNC: 1.19 MG/DL — SIGNIFICANT CHANGE UP (ref 0.5–1.3)
EGFR: 44 ML/MIN/1.73M2 — LOW
GLUCOSE SERPL-MCNC: 132 MG/DL — HIGH (ref 70–99)
POTASSIUM SERPL-MCNC: 3.6 MMOL/L — SIGNIFICANT CHANGE UP (ref 3.5–5.3)
POTASSIUM SERPL-SCNC: 3.6 MMOL/L — SIGNIFICANT CHANGE UP (ref 3.5–5.3)
PROT SERPL-MCNC: 6.3 GM/DL — SIGNIFICANT CHANGE UP (ref 6–8.3)
SODIUM SERPL-SCNC: 145 MMOL/L — SIGNIFICANT CHANGE UP (ref 135–145)

## 2024-07-17 PROCEDURE — 71250 CT THORAX DX C-: CPT | Mod: 26

## 2024-07-17 PROCEDURE — 99233 SBSQ HOSP IP/OBS HIGH 50: CPT

## 2024-07-17 RX ORDER — CEFTRIAXONE SODIUM 500 MG
1000 VIAL (EA) INJECTION EVERY 24 HOURS
Refills: 0 | Status: DISCONTINUED | OUTPATIENT
Start: 2024-07-17 | End: 2024-07-18

## 2024-07-17 RX ORDER — ACETAMINOPHEN 325 MG
1000 TABLET ORAL ONCE
Refills: 0 | Status: COMPLETED | OUTPATIENT
Start: 2024-07-17 | End: 2024-07-17

## 2024-07-17 RX ORDER — SODIUM CHLORIDE 0.9 % (FLUSH) 0.9 %
1000 SYRINGE (ML) INJECTION ONCE
Refills: 0 | Status: COMPLETED | OUTPATIENT
Start: 2024-07-17 | End: 2024-07-17

## 2024-07-17 RX ADMIN — Medication 400 MILLIGRAM(S): at 05:55

## 2024-07-17 RX ADMIN — CLOPIDOGREL BISULFATE 75 MILLIGRAM(S): 75 TABLET, FILM COATED ORAL at 09:57

## 2024-07-17 RX ADMIN — APIXABAN 5 MILLIGRAM(S): 5 TABLET, FILM COATED ORAL at 09:57

## 2024-07-17 RX ADMIN — APIXABAN 5 MILLIGRAM(S): 5 TABLET, FILM COATED ORAL at 22:31

## 2024-07-17 RX ADMIN — AMIODARONE HYDROCHLORIDE 400 MILLIGRAM(S): 50 INJECTION, SOLUTION INTRAVENOUS at 16:31

## 2024-07-17 RX ADMIN — ATORVASTATIN CALCIUM 80 MILLIGRAM(S): 20 TABLET, FILM COATED ORAL at 22:31

## 2024-07-17 RX ADMIN — PAROXETINE HYDROCHLORIDE 40 MILLIGRAM(S): 37.5 TABLET, FILM COATED, EXTENDED RELEASE ORAL at 09:58

## 2024-07-17 RX ADMIN — Medication 166.67 MILLILITER(S): at 09:46

## 2024-07-17 RX ADMIN — AMIODARONE HYDROCHLORIDE 400 MILLIGRAM(S): 50 INJECTION, SOLUTION INTRAVENOUS at 05:53

## 2024-07-17 RX ADMIN — Medication 25 MILLIGRAM(S): at 22:31

## 2024-07-17 RX ADMIN — CARBIDOPA AND LEVODOPA 1 TABLET(S): 10; 100 TABLET ORAL at 16:31

## 2024-07-17 RX ADMIN — CARBIDOPA AND LEVODOPA 1 TABLET(S): 10; 100 TABLET ORAL at 22:32

## 2024-07-17 RX ADMIN — Medication 1000 MILLIGRAM(S): at 09:56

## 2024-07-17 RX ADMIN — Medication 1000 UNIT(S): at 09:57

## 2024-07-17 RX ADMIN — Medication 25 MILLIGRAM(S): at 09:56

## 2024-07-17 RX ADMIN — CARBIDOPA AND LEVODOPA 1 TABLET(S): 10; 100 TABLET ORAL at 09:57

## 2024-07-17 RX ADMIN — AMIODARONE HYDROCHLORIDE 400 MILLIGRAM(S): 50 INJECTION, SOLUTION INTRAVENOUS at 22:31

## 2024-07-17 NOTE — PROVIDER CONTACT NOTE (OTHER) - SITUATION
received call from Cash ARREDONDO to stop Heparin drip 1hr prior to procedure. Drip stopped at 1:45pm

## 2024-07-17 NOTE — PROGRESS NOTE ADULT - ASSESSMENT
89F with TIA, Alzheimer's dementia, parkinson's disease, CAD s/p PCI, HTN, PAD, a-flutter (on eliquis), presents with AMS, reportedly less responsive for few months  Febrile 102F  WBC 14  Cr 1  UA with 5 WBC, 290 RBC, 6 SQE  CTH with No evidence of acute intracranial hemorrhage, midline shift or CT evidence of acute territorial infarct.  Flu, RSV, COVID negative  CXR with low volume, segmental LLL atelectasis, collate clinically for concomitant infection. No effusions  BCx (7/14) NGTD  CT Chest (7/17) without obvious pneumonia    Antimicrobials:  cefTRIAXone Injectable. 1000 every 24 hours (7/14 --- )    Impression:   #Fever  #Abnormal UA  #Altered Mental Status  #Hypoxia  #Leukocytosis (resolved)  - unclear if fever is due to UTI, given minimal pyuria  - patient remains high risk of recurrent aspiration pneumonia/pneumonitis, thought CT chest without obvious pneumonia  - granted patient is improving clinically    Suggestions:  - continue CTX 1G daily, likely short course 7-days for aspiration   - monitor temperature curve  - trend WBC  - maintain aspiration precautions  - discussed recommendations with primary team      Clinical team may change from intravenous to oral antibiotics when the following criteria are met:   1. Patient is clinically improving/stable       a)	Improved signs and symptoms of infection from initial presentation       b)	Afebrile for 24 hours       c)	Leukocytosis trending towards normal range   2. Patient is tolerating oral intake   3. Initial/repeat blood cultures are negative OR do not need to wait for preliminary blood cultures to result    When above criteria met OR on date, may change iv antibiotics to: Cefpodoxime 200mg BID to complete 7-day course      Angel Rubin, DO  Contact on [email or web address]   [facility], [facility]  [address] [place] [address]  Tel: [phone]

## 2024-07-17 NOTE — PROGRESS NOTE ADULT - SUBJECTIVE AND OBJECTIVE BOX
HOSPITALIST ATTENDING PROGRESS NOTE    Chart and meds reviewed.  Patient seen and examined.    CC: AMS    Subjective: MS improving, did have fever last night, CT chest neg for PNA. HR improving, now in NSR. Updated DIL at bedside.     All other systems reviewed and found to be negative with the exception of what has been described above.    MEDICATIONS  (STANDING):  aMIOdarone    Tablet 400 milliGRAM(s) Oral every 8 hours  aMIOdarone    Tablet   Oral   apixaban 5 milliGRAM(s) Oral every 12 hours  atorvastatin 80 milliGRAM(s) Oral at bedtime  carbidopa/levodopa  25/100 1 Tablet(s) Oral <User Schedule>  cefTRIAXone Injectable. 1000 milliGRAM(s) IV Push every 24 hours  cholecalciferol 1000 Unit(s) Oral daily  clopidogrel Tablet 75 milliGRAM(s) Oral daily  metoprolol tartrate 25 milliGRAM(s) Oral two times a day  PARoxetine 40 milliGRAM(s) Oral daily    MEDICATIONS  (PRN):      VITALS:  T(F): 98.8 (07-17-24 @ 15:57), Max: 100.9 (07-16-24 @ 21:05)  HR: 57 (07-17-24 @ 15:57) (57 - 80)  BP: 177/79 (07-17-24 @ 15:57) (159/66 - 177/79)  RR: 18 (07-17-24 @ 15:57) (16 - 18)  SpO2: 95% (07-17-24 @ 15:57) (95% - 99%)  Wt(kg): --    I&O's Summary    16 Jul 2024 07:01  -  17 Jul 2024 07:00  --------------------------------------------------------  IN: 710 mL / OUT: 0 mL / NET: 710 mL    17 Jul 2024 07:01  -  17 Jul 2024 17:39  --------------------------------------------------------  IN: 350 mL / OUT: 0 mL / NET: 350 mL        CAPILLARY BLOOD GLUCOSE          PHYSICAL EXAM:  Gen: NAD  HEENT:  pupils equal and reactive, EOMI, no oropharyngeal lesions, erythema, exudates, oral thrush  NECK:   supple, no carotid bruits, no palpable lymph nodes, no thyromegaly  CV:  +S1, +S2, regular, no murmurs or rubs  RESP:   lungs clear to auscultation bilaterally, no wheezing, rales, rhonchi, good air entry bilaterally  BREAST:  not examined  GI:  abdomen soft, non-tender, non-distended, normal BS, no bruits, no abdominal masses, no palpable masses  RECTAL:  not examined  :  not examined  MSK:   normal muscle tone, no atrophy, no rigidity, no contractions  EXT:  no clubbing, no cyanosis, no edema, no calf pain, swelling or erythema  VASCULAR:  pulses equal and symmetric in the upper and lower extremities  NEURO:  AAOX1-2, no focal neurological deficits, follows all commands, able to move extremities spontaneously  SKIN:  no ulcers, lesions or rashes    LABS:        07-17    145  |  113<H>  |  17  ----------------------------<  132<H>  3.6   |  27  |  1.19    Ca    9.5      17 Jul 2024 06:44  Phos  2.5     07-16  Mg     2.3     07-16    TPro  6.3  /  Alb  2.9<L>  /  TBili  1.4<H>  /  DBili  x   /  AST  23  /  ALT  16  /  AlkPhos  106  07-17        LIVER FUNCTIONS - ( 17 Jul 2024 06:44 )  Alb: 2.9 g/dL / Pro: 6.3 gm/dL / ALK PHOS: 106 U/L / ALT: 16 U/L / AST: 23 U/L / GGT: x             Urinalysis Basic - ( 17 Jul 2024 06:44 )    Color: x / Appearance: x / SG: x / pH: x  Gluc: 132 mg/dL / Ketone: x  / Bili: x / Urobili: x   Blood: x / Protein: x / Nitrite: x   Leuk Esterase: x / RBC: x / WBC x   Sq Epi: x / Non Sq Epi: x / Bacteria: x    CULTURES:  BCx NG  UCx not drawn    Additional results/Imaging, I have personally reviewed:  CXR 7/14/24: Low lung volumes. No change heart mediastinum. Subsegmental atelectasis left base. Correlate clinically for concomitant infection. No pleural effusion or pneumothorax.    CTH 7/15/24: No evidence of acute intracranial hemorrhage, midline shift or CT evidence of acute territorial infarct.    CT chest noncon 7/17/24: no PNA    Telemetry, personally reviewed:  7/15/24: sinus 80s, aflutter up to 140s  7/16/24: sinus 60-80s, afib/flutter up to 130s  7/17/24: sinus o/n and this am, 60s

## 2024-07-17 NOTE — CONSULT NOTE ADULT - ASSESSMENT
89 y/o F w/ PMH of TIA, Alzheimer's dementia, parkinson's disease, CAD s/p PCI, HTN, PAD, a-flutter (on eliquis), p/w AMS. Patient's son at bedside states that his brother informed him that patient has been more lethargic and w/ decreased reponsiveness for the last 3 weeks, and that she has had the current mental status during that time. Patient has been sleeping more and more every day. Patient's seroquel has been tapered off, and patient suppose to see neurologist tomorrow. Patient unable to provide any useful history. Appears very sedated, needed to be called multiple times verbally along with some tactile stimulation to wake up, but patient still goes right back to closing her eyes and is having difficulty staying awake.     7/17/2024  Will continue to load with Amiodarone to prevent paroxysms of Afib with RVR  Unclear to me the etiology of her mental status changes.   Will give some IV Fluids today to see if that helps.

## 2024-07-17 NOTE — CONSULT NOTE ADULT - SUBJECTIVE AND OBJECTIVE BOX
89 y/o F w/ PMH of TIA, Alzheimer's dementia, parkinson's disease, CAD s/p PCI, HTN, PAD, a-flutter (on eliquis), p/w AMS. Patient's son at bedside states that his brother informed him that patient has been more lethargic and w/ decreased reponsiveness for the last 3 weeks, and that she has had the current mental status during that time. Patient has been sleeping more and more every day. Patient's seroquel has been tapered off, and patient suppose to see neurologist tomorrow. Patient unable to provide any useful history. Appears very sedated, needed to be called multiple times verbally along with some tactile stimulation to wake up, but patient still goes right back to closing her eyes and is having difficulty staying awake.     7/17/2024  Patient continues to be very sedated. She will open her eyes when spoken to but you have to prod her to say anything and even then, it is in a whisper and is difficult to understand.   Has had paroxysmal Afib in the hospital. Yesterday, I started loading her with oral Amiodarone.   Remains in NSR this am.   She is on her way for a CT scan.     Cardiac Studies:   < from: TTE W or WO Ultrasound Enhancing Agent (06.10.24 @ 09:36) >   1. Left ventricular systolic function is normal with an ejection fraction visually estimated at 55 to 60 %.   2. Mild to moderate mitral regurgitation.   3. Mild tricuspid regurgitation.   4. Interatrial septum is aneurysmal.   5. Mild pulmonary hypertension.   6. Mild to moderate aortic regurgitation.   7. There is mild calcification of the aortic valve leaflets.   8. There is mild calcification of the mitral valve annulus.    < end of copied text >        PAST MEDICAL & SURGICAL HISTORY:  Hypertension      Hernia, hiatal      CAD (coronary artery disease)  LAD stent 5/2020      Dementia      S/P appendectomy      S/P tonsillectomy      S/P hysterectomy        MEDICATIONS  (STANDING):  aMIOdarone    Tablet 400 milliGRAM(s) Oral every 8 hours  aMIOdarone    Tablet   Oral   apixaban 5 milliGRAM(s) Oral every 12 hours  atorvastatin 80 milliGRAM(s) Oral at bedtime  carbidopa/levodopa  25/100 1 Tablet(s) Oral <User Schedule>  cefTRIAXone Injectable. 1000 milliGRAM(s) IV Push every 24 hours  cholecalciferol 1000 Unit(s) Oral daily  clopidogrel Tablet 75 milliGRAM(s) Oral daily  metoprolol tartrate 25 milliGRAM(s) Oral two times a day  PARoxetine 40 milliGRAM(s) Oral daily    MEDICATIONS  (PRN):    Allergies    Indocin (Other)  doxycycline (Other)  verapamil (Other)  Biaxin (Other)  Demerol HCl (Other)  tetracyclines (Unknown)  penicillins (Other)    Intolerances      FAMILY HISTORY:  FHx: heart disease          REVIEW OF SYSTEMS:    CONSTITUTIONAL: No weakness, fevers or chills  EYES/ENT: No visual changes;  No vertigo or throat pain   NECK: No pain or stiffness  RESPIRATORY: No cough, wheezing, hemoptysis; No shortness of breath  CARDIOVASCULAR: No chest pain or palpitations  GASTROINTESTINAL: No abdominal or epigastric pain. No nausea, vomiting, or hematemesis; No diarrhea or constipation. No melena or hematochezia.  GENITOURINARY: No dysuria, frequency or hematuria  NEUROLOGICAL: No numbness or weakness  SKIN: No itching, burning, rashes, or lesions   All other review of systems is negative unless indicated above      PHYSICAL EXAM:  Daily     Daily   Vital Signs Last 24 Hrs  T(C): 37.7 (17 Jul 2024 07:34), Max: 38.3 (16 Jul 2024 21:05)  T(F): 99.9 (17 Jul 2024 07:34), Max: 100.9 (16 Jul 2024 21:05)  HR: 66 (17 Jul 2024 07:34) (64 - 80)  BP: 159/66 (17 Jul 2024 07:34) (159/66 - 165/80)  BP(mean): --  RR: 17 (17 Jul 2024 07:34) (16 - 17)  SpO2: 99% (17 Jul 2024 07:34) (95% - 99%)    Parameters below as of 17 Jul 2024 07:34  Patient On (Oxygen Delivery Method): room air        Constitutional: NAD, awake and alert, well-developed  HEENT: PERR, EOMI, Normal Hearing, MMM  Neck: Soft and supple, No LAD, No JVD  Respiratory: Breath sounds are clear bilaterally, No wheezing, rales or rhonchi  Cardiovascular: S1 and S2, regular rate and rhythm, no Murmurs, gallops or rubs  Gastrointestinal: Bowel Sounds present, soft, nontender, nondistended, no guarding, no rebound  Extremities: No peripheral edema  Vascular: 2+ peripheral pulses  Neurological: A/O x 3, no focal deficits  Musculoskeletal: 5/5 strength b/l upper and lower extremities  Skin: No rashes    LABS: All Labs Reviewed:    07-17    145  |  113<H>  |  17  ----------------------------<  132<H>  3.6   |  27  |  1.19    Ca    9.5      17 Jul 2024 06:44  Phos  2.5     07-16  Mg     2.3     07-16    TPro  6.3  /  Alb  2.9<L>  /  TBili  1.4<H>  /  DBili  x   /  AST  23  /  ALT  16  /  AlkPhos  106  07-17          Blood Culture: Organism --  Gram Stain Blood -- Gram Stain --  Specimen Source .Blood None  Culture-Blood --        RADIOLOGY: ACC: [id number] EXAM:  CT BRAIN   ORDERED BY: ROGERIO CAMERON     PROCEDURE DATE:  07/14/2024          INTERPRETATION:  CLINICAL INDICATION:  ams    TECHNIQUE: Noncontrast CT examination of the head was performed.  Coronal and sagittal reformats werealso obtained.    COMPARISON: 2/6/2024    FINDINGS:  INTRA-AXIAL: No intracranial mass effect, acute hemorrhage, midline shift   or acute transcortical infarct is seen. There are periventricular white   matter hypodensities that are nonspecific in nature but may reflect   chronic ischemic microvascular disease.  EXTRA-AXIAL: No extra-axial fluid collection is present.  VENTRICLES AND SULCI: Parenchymal volume is commensurate with patient   age. No hydrocephalus.  VISUALIZED SINUSES: Mild mucosal thickening.  VISUALIZED MASTOIDS: Clear.  CALVARIUM: Normal.    IMPRESSION:    No evidence of acute intracranial hemorrhage, midline shift or CT   evidence of acute territorial infarct.    If the patient's symptoms persist, consider short interval follow-up head   CT or brain MRI if there are no MRI contraindications.    EKG: NSR

## 2024-07-17 NOTE — PROGRESS NOTE ADULT - SUBJECTIVE AND OBJECTIVE BOX
Follow Up:      Interval History/ROS: Febrile 100.9F overnight, appears to be more awake, conversive, following commands, reports no acute complaints       REVIEW OF SYSTEMS  [  ] ROS unobtainable because:    [ x ] All other systems negative except as noted below    Constitutional:  [ ] fever [ ] chills  [ ] weight loss  [ ]night sweat  [ ]poor appetite/PO intake [ ]fatigue   Skin:  [ ] rash [ ] phlebitis	  Eyes: [ ] icterus [ ] pain  [ ] discharge	  ENMT: [ ] sore throat  [ ] thrush [ ] ulcers [ ] exudates [ ]anosmia  Respiratory: [ ] dyspnea [ ] hemoptysis [ ] cough [ ] sputum	  Cardiovascular:  [ ] chest pain [ ] palpitations [ ] edema	  Gastrointestinal:  [ ] nausea [ ] vomiting [ ] diarrhea [ ] constipation [ ] pain	  Genitourinary:  [ ] dysuria [ ] frequency [ ] hematuria [ ] discharge [ ] flank pain  [ ] incontinence  Musculoskeletal:  [ ] myalgias [ ] arthralgias [ ] arthritis  [ ] back pain  Neurological:  [ ] headache [ ] weakness [ ] seizures  [ ] confusion/altered mental status    Allergies  Indocin (Other)  doxycycline (Other)  verapamil (Other)  Biaxin (Other)  Demerol HCl (Other)  tetracyclines (Unknown)  penicillins (Other)        ANTIMICROBIALS:    cefTRIAXone Injectable. 1000 every 24 hours        OTHER MEDS: MEDICATIONS  (STANDING):  aMIOdarone    Tablet    aMIOdarone    Tablet 400 every 8 hours  apixaban 5 every 12 hours  atorvastatin 80 at bedtime  carbidopa/levodopa  25/100 1 <User Schedule>  clopidogrel Tablet 75 daily  metoprolol tartrate 25 two times a day  PARoxetine 40 daily      Vital Signs Last 24 Hrs  T(F): 99.9 (07-17-24 @ 07:34), Max: 102.1 (07-14-24 @ 14:03)    Vital Signs Last 24 Hrs  HR: 66 (07-17-24 @ 07:34) (64 - 80)  BP: 159/66 (07-17-24 @ 07:34) (159/66 - 165/80)  RR: 17 (07-17-24 @ 07:34)  SpO2: 99% (07-17-24 @ 07:34) (95% - 99%)  Wt(kg): --    EXAM:    Physical Exam:  Constitutional:  NAD  Head/Eyes: no icterus  ENT:  supple  LUNGS:  CTA  CVS:  regular rhythm, no murmur  Abd:  soft, non-tender; non-distended  Ext:  no edema  Vascular:  IV site no erythema tenderness or discharge  MSK:  joints without swelling  Neuro: AAO X 1    Labs:    07-17    145  |  113<H>  |  17  ----------------------------<  132<H>  3.6   |  27  |  1.19    Ca    9.5      17 Jul 2024 06:44  Phos  2.5     07-16  Mg     2.3     07-16    TPro  6.3  /  Alb  2.9<L>  /  TBili  1.4<H>  /  DBili  x   /  AST  23  /  ALT  16  /  AlkPhos  106  07-17      WBC Trend:  WBC Count: 9.26 (07-15-24 @ 07:01)  WBC Count: 14.56 (07-14-24 @ 14:20)      Creatine Trend:  Creatinine: 1.19 (07-17)  Creatinine: 1.09 (07-16)  Creatinine: 1.02 (07-16)  Creatinine: 1.07 (07-15)      Liver Biochemical Testing Trend:  Alanine Aminotransferase (ALT/SGPT): 16 (07-17)  Alanine Aminotransferase (ALT/SGPT): 20 (07-16)  Alanine Aminotransferase (ALT/SGPT): 25 (07-15)  Alanine Aminotransferase (ALT/SGPT): 15 (07-14)  Alanine Aminotransferase (ALT/SGPT): 46 (06-10)  Aspartate Aminotransferase (AST/SGOT): 23 (07-17-24 @ 06:44)  Aspartate Aminotransferase (AST/SGOT): 36 (07-16-24 @ 08:04)  Aspartate Aminotransferase (AST/SGOT): 29 (07-15-24 @ 07:01)  Aspartate Aminotransferase (AST/SGOT): 37 (07-14-24 @ 14:53)  Aspartate Aminotransferase (AST/SGOT): 79 (06-10-24 @ 06:52)  Bilirubin Total: 1.4 (07-17)  Bilirubin Total: 1.3 (07-16)  Bilirubin Total: 1.4 (07-15)  Bilirubin Total: 2.1 (07-14)  Bilirubin Total: 1.2 (06-10)      Trend LDH      Urinalysis Basic - ( 17 Jul 2024 06:44 )    Color: x / Appearance: x / SG: x / pH: x  Gluc: 132 mg/dL / Ketone: x  / Bili: x / Urobili: x   Blood: x / Protein: x / Nitrite: x   Leuk Esterase: x / RBC: x / WBC x   Sq Epi: x / Non Sq Epi: x / Bacteria: x        MICROBIOLOGY:        Urinalysis with Rflx Culture (collected 14 Jul 2024 14:20)    Culture - Blood (collected 14 Jul 2024 14:20)  Source: .Blood None  Preliminary Report:    No growth at 48 Hours    Culture - Blood (collected 14 Jul 2024 14:20)  Source: .Blood None  Preliminary Report:    No growth at 48 Hours    Culture - Blood (collected 10 Joaquín 2024 06:56)  Source: .Blood None  Final Report:    No growth at 5 days    Culture - Blood (collected 10 Joaquín 2024 06:52)  Source: .Blood None  Final Report:    No growth at 5 days    Culture - Urine (collected 09 Jun 2024 22:30)  Source: Clean Catch Clean Catch (Midstream)  Final Report:    >=3 organisms. Probable collection contamination.    Culture - Blood (collected 29 May 2024 14:34)  Source: .Blood None  Final Report:    No growth at 5 days    Culture - Blood (collected 29 May 2024 14:34)  Source: .Blood None  Final Report:    No growth at 5 days    Culture - Urine (collected 07 Feb 2024 00:18)  Source: Catheterized None  Final Report:    No growth    Culture - Blood (collected 06 Feb 2024 16:42)  Source: .Blood Blood  Final Report:    No growth at 5 days                                                  RADIOLOGY:  imaging below personally reviewed    Xray Chest 1 View- PORTABLE-Urgent:  (14 Jul 2024 15:13)              CT Chest No Cont:   ACC: [id number] EXAM:  CT CHEST   ORDERED BY: LONNY ROACH     PROCEDURE DATE:  07/17/2024          INTERPRETATION:  CLINICAL INDICATION: ASPIRATION RISK, RECURRENT FEVER.    Axial CT images of the chest are obtained without intravenous   administration of contrast.    No prior chest CTs are available for comparison.    No enlarged axillary, mediastinal or hilar lymph nodes.    No pericardial effusion. Heart size is enlarged with dilatation of the   left atrium measuring about 4.6 cm in anteroposterior dimension. Mitral   annular calcification. Vascular calcifications with involvement of the   aorta and the coronary arteries. Coronary artery stent. No pleural   effusion.    Evaluation of the upper abdomen demonstrate cholecystectomy clips.   Duodenal diverticulum.    Evaluation of the lungs demonstrate mild bilateral mid to lower lung   subsegmental areas of linear and dependent atelectasis within the lower   lobes. No pneumonia. No central endobronchial lesions.    Spinal degenerative changes.    IMPRESSION: No pneumonia.    --- End of Report ---            KANNAN STOUT MD; Attending Radiologist  This document has been electronically signed. Jul 17 2024  8:45AM (07-17-24 @ 08:25)  CT Head No Cont:   ACC: [id number] EXAM:  CT BRAIN   ORDERED BY: ROGERIO CAMERON     PROCEDURE DATE:  07/14/2024          INTERPRETATION:  CLINICAL INDICATION:  ams    TECHNIQUE: Noncontrast CT examination of the head was performed.  Coronal and sagittal reformats werealso obtained.    COMPARISON: 2/6/2024    FINDINGS:  INTRA-AXIAL: No intracranial mass effect, acute hemorrhage, midline shift   or acute transcortical infarct is seen. There are periventricular white   matter hypodensities that are nonspecific in nature but may reflect   chronic ischemic microvascular disease.  EXTRA-AXIAL: No extra-axial fluid collection is present.  VENTRICLES AND SULCI: Parenchymal volume is commensurate with patient   age. No hydrocephalus.  VISUALIZED SINUSES: Mild mucosal thickening.  VISUALIZED MASTOIDS: Clear.  CALVARIUM: Normal.    IMPRESSION:    No evidence of acute intracranial hemorrhage, midline shift or CT   evidence of acute territorial infarct.    If the patient's symptoms persist, consider short interval follow-up head   CT or brain MRI if there are no MRI contraindications.    --- End of Report ---            KARLENE DOMINGUEZ MD; Attending Radiologist  This document has been electronically signed. Jul 14 2024  3:40PM (07-14-24 @ 15:20)

## 2024-07-18 ENCOUNTER — TRANSCRIPTION ENCOUNTER (OUTPATIENT)
Age: 89
End: 2024-07-18

## 2024-07-18 PROCEDURE — 99233 SBSQ HOSP IP/OBS HIGH 50: CPT

## 2024-07-18 RX ORDER — CEFUROXIME SODIUM 7.5 G
500 VIAL (EA) INTRAVENOUS EVERY 12 HOURS
Refills: 0 | Status: DISCONTINUED | OUTPATIENT
Start: 2024-07-19 | End: 2024-07-19

## 2024-07-18 RX ADMIN — CLOPIDOGREL BISULFATE 75 MILLIGRAM(S): 75 TABLET, FILM COATED ORAL at 10:29

## 2024-07-18 RX ADMIN — PAROXETINE HYDROCHLORIDE 40 MILLIGRAM(S): 37.5 TABLET, FILM COATED, EXTENDED RELEASE ORAL at 10:29

## 2024-07-18 RX ADMIN — APIXABAN 5 MILLIGRAM(S): 5 TABLET, FILM COATED ORAL at 21:46

## 2024-07-18 RX ADMIN — CARBIDOPA AND LEVODOPA 1 TABLET(S): 10; 100 TABLET ORAL at 11:18

## 2024-07-18 RX ADMIN — Medication 1000 UNIT(S): at 10:29

## 2024-07-18 RX ADMIN — ATORVASTATIN CALCIUM 80 MILLIGRAM(S): 20 TABLET, FILM COATED ORAL at 21:46

## 2024-07-18 RX ADMIN — Medication 25 MILLIGRAM(S): at 08:39

## 2024-07-18 RX ADMIN — AMIODARONE HYDROCHLORIDE 400 MILLIGRAM(S): 50 INJECTION, SOLUTION INTRAVENOUS at 21:46

## 2024-07-18 RX ADMIN — Medication 25 MILLIGRAM(S): at 21:46

## 2024-07-18 RX ADMIN — CARBIDOPA AND LEVODOPA 1 TABLET(S): 10; 100 TABLET ORAL at 08:31

## 2024-07-18 RX ADMIN — Medication 1000 MILLIGRAM(S): at 10:28

## 2024-07-18 RX ADMIN — AMIODARONE HYDROCHLORIDE 400 MILLIGRAM(S): 50 INJECTION, SOLUTION INTRAVENOUS at 07:14

## 2024-07-18 RX ADMIN — CARBIDOPA AND LEVODOPA 1 TABLET(S): 10; 100 TABLET ORAL at 17:26

## 2024-07-18 RX ADMIN — APIXABAN 5 MILLIGRAM(S): 5 TABLET, FILM COATED ORAL at 10:29

## 2024-07-18 NOTE — PROGRESS NOTE ADULT - SUBJECTIVE AND OBJECTIVE BOX
HOSPITALIST ATTENDING PROGRESS NOTE    Chart and meds reviewed.  Patient seen and examined.    CC: AMS    Subjective: MS improving, on RA and afebrile however last night when into afib 140-160, now NSR. Updated son, Fredis, not for d/c today.     All other systems reviewed and found to be negative with the exception of what has been described above.    MEDICATIONS  (STANDING):  aMIOdarone    Tablet 400 milliGRAM(s) Oral every 8 hours  aMIOdarone    Tablet   Oral   apixaban 5 milliGRAM(s) Oral every 12 hours  atorvastatin 80 milliGRAM(s) Oral at bedtime  carbidopa/levodopa  25/100 1 Tablet(s) Oral <User Schedule>  cefTRIAXone Injectable. 1000 milliGRAM(s) IV Push every 24 hours  cholecalciferol 1000 Unit(s) Oral daily  clopidogrel Tablet 75 milliGRAM(s) Oral daily  metoprolol tartrate 25 milliGRAM(s) Oral two times a day  PARoxetine 40 milliGRAM(s) Oral daily    MEDICATIONS  (PRN):      VITALS:  T(F): 97.5 (07-18-24 @ 15:34), Max: 98.4 (07-18-24 @ 06:07)  HR: 54 (07-18-24 @ 15:34) (54 - 65)  BP: 141/66 (07-18-24 @ 15:34) (136/86 - 174/80)  RR: 18 (07-18-24 @ 15:34) (17 - 18)  SpO2: 95% (07-18-24 @ 15:34) (95% - 95%)  Wt(kg): --    I&O's Summary    17 Jul 2024 07:01  -  18 Jul 2024 07:00  --------------------------------------------------------  IN: 350 mL / OUT: 0 mL / NET: 350 mL        CAPILLARY BLOOD GLUCOSE          PHYSICAL EXAM:  Gen: NAD  HEENT:  pupils equal and reactive, EOMI, no oropharyngeal lesions, erythema, exudates, oral thrush  NECK:   supple, no carotid bruits, no palpable lymph nodes, no thyromegaly  CV:  +S1, +S2, regular, no murmurs or rubs  RESP:   lungs clear to auscultation bilaterally, no wheezing, rales, rhonchi, good air entry bilaterally  BREAST:  not examined  GI:  abdomen soft, non-tender, non-distended, normal BS, no bruits, no abdominal masses, no palpable masses  RECTAL:  not examined  :  not examined  MSK:   normal muscle tone, no atrophy, no rigidity, no contractions  EXT:  no clubbing, no cyanosis, no edema, no calf pain, swelling or erythema  VASCULAR:  pulses equal and symmetric in the upper and lower extremities  NEURO:  AAOX1-2, no focal neurological deficits, follows all commands, able to move extremities spontaneously  SKIN:  no ulcers, lesions or rashes    LABS:        07-17    145  |  113<H>  |  17  ----------------------------<  132<H>  3.6   |  27  |  1.19    Ca    9.5      17 Jul 2024 06:44    TPro  6.3  /  Alb  2.9<L>  /  TBili  1.4<H>  /  DBili  x   /  AST  23  /  ALT  16  /  AlkPhos  106  07-17        LIVER FUNCTIONS - ( 17 Jul 2024 06:44 )  Alb: 2.9 g/dL / Pro: 6.3 gm/dL / ALK PHOS: 106 U/L / ALT: 16 U/L / AST: 23 U/L / GGT: x             Urinalysis Basic - ( 17 Jul 2024 06:44 )    Color: x / Appearance: x / SG: x / pH: x  Gluc: 132 mg/dL / Ketone: x  / Bili: x / Urobili: x   Blood: x / Protein: x / Nitrite: x   Leuk Esterase: x / RBC: x / WBC x   Sq Epi: x / Non Sq Epi: x / Bacteria: x    CULTURES:  BCx NG  UCx not drawn    Additional results/Imaging, I have personally reviewed:  CXR 7/14/24: Low lung volumes. No change heart mediastinum. Subsegmental atelectasis left base. Correlate clinically for concomitant infection. No pleural effusion or pneumothorax.    CTH 7/15/24: No evidence of acute intracranial hemorrhage, midline shift or CT evidence of acute territorial infarct.    CT chest noncon 7/17/24: no PNA    Telemetry, personally reviewed:  7/15/24: sinus 80s, aflutter up to 140s  7/16/24: sinus 60-80s, afib/flutter up to 130s  7/17/24: sinus o/n and this am, 60s  7/18/24: 645pm last night afib 140-160s, now NSR

## 2024-07-18 NOTE — DISCHARGE NOTE NURSING/CASE MANAGEMENT/SOCIAL WORK - PATIENT PORTAL LINK FT
You can access the FollowMyHealth Patient Portal offered by [facility] by registering at the following website: [email or web address]. By joining [employer]’s FollowMyHealth portal, you will also be able to view your health information using other applications (apps) compatible with our system.

## 2024-07-18 NOTE — DISCHARGE NOTE NURSING/CASE MANAGEMENT/SOCIAL WORK - NSSCCARECORD_GEN_ALL_CORE
[facility] Mohs Rapid Report Verbiage: The area of clinically evident tumor was marked with skin marking ink and appropriately hatched.  The initial incision was made following the Mohs approach through the skin.  The specimen was taken to the lab, divided into the necessary number of pieces, chromacoded and processed according to the Mohs protocol.  This was repeated in successive stages until a tumor free defect was achieved.

## 2024-07-18 NOTE — PROGRESS NOTE ADULT - NUTRITIONAL ASSESSMENT
This patient has been assessed with a concern for Malnutrition and has been determined to have a diagnosis/diagnoses of Moderate protein-calorie malnutrition.    This patient is being managed with:   Diet Pureed-  Entered: Jul 16 2024 12:05PM  
This patient has been assessed with a concern for Malnutrition and has been determined to have a diagnosis/diagnoses of Moderate protein-calorie malnutrition.    This patient is being managed with:   Diet Regular-  DASH/TLC {Sodium & Cholesterol Restricted} (DASH)  Entered: Jul 14 2024 11:45PM

## 2024-07-18 NOTE — PROGRESS NOTE ADULT - TIME BILLING
Time spent  coordinating the patient's care. This includes reviewing documentation pertinent to this admission, results and imaging. Further tests, medications, and procedures have been ordered as indicated. Laboratory results and the plan of care were communicated to the patient and DIL. Discussed care plan with consultants including ID. Supporting documentation was completed and added to the patient's chart.
Time spent  coordinating the patient's care. This includes reviewing documentation pertinent to this admission, results and imaging. Further tests, medications, and procedures have been ordered as indicated. Laboratory results and the plan of care were communicated to the patient and daughter. Discussed care plan with consultants including ID. Supporting documentation was completed and added to the patient's chart.
Time spent  coordinating the patient's care. This includes reviewing documentation pertinent to this admission, results and imaging. Further tests, medications, and procedures have been ordered as indicated. Laboratory results and the plan of care were communicated to the patient and son. Discussed care plan with consultants including ID. Supporting documentation was completed and added to the patient's chart.
Time spent  coordinating the patient's care. This includes reviewing documentation pertinent to this admission, results and imaging. Further tests, medications, and procedures have been ordered as indicated. Laboratory results and the plan of care were communicated to the patient and son. Discussed care plan with consultants including neuro. Supporting documentation was completed and added to the patient's chart.

## 2024-07-18 NOTE — DISCHARGE NOTE NURSING/CASE MANAGEMENT/SOCIAL WORK - NSDCPEFALRISK_GEN_ALL_CORE
For information on Fall & Injury Prevention, visit: [email or web address] OR  [email or web address] OR  https://www.cdc.gov/steadi/patient.html

## 2024-07-18 NOTE — PROGRESS NOTE ADULT - ASSESSMENT
89 y/o F w/ PMH of TIA, Alzheimer's dementia, parkinson's disease, CAD s/p PCI, HTN, PAD, a-flutter (on eliquis), p/w AMS    *Metabolic Encephalopathy 2/2 Sepsis 2/2 UTI vs aspiration PNA  -monitor MS, now improved  -monitor T, now afebrile  -BCx NG  -now on RA  -continue abx, CTX transitioned to Ceftin x 2 more days to complete course  -s/p IVF  -appreciate ID input  -appreciate neuro input    #Worsening mental status over the last 3 weeks  -appreciate neuro and SLP evals  -protracted delirium in setting of recent COVID vs worsening dementia  -continue home meds  -lives w family and has aides    #A-flutter w/ RVR  -7/17 pm still w episode of afib w RVR, will keep in house overnight to monitor for further episode, currently in NSR  -increased BB dose here  -amiodarone started given continue aflutter  -Eliquis  -f/u cards recs    *Hypokalemia  -replete prn    *H/o TIA / alzheimer's dementia / parkinson's disease / CAD / HTN / PAD   -C/w home meds and f/u outpatient for further management if conditions remain stable during hospitalization     *DVT ppx   -On Eliquis     D/c home w aides 7/19 if remains w stable HR/NSR. F2F on d/c  Updated sonFredis.

## 2024-07-18 NOTE — PROGRESS NOTE ADULT - SUBJECTIVE AND OBJECTIVE BOX
Follow Up:      Interval History/ROS:Patient is a 89y old  Female who presents with a chief complaint of AMS (17 Jul 2024 17:38)      REVIEW OF SYSTEMS  [  ] ROS unobtainable because:    [ x ] All other systems negative except as noted below    Constitutional:  [ ] fever [ ] chills  [ ] weight loss  [ ]night sweat  [ ]poor appetite/PO intake [ ]fatigue   Skin:  [ ] rash [ ] phlebitis	  Eyes: [ ] icterus [ ] pain  [ ] discharge	  ENMT: [ ] sore throat  [ ] thrush [ ] ulcers [ ] exudates [ ]anosmia  Respiratory: [ ] dyspnea [ ] hemoptysis [ ] cough [ ] sputum	  Cardiovascular:  [ ] chest pain [ ] palpitations [ ] edema	  Gastrointestinal:  [ ] nausea [ ] vomiting [ ] diarrhea [ ] constipation [ ] pain	  Genitourinary:  [ ] dysuria [ ] frequency [ ] hematuria [ ] discharge [ ] flank pain  [ ] incontinence  Musculoskeletal:  [ ] myalgias [ ] arthralgias [ ] arthritis  [ ] back pain  Neurological:  [ ] headache [ ] weakness [ ] seizures  [ ] confusion/altered mental status    Allergies  Indocin (Other)  doxycycline (Other)  verapamil (Other)  Biaxin (Other)  Demerol HCl (Other)  tetracyclines (Unknown)  penicillins (Other)        ANTIMICROBIALS:    cefTRIAXone Injectable. 1000 every 24 hours        OTHER MEDS: MEDICATIONS  (STANDING):  aMIOdarone    Tablet 400 every 8 hours  aMIOdarone    Tablet    apixaban 5 every 12 hours  atorvastatin 80 at bedtime  carbidopa/levodopa  25/100 1 <User Schedule>  clopidogrel Tablet 75 daily  metoprolol tartrate 25 two times a day  PARoxetine 40 daily      Vital Signs Last 24 Hrs  T(F): 98.4 (07-18-24 @ 06:07), Max: 102.1 (07-14-24 @ 14:03)    Vital Signs Last 24 Hrs  HR: 55 (07-18-24 @ 06:07) (55 - 65)  BP: 174/80 (07-18-24 @ 06:07) (136/86 - 177/79)  RR: 17 (07-18-24 @ 06:07)  SpO2: 95% (07-18-24 @ 06:07) (95% - 95%)  Wt(kg): --    EXAM:    Physical Exam:  Constitutional:  well preserved, comfortable  Head/Eyes: no icterus  ENT:  supple  LUNGS:  CTA  CVS:  regular rhythm, no murmur  Abd:  soft, non-tender; non-distended  Ext:  no edema  Vascular:  IV site no erythema tenderness or discharge  MSK:  joints without swelling  Neuro: AAO X 3, non- focal    Labs:    07-17    145  |  113<H>  |  17  ----------------------------<  132<H>  3.6   |  27  |  1.19    Ca    9.5      17 Jul 2024 06:44    TPro  6.3  /  Alb  2.9<L>  /  TBili  1.4<H>  /  DBili  x   /  AST  23  /  ALT  16  /  AlkPhos  106  07-17      WBC Trend:  WBC Count: 9.26 (07-15-24 @ 07:01)  WBC Count: 14.56 (07-14-24 @ 14:20)      Creatine Trend:  Creatinine: 1.19 (07-17)  Creatinine: 1.09 (07-16)  Creatinine: 1.02 (07-16)  Creatinine: 1.07 (07-15)      Liver Biochemical Testing Trend:  Alanine Aminotransferase (ALT/SGPT): 16 (07-17)  Alanine Aminotransferase (ALT/SGPT): 20 (07-16)  Alanine Aminotransferase (ALT/SGPT): 25 (07-15)  Alanine Aminotransferase (ALT/SGPT): 15 (07-14)  Alanine Aminotransferase (ALT/SGPT): 46 (06-10)  Aspartate Aminotransferase (AST/SGOT): 23 (07-17-24 @ 06:44)  Aspartate Aminotransferase (AST/SGOT): 36 (07-16-24 @ 08:04)  Aspartate Aminotransferase (AST/SGOT): 29 (07-15-24 @ 07:01)  Aspartate Aminotransferase (AST/SGOT): 37 (07-14-24 @ 14:53)  Aspartate Aminotransferase (AST/SGOT): 79 (06-10-24 @ 06:52)  Bilirubin Total: 1.4 (07-17)  Bilirubin Total: 1.3 (07-16)  Bilirubin Total: 1.4 (07-15)  Bilirubin Total: 2.1 (07-14)  Bilirubin Total: 1.2 (06-10)      Trend LDH      Urinalysis Basic - ( 17 Jul 2024 06:44 )    Color: x / Appearance: x / SG: x / pH: x  Gluc: 132 mg/dL / Ketone: x  / Bili: x / Urobili: x   Blood: x / Protein: x / Nitrite: x   Leuk Esterase: x / RBC: x / WBC x   Sq Epi: x / Non Sq Epi: x / Bacteria: x        MICROBIOLOGY:        Urinalysis with Rflx Culture (collected 14 Jul 2024 14:20)    Culture - Blood (collected 14 Jul 2024 14:20)  Source: .Blood None  Preliminary Report:    No growth at 72 Hours    Culture - Blood (collected 14 Jul 2024 14:20)  Source: .Blood None  Preliminary Report:    No growth at 72 Hours    Culture - Blood (collected 10 Joaquín 2024 06:56)  Source: .Blood None  Final Report:    No growth at 5 days    Culture - Blood (collected 10 Joaquín 2024 06:52)  Source: .Blood None  Final Report:    No growth at 5 days    Culture - Urine (collected 09 Jun 2024 22:30)  Source: Clean Catch Clean Catch (Midstream)  Final Report:    >=3 organisms. Probable collection contamination.    Culture - Blood (collected 29 May 2024 14:34)  Source: .Blood None  Final Report:    No growth at 5 days    Culture - Blood (collected 29 May 2024 14:34)  Source: .Blood None  Final Report:    No growth at 5 days    Culture - Urine (collected 07 Feb 2024 00:18)  Source: Catheterized None  Final Report:    No growth    Culture - Blood (collected 06 Feb 2024 16:42)  Source: .Blood Blood  Final Report:    No growth at 5 days                                                  RADIOLOGY:  imaging below personally reviewed                CT Chest No Cont:   ACC: [id number] EXAM:  CT CHEST   ORDERED BY: LONNY ROACH     PROCEDURE DATE:  07/17/2024          INTERPRETATION:  CLINICAL INDICATION: ASPIRATION RISK, RECURRENT FEVER.    Axial CT images of the chest are obtained without intravenous   administration of contrast.    No prior chest CTs are available for comparison.    No enlarged axillary, mediastinal or hilar lymph nodes.    No pericardial effusion. Heart size is enlarged with dilatation of the   left atrium measuring about 4.6 cm in anteroposterior dimension. Mitral   annular calcification. Vascular calcifications with involvement of the   aorta and the coronary arteries. Coronary artery stent. No pleural   effusion.    Evaluation of the upper abdomen demonstrate cholecystectomy clips.   Duodenal diverticulum.    Evaluation of the lungs demonstrate mild bilateral mid to lower lung   subsegmental areas of linear and dependent atelectasis within the lower   lobes. No pneumonia. No central endobronchial lesions.    Spinal degenerative changes.    IMPRESSION: No pneumonia.    --- End of Report ---            KANNAN STOUT MD; Attending Radiologist  This document has been electronically signed. Jul 17 2024  8:45AM (07-17-24 @ 08:25)       Follow Up:      Interval History/ROS: Afebrile, denies any pain      REVIEW OF SYSTEMS  [  ] ROS unobtainable because:    [ x ] All other systems negative except as noted below    Constitutional:  [ ] fever [ ] chills  [ ] weight loss  [ ]night sweat  [ ]poor appetite/PO intake [ ]fatigue   Skin:  [ ] rash [ ] phlebitis	  Eyes: [ ] icterus [ ] pain  [ ] discharge	  ENMT: [ ] sore throat  [ ] thrush [ ] ulcers [ ] exudates [ ]anosmia  Respiratory: [ ] dyspnea [ ] hemoptysis [ ] cough [ ] sputum	  Cardiovascular:  [ ] chest pain [ ] palpitations [ ] edema	  Gastrointestinal:  [ ] nausea [ ] vomiting [ ] diarrhea [ ] constipation [ ] pain	  Genitourinary:  [ ] dysuria [ ] frequency [ ] hematuria [ ] discharge [ ] flank pain  [ ] incontinence  Musculoskeletal:  [ ] myalgias [ ] arthralgias [ ] arthritis  [ ] back pain  Neurological:  [ ] headache [ ] weakness [ ] seizures  [ ] confusion/altered mental status    Allergies  Indocin (Other)  doxycycline (Other)  verapamil (Other)  Biaxin (Other)  Demerol HCl (Other)  tetracyclines (Unknown)  penicillins (Other)        ANTIMICROBIALS:    cefTRIAXone Injectable. 1000 every 24 hours        OTHER MEDS: MEDICATIONS  (STANDING):  aMIOdarone    Tablet 400 every 8 hours  aMIOdarone    Tablet    apixaban 5 every 12 hours  atorvastatin 80 at bedtime  carbidopa/levodopa  25/100 1 <User Schedule>  clopidogrel Tablet 75 daily  metoprolol tartrate 25 two times a day  PARoxetine 40 daily      Vital Signs Last 24 Hrs  T(F): 98.4 (07-18-24 @ 06:07), Max: 102.1 (07-14-24 @ 14:03)    Vital Signs Last 24 Hrs  HR: 55 (07-18-24 @ 06:07) (55 - 65)  BP: 174/80 (07-18-24 @ 06:07) (136/86 - 177/79)  RR: 17 (07-18-24 @ 06:07)  SpO2: 95% (07-18-24 @ 06:07) (95% - 95%)  Wt(kg): --    EXAM:    Constitutional:  NAD  Head/Eyes: no icterus  ENT:  supple  LUNGS:  CTA  CVS:  regular rhythm, no murmur  Abd:  soft, non-tender; non-distended  Ext:  no edema  Vascular:  IV site no erythema tenderness or discharge  MSK:  joints without swelling  Neuro: AAO X 1    Labs:    07-17    145  |  113<H>  |  17  ----------------------------<  132<H>  3.6   |  27  |  1.19    Ca    9.5      17 Jul 2024 06:44    TPro  6.3  /  Alb  2.9<L>  /  TBili  1.4<H>  /  DBili  x   /  AST  23  /  ALT  16  /  AlkPhos  106  07-17      WBC Trend:  WBC Count: 9.26 (07-15-24 @ 07:01)  WBC Count: 14.56 (07-14-24 @ 14:20)      Creatine Trend:  Creatinine: 1.19 (07-17)  Creatinine: 1.09 (07-16)  Creatinine: 1.02 (07-16)  Creatinine: 1.07 (07-15)      Liver Biochemical Testing Trend:  Alanine Aminotransferase (ALT/SGPT): 16 (07-17)  Alanine Aminotransferase (ALT/SGPT): 20 (07-16)  Alanine Aminotransferase (ALT/SGPT): 25 (07-15)  Alanine Aminotransferase (ALT/SGPT): 15 (07-14)  Alanine Aminotransferase (ALT/SGPT): 46 (06-10)  Aspartate Aminotransferase (AST/SGOT): 23 (07-17-24 @ 06:44)  Aspartate Aminotransferase (AST/SGOT): 36 (07-16-24 @ 08:04)  Aspartate Aminotransferase (AST/SGOT): 29 (07-15-24 @ 07:01)  Aspartate Aminotransferase (AST/SGOT): 37 (07-14-24 @ 14:53)  Aspartate Aminotransferase (AST/SGOT): 79 (06-10-24 @ 06:52)  Bilirubin Total: 1.4 (07-17)  Bilirubin Total: 1.3 (07-16)  Bilirubin Total: 1.4 (07-15)  Bilirubin Total: 2.1 (07-14)  Bilirubin Total: 1.2 (06-10)      Trend LDH      Urinalysis Basic - ( 17 Jul 2024 06:44 )    Color: x / Appearance: x / SG: x / pH: x  Gluc: 132 mg/dL / Ketone: x  / Bili: x / Urobili: x   Blood: x / Protein: x / Nitrite: x   Leuk Esterase: x / RBC: x / WBC x   Sq Epi: x / Non Sq Epi: x / Bacteria: x        MICROBIOLOGY:        Urinalysis with Rflx Culture (collected 14 Jul 2024 14:20)    Culture - Blood (collected 14 Jul 2024 14:20)  Source: .Blood None  Preliminary Report:    No growth at 72 Hours    Culture - Blood (collected 14 Jul 2024 14:20)  Source: .Blood None  Preliminary Report:    No growth at 72 Hours    Culture - Blood (collected 10 Joaquín 2024 06:56)  Source: .Blood None  Final Report:    No growth at 5 days    Culture - Blood (collected 10 Joaquín 2024 06:52)  Source: .Blood None  Final Report:    No growth at 5 days    Culture - Urine (collected 09 Jun 2024 22:30)  Source: Clean Catch Clean Catch (Midstream)  Final Report:    >=3 organisms. Probable collection contamination.    Culture - Blood (collected 29 May 2024 14:34)  Source: .Blood None  Final Report:    No growth at 5 days    Culture - Blood (collected 29 May 2024 14:34)  Source: .Blood None  Final Report:    No growth at 5 days    Culture - Urine (collected 07 Feb 2024 00:18)  Source: Catheterized None  Final Report:    No growth    Culture - Blood (collected 06 Feb 2024 16:42)  Source: .Blood Blood  Final Report:    No growth at 5 days                                                  RADIOLOGY:  imaging below personally reviewed                CT Chest No Cont:   ACC: [id number] EXAM:  CT CHEST   ORDERED BY: LONNY ROACH     PROCEDURE DATE:  07/17/2024          INTERPRETATION:  CLINICAL INDICATION: ASPIRATION RISK, RECURRENT FEVER.    Axial CT images of the chest are obtained without intravenous   administration of contrast.    No prior chest CTs are available for comparison.    No enlarged axillary, mediastinal or hilar lymph nodes.    No pericardial effusion. Heart size is enlarged with dilatation of the   left atrium measuring about 4.6 cm in anteroposterior dimension. Mitral   annular calcification. Vascular calcifications with involvement of the   aorta and the coronary arteries. Coronary artery stent. No pleural   effusion.    Evaluation of the upper abdomen demonstrate cholecystectomy clips.   Duodenal diverticulum.    Evaluation of the lungs demonstrate mild bilateral mid to lower lung   subsegmental areas of linear and dependent atelectasis within the lower   lobes. No pneumonia. No central endobronchial lesions.    Spinal degenerative changes.    IMPRESSION: No pneumonia.    --- End of Report ---            KANNAN STOUT MD; Attending Radiologist  This document has been electronically signed. Jul 17 2024  8:45AM (07-17-24 @ 08:25)

## 2024-07-18 NOTE — PROGRESS NOTE ADULT - ASSESSMENT
89F with TIA, Alzheimer's dementia, parkinson's disease, CAD s/p PCI, HTN, PAD, a-flutter (on eliquis), presents with AMS, reportedly less responsive for few months  Febrile 102F  WBC 14  Cr 1  UA with 5 WBC, 290 RBC, 6 SQE  CTH with No evidence of acute intracranial hemorrhage, midline shift or CT evidence of acute territorial infarct.  Flu, RSV, COVID negative  CXR with low volume, segmental LLL atelectasis, collate clinically for concomitant infection. No effusions  BCx (7/14) NGTD  CT Chest (7/17) without obvious pneumonia    Antimicrobials:  cefTRIAXone Injectable. 1000 every 24 hours (7/14 --- )    Impression:   #Fever  #Abnormal UA  #Altered Mental Status  #Hypoxia  #Leukocytosis (resolved)  - unclear if fever is due to UTI, given minimal pyuria  - patient remains high risk of recurrent aspiration pneumonia/pneumonitis, thought CT chest without obvious pneumonia  - continues to improve clinically especially mentation, following commands     Suggestions:  - continue CTX 1G daily, likely short course 7-days for aspiration   - monitor temperature curve  - trend WBC  - maintain aspiration precautions  - discussed recommendations with primary team      Clinical team may change from intravenous to oral antibiotics when the following criteria are met:   1. Patient is clinically improving/stable       a)	Improved signs and symptoms of infection from initial presentation       b)	Afebrile for 24 hours       c)	Leukocytosis trending towards normal range   2. Patient is tolerating oral intake   3. Initial/repeat blood cultures are negative OR do not need to wait for preliminary blood cultures to result    When above criteria met OR on date, may change iv antibiotics to: Cefuroxime 500mg BID or Cefpodoxime 200mg BID to complete 7-day course      Angel Rubin, DO  Contact on [employer]   [facility], [facility]  [address] [place] [address]  Tel: [phone]

## 2024-07-19 ENCOUNTER — TRANSCRIPTION ENCOUNTER (OUTPATIENT)
Age: 89
End: 2024-07-19

## 2024-07-19 VITALS
RESPIRATION RATE: 18 BRPM | DIASTOLIC BLOOD PRESSURE: 77 MMHG | HEART RATE: 63 BPM | SYSTOLIC BLOOD PRESSURE: 128 MMHG | OXYGEN SATURATION: 96 % | TEMPERATURE: 98 F

## 2024-07-19 PROCEDURE — 99239 HOSP IP/OBS DSCHRG MGMT >30: CPT

## 2024-07-19 RX ORDER — AMIODARONE HYDROCHLORIDE 50 MG/ML
1 INJECTION, SOLUTION INTRAVENOUS
Qty: 28 | Refills: 0
Start: 2024-07-19 | End: 2024-07-27

## 2024-07-19 RX ORDER — HYDRALAZINE HYDROCHLORIDE 50 MG/1
10 TABLET ORAL ONCE
Refills: 0 | Status: COMPLETED | OUTPATIENT
Start: 2024-07-19 | End: 2024-07-19

## 2024-07-19 RX ORDER — CEFUROXIME SODIUM 7.5 G
1 VIAL (EA) INTRAVENOUS
Qty: 3 | Refills: 0
Start: 2024-07-19 | End: 2024-07-20

## 2024-07-19 RX ADMIN — AMIODARONE HYDROCHLORIDE 400 MILLIGRAM(S): 50 INJECTION, SOLUTION INTRAVENOUS at 13:32

## 2024-07-19 RX ADMIN — AMIODARONE HYDROCHLORIDE 400 MILLIGRAM(S): 50 INJECTION, SOLUTION INTRAVENOUS at 05:39

## 2024-07-19 RX ADMIN — CARBIDOPA AND LEVODOPA 1 TABLET(S): 10; 100 TABLET ORAL at 10:31

## 2024-07-19 RX ADMIN — HYDRALAZINE HYDROCHLORIDE 10 MILLIGRAM(S): 50 TABLET ORAL at 10:32

## 2024-07-19 RX ADMIN — PAROXETINE HYDROCHLORIDE 40 MILLIGRAM(S): 37.5 TABLET, FILM COATED, EXTENDED RELEASE ORAL at 10:32

## 2024-07-19 RX ADMIN — Medication 1000 UNIT(S): at 10:32

## 2024-07-19 RX ADMIN — APIXABAN 5 MILLIGRAM(S): 5 TABLET, FILM COATED ORAL at 10:32

## 2024-07-19 RX ADMIN — Medication 500 MILLIGRAM(S): at 10:32

## 2024-07-19 RX ADMIN — CLOPIDOGREL BISULFATE 75 MILLIGRAM(S): 75 TABLET, FILM COATED ORAL at 10:32

## 2024-07-19 RX ADMIN — CARBIDOPA AND LEVODOPA 1 TABLET(S): 10; 100 TABLET ORAL at 13:29

## 2024-07-19 NOTE — DISCHARGE NOTE PROVIDER - PROVIDER TOKENS
PROVIDER:[TOKEN:[[id number]],FOLLOWUP:[1 week],ESTABLISHEDPATIENT:[T]],PROVIDER:[TOKEN:[[id number]],FOLLOWUP:[1-3 days],ESTABLISHEDPATIENT:[T]]

## 2024-07-19 NOTE — PROGRESS NOTE ADULT - ASSESSMENT
89 y/o F w/ PMH of TIA, Alzheimer's dementia, parkinson's disease, CAD s/p PCI, HTN, PAD, a-flutter (on eliquis), p/w AMS. Patient's son at bedside states that his brother informed him that patient has been more lethargic and w/ decreased reponsiveness for the last 3 weeks, and that she has had the current mental status during that time. Patient has been sleeping more and more every day. Patient's seroquel has been tapered off, and patient suppose to see neurologist tomorrow. Patient unable to provide any useful history. Appears very sedated, needed to be called multiple times verbally along with some tactile stimulation to wake up, but patient still goes right back to closing her eyes and is having difficulty staying awake.     7/17/2024  Will continue to load with Amiodarone to prevent paroxysms of Afib with RVR  Unclear to me the etiology of her mental status changes.   Will give some IV Fluids today to see if that helps.     4/19/2024  continue the Amiodarone loading. If she remains in NSR and her family is ready for her to come home, I would continue the Amiodarone loading and Eliquis and will follow up with her as an outpatient.

## 2024-07-19 NOTE — DISCHARGE NOTE PROVIDER - NSDCMRMEDTOKEN_GEN_ALL_CORE_FT
amiodarone 400 mg oral tablet: 1 tab(s) orally 3 times a day  apixaban 5 mg oral tablet: 1 tab(s) orally 2 times a day  carbidopa-levodopa 25 mg-100 mg oral tablet: 1 tab(s) orally 3 times a day *** first dose between 9 &amp; 11am  second dose between 2 &amp; 3pm  third dose between 5 &amp; 6pm ***  carbidopa-levodopa 25 mg-100 mg oral tablet, extended release: 1 tab(s) orally once a day (at bedtime) ***dose between 8:30 &amp; 9pm***  cefuroxime 500 mg oral tablet: 1 tab(s) orally every 12 hours  cholecalciferol 25 mcg (1000 intl units) oral tablet: 1 tab(s) orally once a day  clopidogrel 75 mg oral tablet: 1 tab(s) orally once a day (in the evening)  metoprolol tartrate 25 mg oral tablet: 0.5 tab(s) orally 2 times a day  PARoxetine 40 mg oral tablet: 1 tab(s) orally once a day (in the evening)  rosuvastatin 40 mg oral tablet: 1 tab(s) orally once a day

## 2024-07-19 NOTE — DISCHARGE NOTE PROVIDER - CARE PROVIDER_API CALL
Linda Ott  Internal Medicine  [address] [place] [id number]  Phone: [phone]  Fax: [phone]  Established Patient  Follow Up Time: 1 week    Annie Yates  Cardiovascular Disease  [address] [place] [address]  Phone: [phone]  Fax: [phone]  Established Patient  Follow Up Time: 1-3 days

## 2024-07-19 NOTE — DISCHARGE NOTE PROVIDER - NSDCCPCAREPLAN_GEN_ALL_CORE_FT
PRINCIPAL DISCHARGE DIAGNOSIS  Diagnosis: Neurodegenerative dementia, without behavioral disturbance  Assessment and Plan of Treatment: Patient was admitted for altered mental status- Metabolic Encephalopathy liekly  due to patient at risk of  recurrent aspiration pneumonia/pneumonitis, Blood cultures were negative. Pateint was treated with antibiotics, sent home on Ceftin 500mg 1 tablet twice daily for 1 day to complete the  course.   Neurology was consulted, likely protracted delirium and worsening dementia.   - Continue homne Carbidopa levodopa as prescribed  and  paroxetine 40 Mg daily.   EEG showed mild to moderate generalized slowing, c/w diffuse cerebral dysfunction/encephalopathy. No epileptiform activity, clinical events or seizures were recorded.  CTH 7/15/24: No evidence of acute intracranial hemorrhage, midline shift or CT evidence of acute territorial infarct  Patient lives w family and has aides. Patient will benefit from 24/hr home aide, as patient needs help with routine ADL's , highly recommended. With aspirations precautions at home.     Follow up outpatient with neurologist with in 1 week of discharge      SECONDARY DISCHARGE DIAGNOSES  Diagnosis: Atrial fibrillation and flutter  Assessment and Plan of Treatment: For A-flutter witn RVR converted back to  sinus rhythm. Heart rate  59- 63 bpm. Discussed in detail with cardio Annie Desai will send home on amiodarone and low dose of beta blocker .  Patient will follow up with cardiologist Annie Desai, discusssed in detail with daughter, follow up in 1-3 days   - Take amiodarone 400mg 1 tablet three times aday for next 9 days total, followed by amiodarone 200mg 1 tablet daily  - Amiodarone is sent to pharmacy for 9 days, follow up with cardiologist for further recommendations.   Continue home Eliquis. Follow with cardiologist Dr Yates with in 1-3 days for cardiac monitoring    Diagnosis: Alzheimer disease  Assessment and Plan of Treatment: follow with neurologist with in 1 week of discharge    Diagnosis: History of Parkinson's disease  Assessment and Plan of Treatment: continue home sinimet     PRINCIPAL DISCHARGE DIAGNOSIS  Diagnosis: Neurodegenerative dementia, without behavioral disturbance  Assessment and Plan of Treatment: Patient was admitted for altered mental status- Metabolic Encephalopathy liekly  due to patient at risk of  recurrent aspiration pneumonia/pneumonitis, Blood cultures were negative. Pateint was treated with antibiotics, sent home on Ceftin 500mg 1 tablet twice daily for 1 day to complete the  course.   Neurology was consulted, likely protracted delirium and worsening dementia.   - Continue homne Carbidopa levodopa as prescribed  and  paroxetine 40 Mg daily.   EEG showed mild to moderate generalized slowing, c/w diffuse cerebral dysfunction/encephalopathy. No epileptiform activity, clinical events or seizures were recorded.  CTH 7/15/24: No evidence of acute intracranial hemorrhage, midline shift or CT evidence of acute territorial infarct  Patient lives w family and has aides. Patient will benefit from 24/hr home aide, as patient needs help with routine ADL's , highly recommended. With aspirations precautions at home.     Follow up outpatient with neurologist with in 1 week of discharge      SECONDARY DISCHARGE DIAGNOSES  Diagnosis: Atrial fibrillation and flutter  Assessment and Plan of Treatment: For A-flutter witn RVR converted back to  sinus rhythm. Heart rate  59- 63 bpm. Discussed in detail with cardio Annie Desai will send home on amiodarone and low dose of beta blocker .  Patient will follow up with cardiologist Annie Desai, discusssed in detail with daughter, follow up in 1-3 days   - Take amiodarone 400mg 1 tablet three times aday for next 9 days total, followed by amiodarone 200mg 1 tablet daily  - Amiodarone is sent to pharmacy for 9 days, follow up with cardiologist for further recommendations.   - Take 0.5 tablet of metoprolol tartrate twice daily   Continue home Eliquis. Follow with cardiologist Dr Yates with in 1-3 days for cardiac monitoring    Diagnosis: Alzheimer disease  Assessment and Plan of Treatment: follow with neurologist with in 1 week of discharge    Diagnosis: History of Parkinson's disease  Assessment and Plan of Treatment: continue home sinimet

## 2024-07-19 NOTE — PROGRESS NOTE ADULT - PROVIDER SPECIALTY LIST ADULT
Hospitalist
Hospitalist
Infectious Disease
Cardiology
Infectious Disease
Infectious Disease
Neurology
Hospitalist
Hospitalist

## 2024-07-19 NOTE — DISCHARGE NOTE PROVIDER - HOSPITAL COURSE
HPI:  89 y/o F w/ PMH of TIA, Alzheimer's dementia, parkinson's disease, CAD s/p PCI, HTN, PAD, a-flutter (on eliquis), p/w AMS. Patient's son at bedside states that his brother informed him that patient has been more lethargic and w/ decreased reponsiveness for the last 3 weeks, and that she has had the current mental status during that time. Patient has been sleeping more and more every day. Patient's seroquel has been tapered off, and patient suppose to see neurologist tomorrow. Patient unable to provide any useful history. Appears very sedated, needed to be called multiple times verbally along with some tactile stimulation to wake up, but patient still goes right back to closing her eyes and is having difficulty staying awake.     PSH: PCI, hysterectomy tonsillectomy, appendectomy     Social Hx: Denies tobacco / etoh / drugs     Family hx: heart disease    (14 Jul 2024 23:29)      ---  HOSPITAL COURSE: Patient was admitted for AMS- Metabolic Encephalopathy 2/2 Sepsis 2/2 UTI vs aspiration PNA. BCx NG. Treated with antibiotics, sent home on Ceftin x 1 more days to complete course. ( high risk of recurrent aspiration pneumonia/pneumonitis, thought CT chest without obvious pneumonia) per ID     Neuro consulted, likely protracted delirium in setting of recent COVID vs worsening dementia. Patient lives w family and has aides. Patient will benefit from 24/hr home aide, as patient needs help with routine ADL's , highly recommended. With aspirations precautions at home.    Patient was seen by neurologist who recommended  to restart Carbidopa levodopa and  paroxetine 40 Mg daily. Follow up outpatient with neurologist with in 1 week of discharge     EEG showed mild to moderate generalized slowing, c/w diffuse cerebral dysfunction/encephalopathy. No epileptiform activity, clinical events or seizures were recorded.  CXR 7/14/24: Low lung volumes. No change heart mediastinum. Subsegmental atelectasis left base. Correlate clinically for concomitant infection. No pleural effusion or pneumothorax.    CTH 7/15/24: No evidence of acute intracranial hemorrhage, midline shift or CT evidence of acute territorial infarct    For A-flutter w/ RVR Now sinus rhythm. Heart rate  59- 58 bpm. Cardio recommended low dose of lopressor and amiodarone. Continue home Eliquis. Follow with cardiologist Dr Yates with in 1-3 days for cardiac monitoring    VS reviewed and wnl. Care plan reviewed with caregivers. Caregivers in agreement and endorse understanding. Pt deemed stable for d/c home w/ anticipatory guidance and strict indications for return. No outstanding issues or concerns noted.   Patient is stable to discharge home. Patient is medically optimized by primary care team and consultants to discharge home with f/u with out patient PCP within 1 week of discharge.    ---  CONSULTANTS:   cardio Annie Yates (MD  neuro Duc Soto (MD)  infectious disease  Angel Rubin ()  ---  TIME SPENT:  I, the attending physician, was physically present for the key portions of the evaluation and management (E/M) service provided. The total amount of time spent reviewing the hospital notes, laboratory values, imaging findings, assessing/counseling the patient, discussing with consultant physicians, social work, nursing staff was -- minutes    ---  Primary care provider was made aware of plan for discharge:      [  ] NO     [  ] YES

## 2024-07-19 NOTE — DISCHARGE NOTE PROVIDER - NSDCHHWEIGHT_GEN_ALL_CORE
[FreeTextEntry1] : Fractures are stable.\par Patient is doing well. She can be wbat in her short cam walker.  No gym/sports.  Home ROM exercises recommended.\par \par Repeat x-ray will be performed at the next office visit.\par \par 
Yes

## 2024-07-19 NOTE — PROGRESS NOTE ADULT - SUBJECTIVE AND OBJECTIVE BOX
Patient is a 89y old  Female who presents with a chief complaint of AMS (18 Jul 2024 17:07)    88 y/o F w/ PMH of TIA, Alzheimer's dementia, parkinson's disease, CAD s/p PCI, HTN, PAD, a-flutter (on eliquis), p/w AMS. Patient's son at bedside states that his brother informed him that patient has been more lethargic and w/ decreased reponsiveness for the last 3 weeks, and that she has had the current mental status during that time. Patient has been sleeping more and more every day. Patient's seroquel has been tapered off, and patient suppose to see neurologist tomorrow. Patient unable to provide any useful history. Appears very sedated, needed to be called multiple times verbally along with some tactile stimulation to wake up, but patient still goes right back to closing her eyes and is having difficulty staying awake.     7/17/2024  Patient continues to be very sedated. She will open her eyes when spoken to but you have to prod her to say anything and even then, it is in a whisper and is difficult to understand.   Has had paroxysmal Afib in the hospital. Yesterday, I started loading her with oral Amiodarone.   Remains in NSR this am.   She is on her way for a CT scan.     7/19/2024  Currently in NSR but has been going in and out of Afib.   She is being loaded with oral Amiodarone.   Today, confused but more awake and alert. Her home health aide is by her side.     Cardiac Studies:   < from: TTE W or WO Ultrasound Enhancing Agent (06.10.24 @ 09:36) >   1. Left ventricular systolic function is normal with an ejection fraction visually estimated at 55 to 60 %.   2. Mild to moderate mitral regurgitation.   3. Mild tricuspid regurgitation.   4. Interatrial septum is aneurysmal.   5. Mild pulmonary hypertension.   6. Mild to moderate aortic regurgitation.   7. There is mild calcification of the aortic valve leaflets.   8. There is mild calcification of the mitral valve annulus.    Allergies    Indocin (Other)  doxycycline (Other)  verapamil (Other)  Biaxin (Other)  Demerol HCl (Other)  tetracyclines (Unknown)  penicillins (Other)    Intolerances        MEDICATIONS  (STANDING):  aMIOdarone    Tablet 400 milliGRAM(s) Oral every 8 hours  aMIOdarone    Tablet   Oral   apixaban 5 milliGRAM(s) Oral every 12 hours  atorvastatin 80 milliGRAM(s) Oral at bedtime  carbidopa/levodopa  25/100 1 Tablet(s) Oral <User Schedule>  cefuroxime   Tablet 500 milliGRAM(s) Oral every 12 hours  cholecalciferol 1000 Unit(s) Oral daily  clopidogrel Tablet 75 milliGRAM(s) Oral daily  hydrALAZINE Injectable 10 milliGRAM(s) IV Push once  metoprolol tartrate 25 milliGRAM(s) Oral two times a day  PARoxetine 40 milliGRAM(s) Oral daily    MEDICATIONS  (PRN):    REVIEW OF SYSTEMS:    RESPIRATORY: No cough, wheezing, hemoptysis; No shortness of breath  CARDIOVASCULAR: No chest pain or palpitations  All other review of systems is negative unless indicated above      PHYSICAL EXAM:  Daily     Daily   Vital Signs Last 24 Hrs  T(C): 36.8 (19 Jul 2024 08:29), Max: 36.8 (19 Jul 2024 08:29)  T(F): 98.2 (19 Jul 2024 08:29), Max: 98.2 (19 Jul 2024 08:29)  HR: 59 (19 Jul 2024 08:29) (54 - 62)  BP: 180/76 (19 Jul 2024 09:25) (124/63 - 180/76)  BP(mean): 99 (19 Jul 2024 05:37) (99 - 99)  RR: 18 (19 Jul 2024 08:29) (17 - 18)  SpO2: 95% (19 Jul 2024 08:29) (94% - 98%)    Parameters below as of 19 Jul 2024 08:29  Patient On (Oxygen Delivery Method): room air        Constitutional: NAD, awake and alert, well-developed  HEENT: PERR, EOMI, Normal Hearing, MMM  Neck: Soft and supple, No LAD, No JVD  Respiratory: Breath sounds are clear bilaterally, No wheezing, rales or rhonchi  Cardiovascular: S1 and S2, regular rate and rhythm, no Murmurs, gallops or rubs  Gastrointestinal: Bowel Sounds present, soft, nontender, nondistended, no guarding, no rebound  Extremities: No peripheral edema  Vascular: 2+ peripheral pulses  Neurological: A/O x 3, no focal deficits  Musculoskeletal: 5/5 strength b/l upper and lower extremities  Skin: No rashes    LABS: All Labs Reviewed:

## 2024-07-19 NOTE — DISCHARGE NOTE PROVIDER - CARE PROVIDERS DIRECT ADDRESSES
[email or web address]

## 2024-07-27 DIAGNOSIS — Z86.73 PERSONAL HISTORY OF TRANSIENT ISCHEMIC ATTACK (TIA), AND CEREBRAL INFARCTION WITHOUT RESIDUAL DEFICITS: ICD-10-CM

## 2024-07-27 DIAGNOSIS — G93.41 METABOLIC ENCEPHALOPATHY: ICD-10-CM

## 2024-07-27 DIAGNOSIS — E44.0 MODERATE PROTEIN-CALORIE MALNUTRITION: ICD-10-CM

## 2024-07-27 DIAGNOSIS — F02.80 DEMENTIA IN OTHER DISEASES CLASSIFIED ELSEWHERE, UNSPECIFIED SEVERITY, WITHOUT BEHAVIORAL DISTURBANCE, PSYCHOTIC DISTURBANCE, MOOD DISTURBANCE, AND ANXIETY: ICD-10-CM

## 2024-07-27 DIAGNOSIS — I73.9 PERIPHERAL VASCULAR DISEASE, UNSPECIFIED: ICD-10-CM

## 2024-07-27 DIAGNOSIS — G20.A1 PARKINSON'S DISEASE WITHOUT DYSKINESIA, WITHOUT MENTION OF FLUCTUATIONS: ICD-10-CM

## 2024-07-27 DIAGNOSIS — A41.9 SEPSIS, UNSPECIFIED ORGANISM: ICD-10-CM

## 2024-07-27 DIAGNOSIS — N39.0 URINARY TRACT INFECTION, SITE NOT SPECIFIED: ICD-10-CM

## 2024-07-27 DIAGNOSIS — J69.0 PNEUMONITIS DUE TO INHALATION OF FOOD AND VOMIT: ICD-10-CM

## 2024-07-27 DIAGNOSIS — G30.9 ALZHEIMER'S DISEASE, UNSPECIFIED: ICD-10-CM

## 2024-07-27 DIAGNOSIS — I48.92 UNSPECIFIED ATRIAL FLUTTER: ICD-10-CM

## 2024-07-27 DIAGNOSIS — Z95.5 PRESENCE OF CORONARY ANGIOPLASTY IMPLANT AND GRAFT: ICD-10-CM

## 2024-07-27 DIAGNOSIS — I25.10 ATHEROSCLEROTIC HEART DISEASE OF NATIVE CORONARY ARTERY WITHOUT ANGINA PECTORIS: ICD-10-CM

## 2024-07-27 DIAGNOSIS — E87.6 HYPOKALEMIA: ICD-10-CM

## 2024-07-27 DIAGNOSIS — Z79.02 LONG TERM (CURRENT) USE OF ANTITHROMBOTICS/ANTIPLATELETS: ICD-10-CM

## 2024-07-27 DIAGNOSIS — Z88.0 ALLERGY STATUS TO PENICILLIN: ICD-10-CM

## 2024-07-27 DIAGNOSIS — Z88.1 ALLERGY STATUS TO OTHER ANTIBIOTIC AGENTS: ICD-10-CM

## 2024-07-27 DIAGNOSIS — I10 ESSENTIAL (PRIMARY) HYPERTENSION: ICD-10-CM

## 2024-08-16 ENCOUNTER — EMERGENCY (EMERGENCY)
Facility: HOSPITAL | Age: 89
LOS: 0 days | Discharge: HOME | End: 2024-08-16
Attending: EMERGENCY MEDICINE
Payer: MEDICARE

## 2024-08-16 VITALS
HEART RATE: 55 BPM | SYSTOLIC BLOOD PRESSURE: 144 MMHG | TEMPERATURE: 98 F | OXYGEN SATURATION: 94 % | RESPIRATION RATE: 16 BRPM | DIASTOLIC BLOOD PRESSURE: 76 MMHG | HEIGHT: 64 IN

## 2024-08-16 VITALS
TEMPERATURE: 98 F | RESPIRATION RATE: 18 BRPM | HEART RATE: 55 BPM | DIASTOLIC BLOOD PRESSURE: 81 MMHG | SYSTOLIC BLOOD PRESSURE: 163 MMHG | OXYGEN SATURATION: 94 %

## 2024-08-16 DIAGNOSIS — Z20.822 CONTACT WITH AND (SUSPECTED) EXPOSURE TO COVID-19: ICD-10-CM

## 2024-08-16 DIAGNOSIS — Z79.01 LONG TERM (CURRENT) USE OF ANTICOAGULANTS: ICD-10-CM

## 2024-08-16 DIAGNOSIS — Z90.710 ACQUIRED ABSENCE OF BOTH CERVIX AND UTERUS: Chronic | ICD-10-CM

## 2024-08-16 DIAGNOSIS — Z90.89 ACQUIRED ABSENCE OF OTHER ORGANS: Chronic | ICD-10-CM

## 2024-08-16 DIAGNOSIS — R53.1 WEAKNESS: ICD-10-CM

## 2024-08-16 DIAGNOSIS — F02.80 DEMENTIA IN OTHER DISEASES CLASSIFIED ELSEWHERE, UNSPECIFIED SEVERITY, WITHOUT BEHAVIORAL DISTURBANCE, PSYCHOTIC DISTURBANCE, MOOD DISTURBANCE, AND ANXIETY: ICD-10-CM

## 2024-08-16 DIAGNOSIS — Z88.5 ALLERGY STATUS TO NARCOTIC AGENT: ICD-10-CM

## 2024-08-16 DIAGNOSIS — G20.A1 PARKINSON'S DISEASE WITHOUT DYSKINESIA, WITHOUT MENTION OF FLUCTUATIONS: ICD-10-CM

## 2024-08-16 DIAGNOSIS — I25.10 ATHEROSCLEROTIC HEART DISEASE OF NATIVE CORONARY ARTERY WITHOUT ANGINA PECTORIS: ICD-10-CM

## 2024-08-16 DIAGNOSIS — Z88.1 ALLERGY STATUS TO OTHER ANTIBIOTIC AGENTS: ICD-10-CM

## 2024-08-16 DIAGNOSIS — I48.91 UNSPECIFIED ATRIAL FIBRILLATION: ICD-10-CM

## 2024-08-16 DIAGNOSIS — G30.9 ALZHEIMER'S DISEASE, UNSPECIFIED: ICD-10-CM

## 2024-08-16 DIAGNOSIS — Z88.6 ALLERGY STATUS TO ANALGESIC AGENT: ICD-10-CM

## 2024-08-16 DIAGNOSIS — Z88.0 ALLERGY STATUS TO PENICILLIN: ICD-10-CM

## 2024-08-16 DIAGNOSIS — I10 ESSENTIAL (PRIMARY) HYPERTENSION: ICD-10-CM

## 2024-08-16 DIAGNOSIS — Z90.49 ACQUIRED ABSENCE OF OTHER SPECIFIED PARTS OF DIGESTIVE TRACT: Chronic | ICD-10-CM

## 2024-08-16 LAB
ALBUMIN SERPL ELPH-MCNC: 2.9 G/DL — LOW (ref 3.3–5)
ALP SERPL-CCNC: 128 U/L — HIGH (ref 40–120)
ALT FLD-CCNC: 48 U/L — SIGNIFICANT CHANGE UP (ref 12–78)
AMMONIA BLD-MCNC: 34 UMOL/L — HIGH (ref 11–32)
ANION GAP SERPL CALC-SCNC: 6 MMOL/L — SIGNIFICANT CHANGE UP (ref 5–17)
APPEARANCE UR: ABNORMAL
APTT BLD: 26.9 SEC — SIGNIFICANT CHANGE UP (ref 24.5–35.6)
AST SERPL-CCNC: 166 U/L — HIGH (ref 15–37)
BACTERIA # UR AUTO: NEGATIVE /HPF — SIGNIFICANT CHANGE UP
BASOPHILS # BLD AUTO: 0.06 K/UL — SIGNIFICANT CHANGE UP (ref 0–0.2)
BASOPHILS NFR BLD AUTO: 0.4 % — SIGNIFICANT CHANGE UP (ref 0–2)
BILIRUB SERPL-MCNC: 2.5 MG/DL — HIGH (ref 0.2–1.2)
BILIRUB UR-MCNC: NEGATIVE — SIGNIFICANT CHANGE UP
BUN SERPL-MCNC: 25 MG/DL — HIGH (ref 7–23)
CALCIUM SERPL-MCNC: 10.3 MG/DL — HIGH (ref 8.5–10.1)
CAST: 16 /LPF — HIGH (ref 0–4)
CHLORIDE SERPL-SCNC: 108 MMOL/L — SIGNIFICANT CHANGE UP (ref 96–108)
CO2 SERPL-SCNC: 26 MMOL/L — SIGNIFICANT CHANGE UP (ref 22–31)
COLOR SPEC: YELLOW — SIGNIFICANT CHANGE UP
CREAT SERPL-MCNC: 1.43 MG/DL — HIGH (ref 0.5–1.3)
DIFF PNL FLD: ABNORMAL
EGFR: 35 ML/MIN/1.73M2 — LOW
EOSINOPHIL # BLD AUTO: 0.06 K/UL — SIGNIFICANT CHANGE UP (ref 0–0.5)
EOSINOPHIL NFR BLD AUTO: 0.4 % — SIGNIFICANT CHANGE UP (ref 0–6)
FLUAV AG NPH QL: SIGNIFICANT CHANGE UP
FLUBV AG NPH QL: SIGNIFICANT CHANGE UP
GLUCOSE SERPL-MCNC: 103 MG/DL — HIGH (ref 70–99)
GLUCOSE UR QL: NEGATIVE MG/DL — SIGNIFICANT CHANGE UP
GRAN CASTS # UR COMP ASSIST: PRESENT
HCT VFR BLD CALC: 37 % — SIGNIFICANT CHANGE UP (ref 34.5–45)
HGB BLD-MCNC: 11.7 G/DL — SIGNIFICANT CHANGE UP (ref 11.5–15.5)
HYALINE CASTS # UR AUTO: PRESENT
IMM GRANULOCYTES NFR BLD AUTO: 0.7 % — SIGNIFICANT CHANGE UP (ref 0–0.9)
INR BLD: 1.57 RATIO — HIGH (ref 0.85–1.18)
KETONES UR-MCNC: NEGATIVE MG/DL — SIGNIFICANT CHANGE UP
LACTATE SERPL-SCNC: 1.7 MMOL/L — SIGNIFICANT CHANGE UP (ref 0.7–2)
LEUKOCYTE ESTERASE UR-ACNC: ABNORMAL
LYMPHOCYTES # BLD AUTO: 22 % — SIGNIFICANT CHANGE UP (ref 13–44)
LYMPHOCYTES # BLD AUTO: 3.02 K/UL — SIGNIFICANT CHANGE UP (ref 1–3.3)
MCHC RBC-ENTMCNC: 28.7 PG — SIGNIFICANT CHANGE UP (ref 27–34)
MCHC RBC-ENTMCNC: 31.6 GM/DL — LOW (ref 32–36)
MCV RBC AUTO: 90.7 FL — SIGNIFICANT CHANGE UP (ref 80–100)
MONOCYTES # BLD AUTO: 0.68 K/UL — SIGNIFICANT CHANGE UP (ref 0–0.9)
MONOCYTES NFR BLD AUTO: 5 % — SIGNIFICANT CHANGE UP (ref 2–14)
NEUTROPHILS # BLD AUTO: 9.78 K/UL — HIGH (ref 1.8–7.4)
NEUTROPHILS NFR BLD AUTO: 71.5 % — SIGNIFICANT CHANGE UP (ref 43–77)
NITRITE UR-MCNC: NEGATIVE — SIGNIFICANT CHANGE UP
PH UR: 6 — SIGNIFICANT CHANGE UP (ref 5–8)
PLATELET # BLD AUTO: 236 K/UL — SIGNIFICANT CHANGE UP (ref 150–400)
POTASSIUM SERPL-MCNC: 3.1 MMOL/L — LOW (ref 3.5–5.3)
POTASSIUM SERPL-SCNC: 3.1 MMOL/L — LOW (ref 3.5–5.3)
PROT SERPL-MCNC: 7 GM/DL — SIGNIFICANT CHANGE UP (ref 6–8.3)
PROT UR-MCNC: 300 MG/DL
PROTHROM AB SERPL-ACNC: 17.5 SEC — HIGH (ref 9.5–13)
RBC # BLD: 4.08 M/UL — SIGNIFICANT CHANGE UP (ref 3.8–5.2)
RBC # FLD: 14.7 % — HIGH (ref 10.3–14.5)
RBC CASTS # UR COMP ASSIST: 4 /HPF — SIGNIFICANT CHANGE UP (ref 0–4)
RSV RNA NPH QL NAA+NON-PROBE: SIGNIFICANT CHANGE UP
SARS-COV-2 RNA SPEC QL NAA+PROBE: SIGNIFICANT CHANGE UP
SODIUM SERPL-SCNC: 140 MMOL/L — SIGNIFICANT CHANGE UP (ref 135–145)
SP GR SPEC: 1.02 — SIGNIFICANT CHANGE UP (ref 1–1.03)
SQUAMOUS # UR AUTO: 2 /HPF — SIGNIFICANT CHANGE UP (ref 0–5)
UROBILINOGEN FLD QL: 1 MG/DL — SIGNIFICANT CHANGE UP (ref 0.2–1)
WBC # BLD: 13.7 K/UL — HIGH (ref 3.8–10.5)
WBC # FLD AUTO: 13.7 K/UL — HIGH (ref 3.8–10.5)
WBC UR QL: 1 /HPF — SIGNIFICANT CHANGE UP (ref 0–5)

## 2024-08-16 PROCEDURE — 85025 COMPLETE CBC W/AUTO DIFF WBC: CPT

## 2024-08-16 PROCEDURE — 87040 BLOOD CULTURE FOR BACTERIA: CPT

## 2024-08-16 PROCEDURE — 36000 PLACE NEEDLE IN VEIN: CPT

## 2024-08-16 PROCEDURE — 71045 X-RAY EXAM CHEST 1 VIEW: CPT | Mod: 26

## 2024-08-16 PROCEDURE — 93005 ELECTROCARDIOGRAM TRACING: CPT

## 2024-08-16 PROCEDURE — 82140 ASSAY OF AMMONIA: CPT

## 2024-08-16 PROCEDURE — 80053 COMPREHEN METABOLIC PANEL: CPT

## 2024-08-16 PROCEDURE — 99285 EMERGENCY DEPT VISIT HI MDM: CPT

## 2024-08-16 PROCEDURE — 81001 URINALYSIS AUTO W/SCOPE: CPT

## 2024-08-16 PROCEDURE — 93010 ELECTROCARDIOGRAM REPORT: CPT

## 2024-08-16 PROCEDURE — 36415 COLL VENOUS BLD VENIPUNCTURE: CPT

## 2024-08-16 PROCEDURE — 76700 US EXAM ABDOM COMPLETE: CPT

## 2024-08-16 PROCEDURE — 85730 THROMBOPLASTIN TIME PARTIAL: CPT

## 2024-08-16 PROCEDURE — 87077 CULTURE AEROBIC IDENTIFY: CPT

## 2024-08-16 PROCEDURE — 85610 PROTHROMBIN TIME: CPT

## 2024-08-16 PROCEDURE — 99285 EMERGENCY DEPT VISIT HI MDM: CPT | Mod: 25

## 2024-08-16 PROCEDURE — 87150 DNA/RNA AMPLIFIED PROBE: CPT

## 2024-08-16 PROCEDURE — 83605 ASSAY OF LACTIC ACID: CPT

## 2024-08-16 PROCEDURE — 71045 X-RAY EXAM CHEST 1 VIEW: CPT

## 2024-08-16 PROCEDURE — 76700 US EXAM ABDOM COMPLETE: CPT | Mod: 26

## 2024-08-16 PROCEDURE — 0241U: CPT

## 2024-08-16 RX ORDER — BACTERIOSTATIC SODIUM CHLORIDE 0.9 %
500 VIAL (ML) INJECTION ONCE
Refills: 0 | Status: COMPLETED | OUTPATIENT
Start: 2024-08-16 | End: 2024-08-16

## 2024-08-16 RX ADMIN — Medication 500 MILLILITER(S): at 14:28

## 2024-08-16 NOTE — ED ADULT NURSE NOTE - OBJECTIVE STATEMENT
Pt is an 88 y/o female complaining of see chief complaint quote. As per triage, "Brought in by EMS for lethargy. hx dementia, at baseline mental status (patient is ANOX2 in triage) but has been sleeping a lot more than unusual as per EMS. EKG obtained PTA sinus bradycardia. family also requesting patient have liver enzymes checked. hx atrial fibrillation." Pt son, Fredis López, at bedside and wants to be notified with any pt results at [phone]. Pt resting comfortably in bed.

## 2024-08-16 NOTE — ED ADULT TRIAGE NOTE - CHIEF COMPLAINT QUOTE
brought in by EMS for lethargy. hx dementia, at baseline mental status (patient is ANOX2 in triage) but has been sleeping a lot more than unusual as per EMS. EKG obtained PTA sinus bradycardia. family also requesting patient have liver enzymes checked. hx atrial fibrillation.

## 2024-08-16 NOTE — ED PROVIDER NOTE - PRINCIPAL DIAGNOSIS
- IUD threads were lost, but recent US shows IUD in the uterus  - Graspers were used to attempt removal in the office today but not successful  - Gave patient option of second office attempt with US versus OR attempt with hysteroscopy and IV sedation.  Patient opts for OR removal.  - Written informed consent obtained today.  Surgical scheduler messaged.   Weakness

## 2024-08-16 NOTE — ED PROVIDER NOTE - OBJECTIVE STATEMENT
Pt is an 89y old female w/ a PMHx of Afib on Eliquis, HTN, CAD, Alzheimer's disease, Parkinson's disease, and dementia who presents to the ED c/o decreased energy. Patient was recently d/c from the ED for generalized weakness. Her LFTs have been increasing since while on amiodarone. Denies cough, fever, or focal deficits.

## 2024-08-16 NOTE — ED ADULT NURSE REASSESSMENT NOTE - NS ED NURSE REASSESS COMMENT FT1
Vital signs stable as per EMR. Aide at the bedside. Awaiting transport. Respirations are even and unlabored, in no acute distress.

## 2024-08-16 NOTE — ED PROVIDER NOTE - PHYSICAL EXAMINATION
General:     NAD, well-nourished, well-appearing  Head:     NC/AT, EOMI, oral mucosa moist  Neck:     trachea midline  Lungs:     CTA b/l, no w/r/r  CVS:     S1S2, RRR, no m/g/r  Abd:     +BS, s/nt/nd, no organomegaly  Ext:    2+ radial and pedal pulses, no c/c/e  Neuro: AAOx3, no sensory/motor deficits. At baseline mental status, but less responsive.

## 2024-08-16 NOTE — ED PROVIDER NOTE - PATIENT PORTAL LINK FT
You can access the FollowMyHealth Patient Portal offered by [facility] by registering at the following website: [email or web address]. By joining [facility]’s FollowMyHealth portal, you will also be able to view your health information using other applications (apps) compatible with our system.

## 2024-08-16 NOTE — ED PROVIDER NOTE - CLINICAL SUMMARY MEDICAL DECISION MAKING FREE TEXT BOX
No abdominal tenderness or guarding to suggest surgical abdomen. Patient is experiencing progressively weakness from underlying dementia. Will do CBC, CMP, lipase, CXR, US, ABG, and reassess.

## 2024-08-16 NOTE — ED PROVIDER NOTE - PROGRESS NOTE DETAILS
Pt labs wnl, shows slight improvement of lft from outside labs. US unremarkable. Admission offered to pt son, but declines, prefers to take her home. Return precautions provided, ready for d/c.

## 2024-08-16 NOTE — ED ADULT NURSE NOTE - NSFALLHARMRISKINTERV_ED_ALL_ED
Assistance OOB with selected safe patient handling equipment if applicable/Assistance with ambulation/Communicate risk of Fall with Harm to all staff, patient, and family/Monitor gait and stability/Monitor for mental status changes and reorient to person, place, and time, as needed/Move patient closer to nursing station/within visual sight of ED staff/Provide patient with walking aids/Provide visual cue: red socks, yellow wristband, yellow gown, etc/Reinforce activity limits and safety measures with patient and family/Toileting schedule using arm’s reach rule for commode and bathroom/Use of alarms - bed, stretcher, chair and/or video monitoring/Bed in lowest position, wheels locked, appropriate side rails in place/Call bell, personal items and telephone in reach/Instruct patient to call for assistance before getting out of bed/chair/stretcher/Non-slip footwear applied when patient is off stretcher/[place] to call system/Physically safe environment - no spills, clutter or unnecessary equipment/Purposeful Proactive Rounding/Room/bathroom lighting operational, light cord in reach

## 2024-08-17 ENCOUNTER — INPATIENT (INPATIENT)
Facility: HOSPITAL | Age: 89
LOS: 1 days | Discharge: HOME HEALTH | DRG: 872 | End: 2024-08-19
Attending: INTERNAL MEDICINE | Admitting: INTERNAL MEDICINE
Payer: MEDICARE

## 2024-08-17 VITALS
OXYGEN SATURATION: 92 % | HEIGHT: 64 IN | HEART RATE: 58 BPM | WEIGHT: 130.07 LBS | SYSTOLIC BLOOD PRESSURE: 137 MMHG | DIASTOLIC BLOOD PRESSURE: 70 MMHG | TEMPERATURE: 98 F | RESPIRATION RATE: 18 BRPM

## 2024-08-17 DIAGNOSIS — Z90.49 ACQUIRED ABSENCE OF OTHER SPECIFIED PARTS OF DIGESTIVE TRACT: ICD-10-CM

## 2024-08-17 DIAGNOSIS — Z74.01 BED CONFINEMENT STATUS: ICD-10-CM

## 2024-08-17 DIAGNOSIS — R74.01 ELEVATION OF LEVELS OF LIVER TRANSAMINASE LEVELS: ICD-10-CM

## 2024-08-17 DIAGNOSIS — N17.9 ACUTE KIDNEY FAILURE, UNSPECIFIED: ICD-10-CM

## 2024-08-17 DIAGNOSIS — R78.81 BACTEREMIA: ICD-10-CM

## 2024-08-17 DIAGNOSIS — Z90.89 ACQUIRED ABSENCE OF OTHER ORGANS: Chronic | ICD-10-CM

## 2024-08-17 DIAGNOSIS — I10 ESSENTIAL (PRIMARY) HYPERTENSION: ICD-10-CM

## 2024-08-17 DIAGNOSIS — Z79.01 LONG TERM (CURRENT) USE OF ANTICOAGULANTS: ICD-10-CM

## 2024-08-17 DIAGNOSIS — Z03.89 ENCOUNTER FOR OBSERVATION FOR OTHER SUSPECTED DISEASES AND CONDITIONS RULED OUT: ICD-10-CM

## 2024-08-17 DIAGNOSIS — Z90.710 ACQUIRED ABSENCE OF BOTH CERVIX AND UTERUS: Chronic | ICD-10-CM

## 2024-08-17 DIAGNOSIS — G30.9 ALZHEIMER'S DISEASE, UNSPECIFIED: ICD-10-CM

## 2024-08-17 DIAGNOSIS — Z88.0 ALLERGY STATUS TO PENICILLIN: ICD-10-CM

## 2024-08-17 DIAGNOSIS — G20.A1 PARKINSON'S DISEASE WITHOUT DYSKINESIA, WITHOUT MENTION OF FLUCTUATIONS: ICD-10-CM

## 2024-08-17 DIAGNOSIS — Z98.61 CORONARY ANGIOPLASTY STATUS: ICD-10-CM

## 2024-08-17 DIAGNOSIS — R53.1 WEAKNESS: ICD-10-CM

## 2024-08-17 DIAGNOSIS — Z66 DO NOT RESUSCITATE: ICD-10-CM

## 2024-08-17 DIAGNOSIS — I48.0 PAROXYSMAL ATRIAL FIBRILLATION: ICD-10-CM

## 2024-08-17 DIAGNOSIS — E87.6 HYPOKALEMIA: ICD-10-CM

## 2024-08-17 DIAGNOSIS — Z90.49 ACQUIRED ABSENCE OF OTHER SPECIFIED PARTS OF DIGESTIVE TRACT: Chronic | ICD-10-CM

## 2024-08-17 DIAGNOSIS — Z90.710 ACQUIRED ABSENCE OF BOTH CERVIX AND UTERUS: ICD-10-CM

## 2024-08-17 DIAGNOSIS — I25.10 ATHEROSCLEROTIC HEART DISEASE OF NATIVE CORONARY ARTERY WITHOUT ANGINA PECTORIS: ICD-10-CM

## 2024-08-17 DIAGNOSIS — E44.0 MODERATE PROTEIN-CALORIE MALNUTRITION: ICD-10-CM

## 2024-08-17 LAB
-  BLOOD PCR PANEL: SIGNIFICANT CHANGE UP
ALBUMIN SERPL ELPH-MCNC: 2.7 G/DL — LOW (ref 3.3–5)
ALP SERPL-CCNC: 119 U/L — SIGNIFICANT CHANGE UP (ref 40–120)
ALT FLD-CCNC: 46 U/L — SIGNIFICANT CHANGE UP (ref 12–78)
ANION GAP SERPL CALC-SCNC: 4 MMOL/L — LOW (ref 5–17)
APPEARANCE UR: CLEAR — SIGNIFICANT CHANGE UP
APTT BLD: 26.7 SEC — SIGNIFICANT CHANGE UP (ref 24.5–35.6)
AST SERPL-CCNC: 125 U/L — HIGH (ref 15–37)
BACTERIA # UR AUTO: ABNORMAL /HPF
BASOPHILS # BLD AUTO: 0.03 K/UL — SIGNIFICANT CHANGE UP (ref 0–0.2)
BASOPHILS NFR BLD AUTO: 0.3 % — SIGNIFICANT CHANGE UP (ref 0–2)
BILIRUB SERPL-MCNC: 1.9 MG/DL — HIGH (ref 0.2–1.2)
BILIRUB UR-MCNC: NEGATIVE — SIGNIFICANT CHANGE UP
BUN SERPL-MCNC: 22 MG/DL — SIGNIFICANT CHANGE UP (ref 7–23)
CALCIUM SERPL-MCNC: 9.8 MG/DL — SIGNIFICANT CHANGE UP (ref 8.5–10.1)
CAST: 6 /LPF — HIGH (ref 0–4)
CHLORIDE SERPL-SCNC: 108 MMOL/L — SIGNIFICANT CHANGE UP (ref 96–108)
CO2 SERPL-SCNC: 28 MMOL/L — SIGNIFICANT CHANGE UP (ref 22–31)
COLOR SPEC: YELLOW — SIGNIFICANT CHANGE UP
CREAT SERPL-MCNC: 1.44 MG/DL — HIGH (ref 0.5–1.3)
DIFF PNL FLD: ABNORMAL
EGFR: 35 ML/MIN/1.73M2 — LOW
EOSINOPHIL # BLD AUTO: 0.13 K/UL — SIGNIFICANT CHANGE UP (ref 0–0.5)
EOSINOPHIL NFR BLD AUTO: 1.5 % — SIGNIFICANT CHANGE UP (ref 0–6)
EPI CELLS # UR: PRESENT
FLUAV AG NPH QL: SIGNIFICANT CHANGE UP
FLUBV AG NPH QL: SIGNIFICANT CHANGE UP
GLUCOSE SERPL-MCNC: 154 MG/DL — HIGH (ref 70–99)
GLUCOSE UR QL: NEGATIVE MG/DL — SIGNIFICANT CHANGE UP
GRAM STN FLD: ABNORMAL
GRAN CASTS # UR COMP ASSIST: PRESENT
HCT VFR BLD CALC: 32.1 % — LOW (ref 34.5–45)
HGB BLD-MCNC: 10.5 G/DL — LOW (ref 11.5–15.5)
HYALINE CASTS # UR AUTO: PRESENT
IMM GRANULOCYTES NFR BLD AUTO: 0.5 % — SIGNIFICANT CHANGE UP (ref 0–0.9)
INR BLD: 1.43 RATIO — HIGH (ref 0.85–1.18)
KETONES UR-MCNC: NEGATIVE MG/DL — SIGNIFICANT CHANGE UP
LACTATE SERPL-SCNC: 1.6 MMOL/L — SIGNIFICANT CHANGE UP (ref 0.7–2)
LEUKOCYTE ESTERASE UR-ACNC: NEGATIVE — SIGNIFICANT CHANGE UP
LYMPHOCYTES # BLD AUTO: 2.41 K/UL — SIGNIFICANT CHANGE UP (ref 1–3.3)
LYMPHOCYTES # BLD AUTO: 27.4 % — SIGNIFICANT CHANGE UP (ref 13–44)
MCHC RBC-ENTMCNC: 29.2 PG — SIGNIFICANT CHANGE UP (ref 27–34)
MCHC RBC-ENTMCNC: 32.7 GM/DL — SIGNIFICANT CHANGE UP (ref 32–36)
MCV RBC AUTO: 89.4 FL — SIGNIFICANT CHANGE UP (ref 80–100)
METHOD TYPE: SIGNIFICANT CHANGE UP
MONOCYTES # BLD AUTO: 0.48 K/UL — SIGNIFICANT CHANGE UP (ref 0–0.9)
MONOCYTES NFR BLD AUTO: 5.5 % — SIGNIFICANT CHANGE UP (ref 2–14)
NEUTROPHILS # BLD AUTO: 5.71 K/UL — SIGNIFICANT CHANGE UP (ref 1.8–7.4)
NEUTROPHILS NFR BLD AUTO: 64.8 % — SIGNIFICANT CHANGE UP (ref 43–77)
NITRITE UR-MCNC: NEGATIVE — SIGNIFICANT CHANGE UP
PH UR: 6.5 — SIGNIFICANT CHANGE UP (ref 5–8)
PLATELET # BLD AUTO: 213 K/UL — SIGNIFICANT CHANGE UP (ref 150–400)
POTASSIUM SERPL-MCNC: 2.9 MMOL/L — CRITICAL LOW (ref 3.5–5.3)
POTASSIUM SERPL-SCNC: 2.9 MMOL/L — CRITICAL LOW (ref 3.5–5.3)
PROT SERPL-MCNC: 6.4 GM/DL — SIGNIFICANT CHANGE UP (ref 6–8.3)
PROT UR-MCNC: 100 MG/DL
PROTHROM AB SERPL-ACNC: 16 SEC — HIGH (ref 9.5–13)
RBC # BLD: 3.59 M/UL — LOW (ref 3.8–5.2)
RBC # FLD: 14.7 % — HIGH (ref 10.3–14.5)
RBC CASTS # UR COMP ASSIST: 2 /HPF — SIGNIFICANT CHANGE UP (ref 0–4)
RSV RNA NPH QL NAA+NON-PROBE: SIGNIFICANT CHANGE UP
SARS-COV-2 RNA SPEC QL NAA+PROBE: SIGNIFICANT CHANGE UP
SODIUM SERPL-SCNC: 140 MMOL/L — SIGNIFICANT CHANGE UP (ref 135–145)
SP GR SPEC: 1.01 — SIGNIFICANT CHANGE UP (ref 1–1.03)
SPECIMEN SOURCE: SIGNIFICANT CHANGE UP
SQUAMOUS # UR AUTO: 1 /HPF — SIGNIFICANT CHANGE UP (ref 0–5)
UROBILINOGEN FLD QL: 1 MG/DL — SIGNIFICANT CHANGE UP (ref 0.2–1)
WBC # BLD: 8.8 K/UL — SIGNIFICANT CHANGE UP (ref 3.8–10.5)
WBC # FLD AUTO: 8.8 K/UL — SIGNIFICANT CHANGE UP (ref 3.8–10.5)
WBC UR QL: 0 /HPF — SIGNIFICANT CHANGE UP (ref 0–5)

## 2024-08-17 PROCEDURE — 92610 EVALUATE SWALLOWING FUNCTION: CPT | Mod: GN

## 2024-08-17 PROCEDURE — 82550 ASSAY OF CK (CPK): CPT

## 2024-08-17 PROCEDURE — 71250 CT THORAX DX C-: CPT | Mod: 26,MC

## 2024-08-17 PROCEDURE — 70450 CT HEAD/BRAIN W/O DYE: CPT | Mod: MC

## 2024-08-17 PROCEDURE — 93010 ELECTROCARDIOGRAM REPORT: CPT

## 2024-08-17 PROCEDURE — 85027 COMPLETE CBC AUTOMATED: CPT

## 2024-08-17 PROCEDURE — 99285 EMERGENCY DEPT VISIT HI MDM: CPT

## 2024-08-17 PROCEDURE — 80053 COMPREHEN METABOLIC PANEL: CPT

## 2024-08-17 PROCEDURE — 74177 CT ABD & PELVIS W/CONTRAST: CPT | Mod: MC

## 2024-08-17 PROCEDURE — 36415 COLL VENOUS BLD VENIPUNCTURE: CPT

## 2024-08-17 RX ORDER — MEMANTINE 7 MG/1
1 CAPSULE, EXTENDED RELEASE ORAL
Refills: 0 | DISCHARGE

## 2024-08-17 RX ORDER — CEFEPIME 2 G/1
1000 INJECTION, POWDER, FOR SOLUTION INTRAVENOUS ONCE
Refills: 0 | Status: COMPLETED | OUTPATIENT
Start: 2024-08-17 | End: 2024-08-17

## 2024-08-17 RX ORDER — CEFEPIME 2 G/1
1000 INJECTION, POWDER, FOR SOLUTION INTRAVENOUS ONCE
Refills: 0 | Status: DISCONTINUED | OUTPATIENT
Start: 2024-08-17 | End: 2024-08-17

## 2024-08-17 RX ORDER — APIXABAN 5 MG/1
1 TABLET, FILM COATED ORAL
Refills: 0 | DISCHARGE

## 2024-08-17 RX ORDER — POTASSIUM CHLORIDE 10 MEQ
10 TABLET, EXT RELEASE, PARTICLES/CRYSTALS ORAL
Refills: 0 | Status: COMPLETED | OUTPATIENT
Start: 2024-08-17 | End: 2024-08-17

## 2024-08-17 RX ORDER — VANCOMYCIN/0.9 % SOD CHLORIDE 1.75G/25
1000 PLASTIC BAG, INJECTION (ML) INTRAVENOUS ONCE
Refills: 0 | Status: COMPLETED | OUTPATIENT
Start: 2024-08-17 | End: 2024-08-17

## 2024-08-17 RX ORDER — METOPROLOL TARTRATE 100 MG/1
2 TABLET ORAL
Refills: 0 | DISCHARGE

## 2024-08-17 RX ADMIN — CEFEPIME 1000 MILLIGRAM(S): 2 INJECTION, POWDER, FOR SOLUTION INTRAVENOUS at 21:47

## 2024-08-17 RX ADMIN — Medication 100 MILLIEQUIVALENT(S): at 21:49

## 2024-08-17 RX ADMIN — Medication 250 MILLIGRAM(S): at 23:07

## 2024-08-17 RX ADMIN — Medication 100 MILLIEQUIVALENT(S): at 23:07

## 2024-08-17 NOTE — ED CLERICAL - NS ED CLERK NOTE PRE-ARRIVAL INFORMATION; ADDITIONAL PRE-ARRIVAL INFORMATION
This patient is enrolled in the readmission program and has active care navigation. This patient can be followed up by the care navigation team within 24 hours. To arrange close follow-up or to obtain additional clinical information about this patient, please call the contact number above.   Please call the hospitalist as needed to collaborate on further medical management ([phone])

## 2024-08-17 NOTE — ED ADULT TRIAGE NOTE - CHIEF COMPLAINT QUOTE
Pt BIBEMS from home after pt was told to come back to ED after receiving call for positive blood tests. Pt c/o feeling tried. Pt placed on 2L NC in EMS due to O2 of 92%. Was seen at  yesterday. denies dizziness, chest pain, sob, n/v/d. A&Ox2, +allergies, hx of dementia, afib. as per EMS, pt at baseline MS. No acute distress noted in triage.

## 2024-08-17 NOTE — ED PROVIDER NOTE - WET READ LAUNCH FT
There are no Wet Read(s) to document. Tetracycline Counseling: Patient counseled regarding possible photosensitivity and increased risk for sunburn.  Patient instructed to avoid sunlight, if possible.  When exposed to sunlight, patients should wear protective clothing, sunglasses, and sunscreen.  The patient was instructed to call the office immediately if the following severe adverse effects occur:  hearing changes, easy bruising/bleeding, severe headache, or vision changes.  The patient verbalized understanding of the proper use and possible adverse effects of tetracycline.  All of the patient's questions and concerns were addressed. Patient understands to avoid pregnancy while on therapy due to potential birth defects.

## 2024-08-17 NOTE — ED PROVIDER NOTE - CLINICAL SUMMARY MEDICAL DECISION MAKING FREE TEXT BOX
Patient presents to the ER for positive blood cultures.  Blood cultures positive for gram-positive rods in 2 bottles yesterday.  Patient having generalized weakness and malaise.  Patient vitally stable, afebrile.  Started on IV antibiotics.  Plan for admission. Patient presents to the ER for positive blood cultures.  Blood cultures positive for gram-positive rods in 2 bottles yesterday.  Patient having generalized weakness and malaise.  Patient vitally stable, afebrile.  Started on IV antibiotics.  Plan for admission.  Found hypokalemic, repletion started in ED. Admitted to medicine in stable condition.

## 2024-08-17 NOTE — ED PROVIDER NOTE - PHYSICAL EXAMINATION
Const: NAD  Eyes: PERRL, EOM intact  CV: RRR, no murmurs, no chest wall tenderness, distal pulses intact  Resp: CTAB, normal resp effort  GI: soft, nondistended, nontender. No CVA tenderness  MSK: Full ROM, no muscle or bony deformity or tenderness  Neuro: AOx0, GCS 15, No focal deficits  Skin: No rash, laceration or abrasion  Psych: calm, cooperative.

## 2024-08-17 NOTE — ED POST DISCHARGE NOTE - DETAILS
Patient has positive blood cultures, pt daughter called and told to return to ED, Pt daughter will make sure she comes. Additionally, CXR interpretation section says patient has sepsis, but on impression section says patient has no infiltrate. Patient informed of discrepancy in reading.

## 2024-08-17 NOTE — ED PROVIDER NOTE - OBJECTIVE STATEMENT
89-year-old female with history of dementia, A-fib on Eliquis, hypertension, CAD presents to the ER for positive blood cultures.  Patient was seen in the ER yesterday for generalized weakness.  Patient is currently bedbound but more lethargic than usual over the past few days.  Patient unable to provide appropriate history.  Daughter believes she may have been breathing heavier yesterday however does not see if that is the case now.  No cough, fever, chills at home.

## 2024-08-18 LAB
ALBUMIN SERPL ELPH-MCNC: 2.6 G/DL — LOW (ref 3.3–5)
ALP SERPL-CCNC: 107 U/L — SIGNIFICANT CHANGE UP (ref 40–120)
ALT FLD-CCNC: 94 U/L — HIGH (ref 12–78)
ANION GAP SERPL CALC-SCNC: 6 MMOL/L — SIGNIFICANT CHANGE UP (ref 5–17)
AST SERPL-CCNC: 108 U/L — HIGH (ref 15–37)
BILIRUB SERPL-MCNC: 1.9 MG/DL — HIGH (ref 0.2–1.2)
BUN SERPL-MCNC: 18 MG/DL — SIGNIFICANT CHANGE UP (ref 7–23)
CALCIUM SERPL-MCNC: 9.7 MG/DL — SIGNIFICANT CHANGE UP (ref 8.5–10.1)
CHLORIDE SERPL-SCNC: 110 MMOL/L — HIGH (ref 96–108)
CK SERPL-CCNC: 210 U/L — HIGH (ref 26–192)
CO2 SERPL-SCNC: 23 MMOL/L — SIGNIFICANT CHANGE UP (ref 22–31)
CREAT SERPL-MCNC: 1.21 MG/DL — SIGNIFICANT CHANGE UP (ref 0.5–1.3)
CULTURE RESULTS: ABNORMAL
EGFR: 43 ML/MIN/1.73M2 — LOW
GLUCOSE SERPL-MCNC: 107 MG/DL — HIGH (ref 70–99)
HCT VFR BLD CALC: 30.8 % — LOW (ref 34.5–45)
HGB BLD-MCNC: 9.8 G/DL — LOW (ref 11.5–15.5)
MCHC RBC-ENTMCNC: 28.7 PG — SIGNIFICANT CHANGE UP (ref 27–34)
MCHC RBC-ENTMCNC: 31.8 GM/DL — LOW (ref 32–36)
MCV RBC AUTO: 90.3 FL — SIGNIFICANT CHANGE UP (ref 80–100)
ORGANISM # SPEC MICROSCOPIC CNT: ABNORMAL
ORGANISM # SPEC MICROSCOPIC CNT: SIGNIFICANT CHANGE UP
PLATELET # BLD AUTO: 202 K/UL — SIGNIFICANT CHANGE UP (ref 150–400)
POTASSIUM SERPL-MCNC: 3.6 MMOL/L — SIGNIFICANT CHANGE UP (ref 3.5–5.3)
POTASSIUM SERPL-SCNC: 3.6 MMOL/L — SIGNIFICANT CHANGE UP (ref 3.5–5.3)
PROT SERPL-MCNC: 6.1 GM/DL — SIGNIFICANT CHANGE UP (ref 6–8.3)
RBC # BLD: 3.41 M/UL — LOW (ref 3.8–5.2)
RBC # FLD: 14.9 % — HIGH (ref 10.3–14.5)
SODIUM SERPL-SCNC: 139 MMOL/L — SIGNIFICANT CHANGE UP (ref 135–145)
SPECIMEN SOURCE: SIGNIFICANT CHANGE UP
WBC # BLD: 8.69 K/UL — SIGNIFICANT CHANGE UP (ref 3.8–10.5)
WBC # FLD AUTO: 8.69 K/UL — SIGNIFICANT CHANGE UP (ref 3.8–10.5)

## 2024-08-18 PROCEDURE — 99223 1ST HOSP IP/OBS HIGH 75: CPT

## 2024-08-18 PROCEDURE — 70450 CT HEAD/BRAIN W/O DYE: CPT | Mod: 26

## 2024-08-18 PROCEDURE — 74177 CT ABD & PELVIS W/CONTRAST: CPT | Mod: 26

## 2024-08-18 RX ORDER — CARBIDOPA/LEVODOPA 25MG-250MG
1 TABLET ORAL THREE TIMES A DAY
Refills: 0 | Status: DISCONTINUED | OUTPATIENT
Start: 2024-08-18 | End: 2024-08-19

## 2024-08-18 RX ORDER — POTASSIUM CHLORIDE 10 MEQ
10 TABLET, EXT RELEASE, PARTICLES/CRYSTALS ORAL
Refills: 0 | Status: DISCONTINUED | OUTPATIENT
Start: 2024-08-18 | End: 2024-08-18

## 2024-08-18 RX ORDER — METOPROLOL TARTRATE 100 MG/1
50 TABLET ORAL DAILY
Refills: 0 | Status: DISCONTINUED | OUTPATIENT
Start: 2024-08-18 | End: 2024-08-19

## 2024-08-18 RX ORDER — HEPARIN SODIUM,BOVINE 1000/ML
5000 VIAL (ML) INJECTION EVERY 8 HOURS
Refills: 0 | Status: DISCONTINUED | OUTPATIENT
Start: 2024-08-18 | End: 2024-08-18

## 2024-08-18 RX ORDER — PAROXETINE 10 MG/1
40 TABLET, FILM COATED ORAL DAILY
Refills: 0 | Status: DISCONTINUED | OUTPATIENT
Start: 2024-08-18 | End: 2024-08-19

## 2024-08-18 RX ORDER — MEMANTINE 7 MG/1
5 CAPSULE, EXTENDED RELEASE ORAL DAILY
Refills: 0 | Status: DISCONTINUED | OUTPATIENT
Start: 2024-08-18 | End: 2024-08-19

## 2024-08-18 RX ORDER — SODIUM CHLORIDE 9 MG/ML
1000 INJECTION INTRAMUSCULAR; INTRAVENOUS; SUBCUTANEOUS
Refills: 0 | Status: DISCONTINUED | OUTPATIENT
Start: 2024-08-18 | End: 2024-08-18

## 2024-08-18 RX ORDER — APIXABAN 5 MG/1
2.5 TABLET, FILM COATED ORAL
Refills: 0 | Status: DISCONTINUED | OUTPATIENT
Start: 2024-08-18 | End: 2024-08-19

## 2024-08-18 RX ORDER — CARBIDOPA/LEVODOPA 25MG-250MG
1 TABLET ORAL AT BEDTIME
Refills: 0 | Status: DISCONTINUED | OUTPATIENT
Start: 2024-08-18 | End: 2024-08-19

## 2024-08-18 RX ADMIN — METOPROLOL TARTRATE 50 MILLIGRAM(S): 100 TABLET ORAL at 10:15

## 2024-08-18 RX ADMIN — Medication 100 MILLIEQUIVALENT(S): at 11:23

## 2024-08-18 RX ADMIN — Medication 1 TABLET(S): at 10:15

## 2024-08-18 RX ADMIN — Medication 100 MILLIEQUIVALENT(S): at 00:14

## 2024-08-18 RX ADMIN — Medication 75 MILLIGRAM(S): at 10:15

## 2024-08-18 RX ADMIN — Medication 1 TABLET(S): at 17:57

## 2024-08-18 RX ADMIN — Medication 1 TABLET(S): at 15:18

## 2024-08-18 RX ADMIN — APIXABAN 2.5 MILLIGRAM(S): 5 TABLET, FILM COATED ORAL at 17:56

## 2024-08-18 RX ADMIN — Medication 100 MILLIEQUIVALENT(S): at 05:17

## 2024-08-18 RX ADMIN — SODIUM CHLORIDE 75 MILLILITER(S): 9 INJECTION INTRAMUSCULAR; INTRAVENOUS; SUBCUTANEOUS at 05:17

## 2024-08-18 RX ADMIN — PAROXETINE 40 MILLIGRAM(S): 10 TABLET, FILM COATED ORAL at 11:33

## 2024-08-18 RX ADMIN — MEMANTINE 5 MILLIGRAM(S): 7 CAPSULE, EXTENDED RELEASE ORAL at 11:33

## 2024-08-18 RX ADMIN — Medication 100 MILLIEQUIVALENT(S): at 10:17

## 2024-08-18 RX ADMIN — APIXABAN 2.5 MILLIGRAM(S): 5 TABLET, FILM COATED ORAL at 15:18

## 2024-08-18 RX ADMIN — Medication 1 TABLET(S): at 23:00

## 2024-08-18 NOTE — PATIENT PROFILE ADULT - FUNCTIONAL ASSESSMENT - BASIC MOBILITY 6.
1-calculated by average/Not able to assess (calculate score using [facility] averaging method)

## 2024-08-18 NOTE — PATIENT PROFILE ADULT - FALL HARM RISK - HARM RISK INTERVENTIONS

## 2024-08-18 NOTE — DIETITIAN INITIAL EVALUATION ADULT - LAB (SPECIFY)
95.3 Obtain vitamin D 25OH level to assess nutriture, consider checking B6, B12, thiamine, folate, carnitine, and copper levels as malnutrition in cause these to be deficient

## 2024-08-18 NOTE — SWALLOW BEDSIDE ASSESSMENT ADULT - ORAL PREPARATORY PHASE
eyes were closed. pt has oral alerting response to presnece of utensil at lip with appropriate lip seal and acceptance. Able to manage multiple successive swallows of thin liquid via straw./Within functional limits

## 2024-08-18 NOTE — SWALLOW BEDSIDE ASSESSMENT ADULT - SLP GENERAL OBSERVATIONS
pt was [id number] into the chair; unable ti support herself fully: Pt remains, as noted in previous admissions, very sleepy and almost comatose:  nevertheless, she was abto repsond orally to the presence of the straw in her mouth and between her lips, the spoon in the mouth: very slow mastication.

## 2024-08-18 NOTE — SWALLOW BEDSIDE ASSESSMENT ADULT - NS SPL SWALLOW CLINIC TRIAL FT
Olegario vargas texture diet: use straw for drinking: soften chewable food.  meds as tolerated.   Disc with NSg. Service will follow

## 2024-08-18 NOTE — PROGRESS NOTE ADULT - SUBJECTIVE AND OBJECTIVE BOX
CC: AMS    History of Present Illness:   This patient is an 89 year old female with PMH of Parkinson's disease, dementia, atrial fibrillation, HTN, and CAD who presented to the ER on 8/16 due to weakness and fatigue. She was discharged home but subsequently her blood cultures were found to be positive with gram positive rods, therefore her daughter was called and instructed to bring the patient back to the hospital. The patient is AOx0 and is not able to provide any history or answer any questions. The patient's daughter states that she has been having a progressive decline in her mental status over the last several months, with some periods of slightly improved cognition. She has been seeing a neurologist and has had several medication adjustments. Recently there have been no illness or major changes. No fevers, hemodynamic instability, urinary issues, constipation/diarrhea, or open wounds. The patient does have a mild pressure injury on her back but no purulence or other issues. The patient lives with her daughter and has health aides during the day.    ER Course  - Vancomycin x1  - Cefepime x1  - Potassium 30 mEQ    S:  8/18: Lying in bed, awake, alert, hard of hearing.  Aide at bedside.  Pt states she feels fine.  Pt is HD stable, afebrile.  Spoke with daughter and updated on plan of care.  Daughter frustrated about bringing her back and forth and conflicting information when I told her that BCx likely contaminate and she does not have sepsis.  I told her that as new data is reviewed with follow up assessments that diagnoses can be dynamic and change.  She would like to talk to her pulmonologist.  I told her we will watch her today, monitor off antibiotics, follow up repeat cultures and AM blood work.      REVIEW OF SYSTEMS: All other review of systems is negative unless indicated above.    Vital Signs Last 24 Hrs  T(C): 36.7 (18 Aug 2024 08:03), Max: 36.8 (18 Aug 2024 01:15)  T(F): 98 (18 Aug 2024 08:03), Max: 98.3 (18 Aug 2024 01:15)  HR: 55 (18 Aug 2024 08:03) (50 - 58)  BP: 150/68 (18 Aug 2024 08:03) (126/70 - 156/71)  BP(mean): 78 (18 Aug 2024 02:45) (78 - 97)  RR: 18 (18 Aug 2024 08:03) (16 - 18)  SpO2: 94% (18 Aug 2024 08:03) (92% - 98%)    Parameters below as of 18 Aug 2024 08:03  Patient On (Oxygen Delivery Method): room air        Physical Exam:   Physical Exam: GEN: Elderly, frail, no acute distress  HEENT:  Pupils equal and reactive, no oropharyngeal lesions, erythema, exudates, oral thrush  CV:  Regular rate and rhythm, +S1, +S2, no murmurs or rubs  PULM: Lungs clear to auscultation bilaterally, no wheezing, rales, rhonchi  GI:  Abdomen soft, non-tender, non-distended,  no palpable masses  MSK:  Normal muscle tone, no atrophy, no rigidity, no contractions  EXT:  No clubbing, no cyanosis, no edema, no  swelling or erythema  VASCULAR:  pulses equal and symmetric in the upper and lower extremities  NEURO:  AAOX0, no focal neurological deficits, cannot follow commands  SKIN:  stage 1-2 pressure injury of sacrum      MEDICATIONS  (STANDING):  apixaban 2.5 milliGRAM(s) Oral two times a day  carbidopa/levodopa  25/100 1 Tablet(s) Oral three times a day  carbidopa/levodopa CR 25/100 1 Tablet(s) Oral at bedtime  potassium chloride  10 mEq/100 mL IVPB 10 milliEquivalent(s) IV Intermittent every 1 hour    MEDICATIONS  (PRN):                                9.8    8.69  )-----------( 202      ( 18 Aug 2024 07:19 )             30.8     08-18    139  |  110<H>  |  18  ----------------------------<  107<H>  3.6   |  23  |  1.21    Ca    9.7      18 Aug 2024 07:19    TPro  6.1  /  Alb  2.6<L>  /  TBili  1.9<H>  /  DBili  x   /  AST  108<H>  /  ALT  94<H>  /  AlkPhos  107  08-18    CAPILLARY BLOOD GLUCOSE        LIVER FUNCTIONS - ( 18 Aug 2024 07:19 )  Alb: 2.6 g/dL / Pro: 6.1 gm/dL / ALK PHOS: 107 U/L / ALT: 94 U/L / AST: 108 U/L / GGT: x           PT/INR - ( 17 Aug 2024 20:37 )   PT: 16.0 sec;   INR: 1.43 ratio         PTT - ( 17 Aug 2024 20:37 )  PTT:26.7 sec  Urinalysis Basic - ( 18 Aug 2024 07:19 )    Color: x / Appearance: x / SG: x / pH: x  Gluc: 107 mg/dL / Ketone: x  / Bili: x / Urobili: x   Blood: x / Protein: x / Nitrite: x   Leuk Esterase: x / RBC: x / WBC x   Sq Epi: x / Non Sq Epi: x / Bacteria: x          Assessment/Plan:  89 year old female with PMH of Parkinson's disease, dementia, atrial fibrillation, HTN, and CAD who presented to the ER on 8/16 due to weakness and fatigue. She was discharged home but subsequently her blood cultures were found to be positive with gram positive rods, therefore her daughter was called and instructed to bring the patient back to the hospital.      #positive blood culture:  Likely contamination  -BCx growing: Bacillus species  -unlikely true bacteremia  -per ID --> monitor off antibiotics  -f/u repeat BCx x 2  -no sepsis, no leukocytosis, afebrile  -CT chest clear  -pulm consult per family request Dr. Philip   -AM labs      #JAY: IMPROVING  -stop further IVFs    #encephalopathy: Chronic due to underlying Parkinsons dementia  -acute encephalopathy ruled out and not present on admission      #Paroxysmal atrial fibrillation   -Currently sinus bradycardia  -Eliquis 2.5mg BID  -metoprolol w/ parameters    #Parkinson's disease  -Carbidopa-Levodopa       #CAD  -resume home meds w/ parameters    DVT ppx:   -Eliquis     Code status:   -DNR/DNI   -Proxy: son and daughter [phone] (Bill); [phone] (Claudine)    Dispo:  -d/c pending final blood cultures and clinical presentation    CC: AMS    History of Present Illness:   This patient is an 89 year old female with PMH of Parkinson's disease, dementia, atrial fibrillation, HTN, and CAD who presented to the ER on 8/16 due to weakness and fatigue. She was discharged home but subsequently her blood cultures were found to be positive with gram positive rods, therefore her daughter was called and instructed to bring the patient back to the hospital. The patient is AOx0 and is not able to provide any history or answer any questions. The patient's daughter states that she has been having a progressive decline in her mental status over the last several months, with some periods of slightly improved cognition. She has been seeing a neurologist and has had several medication adjustments. Recently there have been no illness or major changes. No fevers, hemodynamic instability, urinary issues, constipation/diarrhea, or open wounds. The patient does have a mild pressure injury on her back but no purulence or other issues. The patient lives with her daughter and has health aides during the day.    ER Course  - Vancomycin x1  - Cefepime x1  - Potassium 30 mEQ    S:  8/18: Lying in bed, awake, alert, hard of hearing.  Aide at bedside.  Pt states she feels fine.  Pt is HD stable, afebrile.  Spoke with daughter and updated on plan of care.  Daughter frustrated about bringing her back and forth and conflicting information when I told her that BCx likely contaminate and she does not have sepsis.  I told her that as new data is reviewed with follow up assessments that diagnoses can be dynamic and change.  She would like to talk to her pulmonologist.  I told her we will watch her today, monitor off antibiotics, follow up repeat cultures and AM blood work.      REVIEW OF SYSTEMS: All other review of systems is negative unless indicated above.    Vital Signs Last 24 Hrs  T(C): 36.7 (18 Aug 2024 08:03), Max: 36.8 (18 Aug 2024 01:15)  T(F): 98 (18 Aug 2024 08:03), Max: 98.3 (18 Aug 2024 01:15)  HR: 55 (18 Aug 2024 08:03) (50 - 58)  BP: 150/68 (18 Aug 2024 08:03) (126/70 - 156/71)  BP(mean): 78 (18 Aug 2024 02:45) (78 - 97)  RR: 18 (18 Aug 2024 08:03) (16 - 18)  SpO2: 94% (18 Aug 2024 08:03) (92% - 98%)    Parameters below as of 18 Aug 2024 08:03  Patient On (Oxygen Delivery Method): room air        Physical Exam:   Physical Exam: GEN: Elderly, frail, no acute distress  HEENT:  Pupils equal and reactive, no oropharyngeal lesions, erythema, exudates, oral thrush  CV:  Regular rate and rhythm, +S1, +S2, no murmurs or rubs  PULM: Lungs clear to auscultation bilaterally, no wheezing, rales, rhonchi  GI:  Abdomen soft, non-tender, non-distended,  no palpable masses  MSK:  Normal muscle tone, no atrophy, no rigidity, no contractions  EXT:  No clubbing, no cyanosis, no edema, no  swelling or erythema  VASCULAR:  pulses equal and symmetric in the upper and lower extremities  NEURO:  AAOX0, no focal neurological deficits, cannot follow commands  SKIN:  stage 1-2 pressure injury of sacrum      MEDICATIONS  (STANDING):  apixaban 2.5 milliGRAM(s) Oral two times a day  carbidopa/levodopa  25/100 1 Tablet(s) Oral three times a day  carbidopa/levodopa CR 25/100 1 Tablet(s) Oral at bedtime  potassium chloride  10 mEq/100 mL IVPB 10 milliEquivalent(s) IV Intermittent every 1 hour    MEDICATIONS  (PRN):                                9.8    8.69  )-----------( 202      ( 18 Aug 2024 07:19 )             30.8     08-18    139  |  110<H>  |  18  ----------------------------<  107<H>  3.6   |  23  |  1.21    Ca    9.7      18 Aug 2024 07:19    TPro  6.1  /  Alb  2.6<L>  /  TBili  1.9<H>  /  DBili  x   /  AST  108<H>  /  ALT  94<H>  /  AlkPhos  107  08-18    CAPILLARY BLOOD GLUCOSE        LIVER FUNCTIONS - ( 18 Aug 2024 07:19 )  Alb: 2.6 g/dL / Pro: 6.1 gm/dL / ALK PHOS: 107 U/L / ALT: 94 U/L / AST: 108 U/L / GGT: x           PT/INR - ( 17 Aug 2024 20:37 )   PT: 16.0 sec;   INR: 1.43 ratio         PTT - ( 17 Aug 2024 20:37 )  PTT:26.7 sec  Urinalysis Basic - ( 18 Aug 2024 07:19 )    Color: x / Appearance: x / SG: x / pH: x  Gluc: 107 mg/dL / Ketone: x  / Bili: x / Urobili: x   Blood: x / Protein: x / Nitrite: x   Leuk Esterase: x / RBC: x / WBC x   Sq Epi: x / Non Sq Epi: x / Bacteria: x          Assessment/Plan:  89 year old female with PMH of Parkinson's disease, dementia, atrial fibrillation, HTN, and CAD who presented to the ER on 8/16 due to weakness and fatigue. She was discharged home but subsequently her blood cultures were found to be positive with gram positive rods, therefore her daughter was called and instructed to bring the patient back to the hospital.      #positive blood culture:  Likely contamination  -BCx growing: Bacillus species  -unlikely true bacteremia  -per ID --> monitor off antibiotics  -f/u repeat BCx x 2  -no sepsis, no leukocytosis, afebrile  -CT chest clear  -pulm consult per family request Dr. Philip   -AM labs      #JAY: IMPROVING  -stop further IVFs    Transaminitis:  -no abd pain  -stop statin  -US abd no acute pathology  -trend    #encephalopathy: Chronic due to underlying Parkinsons dementia  -acute encephalopathy ruled out and not present on admission      #Paroxysmal atrial fibrillation   -Currently sinus bradycardia  -Eliquis 2.5mg BID  -metoprolol w/ parameters    #Parkinson's disease  -Carbidopa-Levodopa       #CAD  -resume home meds w/ parameters    DVT ppx:   -Eliquis     Code status:   -DNR/DNI   -Proxy: son and daughter [phone] (Bill); [phone] (Claudine)    Dispo:  -d/c pending final blood cultures and clinical presentation

## 2024-08-18 NOTE — SWALLOW BEDSIDE ASSESSMENT ADULT - COMMENTS
89 year old female with PMH of Parkinson's disease, dementia, atrial fibrillation, HTN, and CAD who presented to the ER on 8/16 due to weakness and fatigue. She was discharged home but subsequently her blood cultures were found to be positive with gram positive rods, therefore her daughter was called and instructed to bring the patient back to the hospital. The patient is AOx0 and is not able to provide any history or answer any questions. The patient's daughter states that she has been having a progressive decline in her mental status over the last several months, with some periods of slightly improved cognition. She has been seeing a neurologist and has had several medication adjustments. Recently there have been no illness or major changes. No fevers, hemodynamic instability, urinary issues, constipation/diarrhea, or open wounds. The patient does have a mild pressure injury on her back but no purulence or other issues. The patient lives with her daughter and has health aides during the day.  Service is consulted for po intake.

## 2024-08-18 NOTE — DIETITIAN INITIAL EVALUATION ADULT - PERTINENT LABORATORY DATA
08-18    139  |  110<H>  |  18  ----------------------------<  107<H>  3.6   |  23  |  1.21    Ca    9.7      18 Aug 2024 07:19    TPro  6.1  /  Alb  2.6<L>  /  TBili  1.9<H>  /  DBili  x   /  AST  108<H>  /  ALT  94<H>  /  AlkPhos  107  08-18  A1C with Estimated Average Glucose Result: 6.2 % (06-02-24 @ 07:28)

## 2024-08-18 NOTE — CONSULT NOTE ADULT - SUBJECTIVE AND OBJECTIVE BOX
Patient is a 89y old  Female who presents with a chief complaint of Bacteremia (18 Aug 2024 03:59)    HPI:  This patient is an 89 year old female with PMH of Parkinson's disease, dementia, atrial fibrillation, HTN, and CAD who presented to the ER on  due to weakness and fatigue. She was discharged home but subsequently her blood cultures were found to be positive with gram positive rods, therefore her daughter was called and instructed to bring the patient back to the hospital. The patient is AOx0 and is not able to provide any history or answer any questions. The patient's daughter states that she has been having a progressive decline in her mental status over the last several months, with some periods of slightly improved cognition. She has been seeing a neurologist and has had several medication adjustments. Recently there have been no illness or major changes. No fevers, hemodynamic instability, urinary issues, constipation/diarrhea, or open wounds. The patient does have a mild pressure injury on her back but no purulence or other issues. The patient lives with her daughter and has health aides during the day.  Above HPI reviewed and reconciled with patient      PAST MEDICAL & SURGICAL HISTORY:  Hypertension      Hernia, hiatal      CAD (coronary artery disease)  LAD stent 2020      Dementia      S/P appendectomy      S/P tonsillectomy      S/P hysterectomy        FAMILY HISTORY:  FHx: heart disease      SocH: unable to obtain due to mental status      Allergies  Biaxin (Other)  Indocin (Other)  tetracyclines (Unknown)  penicillins (Other)  Demerol HCl (Other)  doxycycline (Other)  verapamil (Other)    ANTIMICROBIALS (past 90 days)  MEDICATIONS  (STANDING):    cefepime  Injectable.   1000 milliGRAM(s) IV Push (24 @ 21:47)    vancomycin  IVPB.   250 mL/Hr IV Intermittent (24 @ 23:07)        ACTIVE ANTIMICROBIALS    MEDICATIONS  (STANDING):  apixaban 2.5 two times a day  carbidopa/levodopa  25/100 1 three times a day  carbidopa/levodopa CR 25/100 1 at bedtime      REVIEW OF SYSTEMS  [x  ] ROS unobtainable because:  mental status  [  ] All other systems negative except as noted below:	    Constitutional:  [ ] fever [ ] chills  [ ] weight loss  [ ] weakness  Skin:  [ ] rash [ ] phlebitis	  Eyes: [ ] icterus [ ] pain  [ ] discharge	  ENMT: [ ] sore throat  [ ] thrush [ ] ulcers [ ] exudates  Respiratory: [ ] dyspnea [ ] hemoptysis [ ] cough [ ] sputum	  Cardiovascular:  [ ] chest pain [ ] palpitations [ ] edema	  Gastrointestinal:  [ ] nausea [ ] vomiting [ ] diarrhea [ ] constipation [ ] pain	  Genitourinary:  [ ] dysuria [ ] frequency [ ] hematuria [ ] discharge [ ] flank pain  [ ] incontinence  Musculoskeletal:  [ ] myalgias [ ] arthralgias [ ] arthritis  [ ] back pain  Neurological:  [ ] headache [ ] seizures  [ ] confusion/altered mental status  Psychiatric:  [ ] anxiety [ ] depression	  Hematology/Lymphatics:  [ ] lymphadenopathy  Endocrine:  [ ] adrenal [ ] thyroid  Allergic/Immunologic:	 [ ] transplant [ ] seasonal    Vital Signs Last 24 Hrs  T(C): 36.3 (18 Aug 2024 02:45), Max: 36.8 (18 Aug 2024 01:15)  T(F): 97.3 (18 Aug 2024 02:45), Max: 98.3 (18 Aug 2024 01:15)  HR: 51 (18 Aug 2024 02:45) (50 - 58)  BP: 139/67 (18 Aug 2024 02:45) (126/70 - 156/71)  BP(mean): 78 (18 Aug 2024 02:45) (78 - 97)  RR: 18 (18 Aug 2024 02:45) (16 - 18)  SpO2: 95% (18 Aug 2024 02:45) (92% - 98%)    Parameters below as of 18 Aug 2024 02:45  Patient On (Oxygen Delivery Method): room air        Physical Exam:  Constitutional:  frail NAD  Head/Eyes: no icterus  LUNGS:  CTA  CVS:  regular rhythm  Abd:  soft, non-tender; non-distended  Ext:  no edema  Vascular:  IV site no erythema tenderness or discharge  Neuro: AAO X 1    Labs: all available labs reviewed                        10.5   8.80  )-----------( 213      ( 17 Aug 2024 20:37 )             32.1     08-    140  |  108  |  22  ----------------------------<  154<H>  2.9<LL>   |  28  |  1.44<H>    Ca    9.8      17 Aug 2024 20:37    TPro  6.4  /  Alb  2.7<L>  /  TBili  1.9<H>  /  DBili  x   /  AST  125<H>  /  ALT  46  /  AlkPhos  119       LIVER FUNCTIONS - ( 17 Aug 2024 20:37 )  Alb: 2.7 g/dL / Pro: 6.4 gm/dL / ALK PHOS: 119 U/L / ALT: 46 U/L / AST: 125 U/L / GGT: x           Urinalysis Basic - ( 17 Aug 2024 20:37 )    Color: Yellow / Appearance: Clear / S.011 / pH: x  Gluc: 154 mg/dL / Ketone: Negative mg/dL  / Bili: Negative / Urobili: 1.0 mg/dL   Blood: x / Protein: 100 mg/dL / Nitrite: Negative   Leuk Esterase: Negative / RBC: 2 /HPF / WBC 0 /HPF   Sq Epi: x / Non Sq Epi: 1 /HPF / Bacteria: Many /HPF          Radiology: all available radiological tests reviewed  < from: CT Chest No Cont (24 @ 21:24) >  IMPRESSION:  No acute findings in the chest.    Asymmetries in the left breast. These can be further evaluated with   dedicated outpatient breast imaging if clinically desired.    < end of copied text >  < from: Xray Chest 1 View- PORTABLE-Urgent (24 @ 15:57) >  IMPRESSION: No gross infiltrate.    < end of copied text >      Advanced directives addressed: DNR DNI Normal for race

## 2024-08-18 NOTE — H&P ADULT - NSHPPHYSICALEXAM_GEN_ALL_CORE
GEN: Elderly, frail, no acute distress  HEENT:  Pupils equal and reactive, no oropharyngeal lesions, erythema, exudates, oral thrush  CV:  Regular rate and rhythm, +S1, +S2, no murmurs or rubs  PULM: Lungs clear to auscultation bilaterally, no wheezing, rales, rhonchi  GI:  Abdomen soft, non-tender, non-distended,  no palpable masses  MSK:  Normal muscle tone, no atrophy, no rigidity, no contractions  EXT:  No clubbing, no cyanosis, no edema, no  swelling or erythema  VASCULAR:  pulses equal and symmetric in the upper and lower extremities  NEURO:  AAOX0, no focal neurological deficits, cannot follow commands  SKIN:  stage 1-2 pressure injury of sacrum

## 2024-08-18 NOTE — SWALLOW BEDSIDE ASSESSMENT ADULT - SWALLOW EVAL: DIAGNOSIS
reduced alertness , but able to manage thin liquids via straw, puree via spoon, and soft chewable in bite size pieces.

## 2024-08-18 NOTE — PATIENT PROFILE ADULT - NSPROMEDSADMININFO_GEN_A_NUR
Patient currently with extreme lethargy. Per daughter, at baseline, patient swallows pills whole with water

## 2024-08-18 NOTE — DIETITIAN INITIAL EVALUATION ADULT - PERTINENT MEDS FT
MEDICATIONS  (STANDING):  apixaban 2.5 milliGRAM(s) Oral two times a day  carbidopa/levodopa  25/100 1 Tablet(s) Oral three times a day  carbidopa/levodopa CR 25/100 1 Tablet(s) Oral at bedtime  potassium chloride  10 mEq/100 mL IVPB 10 milliEquivalent(s) IV Intermittent every 1 hour    MEDICATIONS  (PRN):

## 2024-08-18 NOTE — SWALLOW BEDSIDE ASSESSMENT ADULT - ORAL PHASE
immediate bolus formation for puree and for thin liquids: with smooth Ap transfer: slow mastication for chewable./Within functional limits

## 2024-08-18 NOTE — SWALLOW BEDSIDE ASSESSMENT ADULT - PHARYNGEAL PHASE
onset o fpharyngeal swallow within timel ylimits with observable and palpable laryngeal lift.  No overt s/s aspiration. ./Within functional limits

## 2024-08-18 NOTE — DIETITIAN INITIAL EVALUATION ADULT - ORAL INTAKE PTA/DIET HISTORY
Patient sleeping upon RD visit, not able to rouse despite multiple attempts.  Staff reports lethargic, noted with dementia.  Per H&P lives with daughter and has aides who assist her at home. Decreased appetite reported on patient profile.  Likely meeting < 75% ENN.

## 2024-08-18 NOTE — H&P ADULT - ASSESSMENT
This patient is an 89 year old female with PMH of Parkinson's disease, dementia, atrial fibrillation, HTN, and CAD who presented to the ER on 8/16 due to weakness and fatigue. She was discharged home but subsequently her blood cultures were found to be positive with gram positive rods, therefore her daughter was called and instructed to bring the patient back to the hospital. The patient is AOx0 and is not able to provide any history or answer any questions. Admitted due to bacteremia.     #Gram positive bacteremia   Patient found to have 2 bottles positive for gram positive rods of unclear source  No UTI or pneumonia detected  Patient does have a pressure injury however there are no signs of infection  Currently hemodynamically stable and afebrile   S/p vancomycin and cefepime in the emergency room  ID consultation requested  Check CT abd/pelvis  Follow up blood culture speciation and draw surveillance cultures     #JAY  Secondary to bacteremia and poor PO intake  Admission Cr 1.44 compared to 1.02 in July  Antibiotics as above  Maintenance fluids  Avoid nephrotoxic agents when possible     #Acute on chronic encephalopathy  Possibly in the setting of gram positive bacteremia   Patient has been having progressive cognitive decline per her daughter   Other etiologies could include progression of her Parkinson's and dementia   Infectious workup and antibiotics as above    #Paroxysmal atrial fibrillation   Currently sinus bradycardia  Medication reconciliation initially said Eliquis 2.5mg BID however patient is on 5mg BID at home  Given her borderline Cr and weight of 62 kg, will continue with 2.5mg BID for now  Hold metoprolol for now given her bradycardia     #Parkinson's disease  Continue home regimen of Carbidopa-Levodopa   Patient is NPO pending swallow evaluation however would recommend trying to administer this medication given its timing importance     #CAD  No ischemic signs or symptoms at this time  Hold PO medications pending swallow evaluation     DVT ppx: on Eliquis 2.5mg BID for atrial fibrillation     Code status: DNR/DNI - discussed with daughter - IRINAST placed in chart  Proxy: son and daughter [phone] (Fredis); [phone] (Claudine)

## 2024-08-18 NOTE — SWALLOW BEDSIDE ASSESSMENT ADULT - SWALLOW EVAL: RECOMMENDED FEEDING/EATING TECHNIQUES
use straw for drinking./allow for swallow between intakes/alternate food with liquid/crush medication (when feasible)/hard swallow w/ each bite or sip/maintain upright posture during/after eating for 30 mins/position upright (90 degrees)

## 2024-08-18 NOTE — H&P ADULT - HISTORY OF PRESENT ILLNESS
This patient is an 89 year old female with PMH of Parkinson's disease, dementia, atrial fibrillation, HTN, and CAD who presented to the ER on 8/16 due to weakness and fatigue. She was discharged home but subsequently her blood cultures were found to be positive with gram positive rods, therefore her daughter was called and instructed to bring the patient back to the hospital. The patient is AOx0 and is not able to provide any history or answer any questions. The patient's daughter states that she has been having a progressive decline in her mental status over the last several months, with some periods of slightly improved cognition. She has been seeing a neurologist and has had several medication adjustments. Recently there have been no illness or major changes. No fevers, hemodynamic instability, urinary issues, constipation/diarrhea, or open wounds. The patient does have a mild pressure injury on her back but no purulence or other issues. The patient lives with her daughter and has health aides during the day.    ER Course  - Vancomycin x1  - Cefepime x1  - Potassium 30 mEQ

## 2024-08-18 NOTE — ED ADULT NURSE NOTE - OBJECTIVE STATEMENT
Pt presents to ED for positive blood cx. Pt c/o feeling tired, was seen at  yesterday d/t lethargy. denies dizziness, chest pain, sob, n/v/d. A&Ox2, +allergies, hx of dementia, afib. As per daughter at bedside, pt at baseline mental status. Pt not ambulatory at baseline. Daughter reports "bedsore" to sacrum.

## 2024-08-18 NOTE — ED ADULT NURSE NOTE - NSFALLHARMRISKINTERV_ED_ALL_ED

## 2024-08-18 NOTE — DIETITIAN INITIAL EVALUATION ADULT - OTHER INFO
This patient is an 89 year old female with PMH of Parkinson's disease, dementia, atrial fibrillation, HTN, and CAD who presented to the ER on 8/16 due to weakness and fatigue. She was discharged home but subsequently her blood cultures were found to be positive with gram positive rods, therefore her daughter was called and instructed to bring the patient back to the hospital. The patient is AOx0 and is not able to provide any history or answer any questions. The patient's daughter states that she has been having a progressive decline in her mental status over the last several months, with some periods of slightly improved cognition. She has been seeing a neurologist and has had several medication adjustments. Recently there have been no illness or major changes. No fevers, hemodynamic instability, urinary issues, constipation/diarrhea, or open wounds. The patient does have a mild pressure injury on her back but no purulence or other issues. The patient lives with her daughter and has health aides during the day.  Admitted for bacteremia    Known to nutrition services with criteria for moderate PCM met previously on 7/15, 6/10 and 5/30.  Prescribed a regular diet.  RD unable to obtain bed scale weight as bed scale not working.  Per EMR weight 8/17 Pt is 136.6# - appears accurate.  Weight history reviewed: 7/15 135# (taken by RD); 6/10 143# (taken by RD); 5/30 147# (taken by RD); 2/7 151# (taken by RD) - loss of 14.4#/10.5% x 6 mo, severe and clin sig.  Patient appears frail, NFPE revealed moderate muscle/fat wasting.  C/w regular diet as prescribed.  Recommend to add Ensure + HP shake BID to optimize nutritional needs (provides 350 kcal, 20g protein/ shake).  Confirm goals of care regarding nutrition support - MOLST present does not address nutrition support.  See additional recommendations below.

## 2024-08-18 NOTE — DIETITIAN INITIAL EVALUATION ADULT - ADD RECOMMEND
1) C/w regular diet as prescribed  2) Encourage protein-rich foods, maximize food preferences   3) Add Ensure + HP shake BID to optimize nutritional needs (provides 350 kcal, 20g protein/ shake)   4) Monitor bowel movements, if no BM for >3 days, consider implementing bowel regimen.   5) MVI w/ minerals daily to ensure 100% RDA met   6) Obtain weekly wt to track/trend changes   7) Confirm goals of care regarding nutrition support. Nutrition support is not recommended due to overall declining medical status which evidenced based studies indicate EN is not effective in prolonging survival and improving quality of life. It can also increase risk of aspiration pneumonia as well as other related issues (infection, GI complications, and worsening/ non-healing PI's). However, will provide nutrition/ hydration within GOC.   RD will continue to monitor PO intake, labs, hydration, and wt prn.

## 2024-08-18 NOTE — H&P ADULT - NSHPLABSRESULTS_GEN_ALL_CORE
CT chest   IMPRESSION:  No acute findings in the chest.    Asymmetries in the left breast. These can be further evaluated with   dedicated outpatient breast imaging if clinically desired.

## 2024-08-19 ENCOUNTER — TRANSCRIPTION ENCOUNTER (OUTPATIENT)
Age: 89
End: 2024-08-19

## 2024-08-19 VITALS
TEMPERATURE: 98 F | HEART RATE: 64 BPM | RESPIRATION RATE: 18 BRPM | OXYGEN SATURATION: 96 % | DIASTOLIC BLOOD PRESSURE: 54 MMHG | SYSTOLIC BLOOD PRESSURE: 124 MMHG

## 2024-08-19 LAB
ALBUMIN SERPL ELPH-MCNC: 2.6 G/DL — LOW (ref 3.3–5)
ALP SERPL-CCNC: 127 U/L — HIGH (ref 40–120)
ALT FLD-CCNC: 39 U/L — SIGNIFICANT CHANGE UP (ref 12–78)
ANION GAP SERPL CALC-SCNC: 5 MMOL/L — SIGNIFICANT CHANGE UP (ref 5–17)
AST SERPL-CCNC: 102 U/L — HIGH (ref 15–37)
BILIRUB SERPL-MCNC: 1.5 MG/DL — HIGH (ref 0.2–1.2)
BUN SERPL-MCNC: 15 MG/DL — SIGNIFICANT CHANGE UP (ref 7–23)
CALCIUM SERPL-MCNC: 9.9 MG/DL — SIGNIFICANT CHANGE UP (ref 8.5–10.1)
CHLORIDE SERPL-SCNC: 110 MMOL/L — HIGH (ref 96–108)
CO2 SERPL-SCNC: 24 MMOL/L — SIGNIFICANT CHANGE UP (ref 22–31)
CREAT SERPL-MCNC: 1.18 MG/DL — SIGNIFICANT CHANGE UP (ref 0.5–1.3)
EGFR: 44 ML/MIN/1.73M2 — LOW
GLUCOSE SERPL-MCNC: 107 MG/DL — HIGH (ref 70–99)
HCT VFR BLD CALC: 30.9 % — LOW (ref 34.5–45)
HGB BLD-MCNC: 9.8 G/DL — LOW (ref 11.5–15.5)
MCHC RBC-ENTMCNC: 28.6 PG — SIGNIFICANT CHANGE UP (ref 27–34)
MCHC RBC-ENTMCNC: 31.7 GM/DL — LOW (ref 32–36)
MCV RBC AUTO: 90.1 FL — SIGNIFICANT CHANGE UP (ref 80–100)
PLATELET # BLD AUTO: 200 K/UL — SIGNIFICANT CHANGE UP (ref 150–400)
POTASSIUM SERPL-MCNC: 3.3 MMOL/L — LOW (ref 3.5–5.3)
POTASSIUM SERPL-SCNC: 3.3 MMOL/L — LOW (ref 3.5–5.3)
PROT SERPL-MCNC: 6.2 GM/DL — SIGNIFICANT CHANGE UP (ref 6–8.3)
RBC # BLD: 3.43 M/UL — LOW (ref 3.8–5.2)
RBC # FLD: 15.2 % — HIGH (ref 10.3–14.5)
SODIUM SERPL-SCNC: 139 MMOL/L — SIGNIFICANT CHANGE UP (ref 135–145)
WBC # BLD: 7.44 K/UL — SIGNIFICANT CHANGE UP (ref 3.8–10.5)
WBC # FLD AUTO: 7.44 K/UL — SIGNIFICANT CHANGE UP (ref 3.8–10.5)

## 2024-08-19 PROCEDURE — 99232 SBSQ HOSP IP/OBS MODERATE 35: CPT

## 2024-08-19 RX ORDER — POTASSIUM CHLORIDE 10 MEQ
40 TABLET, EXT RELEASE, PARTICLES/CRYSTALS ORAL ONCE
Refills: 0 | Status: COMPLETED | OUTPATIENT
Start: 2024-08-19 | End: 2024-08-19

## 2024-08-19 RX ADMIN — METOPROLOL TARTRATE 50 MILLIGRAM(S): 100 TABLET ORAL at 12:39

## 2024-08-19 RX ADMIN — APIXABAN 2.5 MILLIGRAM(S): 5 TABLET, FILM COATED ORAL at 05:27

## 2024-08-19 RX ADMIN — Medication 1 TABLET(S): at 12:38

## 2024-08-19 RX ADMIN — Medication 40 MILLIEQUIVALENT(S): at 15:00

## 2024-08-19 RX ADMIN — PAROXETINE 40 MILLIGRAM(S): 10 TABLET, FILM COATED ORAL at 12:38

## 2024-08-19 RX ADMIN — Medication 1 TABLET(S): at 16:07

## 2024-08-19 RX ADMIN — Medication 75 MILLIGRAM(S): at 12:38

## 2024-08-19 RX ADMIN — MEMANTINE 5 MILLIGRAM(S): 7 CAPSULE, EXTENDED RELEASE ORAL at 12:39

## 2024-08-19 NOTE — PROGRESS NOTE ADULT - SUBJECTIVE AND OBJECTIVE BOX
CHIEF COMPLAINT/INTERVAL HISTORY:    Patient is a 89y old  Female who presents with a chief complaint of Bacteremia     (18 Aug 2024 09:51)      HPI:  This patient is an 89 year old female with PMH of Parkinson's disease, dementia, atrial fibrillation, HTN, and CAD who presented to the ER on 8/16 due to weakness and fatigue. She was discharged home but subsequently her blood cultures were found to be positive with gram positive rods, therefore her daughter was called and instructed to bring the patient back to the hospital. The patient is AOx0 and is not able to provide any history or answer any questions. The patient's daughter states that she has been having a progressive decline in her mental status over the last several months, with some periods of slightly improved cognition. She has been seeing a neurologist and has had several medication adjustments. Recently there have been no illness or major changes. No fevers, hemodynamic instability, urinary issues, constipation/diarrhea, or open wounds. The patient does have a mild pressure injury on her back but no purulence or other issues. The patient lives with her daughter and has health aides during the day.    ER Course  - Vancomycin x1  - Cefepime x1  - Potassium 30 mEQ (18 Aug 2024 03:59)      SUBJECTIVE & OBJECTIVE: Pt seen and examined at bedside.     ICU Vital Signs Last 24 Hrs  T(C): 36.4 (19 Aug 2024 08:03), Max: 37.1 (18 Aug 2024 16:34)  T(F): 97.6 (19 Aug 2024 08:03), Max: 98.8 (18 Aug 2024 16:34)  HR: 55 (19 Aug 2024 08:03) (55 - 62)  BP: 148/69 (19 Aug 2024 08:03) (111/71 - 148/69)  BP(mean): 81 (18 Aug 2024 23:57) (81 - 81)  ABP: --  ABP(mean): --  RR: 18 (19 Aug 2024 08:03) (18 - 18)  SpO2: 95% (19 Aug 2024 08:03) (94% - 95%)    O2 Parameters below as of 19 Aug 2024 08:03  Patient On (Oxygen Delivery Method): room air      PHYSICAL EXAM:      Constitutional: NAD  Eyes: perrl, no conjunctival changes  ENMT: no exudates, moist oral muc, uvula midline  Neck: no JVD, no LAD  Back: no cva tenderness  Respiratory: CTA, no exp wheezes  Cardiovascular: S1S2 reg, no murmur gallop or rub  Gastrointestinal: abd soft, NT/ND + BS  Genitourinary: voiding  Extremities: FROM, no joint effusions, no edema, no clubbing , no cyanosis  Vascular: pedal pulses + bilateral, warm extremities  Neurological: non focal, mot str 5/5/ all extr  Skin: no rashes  Lymph Nodes: no LAD            LABS:                        9.8    7.44  )-----------( 200      ( 19 Aug 2024 07:07 )             30.9     08-19    139  |  110<H>  |  15  ----------------------------<  107<H>  3.3<L>   |  24  |  1.18    Ca    9.9      19 Aug 2024 07:07    TPro  6.2  /  Alb  2.6<L>  /  TBili  1.5<H>  /  DBili  x   /  AST  102<H>  /  ALT  39  /  AlkPhos  127<H>  08-19    PT/INR - ( 17 Aug 2024 20:37 )   PT: 16.0 sec;   INR: 1.43 ratio          RECENT CULTURES:      RADIOLOGY & ADDITIONAL TESTS: personally reviewed      Assessment/Plan:  89 year old female with PMH of Parkinson's disease, dementia, atrial fibrillation, HTN, and CAD who presented to the ER on 8/16 due to weakness and fatigue. She was discharged home but subsequently her blood cultures were found to be positive with gram positive rods, therefore her daughter was called and instructed to bring the patient back to the hospital.      #positive blood culture:  Likely contamination  -BCx growing: Bacillus species  -unlikely true bacteremia  -per ID --> monitor off antibiotics  -f/u repeat BCx x 2  -no sepsis, no leukocytosis, afebrile  -CT chest clear  -pulm consult per family request Dr. Philip   -AM labs        Transaminitis:  -no abd pain  -stop statin  -US abd no acute pathology  -trend    #encephalopathy: Chronic due to underlying Parkinsons dementia  -acute encephalopathy ruled out and not present on admission      #Paroxysmal atrial fibrillation   -Currently sinus bradycardia  -Eliquis 2.5mg BID  -metoprolol w/ parameters    #Parkinson's disease  -Carbidopa-Levodopa       #CAD  -resume home meds w/ parameters    DVT ppx:   -Eliquis     Code status:   -DNR/DNI   -Proxy: son and daughter [phone] (Bill); [phone] (Claudine)          time spent: 45min

## 2024-08-19 NOTE — CONSULT NOTE ADULT - SUBJECTIVE AND OBJECTIVE BOX
HPI: DENA HENRY is a 89y old Female with PMH Parkinson's disease, dementia, atrial fibrillation, HTN, and CAD who presented to the ER on 8/16 due to weakness and fatigue. She was discharged home but subsequently her blood cultures were found to be positive with gram positive rods, therefore her daughter was called and instructed to bring the patient back to the hospital. The patient is AOx0 and is not able to provide any history or answer any questions. The patient's son states that she has been having a progressive decline in her mental status over the last several months, with some periods of slightly improved cognition. She has been seeing a neurologist and has had several medication adjustments. Recently there have been no illness or major changes. No fevers, hemodynamic instability, urinary issues, constipation/diarrhea, or open wounds.   son at bedside. patient seen prior to d/c. discussed imaging, bloodwork.  she is going to have a sleep study at home soon and f/u with neuro regarding meds  she was seen by id and antibiotics were discontinued    Past Medical History:   Hypertension    Hernia, hiatal    CAD (coronary artery disease)    Dementia      Past Surgical History:   No significant past surgical history    S/P appendectomy    S/P tonsillectomy    S/P hysterectomy      Family History:    FHx: heart disease       Social History: lives at home    Review of Systems:  All 10 systems reviewed in detailed and found to be negative with the exception of what has already been described above    Allergies:  Biaxin (Other)  Indocin (Other)  tetracyclines (Unknown)  penicillins (Other)  Demerol HCl (Other)  doxycycline (Other)  verapamil (Other)    Meds  MEDICATIONS  (STANDING):  apixaban 2.5 milliGRAM(s) Oral two times a day  carbidopa/levodopa  25/100 1 Tablet(s) Oral three times a day  carbidopa/levodopa CR 25/100 1 Tablet(s) Oral at bedtime  clopidogrel Tablet 75 milliGRAM(s) Oral daily  memantine 5 milliGRAM(s) Oral daily  metoprolol succinate ER 50 milliGRAM(s) Oral daily  PARoxetine 40 milliGRAM(s) Oral daily    MEDICATIONS  (PRN):    Physical Exam  T(C): 36.9 (08-19-24 @ 16:43), Max: 36.9 (08-19-24 @ 16:43)  HR: 64 (08-19-24 @ 16:43) (55 - 64)  BP: 124/54 (08-19-24 @ 16:43) (111/71 - 148/69)  RR: 18 (08-19-24 @ 16:43) (18 - 18)  SpO2: 96% (08-19-24 @ 16:43) (94% - 96%)  Gen: awake no distress no responsive verbally  HEENT: Anicteric sclera, moist mucous membranes  Cardio: Regular rhythm and rate, normal S1S2, no murmurs  Resp: Clear to auscultation bilaterally, no wheezing or rhonchi  GI: Nontender, nondistended, normoactive bowel sounds  Ext: No cyanosis, clubbing or edema  Neuro: Nonfocal    Labs:                        9.8    7.44  )-----------( 200      ( 19 Aug 2024 07:07 )             30.9     08-19    139  |  110<H>  |  15  ----------------------------<  107<H>  3.3<L>   |  24  |  1.18    Ca    9.9      19 Aug 2024 07:07    TPro  6.2  /  Alb  2.6<L>  /  TBili  1.5<H>  /  DBili  x   /  AST  102<H>  /  ALT  39  /  AlkPhos  127<H>  08-19    PT/INR - ( 17 Aug 2024 20:37 )   PT: 16.0 sec;   INR: 1.43 ratio         PTT - ( 17 Aug 2024 20:37 )  PTT:26.7 sec  Urinalysis Basic - ( 19 Aug 2024 07:07 )    Color: x / Appearance: x / SG: x / pH: x  Gluc: 107 mg/dL / Ketone: x  / Bili: x / Urobili: x   Blood: x / Protein: x / Nitrite: x   Leuk Esterase: x / RBC: x / WBC x   Sq Epi: x / Non Sq Epi: x / Bacteria: x    < from: CT Chest No Cont (08.17.24 @ 21:24) >    PROCEDURE DATE:  08/17/2024          INTERPRETATION:  CLINICAL INFORMATION: Sepsis    COMPARISON: 7/17/2024    CONTRAST/COMPLICATIONS:  IV Contrast: NONE  Oral Contrast: NONE  Complications: None reported at time of study completion    PROCEDURE:  CT of the Chest was performed.  Sagittal and coronal reformats were performed.    FINDINGS:    LUNGS AND LARGE AIRWAYS: Patent central airways. Mild bibasilar   atelectasis.  PLEURA: No pleural effusion.  VESSELS: Aortic calcifications. Coronary artery calcifications.  HEART: Heart size is normal. No pericardial effusion.  MEDIASTINUM AND COLETTE: No lymphadenopathy.  CHEST WALL AND LOWER NECK: Asymmetries in the posterior lateral left   breast and lower left breast  VISUALIZED UPPER ABDOMEN: Small hiatal hernia.  BONES: Degenerative changes. Diffuse osteopenia    IMPRESSION:  No acute findings in the chest.    Asymmetries in the left breast. These can be further evaluated with   dedicated outpatient breast imaging if clinically desired.    bloodwork from 8/16 b cereus +. repeat bloodwork negative    < from: CT Abdomen and Pelvis w/ IV Cont (08.18.24 @ 12:57) >  PROCEDURE DATE:  08/18/2024          INTERPRETATION:  CLINICAL INFORMATION: Patient with bacteremia of unknown   etiology    COMPARISON: CT abdomen pelvis February 25, 2017.    CONTRAST/COMPLICATIONS:  IV Contrast: Omnipaque 350  90 cc administered   0 cc discarded  Oral Contrast: NONE  Complications: None reported at time of study completion    PROCEDURE:  CT of the Abdomen and Pelvis was performed.  Sagittal and coronal reformats were performed.    FINDINGS:  LOWER CHEST: Minimal bibasilar subsegmental atelectasis.  Aortic and coronary artery calcification present.  LIVER: Within normal limits.  BILE DUCTS: Normal caliber.  GALLBLADDER: Cholecystectomy.  SPLEEN: Within normal limits.  PANCREAS: Within normal limits.  ADRENALS: Within normal limits.  KIDNEYS/URETERS: Within normal limits.    BLADDER: Within normal limits.  REPRODUCTIVE ORGANS: Hysterectomy. No adnexal mass lesions.    Small fecal impaction in the rectum suggested.    Diverticulosis without acute diverticulitis.    BOWEL: No bowel obstruction. Appendix is normal.  PERITONEUM/RETROPERITONEUM: Within normal limits.  No free air or abscess.  VESSELS: Atherosclerotic changes.  LYMPH NODES: No lymphadenopathy.  ABDOMINAL WALL: Small fat-containing umbilical hernia.  BONES: Degenerative changes. No lytic or blastic process. Mild endplate   concavity involving superior endplate L1 vertebral body.    IMPRESSION:  Diverticulosis without acute diverticulitis.    Source of bacteremia is not identified on the exam.      < from: US Abdomen Complete (US Abdomen Complete .) (08.16.24 @ 17:36) >  PROCEDURE DATE:  08/16/2024          INTERPRETATION:  CLINICAL INFORMATION: Elevated LFTs.    COMPARISON: CT abdomen and pelvis dated 2/25/2017.    TECHNIQUE: Sonography of the abdomen.    FINDINGS:  Evaluation is somewhat limited due to overlying bowel gas shadows.    Liver: Grossly normal in appearance.  Bile ducts: Normal caliber. Common bile duct measures 7 mm.  Gallbladder: Surgically absent.  Pancreas: Visualized portions are within normal limits.  Spleen: 5.3 cm. Within normal limits.  Right kidney: 9.3 cm. No hydronephrosis.  Left kidney: 11.4 cm. No hydronephrosis.  Ascites: None.  Aorta and IVC: Visualized portions are within normal limits.    IMPRESSION:  Evaluation is somewhat limited due to overlying bowel gas shadows. Within   these limitations, no appreciable abnormality identified.    Status post cholecystectomy without evidence of biliary ductal dilatation.      < end of copied text >

## 2024-08-19 NOTE — DISCHARGE NOTE NURSING/CASE MANAGEMENT/SOCIAL WORK - NSDCPEFALRISK_GEN_ALL_CORE
For information on Fall & Injury Prevention, visit: [email or web address] OR  [email or web address] OR  https://www.cdc.gov/steadi/patient.html

## 2024-08-19 NOTE — DISCHARGE NOTE PROVIDER - NSDCCPCAREPLAN_GEN_ALL_CORE_FT
PRINCIPAL DISCHARGE DIAGNOSIS  Diagnosis: Bacteremia  Assessment and Plan of Treatment: contaminant; please f/up with outpatiet Neurology to get answers for her neuro decline

## 2024-08-19 NOTE — PROGRESS NOTE ADULT - ASSESSMENT
Assessment:  89F with TIA, Alzheimer's dementia, parkinson's disease, CAD s/p PCI, HTN, PAD, a-flutter (on eliquis), initially presented ED 8/16 with fatigue and weakness, discharged home but subsequently her blood cultures were found to be positive with gram positive rods and came back to ED  Afebrile on admission  No leukocytosis  UA negative  CXR clear  CT Chest negative  COVID negative  BCx 8/16 with 1 out of 4 Bacillus sp  BCx 8/17 NGTD  CT AP without infectious source    Antimicrobials:  s/p Vanco and Cefepime (8/17) x1    Impression:   #Bacillus sp. positive Blood culture; likely contaminant  #Weakness, Fatigue  - remains afebrile, nontoxic appearing, limited ROS due to mental status  - no obvious signs of infection  - Blood culture with 1 out of 4 Bacillus species, likely contaminant    Recommendations:  - monitor off antibiotic  - monitor temperature curve  - trend WBC  - ID service will sign off. Please reconsult/call as needed    Clinical team may change from intravenous to oral antibiotics when the following criteria are met:   1. Patient is clinically improving/stable       a)	Improved signs and symptoms of infection from initial presentation       b)	Afebrile for 24 hours       c)	Leukocytosis trending towards normal range   2. Patient is tolerating oral intake   3. Initial/repeat blood cultures are negative OR do not need to wait for preliminary blood cultures to result  not applicable

## 2024-08-19 NOTE — PROGRESS NOTE ADULT - SUBJECTIVE AND OBJECTIVE BOX
Date of Service:08-19-24 @ 14:20  Interval History/ROS: Afebrile overnight. Limited ROS      REVIEW OF SYSTEMS  [x  ] ROS unobtainable because:  mental status  [  ] All other systems negative except as noted below    Constitutional:  [ ] fever [ ] chills  [ ] weight loss  [ ]night sweat  [ ]poor appetite/PO intake [ ]fatigue   Skin:  [ ] rash [ ] phlebitis	  Eyes: [ ] icterus [ ] pain  [ ] discharge	  ENMT: [ ] sore throat  [ ] thrush [ ] ulcers [ ] exudates [ ]anosmia  Respiratory: [ ] dyspnea [ ] hemoptysis [ ] cough [ ] sputum	  Cardiovascular:  [ ] chest pain [ ] palpitations [ ] edema	  Gastrointestinal:  [ ] nausea [ ] vomiting [ ] diarrhea [ ] constipation [ ] pain	  Genitourinary:  [ ] dysuria [ ] frequency [ ] hematuria [ ] discharge [ ] flank pain  [ ] incontinence  Musculoskeletal:  [ ] myalgias [ ] arthralgias [ ] arthritis  [ ] back pain  Neurological:  [ ] headache [ ] weakness [ ] seizures  [ ] confusion/altered mental status    Allergies  Biaxin (Other)  Indocin (Other)  tetracyclines (Unknown)  penicillins (Other)  Demerol HCl (Other)  doxycycline (Other)  verapamil (Other)        ANTIMICROBIALS:          OTHER MEDS: MEDICATIONS  (STANDING):  apixaban 2.5 two times a day  carbidopa/levodopa  25/100 1 three times a day  carbidopa/levodopa CR 25/100 1 at bedtime  clopidogrel Tablet 75 daily  memantine 5 daily  metoprolol succinate ER 50 daily  PARoxetine 40 daily      Vital Signs Last 24 Hrs  T(F): 97.6 (08-19-24 @ 08:03), Max: 98.8 (08-18-24 @ 16:34)    Vital Signs Last 24 Hrs  HR: 55 (08-19-24 @ 08:03) (55 - 62)  BP: 148/69 (08-19-24 @ 08:03) (111/71 - 148/69)  RR: 18 (08-19-24 @ 08:03)  SpO2: 95% (08-19-24 @ 08:03) (94% - 95%)  Wt(kg): --    EXAM:    Constitutional:  frail NAD  Head/Eyes: no icterus  LUNGS:  CTA  CVS:  regular rhythm  Abd:  soft, non-tender; non-distended  Ext:  no edema  Vascular:  IV site no erythema tenderness or discharge  Neuro: AAO X 1    Labs:                        9.8    7.44  )-----------( 200      ( 19 Aug 2024 07:07 )             30.9     08-19    139  |  110<H>  |  15  ----------------------------<  107<H>  3.3<L>   |  24  |  1.18    Ca    9.9      19 Aug 2024 07:07    TPro  6.2  /  Alb  2.6<L>  /  TBili  1.5<H>  /  DBili  x   /  AST  102<H>  /  ALT  39  /  AlkPhos  127<H>  08-19      WBC Trend:  WBC Count: 7.44 (08-19-24 @ 07:07)  WBC Count: 8.69 (08-18-24 @ 07:19)  WBC Count: 8.80 (08-17-24 @ 20:37)  WBC Count: 13.70 (08-16-24 @ 14:08)      Creatine Trend:  Creatinine: 1.18 (08-19)  Creatinine: 1.21 (08-18)  Creatinine: 1.44 (08-17)  Creatinine: 1.43 (08-16)      Liver Biochemical Testing Trend:  Alanine Aminotransferase (ALT/SGPT): 39 (08-19)  Alanine Aminotransferase (ALT/SGPT): 94 *H* (08-18)  Alanine Aminotransferase (ALT/SGPT): 46 (08-17)  Alanine Aminotransferase (ALT/SGPT): 48 (08-16)  Alanine Aminotransferase (ALT/SGPT): 16 (07-17)  Aspartate Aminotransferase (AST/SGOT): 102 (08-19-24 @ 07:07)  Aspartate Aminotransferase (AST/SGOT): 108 (08-18-24 @ 07:19)  Aspartate Aminotransferase (AST/SGOT): 125 (08-17-24 @ 20:37)  Aspartate Aminotransferase (AST/SGOT): 166 (08-16-24 @ 14:08)  Aspartate Aminotransferase (AST/SGOT): 23 (07-17-24 @ 06:44)  Bilirubin Total: 1.5 (08-19)  Bilirubin Total: 1.9 (08-18)  Bilirubin Total: 1.9 (08-17)  Bilirubin Total: 2.5 (08-16)  Bilirubin Total: 1.4 (07-17)      Trend LDH      Urinalysis Basic - ( 19 Aug 2024 07:07 )    Color: x / Appearance: x / SG: x / pH: x  Gluc: 107 mg/dL / Ketone: x  / Bili: x / Urobili: x   Blood: x / Protein: x / Nitrite: x   Leuk Esterase: x / RBC: x / WBC x   Sq Epi: x / Non Sq Epi: x / Bacteria: x        MICROBIOLOGY:        Culture - Blood (collected 17 Aug 2024 21:39)  Source: .Blood None  Preliminary Report:    No growth at 24 hours    Urinalysis with Rflx Culture (collected 17 Aug 2024 20:37)    Culture - Blood (collected 17 Aug 2024 20:37)  Source: .Blood None  Preliminary Report:    No growth at 24 hours    Culture - Blood (collected 16 Aug 2024 15:50)  Source: .Blood None  Final Report:    Growth in anaerobic bottle: Bacillus cereus    "Susceptibilities not performed"    Direct identification is available within approximately 3-5    hours either by Blood Panel Multiplexed PCR or Direct    MALDI-TOF. Details: [email or web address]  Organism: Blood Culture PCR  Organism: Blood Culture PCR    Sensitivities:      Method Type: PCR      -  Blood PCR Panel: NEG    Urinalysis with Rflx Culture (collected 16 Aug 2024 14:46)    Culture - Blood (collected 16 Aug 2024 14:08)  Source: .Blood None  Preliminary Report:    No growth at 48 Hours    Urinalysis with Rflx Culture (collected 14 Jul 2024 14:20)    Culture - Blood (collected 14 Jul 2024 14:20)  Source: .Blood None  Final Report:    No growth at 5 days    Culture - Blood (collected 14 Jul 2024 14:20)  Source: .Blood None  Final Report:    No growth at 5 days    Culture - Blood (collected 10 Joaquín 2024 06:56)  Source: .Blood None  Final Report:    No growth at 5 days        Lactate, Blood: 1.6 (08-17 @ 20:37)        RADIOLOGY:  imaging below personally reviewed    Xray Chest 1 View- PORTABLE-Urgent:  (16 Aug 2024 15:57)              CT Abdomen and Pelvis w/ IV Cont:   ACC: [id number] EXAM:  CT ABDOMEN AND PELVIS IC   ORDERED BY: ALISON JACOBS     PROCEDURE DATE:  08/18/2024          INTERPRETATION:  CLINICAL INFORMATION: Patient with bacteremia of unknown   etiology    COMPARISON: CT abdomen pelvis February 25, 2017.    CONTRAST/COMPLICATIONS:  IV Contrast: Omnipaque 350  90 cc administered   0 cc discarded  Oral Contrast: NONE  Complications: None reported at time of study completion    PROCEDURE:  CT of the Abdomen and Pelvis was performed.  Sagittal and coronal reformats were performed.    FINDINGS:  LOWER CHEST: Minimal bibasilar subsegmental atelectasis.  Aortic and coronary artery calcification present.  LIVER: Within normal limits.  BILE DUCTS: Normal caliber.  GALLBLADDER: Cholecystectomy.  SPLEEN: Within normal limits.  PANCREAS: Within normal limits.  ADRENALS: Within normal limits.  KIDNEYS/URETERS: Within normal limits.    BLADDER: Within normal limits.  REPRODUCTIVE ORGANS: Hysterectomy. No adnexal mass lesions.    Small fecal impaction in the rectum suggested.    Diverticulosis without acute diverticulitis.    BOWEL: No bowel obstruction. Appendix is normal.  PERITONEUM/RETROPERITONEUM: Within normal limits.  No free air or abscess.  VESSELS: Atherosclerotic changes.  LYMPH NODES: No lymphadenopathy.  ABDOMINAL WALL: Small fat-containing umbilical hernia.  BONES: Degenerative changes. No lytic or blastic process. Mild endplate   concavity involving superior endplate L1 vertebral body.    IMPRESSION:  Diverticulosis without acute diverticulitis.    Source of bacteremia is not identified on the exam.    Please refer to detailed findings otherwise described above.        --- End of Report ---            MARGARET EUGENE MD; Attending Radiologist  This document has been electronically signed. Aug 18 2024 10:13PM (08-18-24 @ 12:57)  CT Head No Cont:   ACC: [id number] EXAM:  CT BRAIN   ORDERED BY: ALISON JACOBS     PROCEDURE DATE:  08/18/2024          INTERPRETATION:  .    CLINICAL INFORMATION: Worsening encephalopathy.    TECHNIQUE: Multiple axial CT images of the head were obtained without   contrast. Sagittal and coronal reconstructed images were acquired from   the source data.    COMPARISON: Prior CT study of the head from 7/14/2024. Prior brain MRI   study from 7/7/2021.    FINDINGS: There is no acute intracranial hemorrhage, mass effect, shift   of the midline structures, herniation, extra-axial fluid collection, or   hydrocephalus.    There is diffuse cerebral volume loss with prominence of the sulci,   fissures, and cisternal spaces which is normal for the patient's age.   There is mild deep and periventricular white matter hypoattenuation   statistically compatible with microvascular changes given calcific   atherosclerotic disease of the intracranial arteries.    Polypoidal mucosal thickening is seen along the anterior wall of the   right maxillary sinus. Small air-fluid levels are seen within the   bilateral sphenoid sinuses. The tympanomastoid cavities are clear. The   calvarium is intact. The imaged orbits are unremarkable.    IMPRESSION: No acute intracranial hemorrhage, mass effect, or shift of   the midline structures.    Similar-appearing mild chronic white matter microvascular type changes.    --- End of Report ---            MATT HARRINGTON MD; Attending Radiologist  This document has been electronically signed.Aug 18 2024  2:59PM (08-18-24 @ 12:56)  CT Chest No Cont:   ACC: [id number] EXAM:  CT CHEST   ORDERED BY: JORDAN JOHN     PROCEDURE DATE:  08/17/2024          INTERPRETATION:  CLINICAL INFORMATION: Sepsis    COMPARISON: 7/17/2024    CONTRAST/COMPLICATIONS:  IV Contrast: NONE  Oral Contrast: NONE  Complications: None reported at time of study completion    PROCEDURE:  CT of the Chest was performed.  Sagittal and coronal reformats were performed.    FINDINGS:    LUNGS AND LARGE AIRWAYS: Patent central airways. Mild bibasilar   atelectasis.  PLEURA: No pleural effusion.  VESSELS: Aortic calcifications. Coronary artery calcifications.  HEART: Heart size is normal. No pericardial effusion.  MEDIASTINUM AND COLETTE: No lymphadenopathy.  CHEST WALL AND LOWER NECK: Asymmetries in the posterior lateral left   breast and lower left breast  VISUALIZED UPPER ABDOMEN: Small hiatal hernia.  BONES: Degenerative changes. Diffuse osteopenia    IMPRESSION:  No acute findings in the chest.    Asymmetries in the left breast. These can be further evaluated with   dedicated outpatient breast imaging if clinically desired.        --- End of Report ---

## 2024-08-19 NOTE — DISCHARGE NOTE PROVIDER - NSDCMRMEDTOKEN_GEN_ALL_CORE_FT
apixaban 2.5 mg oral tablet: 1 tab(s) orally every 12 hours  carbidopa-levodopa 25 mg-100 mg oral tablet: 1 tab(s) orally 3 times a day *** first dose between 9 &amp; 11am  second dose between 2 &amp; 3pm  third dose between 5 &amp; 6pm ***  carbidopa-levodopa 25 mg-100 mg oral tablet, extended release: 1 tab(s) orally once a day (at bedtime) ***dose between 8:30 &amp; 9pm***  clopidogrel 75 mg oral tablet: 1 tab(s) orally once a day (in the evening)  memantine 5 mg oral tablet: 1 tab(s) orally once a day  metoprolol succinate 25 mg oral tablet, extended release: 2 tab(s) orally once a day  PARoxetine 40 mg oral tablet: 1 tab(s) orally once a day (in the evening)  rosuvastatin 40 mg oral tablet: 1 tab(s) orally once a day

## 2024-08-19 NOTE — DISCHARGE NOTE NURSING/CASE MANAGEMENT/SOCIAL WORK - PATIENT PORTAL LINK FT
You can access the FollowMyHealth Patient Portal offered by [facility] by registering at the following website: [email or web address]. By joining [facility]’s FollowMyHealth portal, you will also be able to view your health information using other applications (apps) compatible with our system.

## 2024-08-19 NOTE — DISCHARGE NOTE PROVIDER - NSDCCAREPROVSEEN_GEN_ALL_CORE_FT
Healing well. Right heel is almost healed. Left heel with eschar. Offload heels   Monica, Angel Crawford

## 2024-08-19 NOTE — CONSULT NOTE ADULT - ASSESSMENT
Assessment:  89F with TIA, Alzheimer's dementia, parkinson's disease, CAD s/p PCI, HTN, PAD, a-flutter (on eliquis), initially presented ED 8/16 with fatigue and weakness, discharged home but subsequently her blood cultures were found to be positive with gram positive rods and came back to ED  Afebrile on admission  No leukocytosis  UA negative  CXR clear  CT Chest negative  COVID negative  BCx 8/16 with 1 out of 4 Bacillus sp    Antimicrobials:  s/p Vanco and Cefepime (8/17) x1    Impression:   #Bacillus sp. positive Blood culture; likely contaminant  #Weakness, Fatigue  - remains afebrile, nontoxic appearing, limited ROS due to mental status  - no obvious signs of infection  - Blood culture with 1 out of 4 Bacillus species, likely contaminant    Recommendations:  - monitor off antibiotic  - monitor temperature curve  - trend WBC  - follow up BCx x2    Clinical team may change from intravenous to oral antibiotics when the following criteria are met:   1. Patient is clinically improving/stable       a)	Improved signs and symptoms of infection from initial presentation       b)	Afebrile for 24 hours       c)	Leukocytosis trending towards normal range   2. Patient is tolerating oral intake   3. Initial/repeat blood cultures are negative OR do not need to wait for preliminary blood cultures to result    Cannot advise changing to oral antibiotic therapy until culture sensitivity is available.  
89y old Female with PMH Parkinson's disease, dementia, atrial fibrillation, HTN, and CAD who presented to the ER on 8/16 due to weakness and fatigue  1. b cereus positive blood cultures: off abx. seen by id. repeat cultures no growth. unlikely true bacteremia  2. mental status worsening: most likely secondary to meds  -following up with neuro  -obtain sleep study as outpatient  3. breast asymmetries: discussed with patients son. likely not contributing to presentation.  -he does not think it is appropriate to pursue at this time, and I agree based on her overall presentation  4. transaminitis: us abdomen with no pathology    f/u with me after sleep study

## 2024-08-19 NOTE — DISCHARGE NOTE PROVIDER - HOSPITAL COURSE
This patient is an 89 year old female with PMH of Parkinson's disease, dementia, atrial fibrillation, HTN, and CAD who presented to the ER on 8/16 due to weakness and fatigue. She was discharged home but subsequently her blood cultures were found to be positive with gram positive rods, therefore her daughter was called and instructed to bring the patient back to the hospital. The patient is AOx0 and is not able to provide any history or answer any questions. The patient's daughter states that she has been having a progressive decline in her mental status over the last several months, with some periods of slightly improved cognition. She has been seeing a neurologist and has had several medication adjustments. Recently there have been no illness or major changes. No fevers, hemodynamic instability, urinary issues, constipation/diarrhea, or open wounds. The patient does have a mild pressure injury on her back but no purulence or other issues. The patient lives with her daughter and has health aides during the day.  positive blood culture:  Likely contamination  -BCx growing: Bacillus species  -unlikely true bacteremia  -per ID --> monitor off antibiotics  -f/u repeat BCx x 2  -no sepsis, no leukocytosis, afebrile  -CT chest clear  -pulm consult per family request Dr. Philip to interpret the blood cx- not necessary  -AM labs        Transaminitis:  -no abd pain  -stop statin  -US abd no acute pathology  -trend    #encephalopathy: Chronic due to underlying Parkinsons dementia  -acute encephalopathy ruled out and not present on admission      #Paroxysmal atrial fibrillation   -Currently sinus bradycardia  -Eliquis 2.5mg BID  -metoprolol w/ parameters    #Parkinson's disease  -Carbidopa-Levodopa       #CAD  -resume home meds w/ parameters    DVT ppx:   -Eliquis     Code status:   -DNR/DNI   20 min spent with [phone] (Claudine) who expressed frustration at Mom's deteriorating condition for the past 3 weeks. I told her the dx of sepsis was incorrect but she did not believe me. I also reviewed the entire chart with her on the phone and i told her a diagnosis is made after d/w patient, pertinent physical exam and lastly test results. She did not believe that the cultures were a contaminant either. After 20 min on the pone trying to answer all her questions and sympathizing with her concerns i had to ask her to end the conversation, that if she is that concerned i could keep her another day but i did not think that will help. I believe her condition is irreversible, cT also has chronic white matter disease and in top of AD and PD will make her cognitive decline to continue  the sepsis diagnosis made on admission was incorrect but she did not believe me

## 2024-08-19 NOTE — DISCHARGE NOTE NURSING/CASE MANAGEMENT/SOCIAL WORK - NSDCFUADDAPPT_GEN_ALL_CORE_FT
Dr. Linda Ott  [address] [place] [id number]  [phone]  Wednesday August 21th @3:30pm

## 2024-08-19 NOTE — PROGRESS NOTE ADULT - NUTRITIONAL ASSESSMENT
This patient has been assessed with a concern for Malnutrition and has been determined to have a diagnosis/diagnoses of Moderate protein-calorie malnutrition.    This patient is being managed with:   Diet DASH/TLC-  Sodium & Cholesterol Restricted  Entered: Aug 18 2024 10:28AM  
02-Jan-2019 19:02

## 2024-08-21 LAB
CULTURE RESULTS: SIGNIFICANT CHANGE UP
SPECIMEN SOURCE: SIGNIFICANT CHANGE UP

## 2024-08-29 NOTE — CDI QUERY NOTE - NSCDIOTHERTXTBX_GEN_ALL_CORE_HH
Nursing flow sheets reflect that the patient is total care for all activities of daily living with a past medical history pf Parkinson's and dementia.       Please determine which of the following if any apply to this pt.    - Immobility, complete, due to severe physical disability, frailty  - Immobility, complete due to other condition(please specify)  - Other mobility(please specify)  - Functional Quadriplegia    Supporting documentation:    Patient profile adult 8/18/2024   Functional Assessment: Basic Mobility  Type of Assessment: Admission  Turning from your back to your side while in a flat bed without using bedrails? = Total assistance  Moving from lying on your back to sitting on the flat side of a flat bed without using bedrails? = Total assistance  Moving to and from a bed to a chair (including a wheelchair)? = Total assistance  Standing up from a chair using your arms (e.g. wheelchair or bedside chair)? = Total assistance  Walking in hospital room? = Total assistance    Functional Assessment: Daily Activity  Type of Assessment: Admission  Putting on and taking off regular lower body clothing? = Total assistance  Bathing (including washing, rinsing, drying)? = Total assistance  Toileting, which includes using toilet, bedpan or urinal? = Total assistance  Putting on and taking off regular upper body clothing? = Total assistance  Take care of personal grooming such as brushing teeth? = Total assistance  Eating meals? = Total assistance     Adult 8/18/2024   AAOX0, no focal neurological deficits, cannot follow commands  Patient has been having progressive cognitive decline per her daughter   Other etiologies could include progression of her Parkinson's and dementia     ED provider note 8/17/2024   Patient is currently bedbound but more lethargic than usual over the past few days.  Patient unable to provide appropriate history.

## 2024-10-03 NOTE — DISCHARGE NOTE PROVIDER - DETAILS OF MALNUTRITION DIAGNOSIS/DIAGNOSES
This patient has been assessed with a concern for Malnutrition and was treated during this hospitalization for the following Nutrition diagnosis/diagnoses:     -  08/18/2024: Moderate protein-calorie malnutrition  
18

## 2024-10-31 NOTE — PATIENT PROFILE ADULT - FUNCTIONAL ASSESSMENT - DAILY ACTIVITY 5.
4 = No assist / stand by assistance
Pt BIBA from home with c/o fever, chills, cold, sneezing x3 days. Pt was at urgent care for the same symptoms, and tested negative for flu/covid, but states she was hypertensive there with her systolic in the 170's.  States she was around 3 sick family members who were also sick. 20g R AC placed, labs sent. Safety precautions maintained. Sepsis protocol as per flow sheet . Call bell within reach.

## 2025-01-20 NOTE — ED ADULT TRIAGE NOTE - WEIGHT IN LBS
Patient: Nathaly Ramirez    Procedure: Procedure(s):  exam under anesthesia, hemorrhoidectomy, excision of hypertrophied anal papilla       Anesthesia Type:  MAC    Note:  Disposition: Outpatient   Postop Pain Control: Uneventful            Sign Out: Well controlled pain   PONV: No   Neuro/Psych: Uneventful            Sign Out: Acceptable/Baseline neuro status   Airway/Respiratory: Uneventful            Sign Out: Acceptable/Baseline resp. status   CV/Hemodynamics: Uneventful            Sign Out: Acceptable CV status; No obvious hypovolemia; No obvious fluid overload   Other NRE: NONE   DID A NON-ROUTINE EVENT OCCUR? No           Last vitals:  Vitals Value Taken Time   /86 01/20/25 1430   Temp 36.6  C (97.8  F) 01/20/25 1430   Pulse 53 01/20/25 1430   Resp 16 01/20/25 1430   SpO2 98 % 01/20/25 1435   Vitals shown include unfiled device data.    Electronically Signed By: Hernesto Alarcon MD  January 20, 2025  3:07 PM  
139.9

## 2025-02-06 NOTE — DIETITIAN INITIAL EVALUATION ADULT - ETIOLOGY-BASIS
Acute illness or injury
Simple: Patient demonstrates quick and easy understanding/Verbalized Understanding